# Patient Record
Sex: FEMALE | Race: BLACK OR AFRICAN AMERICAN | NOT HISPANIC OR LATINO | Employment: FULL TIME | URBAN - METROPOLITAN AREA
[De-identification: names, ages, dates, MRNs, and addresses within clinical notes are randomized per-mention and may not be internally consistent; named-entity substitution may affect disease eponyms.]

---

## 2017-01-09 ENCOUNTER — OFFICE VISIT (OUTPATIENT)
Dept: OBSTETRICS AND GYNECOLOGY | Facility: CLINIC | Age: 37
End: 2017-01-09
Payer: COMMERCIAL

## 2017-01-09 VITALS
HEIGHT: 66 IN | BODY MASS INDEX: 37.35 KG/M2 | DIASTOLIC BLOOD PRESSURE: 84 MMHG | WEIGHT: 232.38 LBS | SYSTOLIC BLOOD PRESSURE: 122 MMHG

## 2017-01-09 DIAGNOSIS — Z34.90 ENCOUNTER FOR SUPERVISION OF NORMAL PREGNANCY, ANTEPARTUM, UNSPECIFIED GRAVIDITY: Primary | ICD-10-CM

## 2017-01-09 PROCEDURE — 99999 PR PBB SHADOW E&M-EST. PATIENT-LVL II: CPT | Mod: PBBFAC,,, | Performed by: ADVANCED PRACTICE MIDWIFE

## 2017-01-09 PROCEDURE — 99212 OFFICE O/P EST SF 10 MIN: CPT | Mod: S$GLB,,, | Performed by: ADVANCED PRACTICE MIDWIFE

## 2017-01-09 PROCEDURE — 1159F MED LIST DOCD IN RCRD: CPT | Mod: S$GLB,,, | Performed by: ADVANCED PRACTICE MIDWIFE

## 2017-01-09 NOTE — LETTER
January 9, 2017    Merari Orosco  [address] [facility] [address]             [facility]  [address] [facility] [address]  Phone: [phone]  Fax: [phone] To whom it may concern,      Please excuse Merari Orosco from work  on 1/9/17.       If you have any questions or concerns, please don't hesitate to call.    Sincerely,        Miranda Tovar CNM

## 2017-01-10 ENCOUNTER — TELEPHONE (OUTPATIENT)
Dept: OBSTETRICS AND GYNECOLOGY | Facility: CLINIC | Age: 37
End: 2017-01-10

## 2017-01-10 NOTE — TELEPHONE ENCOUNTER
----- Message from Rama Santos sent at 1/10/2017  8:18 AM CST -----  Contact: pt  Call pt at [phone]// pt calling for work excuse for yesterday will be here to  today//moustapha johnson

## 2017-01-12 NOTE — PROGRESS NOTES
Pt here for IUD string check. She states she is having some spotting, nothing too heavy. IUD strings easily visualized.

## 2017-01-23 ENCOUNTER — TELEPHONE (OUTPATIENT)
Dept: OBSTETRICS AND GYNECOLOGY | Facility: CLINIC | Age: 37
End: 2017-01-23

## 2017-01-23 NOTE — TELEPHONE ENCOUNTER
----- Message from Julieth Swan sent at 1/23/2017  2:55 PM CST -----  Contact: Rose[employer] .  Calling regarding pt work excuse to return back to work, states she need to clarify restrictions, please call Rose @ [phone],

## 2017-02-02 ENCOUNTER — OFFICE VISIT (OUTPATIENT)
Dept: INTERNAL MEDICINE | Facility: CLINIC | Age: 37
End: 2017-02-02
Payer: COMMERCIAL

## 2017-02-02 VITALS
OXYGEN SATURATION: 96 % | HEART RATE: 72 BPM | WEIGHT: 231.5 LBS | BODY MASS INDEX: 37.21 KG/M2 | TEMPERATURE: 98 F | HEIGHT: 66 IN | DIASTOLIC BLOOD PRESSURE: 76 MMHG | SYSTOLIC BLOOD PRESSURE: 128 MMHG

## 2017-02-02 DIAGNOSIS — J01.90 ACUTE SINUSITIS, RECURRENCE NOT SPECIFIED, UNSPECIFIED LOCATION: Primary | ICD-10-CM

## 2017-02-02 PROCEDURE — 3078F DIAST BP <80 MM HG: CPT | Mod: S$GLB,,, | Performed by: PHYSICIAN ASSISTANT

## 2017-02-02 PROCEDURE — 3074F SYST BP LT 130 MM HG: CPT | Mod: S$GLB,,, | Performed by: PHYSICIAN ASSISTANT

## 2017-02-02 PROCEDURE — 99999 PR PBB SHADOW E&M-EST. PATIENT-LVL III: CPT | Mod: PBBFAC,,, | Performed by: PHYSICIAN ASSISTANT

## 2017-02-02 PROCEDURE — 99213 OFFICE O/P EST LOW 20 MIN: CPT | Mod: S$GLB,,, | Performed by: PHYSICIAN ASSISTANT

## 2017-02-02 RX ORDER — DOXYCYCLINE 100 MG/1
100 CAPSULE ORAL 2 TIMES DAILY
Qty: 14 CAPSULE | Refills: 0 | Status: SHIPPED | OUTPATIENT
Start: 2017-02-02 | End: 2017-02-09

## 2017-02-02 RX ORDER — METHYLPREDNISOLONE 4 MG/1
TABLET ORAL
Qty: 1 PACKAGE | Refills: 0 | Status: SHIPPED | OUTPATIENT
Start: 2017-02-02 | End: 2017-02-28

## 2017-02-02 NOTE — LETTER
February 2, 2017      [facility]  [address]  [place] [address]  Phone: [phone]  Fax: [phone]       Patient: Merari Orosco   YOB: 1980  Date of Visit: 02/02/2017    To Whom It May Concern:    Merari was at [facility] on 02/02/2017. She may return to work/school on 2/3/2017 with no restrictions. Also excuse her on 2/1/2017 as well. If you have any questions or concerns, or if I can be of further assistance, please do not hesitate to contact me.    Sincerely,          Chelsie Saucedo PA-C

## 2017-02-02 NOTE — PROGRESS NOTES
Subjective:      Patient ID: Merari Orosco is a 36 y.o. female.    Chief Complaint: Sinus Problem and Headache (with pressure behind eyes)    Sinus Problem   This is a recurrent problem. The current episode started in the past 7 days. The problem has been gradually worsening since onset. There has been no fever. The pain is moderate. Associated symptoms include congestion, ear pain, headaches and sinus pressure. Pertinent negatives include no chills, coughing, diaphoresis, hoarse voice, neck pain, shortness of breath, sneezing, sore throat or swollen glands. (Teeth pain) Treatments tried: claritin D. The treatment provided no relief.       Review of Systems   Constitutional: Positive for activity change, appetite change and fatigue. Negative for chills, diaphoresis, fever and unexpected weight change.   HENT: Positive for congestion, ear pain, postnasal drip and sinus pressure. Negative for dental problem, drooling, ear discharge, facial swelling, hearing loss, hoarse voice, mouth sores, nosebleeds, rhinorrhea, sneezing, sore throat and trouble swallowing.    Eyes: Negative for pain, discharge, redness, itching and visual disturbance.   Respiratory: Negative for cough, chest tightness, shortness of breath and wheezing.    Cardiovascular: Negative for chest pain, palpitations and leg swelling.   Gastrointestinal: Negative for abdominal pain, constipation, diarrhea, nausea and vomiting.   Endocrine: Negative.    Genitourinary: Negative.  Negative for difficulty urinating.   Musculoskeletal: Negative for myalgias, neck pain and neck stiffness.   Skin: Negative for rash.   Allergic/Immunologic: Negative for environmental allergies, food allergies and immunocompromised state.   Neurological: Positive for headaches. Negative for dizziness and weakness.     Objective:     Visit Vitals    /76 (BP Location: Right arm, Patient Position: Sitting, BP Method: Manual)    Pulse 72    Temp 97.5 °F (36.4 °C) (Tympanic)  "   Ht 5' 6" (1.676 m)    Wt 105 kg (231 lb 7.7 oz)    LMP 01/02/2017 (Approximate)    SpO2 96%    BMI 37.36 kg/m2       Physical Exam   Constitutional: She is oriented to person, place, and time. She appears well-developed and well-nourished.   HENT:   Head: Normocephalic and atraumatic.   Right Ear: Hearing, external ear and ear canal normal. No tenderness. A middle ear effusion (serous) is present.   Left Ear: Hearing, external ear and ear canal normal. No tenderness. A middle ear effusion (serous) is present.   Nose: Mucosal edema and rhinorrhea present. No sinus tenderness. Right sinus exhibits maxillary sinus tenderness and frontal sinus tenderness. Left sinus exhibits maxillary sinus tenderness and frontal sinus tenderness.   Mouth/Throat: Uvula is midline and mucous membranes are normal. No oral lesions. Normal dentition. No dental abscesses, uvula swelling or dental caries. Posterior oropharyngeal edema and posterior oropharyngeal erythema present. No oropharyngeal exudate or tonsillar abscesses. No tonsillar exudate.   Eyes: Conjunctivae and EOM are normal. Pupils are equal, round, and reactive to light. Right eye exhibits no discharge. Left eye exhibits no discharge.   Neck: Normal range of motion. Neck supple.   Cardiovascular: Normal rate, regular rhythm and normal heart sounds.  Exam reveals no gallop and no friction rub.    No murmur heard.  Pulmonary/Chest: Effort normal and breath sounds normal. No respiratory distress. She has no wheezes. She has no rales.   Lymphadenopathy:     She has no cervical adenopathy.   Neurological: She is alert and oriented to person, place, and time.   Skin: Skin is warm. No rash noted. No erythema. No pallor.   Psychiatric: She has a normal mood and affect. Her behavior is normal. Judgment and thought content normal.   Vitals reviewed.      Assessment:     1. Acute sinusitis, recurrence not specified, unspecified location      Plan:   Acute sinusitis, recurrence " not specified, unspecified location  -     methylPREDNISolone (MEDROL DOSEPACK) 4 mg tablet; use as directed  Dispense: 1 Package; Refill: 0  -     doxycycline (MONODOX) 100 MG capsule; Take 1 capsule (100 mg total) by mouth 2 (two) times daily. Do not take with milk or yogurt  Dispense: 14 capsule; Refill: 0    -flonase  -tylenol or Excedrin for headache  -Educational handout on over-the-counter medications and at-home conservative care, pertinent to the patients diagnosis today, was handed to the patient and discussed in detail.  -Steroids can increase blood sugar and blood pressure. Therefore, diabetics need to check their blood sugar regularly while taking a steroid. Patients with hypertension need to check their blood pressure regularly as well.      Return if symptoms worsen or fail to improve.

## 2017-02-02 NOTE — PATIENT INSTRUCTIONS
Acute Bacterial Rhinosinusitis (ABRS)  Acute bacterial rhinosinusitis (ABRS) is an infection of your nasal cavity and sinuses. Its caused by bacteria. Acute means that youve had symptoms for less than 12 weeks.  Understanding your sinuses  The nasal cavity is the large air-filled space behind your nose. The sinuses are a group of spaces formed by the bones of your face. They connect with your nasal cavity. ABRS causes the tissue lining these spaces to become inflamed. Mucus may not drain normally. This leads to facial pain and other symptoms.  What causes ABRS?  ABRS most often follows an upper respiratory infection caused by a virus. Bacteria then infect the lining of your nasal cavity and sinuses. But you can also get ABRS if you have:  · Nasal allergies  · Long-term nasal swelling and congestion not caused by allergies  · Blockage in the nose  Symptoms of ABRS  The symptoms of ABRS may be different for each person, and can include:  · Nasal congestion  · Runny nose  · Fluid draining from the nose down the throat (postnasal drip)  · Headache  · Cough  · Pain in the sinuses  · Thick, colored fluid from the nose (mucus)  · Fever  Diagnosing ABRS  ABRS may be diagnosed if youve had an upper respiratory infection like a cold and cough for longer than 10 to 14 days. Your health care provider will ask about your symptoms and your medical history. The provider will check your vital signs, including your temperature. Youll have a physical exam. The health care provider will check your ears, nose, and throat. You likely wont need any tests. If ABRS comes back, you may have a culture or other tests.  Treatment for ABRS  Treatment may include:  · Antibiotic medicine. This is for symptoms that last for at least 10 to 14 days.  · Nasal corticosteroid medicine. Drops or spray used in the nose can lessen swelling and congestion.  · Over-the-counter pain medicine. This is to lessen sinus pain and pressure.  · Nasal  decongestant medicine. Spray or drops may help to lessen congestion. Do not use them for more than a few days.  · Salt wash (saline irrigation). This can help to loosen mucus.  Possible complications of ABRS  ABRS may come back or become long-term (chronic).  In rare cases, ABRS may cause complications such as:   · Inflamed tissue around the brain and spinal cord (meningitis)  · Inflamed tissue around the eyes (orbital cellulitis)  · Inflamed bones around the sinuses (osteitis)  These problems may need to be treated in a hospital with intravenous (IV) antibiotic medicine or surgery.  When to call the health care provider  Call your health care provider if you have any of the following:  · Symptoms that dont get better, or get worse  · Symptoms that dont get better after 3 to 5 days on antibiotics  · Trouble seeing  · Swelling around your eyes  · Confusion or trouble staying awake      © 9508-5464 [employer]. [address] [place] [address]. All rights reserved. This information is not intended as a substitute for professional medical care. Always follow your healthcare professional's instructions.        Sinusitis (Antibiotic Treatment)    The sinuses are air-filled spaces within the bones of the face. They connect to the inside of the nose. Sinusitis is an inflammation of the tissue lining the sinus cavity. Sinus inflammation can occur during a cold. It can also be due to allergies to pollens and other particles in the air. Sinusitis can cause symptoms of sinus congestion and fullness. A sinus infection causes fever, headache and facial pain. There is often green or yellow drainage from the nose or into the back of the throat (post-nasal drip). You have been given antibiotics to treat this condition.  Home care:  · Take the full course of antibiotics as instructed. Do not stop taking them, even if you feel better.  · Drink plenty of water, hot tea, and other liquids. This may help thin  mucus. It also may promote sinus drainage.  · Heat may help soothe painful areas of the face. Use a towel soaked in hot water. Or,  the shower and direct the hot spray onto your face. Using a vaporizer along with a menthol rub at night may also help.   · An expectorant containing guaifenesin may help thin the mucus and promote drainage from the sinuses.  · Over-the-counter decongestants may be used unless a similar medicine was prescribed. Nasal sprays work the fastest. Use one that contains phenylephrine or oxymetazoline. First blow the nose gently. Then use the spray. Do not use these medicines more often than directed on the label or symptoms may get worse. You may also use tablets containing pseudoephedrine. Avoid products that combine ingredients, because side effects may be increased. Read labels. You can also ask the pharmacist for help. (NOTE: Persons with high blood pressure should not use decongestants. They can raise blood pressure.)  · Over-the-counter antihistamines may help if allergies contributed to your sinusitis.    · Do not use nasal rinses or irrigation during an acute sinus infection, unless told to by your health care provider. Rinsing may spread the infection to other sinuses.  · Use acetaminophen or ibuprofen to control pain, unless another pain medicine was prescribed. (If you have chronic liver or kidney disease or ever had a stomach ulcer, talk with your doctor before using these medicines. Aspirin should never be used in anyone under 18 years of age who is ill with a fever. It may cause severe liver damage.)  · Don't smoke. This can worsen symptoms.  Follow-up care  Follow up with your healthcare provider or our staff if you are not improving within the next week.  When to seek medical advice  Call your healthcare provider if any of these occur:  · Facial pain or headache becoming more severe  · Stiff neck  · Unusual drowsiness or confusion  · Swelling of the forehead or  eyelids  · Vision problems, including blurred or double vision  · Fever of 100.4ºF (38ºC) or higher, or as directed by your healthcare provider  · Seizure  · Breathing problems  · Symptoms not resolving within 10 days  © 3342-2108 [employer]. [address] [place] [address]. All rights reserved. This information is not intended as a substitute for professional medical care. Always follow your healthcare professional's instructions.        Preventing Sinusitis    Colds, flu, and allergies can lead more easily to sinusitis. Do your best to prevent sinusitis by preventing these underlying problems. Do what you can to avoid getting colds and other infections. Avoid any allergens (substances that cause allergies), and keep your sinuses as moist as possible.  Tips for air travel  When traveling on an airplane, use saline nasal spray to keep your sinuses moist. Drink plenty of fluids. You may also want to take a decongestant before boarding.   Prevent colds  Do what you can to avoid exposure to colds and flu. Whenever possible, take more time to rest when you feel something coming on.  · Wash your hands often, especially during cold and flu season.  · As much as possible, stay away from infected people.  · Follow these standbys for beating the bugs: eat balanced meals, exercise regularly, and get plenty of sleep.  Avoid allergens  First find out what substances youre allergic to. Then take steps to minimize exposure to allergens or irritants in the air such as dust, pollution, and pollen.  · Wear a mask when you clean, or consider hiring a  to help minimize your exposure to dust.  · Sit in the nonsmoking sections of restaurants.  · Avoid the outdoors during peak pollution hours such as rush hour.  · Keep an air conditioner on during allergy season and clean its filter regularly.  Maximize moisture  Keeping your sinuses moist makes your mucus thinner, allowing your sinuses to drain  better. This, in turn, helps prevent infection. Ask your doctor about these suggestions:  · Use a humidifier, regularly cleaning out any mold or mildew in the reservoir.  · Drink several glasses of water a day.  · Avoid drying substances such as alcohol and coffee.  · Avoid smoke, which dries out sinus linings.  · Use saltwater rinses.  © 4061-8704 [employer]. [address] [place] [address], [place] [address]. All rights reserved. This information is not intended as a substitute for professional medical care. Always follow your healthcare professional's instructions.        Self-Care for Sinusitis     Drinking plenty of water can help sinuses drain.     Sinusitis can often be managed with self-care. Self-care can keep sinuses moist and make you feel more comfortable. Remember to follow your doctor's instructions closely, which can make a big difference in getting your sinus problem under control.  Drink fluids  Drinking extra fluids -- a glass every hour or two -- helps thin your mucus, allowing it to drain from your sinuses more easily. A humidifier helps in much the same way. Fluids can also offset the drying effects of certain drugs.  Use saltwater rinses  Rinses help keep your sinuses and nose moist. Mix a teaspoon of salt in 8 ounces of fresh, warm water. Use a bulb syringe to gently squirt the water into your nose a few times a day. You can also buy ready-made saline nasal sprays.  Apply hot or cold packs  Applying heat to the area surrounding your sinuses may make you feel more comfortable. Use a hot water bottle or a hand towel dipped in hot water. Some people also find ice packs effective for relieving pain.  Medications  Your doctor may prescribe medications to help treat your sinusitis. If you have an infection, antibiotics can help clear it up. If you are prescribed antibiotics, take all pills on schedule until they are gone, even if you feel better. Decongestants help relieve swelling. Use  decongestant sprays for short periods only under the direction of your doctor. If you have allergies, your doctor may prescribe medications to help relieve them.   © 8512-2549 [employer]. [address] [place] [address]. All rights reserved. This information is not intended as a substitute for professional medical care. Always follow your healthcare professional's instructions.        Sinus Headaches     When using nasal spray, keep your chin down and angle the spray away from center.     Sinus headaches can cause a gnawing pain behind the nose and eyes. The pain most often gets worse in the afternoon and evening. You may also run a fever. Sinus headaches are caused by colds or allergies that make the nasal passages inflamed or infected.  To help prevent sinus headaches:  · Treat colds promptly to keep mucus from backing up.  · Avoid things that trigger sinus problems, such as pollens, dust, smoke, fumes, and strong odors.  · Take allergy medications as directed by your doctor.  To relieve the pain:  · Keep your sinuses open and free of mucus. Try over-the-counter sinus rinse products.  · Use a nasal decongestant as directed to reduce the inflammation.  · Drink fluids to keep the mucus thinner. This helps it drain more easily. You can also use a humidifier.  · Apply hot packs to the area around your sinuses. Use a hot water bottle.  · See your doctor if your sinus headache lasts more than two weeks. You may need medication for a sinus infection or an exam to check for other headache conditions, like migraines.  © 0957-6132 [employer]. [address] [place] [address]. All rights reserved. This information is not intended as a substitute for professional medical care. Always follow your healthcare professional's instructions.

## 2017-02-02 NOTE — MR AVS SNAPSHOT
[facility]  [address] [facility] [place] [address]  Phone: [phone]  Fax: [phone]                  Merari Orosco   2017 10:20 AM   Office Visit    Description:  Female : 1980   Provider:  Chelsie Saucedo PA-C   Department:  [facility] Internal Medicine           Reason for Visit     Sinus Problem     Headache           Diagnoses this Visit        Comments    Acute sinusitis, recurrence not specified, unspecified location    -  Primary            To Do List           Future Appointments        Provider Department Dept Phone    2017 4:20 PM Gena Ordonez MD [facility] [phone]      Goals (5 Years of Data)     None      Follow-Up and Disposition     Return if symptoms worsen or fail to improve.    Follow-up and Disposition History       These Medications        Disp Refills Start End    methylPREDNISolone (MEDROL DOSEPACK) 4 mg tablet 1 Package 0 2017     use as directed    Pharmacy: [facility] [address] [place] [address] [place] [address] AT [facility] Ph #: [phone]       doxycycline (MONODOX) 100 MG capsule 14 capsule 0 2017    Take 1 capsule (100 mg total) by mouth 2 (two) times daily. Do not take with milk or yogurt - Oral    Pharmacy: [facility] [address] [place] [address] [place] - [address] AT [facility] Ph #: [phone]         [facility] On Call     [facility] On Call Nurse [facility] -  Assistance  Registered nurses in the [facility] On Call Center provide clinical advisement, health education, appointment booking, and other advisory services.  Call for this free service at [phone].             Medications           Message regarding Medications     Verify the changes and/or additions to your medication regime listed below are the same as discussed with your clinician today.  If any of these changes or additions are incorrect, please notify your healthcare provider.       "  START taking these NEW medications        Refills    methylPREDNISolone (MEDROL DOSEPACK) 4 mg tablet 0    Sig: use as directed    Class: Normal    doxycycline (MONODOX) 100 MG capsule 0    Sig: Take 1 capsule (100 mg total) by mouth 2 (two) times daily. Do not take with milk or yogurt    Class: Normal    Route: Oral      STOP taking these medications     ibuprofen (ADVIL,MOTRIN) 800 MG tablet Take 1 tablet (800 mg total) by mouth every 8 (eight) hours as needed for Pain.    clonazePAM (KLONOPIN) 0.5 MG tablet Take 1 tablet (0.5 mg total) by mouth 2 (two) times daily as needed for Anxiety.           Verify that the below list of medications is an accurate representation of the medications you are currently taking.  If none reported, the list may be blank. If incorrect, please contact your healthcare provider. Carry this list with you in case of emergency.           Current Medications     fluoxetine (PROZAC) 40 MG capsule Take 1 capsule (40 mg total) by mouth once daily.    lisinopril-hydrochlorothiazide (PRINZIDE,ZESTORETIC) 20-12.5 mg per tablet Take 2 tablets by mouth once daily.    metoprolol tartrate (LOPRESSOR) 50 MG tablet Take 1 tablet (50 mg total) by mouth 2 (two) times daily.    promethazine-dextromethorphan (PROMETHAZINE-DM) 6.25-15 mg/5 mL Syrp Take 5 mLs by mouth every 6 (six) hours as needed. May cause drowsiness    doxycycline (MONODOX) 100 MG capsule Take 1 capsule (100 mg total) by mouth 2 (two) times daily. Do not take with milk or yogurt    methylPREDNISolone (MEDROL DOSEPACK) 4 mg tablet use as directed           Clinical Reference Information           Your Vitals Were     BP Pulse Temp Height    128/76 (BP Location: Right arm, Patient Position: Sitting, BP Method: Manual) 72 97.5 °F (36.4 °C) (Tympanic) 5' 6" (1.676 m)    Weight Last Period SpO2 BMI    105 kg (231 lb 7.7 oz) 01/02/2017 (Approximate) 96% 37.36 kg/m2      Blood Pressure          Most Recent Value    BP  128/76      Allergies " as of 2/2/2017     No Known Allergies      Immunizations Administered on Date of Encounter - 2/2/2017     None      Instructions      Acute Bacterial Rhinosinusitis (ABRS)  Acute bacterial rhinosinusitis (ABRS) is an infection of your nasal cavity and sinuses. Its caused by bacteria. Acute means that youve had symptoms for less than 12 weeks.  Understanding your sinuses  The nasal cavity is the large air-filled space behind your nose. The sinuses are a group of spaces formed by the bones of your face. They connect with your nasal cavity. ABRS causes the tissue lining these spaces to become inflamed. Mucus may not drain normally. This leads to facial pain and other symptoms.  What causes ABRS?  ABRS most often follows an upper respiratory infection caused by a virus. Bacteria then infect the lining of your nasal cavity and sinuses. But you can also get ABRS if you have:  · Nasal allergies  · Long-term nasal swelling and congestion not caused by allergies  · Blockage in the nose  Symptoms of ABRS  The symptoms of ABRS may be different for each person, and can include:  · Nasal congestion  · Runny nose  · Fluid draining from the nose down the throat (postnasal drip)  · Headache  · Cough  · Pain in the sinuses  · Thick, colored fluid from the nose (mucus)  · Fever  Diagnosing ABRS  ABRS may be diagnosed if youve had an upper respiratory infection like a cold and cough for longer than 10 to 14 days. Your health care provider will ask about your symptoms and your medical history. The provider will check your vital signs, including your temperature. Youll have a physical exam. The health care provider will check your ears, nose, and throat. You likely wont need any tests. If ABRS comes back, you may have a culture or other tests.  Treatment for ABRS  Treatment may include:  · Antibiotic medicine. This is for symptoms that last for at least 10 to 14 days.  · Nasal corticosteroid medicine. Drops or spray used in the  nose can lessen swelling and congestion.  · Over-the-counter pain medicine. This is to lessen sinus pain and pressure.  · Nasal decongestant medicine. Spray or drops may help to lessen congestion. Do not use them for more than a few days.  · Salt wash (saline irrigation). This can help to loosen mucus.  Possible complications of ABRS  ABRS may come back or become long-term (chronic).  In rare cases, ABRS may cause complications such as:   · Inflamed tissue around the brain and spinal cord (meningitis)  · Inflamed tissue around the eyes (orbital cellulitis)  · Inflamed bones around the sinuses (osteitis)  These problems may need to be treated in a hospital with intravenous (IV) antibiotic medicine or surgery.  When to call the health care provider  Call your health care provider if you have any of the following:  · Symptoms that dont get better, or get worse  · Symptoms that dont get better after 3 to 5 days on antibiotics  · Trouble seeing  · Swelling around your eyes  · Confusion or trouble staying awake      © 5658-6242 [employer]. [address] [place] [address]. All rights reserved. This information is not intended as a substitute for professional medical care. Always follow your healthcare professional's instructions.        Sinusitis (Antibiotic Treatment)    The sinuses are air-filled spaces within the bones of the face. They connect to the inside of the nose. Sinusitis is an inflammation of the tissue lining the sinus cavity. Sinus inflammation can occur during a cold. It can also be due to allergies to pollens and other particles in the air. Sinusitis can cause symptoms of sinus congestion and fullness. A sinus infection causes fever, headache and facial pain. There is often green or yellow drainage from the nose or into the back of the throat (post-nasal drip). You have been given antibiotics to treat this condition.  Home care:  · Take the full course of antibiotics as  instructed. Do not stop taking them, even if you feel better.  · Drink plenty of water, hot tea, and other liquids. This may help thin mucus. It also may promote sinus drainage.  · Heat may help soothe painful areas of the face. Use a towel soaked in hot water. Or,  the shower and direct the hot spray onto your face. Using a vaporizer along with a menthol rub at night may also help.   · An expectorant containing guaifenesin may help thin the mucus and promote drainage from the sinuses.  · Over-the-counter decongestants may be used unless a similar medicine was prescribed. Nasal sprays work the fastest. Use one that contains phenylephrine or oxymetazoline. First blow the nose gently. Then use the spray. Do not use these medicines more often than directed on the label or symptoms may get worse. You may also use tablets containing pseudoephedrine. Avoid products that combine ingredients, because side effects may be increased. Read labels. You can also ask the pharmacist for help. (NOTE: Persons with high blood pressure should not use decongestants. They can raise blood pressure.)  · Over-the-counter antihistamines may help if allergies contributed to your sinusitis.    · Do not use nasal rinses or irrigation during an acute sinus infection, unless told to by your health care provider. Rinsing may spread the infection to other sinuses.  · Use acetaminophen or ibuprofen to control pain, unless another pain medicine was prescribed. (If you have chronic liver or kidney disease or ever had a stomach ulcer, talk with your doctor before using these medicines. Aspirin should never be used in anyone under 18 years of age who is ill with a fever. It may cause severe liver damage.)  · Don't smoke. This can worsen symptoms.  Follow-up care  Follow up with your healthcare provider or our staff if you are not improving within the next week.  When to seek medical advice  Call your healthcare provider if any of these  occur:  · Facial pain or headache becoming more severe  · Stiff neck  · Unusual drowsiness or confusion  · Swelling of the forehead or eyelids  · Vision problems, including blurred or double vision  · Fever of 100.4ºF (38ºC) or higher, or as directed by your healthcare provider  · Seizure  · Breathing problems  · Symptoms not resolving within 10 days  © 2000-2016 [employer]. [address] [place] [address], [place] [address]. All rights reserved. This information is not intended as a substitute for professional medical care. Always follow your healthcare professional's instructions.        Preventing Sinusitis    Colds, flu, and allergies can lead more easily to sinusitis. Do your best to prevent sinusitis by preventing these underlying problems. Do what you can to avoid getting colds and other infections. Avoid any allergens (substances that cause allergies), and keep your sinuses as moist as possible.  Tips for air travel  When traveling on an airplane, use saline nasal spray to keep your sinuses moist. Drink plenty of fluids. You may also want to take a decongestant before boarding.   Prevent colds  Do what you can to avoid exposure to colds and flu. Whenever possible, take more time to rest when you feel something coming on.  · Wash your hands often, especially during cold and flu season.  · As much as possible, stay away from infected people.  · Follow these standbys for beating the bugs: eat balanced meals, exercise regularly, and get plenty of sleep.  Avoid allergens  First find out what substances youre allergic to. Then take steps to minimize exposure to allergens or irritants in the air such as dust, pollution, and pollen.  · Wear a mask when you clean, or consider hiring a  to help minimize your exposure to dust.  · Sit in the nonsmoking sections of restaurants.  · Avoid the outdoors during peak pollution hours such as rush hour.  · Keep an air conditioner on during allergy season  and clean its filter regularly.  Maximize moisture  Keeping your sinuses moist makes your mucus thinner, allowing your sinuses to drain better. This, in turn, helps prevent infection. Ask your doctor about these suggestions:  · Use a humidifier, regularly cleaning out any mold or mildew in the reservoir.  · Drink several glasses of water a day.  · Avoid drying substances such as alcohol and coffee.  · Avoid smoke, which dries out sinus linings.  · Use saltwater rinses.  © 8779-4428 [employer]. [address] [place] [address], [place] [address]. All rights reserved. This information is not intended as a substitute for professional medical care. Always follow your healthcare professional's instructions.        Self-Care for Sinusitis     Drinking plenty of water can help sinuses drain.     Sinusitis can often be managed with self-care. Self-care can keep sinuses moist and make you feel more comfortable. Remember to follow your doctor's instructions closely, which can make a big difference in getting your sinus problem under control.  Drink fluids  Drinking extra fluids -- a glass every hour or two -- helps thin your mucus, allowing it to drain from your sinuses more easily. A humidifier helps in much the same way. Fluids can also offset the drying effects of certain drugs.  Use saltwater rinses  Rinses help keep your sinuses and nose moist. Mix a teaspoon of salt in 8 ounces of fresh, warm water. Use a bulb syringe to gently squirt the water into your nose a few times a day. You can also buy ready-made saline nasal sprays.  Apply hot or cold packs  Applying heat to the area surrounding your sinuses may make you feel more comfortable. Use a hot water bottle or a hand towel dipped in hot water. Some people also find ice packs effective for relieving pain.  Medications  Your doctor may prescribe medications to help treat your sinusitis. If you have an infection, antibiotics can help clear it up. If you are  prescribed antibiotics, take all pills on schedule until they are gone, even if you feel better. Decongestants help relieve swelling. Use decongestant sprays for short periods only under the direction of your doctor. If you have allergies, your doctor may prescribe medications to help relieve them.   © 5376-1425 [employer]. [address] [place] [address]. All rights reserved. This information is not intended as a substitute for professional medical care. Always follow your healthcare professional's instructions.        Sinus Headaches     When using nasal spray, keep your chin down and angle the spray away from center.     Sinus headaches can cause a gnawing pain behind the nose and eyes. The pain most often gets worse in the afternoon and evening. You may also run a fever. Sinus headaches are caused by colds or allergies that make the nasal passages inflamed or infected.  To help prevent sinus headaches:  · Treat colds promptly to keep mucus from backing up.  · Avoid things that trigger sinus problems, such as pollens, dust, smoke, fumes, and strong odors.  · Take allergy medications as directed by your doctor.  To relieve the pain:  · Keep your sinuses open and free of mucus. Try over-the-counter sinus rinse products.  · Use a nasal decongestant as directed to reduce the inflammation.  · Drink fluids to keep the mucus thinner. This helps it drain more easily. You can also use a humidifier.  · Apply hot packs to the area around your sinuses. Use a hot water bottle.  · See your doctor if your sinus headache lasts more than two weeks. You may need medication for a sinus infection or an exam to check for other headache conditions, like migraines.  © 6096-7492 [employer]. [address] [place] [address]. All rights reserved. This information is not intended as a substitute for professional medical care. Always follow your healthcare professional's  instructions.             Language Assistance Services     ATTENTION: Language assistance services are available, free of charge. Please call [phone].      ATENCIÓN: Si habla marisel, tiene a peralta disposición servicios gratuitos de asistencia lingüística. Llame al [phone].     CHÚ Ý: N?u b?n nói Ti?ng Vi?t, có các d?ch v? h? tr? ngôn ng? mi?n phí dành cho b?n. G?i s? [phone].         [facility] complies with applicable Federal civil rights laws and does not discriminate on the basis of race, color, national origin, age, disability, or sex.

## 2017-02-13 DIAGNOSIS — F32.A ANXIETY AND DEPRESSION: ICD-10-CM

## 2017-02-13 DIAGNOSIS — F41.9 ANXIETY AND DEPRESSION: ICD-10-CM

## 2017-02-14 RX ORDER — CLONAZEPAM 0.5 MG/1
0.5 TABLET ORAL 2 TIMES DAILY PRN
Qty: 30 TABLET | Refills: 0 | OUTPATIENT
Start: 2017-02-14

## 2017-02-21 ENCOUNTER — TELEPHONE (OUTPATIENT)
Dept: PHARMACY | Facility: CLINIC | Age: 37
End: 2017-02-21

## 2017-02-21 NOTE — TELEPHONE ENCOUNTER
Patient called to ask for a refill on her Clonazepam.  I explained we faxed her doctor and were waiting on a response.      I told her she could contact her doctor through the [facility] portal or leave her a message through the phone lines.      Patient is asking us to rush the prescription, I indicated that we can not do that until we get a hard copy.

## 2017-02-28 ENCOUNTER — TELEPHONE (OUTPATIENT)
Dept: INTERNAL MEDICINE | Facility: CLINIC | Age: 37
End: 2017-02-28

## 2017-02-28 ENCOUNTER — OFFICE VISIT (OUTPATIENT)
Dept: INTERNAL MEDICINE | Facility: CLINIC | Age: 37
End: 2017-02-28
Payer: COMMERCIAL

## 2017-02-28 VITALS
TEMPERATURE: 98 F | SYSTOLIC BLOOD PRESSURE: 120 MMHG | HEIGHT: 66 IN | WEIGHT: 232.38 LBS | BODY MASS INDEX: 37.35 KG/M2 | OXYGEN SATURATION: 98 % | DIASTOLIC BLOOD PRESSURE: 84 MMHG | HEART RATE: 81 BPM

## 2017-02-28 DIAGNOSIS — M54.50 ACUTE BILATERAL LOW BACK PAIN WITHOUT SCIATICA: Primary | ICD-10-CM

## 2017-02-28 LAB
BACTERIA #/AREA URNS HPF: ABNORMAL /HPF
BILIRUB UR QL STRIP: NEGATIVE
CLARITY UR: CLEAR
COLOR UR: YELLOW
GLUCOSE UR QL STRIP: NEGATIVE
HGB UR QL STRIP: ABNORMAL
KETONES UR QL STRIP: NEGATIVE
LEUKOCYTE ESTERASE UR QL STRIP: NEGATIVE
MICROSCOPIC COMMENT: ABNORMAL
NITRITE UR QL STRIP: NEGATIVE
PH UR STRIP: 7 [PH] (ref 5–8)
PROT UR QL STRIP: NEGATIVE
RBC #/AREA URNS HPF: 4 /HPF (ref 0–4)
SP GR UR STRIP: 1 (ref 1–1.03)
SQUAMOUS #/AREA URNS HPF: 3 /HPF
URN SPEC COLLECT METH UR: ABNORMAL
WBC #/AREA URNS HPF: 2 /HPF (ref 0–5)

## 2017-02-28 PROCEDURE — 3079F DIAST BP 80-89 MM HG: CPT | Mod: S$GLB,,, | Performed by: FAMILY MEDICINE

## 2017-02-28 PROCEDURE — 3074F SYST BP LT 130 MM HG: CPT | Mod: S$GLB,,, | Performed by: FAMILY MEDICINE

## 2017-02-28 PROCEDURE — 1160F RVW MEDS BY RX/DR IN RCRD: CPT | Mod: S$GLB,,, | Performed by: FAMILY MEDICINE

## 2017-02-28 PROCEDURE — 81000 URINALYSIS NONAUTO W/SCOPE: CPT

## 2017-02-28 PROCEDURE — 99999 PR PBB SHADOW E&M-EST. PATIENT-LVL III: CPT | Mod: PBBFAC,,, | Performed by: FAMILY MEDICINE

## 2017-02-28 PROCEDURE — 87086 URINE CULTURE/COLONY COUNT: CPT

## 2017-02-28 PROCEDURE — 99213 OFFICE O/P EST LOW 20 MIN: CPT | Mod: S$GLB,,, | Performed by: FAMILY MEDICINE

## 2017-02-28 RX ORDER — CYCLOBENZAPRINE HCL 5 MG
5 TABLET ORAL 3 TIMES DAILY PRN
Qty: 10 TABLET | Refills: 0 | Status: SHIPPED | OUTPATIENT
Start: 2017-02-28 | End: 2017-03-07 | Stop reason: SDUPTHER

## 2017-02-28 NOTE — PROGRESS NOTES
Subjective:       Patient ID: Merari Orosco is a 36 y.o. female.    Chief Complaint: Medication Refill and Back Pain    HPI  Back Pain: Patient presents for presents evaluation of low back problems.  Symptoms have been present for a few months and include pain in low back both sides (throbbing in character; 6/10 in severity). With moving around it is worse than a 6. Initial inciting event: IUD placement. Symptoms are worst: NA. Pain is worse with cycle. Alleviating factors identifiable by patient are Using a blanket for pressure (in chair against back). Exacerbating factors identifiable by patient are a lot of movement and laying flat. Treatments so far initiated by patient: took ibuprofen 800 mg which did not help.  Previous lower back problems: has had pain in the past but did not last as long. Previous workup: none. Previous treatments: none. She has had IUD checked.       Review of Systems   Constitutional: Negative for fatigue and fever.   Genitourinary: Positive for flank pain. Negative for decreased urine volume, difficulty urinating, dysuria, pelvic pain and urgency.   Musculoskeletal: Positive for back pain. Negative for gait problem, joint swelling, neck pain and neck stiffness.   Neurological: Negative for dizziness, weakness, light-headedness and headaches.   Psychiatric/Behavioral: Negative for sleep disturbance.         Objective:        Physical Exam   Constitutional: She is oriented to person, place, and time. She appears well-developed and well-nourished.   HENT:   Head: Normocephalic and atraumatic.   Cardiovascular: Normal heart sounds.    Pulmonary/Chest: Breath sounds normal.   Abdominal: Soft. Bowel sounds are normal. She exhibits no distension. There is no tenderness.   Musculoskeletal: Normal range of motion.   Neurological: She is alert and oriented to person, place, and time.   Skin: No rash noted.   Psychiatric: She has a normal mood and affect.   Nursing note and vitals reviewed.         Assessment/Plan:   Acute bilateral low back pain without sciatica  -     Urinalysis-trace blood  -     Urine culture-pending        -     Urinalysis Microscopic-few bacteria  -     cyclobenzaprine (FLEXERIL) 5 MG tablet; Take 1 tablet (5 mg total) by mouth 3 (three) times daily as needed for Muscle spasms.  Dispense: 10 tablet; Refill: 0    See PCP for medication refills (Xanax)  Return if symptoms worsen or fail to improve.    Shefali Kirk MD  [facility]   Family Medicine

## 2017-02-28 NOTE — MR AVS SNAPSHOT
[facility]  [address] [place] [address]  [place] [address]  Phone: [phone]  Fax: [phone]                  Merari Orosco   2017 1:40 PM   Office Visit    Description:  Female : 1980   Provider:  Shefali Kirk MD   Department:  [facility]           Reason for Visit     Medication Refill     Back Pain           Diagnoses this Visit        Comments    Acute bilateral low back pain without sciatica    -  Primary            To Do List           Goals (5 Years of Data)     None      [facility]     [facility] On Call Nurse Care Line -  Assistance  Registered nurses in the [facility] On Call Center provide clinical advisement, health education, appointment booking, and other advisory services.  Call for this free service at [phone].             Medications           Message regarding Medications     Verify the changes and/or additions to your medication regime listed below are the same as discussed with your clinician today.  If any of these changes or additions are incorrect, please notify your healthcare provider.        STOP taking these medications     methylPREDNISolone (MEDROL DOSEPACK) 4 mg tablet use as directed           Verify that the below list of medications is an accurate representation of the medications you are currently taking.  If none reported, the list may be blank. If incorrect, please contact your healthcare provider. Carry this list with you in case of emergency.           Current Medications     fluoxetine (PROZAC) 40 MG capsule Take 1 capsule (40 mg total) by mouth once daily.    lisinopril-hydrochlorothiazide (PRINZIDE,ZESTORETIC) 20-12.5 mg per tablet Take 2 tablets by mouth once daily.    metoprolol tartrate (LOPRESSOR) 50 MG tablet Take 1 tablet (50 mg total) by mouth 2 (two) times daily.    promethazine-dextromethorphan (PROMETHAZINE-DM) 6.25-15 mg/5 mL Syrp Take 5 mLs by mouth every 6 (six) hours as needed. May  cause drowsiness           Clinical Reference Information           Your Vitals Were     BP                   120/84 (BP Location: Left arm, Patient Position: Sitting, BP Method: Manual)           Blood Pressure          Most Recent Value    BP  120/84      Allergies as of 2/28/2017     No Known Allergies      Immunizations Administered on Date of Encounter - 2/28/2017     None      Orders Placed During Today's Visit      Normal Orders This Visit    Urinalysis     Urine culture       Language Assistance Services     ATTENTION: Language assistance services are available, free of charge. Please call [phone].      ATENCIÓN: Si chrisla marisel, tiene a peralta disposición servicios gratuitos de asistencia lingüística. Llame al [phone].     CHÚ Ý: N?u b?n nói Ti?ng Vi?t, có các d?ch v? h? tr? ngôn ng? mi?n phí dành cho b?n. G?i s? [phone].         [facility] complies with applicable Federal civil rights laws and does not discriminate on the basis of race, color, national origin, age, disability, or sex.

## 2017-03-02 LAB
BACTERIA UR CULT: NORMAL
BACTERIA UR CULT: NORMAL

## 2017-03-07 ENCOUNTER — PATIENT MESSAGE (OUTPATIENT)
Dept: INTERNAL MEDICINE | Facility: CLINIC | Age: 37
End: 2017-03-07

## 2017-03-07 DIAGNOSIS — M54.50 ACUTE BILATERAL LOW BACK PAIN WITHOUT SCIATICA: ICD-10-CM

## 2017-03-07 RX ORDER — CYCLOBENZAPRINE HCL 5 MG
5 TABLET ORAL 3 TIMES DAILY PRN
Qty: 10 TABLET | Refills: 0 | Status: SHIPPED | OUTPATIENT
Start: 2017-03-07 | End: 2017-03-17

## 2017-07-27 ENCOUNTER — OFFICE VISIT (OUTPATIENT)
Dept: INTERNAL MEDICINE | Facility: CLINIC | Age: 37
End: 2017-07-27
Payer: COMMERCIAL

## 2017-07-27 VITALS
WEIGHT: 241.63 LBS | HEART RATE: 83 BPM | TEMPERATURE: 96 F | BODY MASS INDEX: 38.83 KG/M2 | HEIGHT: 66 IN | DIASTOLIC BLOOD PRESSURE: 74 MMHG | SYSTOLIC BLOOD PRESSURE: 116 MMHG

## 2017-07-27 DIAGNOSIS — M54.50 ACUTE BILATERAL LOW BACK PAIN WITHOUT SCIATICA: Primary | ICD-10-CM

## 2017-07-27 PROCEDURE — 99214 OFFICE O/P EST MOD 30 MIN: CPT | Mod: 25,S$GLB,, | Performed by: NURSE PRACTITIONER

## 2017-07-27 PROCEDURE — 99999 PR PBB SHADOW E&M-EST. PATIENT-LVL III: CPT | Mod: PBBFAC,,, | Performed by: NURSE PRACTITIONER

## 2017-07-27 PROCEDURE — 96372 THER/PROPH/DIAG INJ SC/IM: CPT | Mod: S$GLB,,, | Performed by: NURSE PRACTITIONER

## 2017-07-27 RX ORDER — DICLOFENAC SODIUM 10 MG/G
2 GEL TOPICAL DAILY
Qty: 100 G | Refills: 0 | Status: SHIPPED | OUTPATIENT
Start: 2017-07-27 | End: 2017-08-24

## 2017-07-27 RX ORDER — METHYLPREDNISOLONE ACETATE 80 MG/ML
80 INJECTION, SUSPENSION INTRA-ARTICULAR; INTRALESIONAL; INTRAMUSCULAR; SOFT TISSUE
Status: COMPLETED | OUTPATIENT
Start: 2017-07-27 | End: 2017-07-27

## 2017-07-27 RX ORDER — CYCLOBENZAPRINE HCL 10 MG
10 TABLET ORAL NIGHTLY PRN
Qty: 10 TABLET | Refills: 0 | Status: SHIPPED | OUTPATIENT
Start: 2017-07-27 | End: 2017-08-03

## 2017-07-27 RX ADMIN — METHYLPREDNISOLONE ACETATE 80 MG: 80 INJECTION, SUSPENSION INTRA-ARTICULAR; INTRALESIONAL; INTRAMUSCULAR; SOFT TISSUE at 11:07

## 2017-07-27 NOTE — PROGRESS NOTES
Subjective:       Patient ID: Merari Orosco is a 37 y.o. female.    Chief Complaint: Back Pain    Pt presents with midline lumbar back pain x 6 weeks    She has been having this pain on and off since last year. She correlates the pain with her Mirena IUD. She states that she was not having the pain until she got the Mirena. She describes it as cramping and tightness. Pain is 7/10.  Pain is worse when sitting to standing and walking. She has used ibuprofen and it is not helping. She was seen in local urgent a week ago where she had lumbar xrays that were negative. She was also given norco for pain which she states helps but makes her too drowsy and the pain just comes right back. She reports having urinary urgency since IUD was placed as well. Pt has not been back to see GYN since IUD was placed has only gone to urgent cares to be seen. No radicular pain, no leg weakness, numbness/tingling, incont to bladder or bowel, fever, cp, sob, headaches, or any other acute symptoms.      Past Medical History:  No date: Hypertension            Review of Systems   Constitutional: Positive for activity change. Negative for appetite change, chills, diaphoresis, fatigue, fever and unexpected weight change.   HENT: Negative.    Eyes: Negative for visual disturbance.   Respiratory: Negative for apnea, cough, choking, chest tightness, shortness of breath, wheezing and stridor.    Cardiovascular: Negative for chest pain, palpitations and leg swelling.   Genitourinary: Positive for urgency. Negative for decreased urine volume, difficulty urinating, dyspareunia, dysuria, enuresis, flank pain, frequency, genital sores, hematuria, menstrual problem, pelvic pain, vaginal bleeding, vaginal discharge and vaginal pain.   Musculoskeletal: Positive for back pain (lower/ midline ) and gait problem. Negative for arthralgias, joint swelling, myalgias, neck pain and neck stiffness.   Neurological: Negative for dizziness, tremors, seizures, facial  asymmetry, speech difficulty, weakness, light-headedness, numbness and headaches.   Psychiatric/Behavioral: Negative.        Objective:      Physical Exam   Constitutional: She is oriented to person, place, and time.   obese   Cardiovascular: Normal rate, regular rhythm and normal heart sounds.    Pulmonary/Chest: Effort normal and breath sounds normal.   Musculoskeletal:        Lumbar back: She exhibits tenderness, bony tenderness, pain and spasm. She exhibits no swelling, no edema, no deformity, no laceration and normal pulse.   Neurological: She is alert and oriented to person, place, and time.   Skin: Skin is warm and dry.   Psychiatric: She has a normal mood and affect.       Assessment:       1. Acute bilateral low back pain without sciatica        Plan:   Acute bilateral low back pain without sciatica  -     methylPREDNISolone acetate injection 80 mg; Inject 1 mL (80 mg total) into the muscle one time.  -     cyclobenzaprine (FLEXERIL) 10 MG tablet; Take 1 tablet (10 mg total) by mouth nightly as needed for Muscle spasms.  Dispense: 10 tablet; Refill: 0  -     diclofenac sodium (VOLTAREN) 1 % Gel; Apply 2 g topically once daily.  Dispense: 100 g; Refill: 0      Heating pad to lower back  Steroid shot today. Take OTC ( aleve, ibuprofen) 3 pills up to 2 times daily for pain starting tomorrow   Rub in diclofenac to lower back  Take muscle relaxant only at night they cause drowsiness  Follow up with gyn to discuss IUD and other options /F/U with PCP PRN

## 2017-07-27 NOTE — PATIENT INSTRUCTIONS
Heating pad to lower back  Steroid shot today. Take OTC ( aleve, ibuprofen) 3 pills up to 2 times daily for pain starting tomorrow   Rub in diclofenac to lower back  Take muscle relaxant only at night they cause drowsiness  Follow up with gyn to discuss IUD and other options

## 2017-08-04 DIAGNOSIS — F32.A ANXIETY AND DEPRESSION: ICD-10-CM

## 2017-08-04 DIAGNOSIS — F41.9 ANXIETY AND DEPRESSION: ICD-10-CM

## 2017-08-04 RX ORDER — FLUOXETINE HYDROCHLORIDE 40 MG/1
CAPSULE ORAL
Qty: 30 CAPSULE | Refills: 2 | OUTPATIENT
Start: 2017-08-04

## 2017-08-24 ENCOUNTER — OFFICE VISIT (OUTPATIENT)
Dept: OTOLARYNGOLOGY | Facility: CLINIC | Age: 37
End: 2017-08-24
Payer: COMMERCIAL

## 2017-08-24 VITALS
HEART RATE: 88 BPM | BODY MASS INDEX: 39.5 KG/M2 | WEIGHT: 244.69 LBS | SYSTOLIC BLOOD PRESSURE: 122 MMHG | DIASTOLIC BLOOD PRESSURE: 80 MMHG | TEMPERATURE: 97 F

## 2017-08-24 DIAGNOSIS — Q18.1 CONGENITAL PREAURICULAR PIT: Primary | ICD-10-CM

## 2017-08-24 DIAGNOSIS — M26.622 ARTHRALGIA OF LEFT TEMPOROMANDIBULAR JOINT: ICD-10-CM

## 2017-08-24 DIAGNOSIS — J30.9 ALLERGIC RHINITIS, UNSPECIFIED CHRONICITY, UNSPECIFIED SEASONALITY, UNSPECIFIED TRIGGER: ICD-10-CM

## 2017-08-24 DIAGNOSIS — H91.90 PERCEIVED HEARING CHANGES: ICD-10-CM

## 2017-08-24 PROCEDURE — 3074F SYST BP LT 130 MM HG: CPT | Mod: S$GLB,,, | Performed by: PHYSICIAN ASSISTANT

## 2017-08-24 PROCEDURE — 3079F DIAST BP 80-89 MM HG: CPT | Mod: S$GLB,,, | Performed by: PHYSICIAN ASSISTANT

## 2017-08-24 PROCEDURE — 3008F BODY MASS INDEX DOCD: CPT | Mod: S$GLB,,, | Performed by: PHYSICIAN ASSISTANT

## 2017-08-24 PROCEDURE — 99204 OFFICE O/P NEW MOD 45 MIN: CPT | Mod: S$GLB,,, | Performed by: PHYSICIAN ASSISTANT

## 2017-08-24 PROCEDURE — 99999 PR PBB SHADOW E&M-EST. PATIENT-LVL III: CPT | Mod: PBBFAC,,, | Performed by: PHYSICIAN ASSISTANT

## 2017-08-24 RX ORDER — CETIRIZINE HYDROCHLORIDE 10 MG/1
10 TABLET ORAL DAILY
Qty: 30 TABLET | Refills: 12 | Status: SHIPPED | OUTPATIENT
Start: 2017-08-24 | End: 2020-01-30

## 2017-08-24 RX ORDER — FLUTICASONE PROPIONATE 50 MCG
2 SPRAY, SUSPENSION (ML) NASAL DAILY
Qty: 1 BOTTLE | Refills: 12 | Status: SHIPPED | OUTPATIENT
Start: 2017-08-24 | End: 2017-08-29

## 2017-08-24 NOTE — PATIENT INSTRUCTIONS
We had a long discussion regarding the underlying pathology of temporomandibular joint dysfunction (TMD) as the cause of ear pain.  We further discussed conservative measures to treat TMD including avoiding gum and other foods that require lots of chewing, warm compresses, and scheduled antinflammatories.  If the pain persists, the patient will then schedule an appointment with a dentist for further evaluation.  Dr. Mikhail Valles - TMJ specialist

## 2017-08-24 NOTE — LETTER
August 24, 2017      Gena Ordonez MD  [address] [place] [address]           [facility]  [address] [place] [address]  Phone: [phone]  Fax: [phone]          Patient: Merari Orosco   MR Number: [id number]   YOB: 1980   Date of Visit: 8/24/2017       Dear Dr. Gena Ordonez:    Thank you for referring Merari Orosco to me for evaluation. Attached you will find relevant portions of my assessment and plan of care.    If you have questions, please do not hesitate to call me. I look forward to following Merari Orosco along with you.    Sincerely,    Ree Gallegos PA-C    Enclosure  CC:  No Recipients    If you would like to receive this communication electronically, please contact [email or web address] or [phone] to request more information on [facility] access.    For providers and/or their staff who would like to refer a patient to [facility], please contact us through our one-stop-shop provider referral line, [facility], at [phone].    If you feel you have received this communication in error or would no longer like to receive these types of communications, please e-mail [email or web address]

## 2017-08-24 NOTE — PROGRESS NOTES
Subjective:       Patient ID: Merari Orosco is a 37 y.o. female.    Chief Complaint: Otalgia (Both ears)    Patient is a very pleasant 37 year old female here to see me today for the first time for evaluation of otalgia.  She has a right preauricular pit that she says becomes inflamed and drains periodically (maybe three times/year).  It was drained once in the ED when she was a teenager, otherwise no surgery on it.  She's also been told by her dentist that she has left TMJ arthralgia and a splint was recommended.  She takes Ibuprofen as needed for that.  She does frequently chew gum but denies grinding or clenching.  She denies ear drainage; denies tinnitus; denies dizziness.  She says her hearing has gradually declined over the years with her left ear being the better hearing ear.  She also suffers with allergies.  She's had previous allergy testing about 2-3 years ago and was on SCIT for about 1 year (2 years ago).  She saw no real improvement in her symptoms and it was inconvenient with work so she stopped.  She takes Claritin-D as needed currently; no nasal sprays.  She does not smoke; she has a dog; no carpet in her home.  Her allergy symptoms include rhinorrhea, nasal congestion, burning in the nose and eyes; little sneezing and throat irritation.      Review of Systems   Constitutional: Negative for activity change, appetite change and fever.   HENT: Positive for congestion, ear pain, hearing loss and rhinorrhea. Negative for ear discharge, nosebleeds, postnasal drip, sinus pressure, sneezing, sore throat, tinnitus and trouble swallowing.    Eyes: Positive for pain (burning). Negative for discharge.   Respiratory: Negative for cough, shortness of breath and wheezing.    Cardiovascular: Negative for chest pain.   Gastrointestinal: Negative for diarrhea, nausea and vomiting.   Musculoskeletal: Negative for arthralgias and neck pain.   Allergic/Immunologic: Positive for environmental allergies. Negative for  food allergies.   Neurological: Positive for headaches. Negative for dizziness and light-headedness.   Hematological: Negative for adenopathy.   Psychiatric/Behavioral: The patient is nervous/anxious (anxiety).        Objective:      Physical Exam   Constitutional: She is oriented to person, place, and time. She appears well-developed and well-nourished. She is cooperative. No distress.   HENT:   Head: Normocephalic and atraumatic.   Right Ear: Tympanic membrane, external ear and ear canal normal. Tympanic membrane is not erythematous. No middle ear effusion.   Left Ear: Tympanic membrane, external ear and ear canal normal. Tympanic membrane is not erythematous.  No middle ear effusion.   Ears:    Nose: Mucosal edema present. No rhinorrhea, nasal deformity or septal deviation. No epistaxis. Right sinus exhibits no maxillary sinus tenderness and no frontal sinus tenderness. Left sinus exhibits no maxillary sinus tenderness and no frontal sinus tenderness.   Mouth/Throat: Uvula is midline, oropharynx is clear and moist and mucous membranes are normal. Mucous membranes are not pale and not dry. No trismus in the jaw. Normal dentition. No uvula swelling. No oropharyngeal exudate or posterior oropharyngeal erythema. Tonsils are 2+ on the right. Tonsils are 2+ on the left. No tonsillar exudate.   Crepitus over left TMJ with opening her mouth   Eyes: Conjunctivae, EOM and lids are normal. Pupils are equal, round, and reactive to light. Right eye exhibits no chemosis. Left eye exhibits no chemosis. Right conjunctiva is not injected. Left conjunctiva is not injected. No scleral icterus. Right eye exhibits normal extraocular motion and no nystagmus. Left eye exhibits normal extraocular motion and no nystagmus.   Neck: Trachea normal and phonation normal. No tracheal tenderness present. No tracheal deviation present. No thyroid mass and no thyromegaly present.   Cardiovascular: Intact distal pulses.    Pulmonary/Chest: Effort  normal. No stridor. No respiratory distress.   Abdominal: She exhibits no distension.   Lymphadenopathy:        Head (right side): No submental, no submandibular, no preauricular and no posterior auricular adenopathy present.        Head (left side): No submental, no submandibular, no preauricular and no posterior auricular adenopathy present.     She has no cervical adenopathy.   Neurological: She is alert and oriented to person, place, and time. No cranial nerve deficit.   Skin: Skin is warm and dry. No rash noted. No erythema.   Psychiatric: She has a normal mood and affect. Her behavior is normal.       Assessment:       1. Congenital preauricular pit    2. Allergic rhinitis, unspecified chronicity, unspecified seasonality, unspecified trigger    3. Arthralgia of left temporomandibular joint    4. Perceived hearing changes        Plan:         1.  Preauricular pit, right:  She desires surgical removal.  We discussed that surgical removal is not necessary unless pits become repeatedly infected or have continuous drainage which she reports happens often.  I'll review with Dr. Beasley or Dr. Roque and call her with further recommendations.  No antibiotics needed today as it's not inflamed.  2.  AR:  She had little success with SCIT and is not on a consistent allergy regimen currently.   The patient was given a prescription for a steroid nasal spray, and we discussed in detail the proper mechanism of use directing the spray away from the nasal septum.  In addition, we also discussed that it will take two to three weeks of daily use to achieve maximal effectiveness.  The patient will please call in 2-3 weeks with their progress.  If allergy symptoms persist at that time, we could consider additional allergy testing.  Also recommend Zyrtec daily.  3.  TMJ arthralgia:  We had a long discussion regarding the underlying pathology of temporomandibular joint dysfunction (TMD) as the cause of ear pain.  We further discussed  conservative measures to treat TMD including avoiding gum and other foods that require lots of chewing, warm compresses, and scheduled antinflammatories.  If the pain persists, the patient will then schedule an appointment with a dentist for further evaluation.  4.  Perceived hearing changes:  Recommend scheduling an audiogram at her convenience and will call review results once available.

## 2017-08-28 ENCOUNTER — TELEPHONE (OUTPATIENT)
Dept: OTOLARYNGOLOGY | Facility: CLINIC | Age: 37
End: 2017-08-28

## 2017-08-28 NOTE — TELEPHONE ENCOUNTER
Phoned patient and let her know that PIA Chakraborty spoke with Dr. Beasley about her case and that there is a risk of recurrence after excision of preauricular pit and Ree is recommending for patient to see Dr. Beasley or Dr. Roque to discuss further and discuss possible surgery. Patient verbalized understanding and wants to see Dr. Guillermo. I scheduled patient an appointment for her and the call ended well.

## 2017-08-29 ENCOUNTER — OFFICE VISIT (OUTPATIENT)
Dept: INTERNAL MEDICINE | Facility: CLINIC | Age: 37
End: 2017-08-29
Payer: COMMERCIAL

## 2017-08-29 ENCOUNTER — LAB VISIT (OUTPATIENT)
Dept: LAB | Facility: HOSPITAL | Age: 37
End: 2017-08-29
Attending: FAMILY MEDICINE
Payer: COMMERCIAL

## 2017-08-29 VITALS
BODY MASS INDEX: 40.46 KG/M2 | WEIGHT: 251.75 LBS | SYSTOLIC BLOOD PRESSURE: 134 MMHG | HEIGHT: 66 IN | DIASTOLIC BLOOD PRESSURE: 88 MMHG | TEMPERATURE: 97 F

## 2017-08-29 DIAGNOSIS — I10 ESSENTIAL HYPERTENSION: ICD-10-CM

## 2017-08-29 DIAGNOSIS — Z00.00 ANNUAL PHYSICAL EXAM: ICD-10-CM

## 2017-08-29 DIAGNOSIS — Z00.00 ANNUAL PHYSICAL EXAM: Primary | ICD-10-CM

## 2017-08-29 DIAGNOSIS — F32.A ANXIETY AND DEPRESSION: ICD-10-CM

## 2017-08-29 DIAGNOSIS — F41.9 ANXIETY AND DEPRESSION: ICD-10-CM

## 2017-08-29 DIAGNOSIS — E66.01 MORBID OBESITY WITH BMI OF 40.0-44.9, ADULT: ICD-10-CM

## 2017-08-29 PROCEDURE — 83036 HEMOGLOBIN GLYCOSYLATED A1C: CPT

## 2017-08-29 PROCEDURE — 99395 PREV VISIT EST AGE 18-39: CPT | Mod: S$GLB,,, | Performed by: FAMILY MEDICINE

## 2017-08-29 PROCEDURE — 85027 COMPLETE CBC AUTOMATED: CPT

## 2017-08-29 PROCEDURE — 84443 ASSAY THYROID STIM HORMONE: CPT

## 2017-08-29 PROCEDURE — 99999 PR PBB SHADOW E&M-EST. PATIENT-LVL III: CPT | Mod: PBBFAC,,, | Performed by: FAMILY MEDICINE

## 2017-08-29 PROCEDURE — 36415 COLL VENOUS BLD VENIPUNCTURE: CPT | Mod: PO

## 2017-08-29 PROCEDURE — 80053 COMPREHEN METABOLIC PANEL: CPT

## 2017-08-29 PROCEDURE — 80061 LIPID PANEL: CPT

## 2017-08-29 RX ORDER — BUPROPION HYDROCHLORIDE 150 MG/1
150 TABLET ORAL DAILY
Qty: 30 TABLET | Refills: 1 | Status: SHIPPED | OUTPATIENT
Start: 2017-08-29 | End: 2017-10-23 | Stop reason: SDUPTHER

## 2017-08-29 NOTE — LETTER
August 29, 2017                 [facility] - Internal Medicine  Internal Medicine  [address] [facility] [address] [place] [address]  Phone: [phone]  Fax: [phone]   August 29, 2017     Patient: Merari Orosco   YOB: 1980   Date of Visit: 8/29/2017       To Whom it May Concern:    Merari Orosco was seen in my clinic on 8/29/2017. She may return to work on 8/30/2017.    If you have any questions or concerns, please don't hesitate to call.    Sincerely,         Ayush Zuluaga MD/Clementina Mayer LPN

## 2017-08-29 NOTE — PROGRESS NOTES
Subjective:   Patient ID: Merari Orosco is a 37 y.o. female.  Chief Complaint:  Anxiety    PCP Dr. Ordonez.  Presents for a physical exam and to discuss retreatment for anxiety/depression.  Hypertension well controlled.  Previously on Prozac.  Reports not really effective and caused abnormal dreams.  Stopped medication.  Klonopin as needed did help, but did not want to take on a regular basis.  Symptoms recurring is starting to affect ADLs.  Interested in starting another medication.  August 2016 CBC, CMP, lipids, thyroid all acceptable.  Due repeat labs.  No additional complaints concerns today.        Current Outpatient Prescriptions:     cetirizine (ZYRTEC) 10 MG tablet, Take 1 tablet (10 mg total) by mouth once daily., Disp: 30 tablet, Rfl: 12    lisinopril-hydrochlorothiazide (PRINZIDE,ZESTORETIC) 20-12.5 mg per tablet, Take 2 tablets by mouth once daily., Disp: 60 tablet, Rfl: 11    metoprolol tartrate (LOPRESSOR) 50 MG tablet, Take 1 tablet (50 mg total) by mouth 2 (two) times daily., Disp: 60 tablet, Rfl: 11    buPROPion (WELLBUTRIN XL) 150 MG TB24 tablet, Take 1 tablet (150 mg total) by mouth once daily., Disp: 30 tablet, Rfl: 1     Review of Systems   Constitutional: Positive for fatigue. Negative for activity change, appetite change, chills and fever.   HENT: Negative for congestion, dental problem, ear pain, postnasal drip, sinus pressure and sore throat.    Eyes: Negative for visual disturbance.   Respiratory: Negative for cough, chest tightness, shortness of breath and wheezing.    Cardiovascular: Negative for chest pain, palpitations and leg swelling.   Gastrointestinal: Negative for abdominal pain, blood in stool, constipation, diarrhea, nausea and vomiting.   Endocrine: Negative for polydipsia, polyphagia and polyuria.   Genitourinary: Negative for difficulty urinating, dysuria, flank pain, hematuria and pelvic pain.   Musculoskeletal: Negative for back pain, myalgias and neck pain.   Skin:  "Negative for rash.   Neurological: Negative for dizziness, tremors, syncope, weakness, light-headedness, numbness and headaches.   Hematological: Negative for adenopathy.   Psychiatric/Behavioral: Positive for decreased concentration, dysphoric mood and sleep disturbance. Negative for agitation, behavioral problems, confusion, hallucinations, self-injury and suicidal ideas. The patient is nervous/anxious. The patient is not hyperactive.      Objective:   /88 (BP Location: Right arm, Patient Position: Sitting, BP Method: Large (Manual))   Temp 97.1 °F (36.2 °C) (Tympanic)   Ht 5' 6" (1.676 m)   Wt 114.2 kg (251 lb 12.3 oz)   LMP  (LMP Unknown)   BMI 40.64 kg/m²     Physical Exam   Constitutional: She is oriented to person, place, and time. Vital signs are normal. She appears well-developed and well-nourished. No distress.   Neck: No JVD present. No thyroid mass and no thyromegaly present.   Cardiovascular: Normal rate, regular rhythm and normal heart sounds.  Exam reveals no gallop and no friction rub.    No murmur heard.  Pulses:       Radial pulses are 2+ on the right side, and 2+ on the left side.   Pulmonary/Chest: Effort normal and breath sounds normal. She has no wheezes. She has no rhonchi. She has no rales.   Abdominal: Soft. She exhibits no distension. There is no tenderness. There is no rebound, no guarding and no CVA tenderness.   Musculoskeletal: Normal range of motion. She exhibits no edema.   Neurological: She is oriented to person, place, and time. She displays a negative Romberg sign. Coordination and gait normal.   Skin: Skin is warm and dry. No rash noted.   Psychiatric: Thought content normal. Her mood appears not anxious. Her affect is not angry, not blunt, not labile and not inappropriate. Her speech is delayed. Her speech is not rapid and/or pressured, not tangential and not slurred. She is slowed and withdrawn. She is not agitated, not aggressive, not hyperactive, not actively " hallucinating and not combative. Thought content is not paranoid and not delusional. Cognition and memory are normal. She does not express impulsivity or inappropriate judgment. She exhibits a depressed mood. She expresses no homicidal and no suicidal ideation. She is communicative.   Flat affect, teary-eyed, monotone, decreased eye contact. She is inattentive.   Nursing note and vitals reviewed.    Assessment:     1. Annual physical exam    2. Anxiety and depression    3. Essential hypertension    4. Morbid obesity with BMI of 40.0-44.9, adult      Plan:   Annual physical exam  -     CBC Without Differential; Future; Expected date: 08/29/2017  -     Comprehensive metabolic panel; Future; Expected date: 08/29/2017  -     TSH; Future; Expected date: 08/29/2017  -     Lipid panel; Future; Expected date: 08/29/2017  -     Hemoglobin A1c; Future; Expected date: 08/29/2017  Check labs.  Treat as indicated.    Anxiety and depression  -     buPROPion (WELLBUTRIN XL) 150 MG TB24 tablet; Take 1 tablet (150 mg total) by mouth once daily.  Dispense: 30 tablet; Refill: 1  -     Ambulatory referral to Psychiatry  Agrees to trial of Wellbutrin with referral to psychiatry  for counseling/therapy    Essential hypertension  Controlled.  Continue present medications.    Morbid obesity with BMI of 40.0-44.9, adult  Discussed increased BMI, Increased health risks, Need for weight loss, Lifestyle modifications.    RTC 1 month

## 2017-08-30 LAB
ALBUMIN SERPL BCP-MCNC: 3.3 G/DL
ALP SERPL-CCNC: 55 U/L
ALT SERPL W/O P-5'-P-CCNC: 26 U/L
ANION GAP SERPL CALC-SCNC: 9 MMOL/L
AST SERPL-CCNC: 30 U/L
BILIRUB SERPL-MCNC: 0.3 MG/DL
BUN SERPL-MCNC: 13 MG/DL
CALCIUM SERPL-MCNC: 8.5 MG/DL
CHLORIDE SERPL-SCNC: 102 MMOL/L
CHOLEST SERPL-MCNC: 167 MG/DL
CHOLEST/HDLC SERPL: 3.6 {RATIO}
CO2 SERPL-SCNC: 27 MMOL/L
CREAT SERPL-MCNC: 0.8 MG/DL
ERYTHROCYTE [DISTWIDTH] IN BLOOD BY AUTOMATED COUNT: 13.1 %
EST. GFR  (AFRICAN AMERICAN): >60 ML/MIN/1.73 M^2
EST. GFR  (NON AFRICAN AMERICAN): >60 ML/MIN/1.73 M^2
GLUCOSE SERPL-MCNC: 76 MG/DL
HCT VFR BLD AUTO: 35.1 %
HDLC SERPL-MCNC: 47 MG/DL
HDLC SERPL: 28.1 %
HGB BLD-MCNC: 11.9 G/DL
LDLC SERPL CALC-MCNC: 85.4 MG/DL
MCH RBC QN AUTO: 31.4 PG
MCHC RBC AUTO-ENTMCNC: 33.9 G/DL
MCV RBC AUTO: 93 FL
NONHDLC SERPL-MCNC: 120 MG/DL
PLATELET # BLD AUTO: 237 K/UL
PMV BLD AUTO: 10.4 FL
POTASSIUM SERPL-SCNC: 3.4 MMOL/L
PROT SERPL-MCNC: 6.6 G/DL
RBC # BLD AUTO: 3.79 M/UL
SODIUM SERPL-SCNC: 138 MMOL/L
TRIGL SERPL-MCNC: 173 MG/DL
TSH SERPL DL<=0.005 MIU/L-ACNC: 1.13 UIU/ML
WBC # BLD AUTO: 6.01 K/UL

## 2017-08-31 ENCOUNTER — TELEPHONE (OUTPATIENT)
Dept: INTERNAL MEDICINE | Facility: CLINIC | Age: 37
End: 2017-08-31

## 2017-08-31 LAB
ESTIMATED AVG GLUCOSE: 103 MG/DL
HBA1C MFR BLD HPLC: 5.2 %

## 2017-08-31 NOTE — TELEPHONE ENCOUNTER
Excuse sent through [employer]. Pt notified only excuse for today and to return tomorrow. Pt verbalized understanding.

## 2017-08-31 NOTE — TELEPHONE ENCOUNTER
----- Message from Jing Armendariz sent at 8/31/2017  7:48 AM CDT -----  Patient needs a doctors excuse to return to work tomorrow.  Call her at [phone].                                        carias

## 2017-08-31 NOTE — TELEPHONE ENCOUNTER
S/w pt. Pt stated that she was seen on 8/29/17 and was started on Wellbutrin. She stated that she has woken up with a headache yesterday and today and did not go to work. Pt is wanting an excuse to return to work tomorrow. Told pt that I would discuss with on call doctor and return her call. Pt verbalized understanding.

## 2017-08-31 NOTE — LETTER
August 31, 2017                 [facility] Internal Medicine  Internal Medicine  [address] [place] [place] [address]  Phone: [phone]  Fax: [phone]   August 31, 2017     Patient: Merari Orosco   YOB: 1980   Date of Visit: 8/31/2017       To Whom it May Concern:    Please excuse Merari Orosco from work today, 8/31/2017. She may return to work tomorrow, 9/1/2017.    If you have any questions or concerns, please don't hesitate to call.    Sincerely,         Ayush Zuluaga MD/Clementina Mayer LPN

## 2017-09-25 DIAGNOSIS — I10 ESSENTIAL HYPERTENSION: ICD-10-CM

## 2017-09-25 RX ORDER — LISINOPRIL AND HYDROCHLOROTHIAZIDE 12.5; 2 MG/1; MG/1
TABLET ORAL
Qty: 60 TABLET | Refills: 11 | Status: SHIPPED | OUTPATIENT
Start: 2017-09-25 | End: 2020-01-30

## 2017-09-25 RX ORDER — CHOLECALCIFEROL (VITAMIN D3) 25 MCG
TABLET ORAL
Qty: 100 TABLET | Refills: 3 | Status: SHIPPED | OUTPATIENT
Start: 2017-09-25 | End: 2020-01-30

## 2017-09-25 RX ORDER — METOPROLOL TARTRATE 50 MG/1
TABLET ORAL
Qty: 60 TABLET | Refills: 11 | Status: SHIPPED | OUTPATIENT
Start: 2017-09-25 | End: 2020-01-30

## 2017-10-16 ENCOUNTER — LAB VISIT (OUTPATIENT)
Dept: LAB | Facility: HOSPITAL | Age: 37
End: 2017-10-16
Attending: PHYSICIAN ASSISTANT
Payer: COMMERCIAL

## 2017-10-16 ENCOUNTER — OFFICE VISIT (OUTPATIENT)
Dept: INTERNAL MEDICINE | Facility: CLINIC | Age: 37
End: 2017-10-16
Payer: COMMERCIAL

## 2017-10-16 VITALS
WEIGHT: 254.19 LBS | BODY MASS INDEX: 40.85 KG/M2 | HEIGHT: 66 IN | DIASTOLIC BLOOD PRESSURE: 88 MMHG | SYSTOLIC BLOOD PRESSURE: 112 MMHG | OXYGEN SATURATION: 97 % | HEART RATE: 94 BPM | TEMPERATURE: 98 F

## 2017-10-16 DIAGNOSIS — R60.1 GENERALIZED EDEMA: Primary | ICD-10-CM

## 2017-10-16 DIAGNOSIS — E87.6 HYPOKALEMIA: Primary | ICD-10-CM

## 2017-10-16 DIAGNOSIS — M54.50 ACUTE BILATERAL LOW BACK PAIN WITHOUT SCIATICA: ICD-10-CM

## 2017-10-16 DIAGNOSIS — R60.1 GENERALIZED EDEMA: ICD-10-CM

## 2017-10-16 LAB
ALBUMIN SERPL BCP-MCNC: 3.7 G/DL
ALP SERPL-CCNC: 39 U/L
ALT SERPL W/O P-5'-P-CCNC: 22 U/L
ANION GAP SERPL CALC-SCNC: 10 MMOL/L
AST SERPL-CCNC: 27 U/L
BILIRUB SERPL-MCNC: 0.2 MG/DL
BUN SERPL-MCNC: 8 MG/DL
CALCIUM SERPL-MCNC: 9.6 MG/DL
CHLORIDE SERPL-SCNC: 101 MMOL/L
CO2 SERPL-SCNC: 32 MMOL/L
CREAT SERPL-MCNC: 0.9 MG/DL
EST. GFR  (AFRICAN AMERICAN): >60 ML/MIN/1.73 M^2
EST. GFR  (NON AFRICAN AMERICAN): >60 ML/MIN/1.73 M^2
GLUCOSE SERPL-MCNC: 95 MG/DL
POTASSIUM SERPL-SCNC: 3.1 MMOL/L
PROT SERPL-MCNC: 7.7 G/DL
SODIUM SERPL-SCNC: 143 MMOL/L

## 2017-10-16 PROCEDURE — 99999 PR PBB SHADOW E&M-EST. PATIENT-LVL IV: CPT | Mod: PBBFAC,,, | Performed by: PHYSICIAN ASSISTANT

## 2017-10-16 PROCEDURE — 96372 THER/PROPH/DIAG INJ SC/IM: CPT | Mod: S$GLB,,, | Performed by: PHYSICIAN ASSISTANT

## 2017-10-16 PROCEDURE — 83880 ASSAY OF NATRIURETIC PEPTIDE: CPT

## 2017-10-16 PROCEDURE — 80053 COMPREHEN METABOLIC PANEL: CPT | Mod: PO

## 2017-10-16 PROCEDURE — 99213 OFFICE O/P EST LOW 20 MIN: CPT | Mod: 25,S$GLB,, | Performed by: PHYSICIAN ASSISTANT

## 2017-10-16 PROCEDURE — 36415 COLL VENOUS BLD VENIPUNCTURE: CPT | Mod: PO

## 2017-10-16 PROCEDURE — 84443 ASSAY THYROID STIM HORMONE: CPT

## 2017-10-16 RX ORDER — POTASSIUM CHLORIDE 750 MG/1
10 TABLET, EXTENDED RELEASE ORAL 2 TIMES DAILY
Qty: 10 TABLET | Refills: 0 | Status: SHIPPED | OUTPATIENT
Start: 2017-10-16 | End: 2017-10-21

## 2017-10-16 RX ORDER — METHYLPREDNISOLONE ACETATE 80 MG/ML
80 INJECTION, SUSPENSION INTRA-ARTICULAR; INTRALESIONAL; INTRAMUSCULAR; SOFT TISSUE
Status: COMPLETED | OUTPATIENT
Start: 2017-10-16 | End: 2017-10-16

## 2017-10-16 RX ADMIN — METHYLPREDNISOLONE ACETATE 80 MG: 80 INJECTION, SUSPENSION INTRA-ARTICULAR; INTRALESIONAL; INTRAMUSCULAR; SOFT TISSUE at 02:10

## 2017-10-16 NOTE — LETTER
October 17, 2017      [facility] - Internal Medicine  [address] [address] [place] [address]  Phone: [phone]  Fax: [phone]       Patient: Merari Orosco   YOB: 1980  Date of Visit: 10/16/2017    To Whom It May Concern:    Krishna Orosco  was at [facility] on 10/16/2017. She may return to work/school on 10/18/2017 with no restrictions. If you have any questions or concerns, or if I can be of further assistance, please do not hesitate to contact me.    Sincerely,          Chelsie Saucedo PA-C

## 2017-10-16 NOTE — PROGRESS NOTES
Subjective:      Patient ID: Merari Orosco is a 37 y.o. female.    Chief Complaint: Back Pain and Belepharitis    Back Pain   This is a new problem. The current episode started in the past 7 days. The problem occurs constantly. The problem has been rapidly worsening since onset. The pain is present in the lumbar spine. The quality of the pain is described as cramping. The pain is worse during the night. The symptoms are aggravated by sitting. Pertinent negatives include no abdominal pain, bladder incontinence, bowel incontinence, chest pain, dysuria, fever, headaches, leg pain, numbness, paresis, paresthesias, pelvic pain, perianal numbness, tingling or weakness. She has tried NSAIDs (pressure on back helps) for the symptoms. The treatment provided mild relief.   Edema   This is a new problem. The current episode started in the past 7 days. The problem occurs constantly. The problem has been unchanged. Pertinent negatives include no abdominal pain, anorexia, arthralgias, change in bowel habit, chest pain, chills, congestion, coughing, diaphoresis, fatigue, fever, headaches, joint swelling, myalgias, nausea, neck pain, numbness, rash, sore throat, swollen glands, urinary symptoms, vertigo, visual change, vomiting or weakness. Associated symptoms comments: Stiffness in her left hand 4th and 5th digit. Exacerbated by: worse in the morning. She has tried NSAIDs for the symptoms. The treatment provided moderate relief.   Pt reports swelling in her hands, feet, and around her eyes.     Review of Systems   Constitutional: Negative for chills, diaphoresis, fatigue and fever.   HENT: Negative for congestion and sore throat.    Respiratory: Negative for cough.    Cardiovascular: Negative for chest pain.   Gastrointestinal: Negative for abdominal pain, anorexia, bowel incontinence, change in bowel habit, nausea and vomiting.   Genitourinary: Negative for bladder incontinence, dysuria and pelvic pain.   Musculoskeletal:  "Positive for back pain. Negative for arthralgias, joint swelling, myalgias and neck pain.   Skin: Negative for rash.   Neurological: Negative for vertigo, tingling, weakness, numbness, headaches and paresthesias.     Objective:   /88   Pulse 94   Temp 97.6 °F (36.4 °C) (Tympanic)   Ht 5' 6" (1.676 m)   Wt 115.3 kg (254 lb 3.1 oz)   SpO2 97%   BMI 41.03 kg/m²     Physical Exam   Constitutional: She is oriented to person, place, and time. She appears well-developed and well-nourished.   HENT:   Head: Normocephalic and atraumatic.   Right Ear: External ear normal.   Left Ear: External ear normal.   Nose: Nose normal.   Mouth/Throat: Oropharynx is clear and moist.   Eyes: Conjunctivae and EOM are normal. Pupils are equal, round, and reactive to light.   Neck: Normal range of motion. Neck supple.   Cardiovascular: Normal rate, regular rhythm and normal heart sounds.  Exam reveals no gallop and no friction rub.    No murmur heard.  Pulmonary/Chest: Effort normal and breath sounds normal. No respiratory distress. She has no wheezes. She has no rales. She exhibits no tenderness.   Abdominal: Soft. She exhibits no distension. There is no tenderness.   Musculoskeletal:        Right hip: Normal. She exhibits normal range of motion and normal strength.        Left hip: Normal. She exhibits normal range of motion and normal strength.        Right ankle: She exhibits swelling. She exhibits normal range of motion, no ecchymosis, no deformity and no laceration. No tenderness.        Left ankle: She exhibits swelling. She exhibits normal range of motion, no ecchymosis, no deformity, no laceration and normal pulse. No tenderness.        Lumbar back: She exhibits decreased range of motion, tenderness, pain and spasm. She exhibits no bony tenderness, no swelling, no edema, no deformity, no laceration and normal pulse.        Right hand: She exhibits swelling. She exhibits normal range of motion, no tenderness, no bony " tenderness, normal capillary refill, no deformity and no laceration.        Left hand: She exhibits swelling. She exhibits normal range of motion, no tenderness, no bony tenderness, normal capillary refill, no deformity and no laceration.        Hands:       Right lower leg: She exhibits no tenderness, no bony tenderness, no swelling and no edema.        Left lower leg: She exhibits no tenderness, no bony tenderness and no swelling.        Legs:  Lymphadenopathy:     She has no cervical adenopathy.   Neurological: She is alert and oriented to person, place, and time.   Skin: Skin is warm and dry.   Psychiatric: She has a normal mood and affect. Her behavior is normal. Judgment and thought content normal.   Vitals reviewed.      Assessment:     1. Generalized edema    2. Acute bilateral low back pain without sciatica      Plan:   Generalized edema  -     Comprehensive metabolic panel; Future; Expected date: 10/16/2017  -     Urinalysis; Future  -     TSH; Future; Expected date: 10/16/2017  -     Brain natriuretic peptide; Future; Expected date: 10/16/2017  -likely induced by NSAID use. Discontinue NSAIDS for now.   -compression stockings    Acute bilateral low back pain without sciatica  -     methylPREDNISolone acetate injection 80 mg; Inject 1 mL (80 mg total) into the muscle one time.  -Steroids can increase blood sugar and blood pressure. Therefore, diabetics need to check their blood sugar regularly while taking a steroid. Patients with hypertension need to check their blood pressure regularly as well.    -Educational handout on over-the-counter medications and at-home conservative care, pertinent to the patients diagnosis today, was handed to the patient and discussed in detail.      Return if symptoms worsen or fail to improve.

## 2017-10-16 NOTE — PATIENT INSTRUCTIONS
Back Care Tips    Caring for your back  These are things you can do to prevent a recurrence of acute back pain and to reduce symptoms from chronic back pain:  · Maintain a healthy weight. If you are overweight, losing weight will help most types of back pain.  · Exercise is an important part of recovery from most types of back pain. The muscles behind and in front of the spine support the back. This means strengthening both the back muscles and the abdominal muscles will provide better support for your spine.   · Swimming and brisk walking are good overall exercises to improve your fitness level.  · Practice safe lifting methods (below).  · Practice good posture when sitting, standing and walking. Avoid prolonged sitting. This puts more stress on the lower back than standing or walking.  · Wear quality shoes with sufficient arch support. Foot and ankle alignment can affect back symptoms. Women should avoid wearing high heels.  · Therapeutic massage can help relax the back muscles without stretching them.  · During the first 24 to 72 hours after an acute injury or flare-up of chronic back pain, apply an ice pack to the painful area for 20 minutes and then remove it for 20 minutes, over a period of 60 to 90 minutes, or several times a day. As a safety precaution, do not use a heating pad at bedtime. Sleeping on a heating pad can lead to skin burns or tissue damage.  · You can alternate ice and heat therapies.  Medications  Talk to your healthcare provider before using medicines, especially if you have other medical problems or are taking other medicines.  · You may use acetaminophen or ibuprofen to control pain, unless your healthcare provider prescribed other pain medicine. If you have chronic conditions like diabetes, liver or kidney disease, stomach ulcers, or gastrointestinal bleeding, or are taking blood thinners, talk with your healthcare provider before taking any medicines.  · Be careful if you are given  prescription pain medicines, narcotics, or medicine for muscle spasm. They can cause drowsiness, affect your coordination, reflexes, and judgment. Do not drive or operate heavy machinery while taking these types of medicines. Take prescription pain medicine only as prescribed by your healthcare provider.  Lumbar stretch  Here is a simple stretching exercise that will help relax muscle spasm and keep your back more limber. If exercise makes your back pain worse, dont do it.  · Lie on your back with your knees bent and both feet on the ground.  · Slowly raise your left knee to your chest as you flatten your lower back against the floor. Hold for 5 seconds.  · Relax and repeat the exercise with your right knee.  · Do 10 of these exercises for each leg.  Safe lifting method  · Dont bend over at the waist to lift an object off the floor.  Instead, bend your knees and hips in a squat.   · Keep your back and head upright  · Hold the object close to your body, directly in front of you.  · Straighten your legs to lift the object.   · Lower the object to the floor in the reverse fashion.  · If you must slide something across the floor, push it.  Posture tips  Sitting  Sit in chairs with straight backs or low-back support. Keep your knees lower than your hips, with your feet flat on the floor.  When driving, sit up straight. Adjust the seat forward so you are not leaning toward the steering wheel.  A small pillow or rolled towel behind your lower back may help if you are driving long distances.   Standing  When standing for long periods, shift most of your weight to one leg at a time. Alternate legs every few minutes.   Sleeping  The best way to sleep is on your side with your knees bent. Put a low pillow under your head to support your neck in a neutral spine position. Avoid thick pillows that bend your neck to one side. Put a pillow between your legs to further relax your lower back. If you sleep on your back, put pillows  under your knees to support your legs in a slightly flexed position. Use a firm mattress. If your mattress sags, replace it, or use a 1/2-inch plywood board under the mattress to add support.  Follow-up care  Follow up with your healthcare provider, or as advised.  If X-rays, a CT scan or an MRI scan were taken, they will be reviewed by a radiologist. You will be notified of any new findings that may affect your care.  Call 911  Seek emergency medical care if any of the following occur:  · Trouble breathing  · Confusion  · Very drowsy  · Fainting or loss of consciousness  · Rapid or very slow heart rate  · Loss of  bowel or bladder control  When to seek medical care  Call your healthcare provider if any of the following occur:  · Pain becomes worse or spreads to your arms or legs  · Weakness or numbness in one or both arms or legs  · Numbness in the groin area  Date Last Reviewed: 6/1/2016  © 8714-0111 [employer]. [address] [place] [address]. All rights reserved. This information is not intended as a substitute for professional medical care. Always follow your healthcare professional's instructions.        Causes of Lumbar (Low Back) Pain  Low back pain can be caused by problems with any part of the lumbar spine. A disk can herniate (push out) and press on a nerve. Vertebrae can rub against each other or slip out of place. This can irritate facet joints and nerves. It can also lead to stenosis, a narrowing of the spinal canal or foramen.  Pressure from a disk  Constant wear and tear on a disk can cause it to weaken and push outward. Part of the disk may then press on nearby nerves. There are two common types of herniated disks:  Contained means the soft nucleus is protruding outward.   Extruded means the firm annulus has torn, letting the soft center squeeze through.     Pressure from bone  An unstable spine   With age, a disk may thin and wear out. Vertebrae above and below the disk  may begin to touch. This can put pressure on nerves. It can also cause bone spurs (growths) to form where the bones rub together.    Stenosis results when bone spurs narrow the foramen or spinal canal. This also puts pressure on nerves. Slipping vertebrae can irritate nerves and joints. They can also worsen stenosis.    In some cases, vertebrae become unstable and slip forward. This is called spondylolisthesis.     Date Last Reviewed: 10/12/2015  © 3693-3595 [employer]. [address] [place] [address], [place] [address]. All rights reserved. This information is not intended as a substitute for professional medical care. Always follow your healthcare professional's instructions.        Self-Care for Low Back Pain    Most people have low back pain now and then. In many cases, it isnt serious and self-care can help. Sometimes low back pain can be a sign of a bigger problem. Call your healthcare provider if your pain returns often or gets worse over time. For the long-term care of your back, get regular exercise, lose any excess weight and learn good posture.  Take a short rest  Lying down during the day may be beneficial for short periods of time if severe pain increases with sitting or standing. Long-term bed rest could be detrimental.  Reduce pain and swelling  Cold reduces swelling. Both cold and heat can reduce pain. Protect your skin by placing a towel between your body and the ice or heat source.  · For the first few days, apply an ice pack for 15 to 20 minutes .  · After the first few days, try heat for 15 minutes at a time to ease pain. Never sleep on a heating pad.  · Over-the-counter medicine can help control pain and swelling. Try aspirin or ibuprofen.  Exercise  Exercise can help your back heal. It also helps your back get stronger and more flexible, preventing any reinjury. Ask your healthcare provider about specific exercises for your back.  Use good posture to avoid reinjury  · When moving, bend  at the hips and knees. Dont bend at the waist or twist around.  · When lifting, keep the object close to your body. Dont try to lift more than you can handle.  · When sitting, keep your lower back supported. Use a rolled-up towel as needed.  Seek immediate medical care if:  · Youre unable to stand or walk.  · You have a temperature over 100.4°F (38.0°C)  · You have frequent, painful, or bloody urination.  · You have severe abdominal pain.  · You have a sharp, stabbing pain.  · Your pain is constant.  · You have pain or numbness in your leg.  · You feel pain in a new area of your back.  · You notice that the pain isnt decreasing after more than a week.   Date Last Reviewed: 9/29/2015 © 2000-2017 [employer]. [address] [place] [address]. All rights reserved. This information is not intended as a substitute for professional medical care. Always follow your healthcare professional's instructions.        Back Spasm (No Trauma)    Spasm of the back muscles can occur after a sudden forceful twisting or bending force (such as in a car accident), after a simple awkward movement, or after lifting something heavy with poor body positioning. In any case, muscle spasm adds to the pain. Sleeping in an awkward position or on a poor quality mattress can also cause this. Some people respond to emotional stress by tensing the muscles of their back.  Pain that continues may need further evaluation or other types of treatment such as physical therapy.  You don't always need X-rays for the initial evaluation of back pain, unless you had a physical injury such as from a car accident or fall. If your pain continues and doesn't respond to medical treatment, X-rays and other tests may then be done.   Home care  · As soon as possible, start sitting or walking again to avoid problems from prolonged bed rest (muscle weakness, worsening back stiffness and pain, blood clots in the legs).  · When in bed, try to  find a position of comfort. A firm mattress is best. Try lying flat on your back with pillows under your knees. You can also try lying on your side with your knees bent up toward your chest and a pillow between your knees.  · Avoid prolonged sitting, long car rides, or travel. This puts more stress on the lower back than standing or walking.   · During the first 24 to 72 hours after an injury or flare-up, apply an ice pack to the painful area for 20 minutes, then remove it for 20 minutes. Do this over a period of 60 to 90 minutes or several times a day. This will reduce swelling and pain. Always wrap ice packs in a thin towel.  · You can start with ice, then switch to heat. Heat (hot shower, hot bath, or heating pad) reduces pain, and works well for muscle spasms. Apply heat to the painful area for 20 minutes, then remove it for 20 minutes. Do this over a period of 60 to 90 minutes or several times a day. Do not sleep on a heating pad as it can burn or damage skin.  · Alternate ice and heat therapies.  · Be aware of safe lifting methods and do not lift anything over 15 pounds until all the pain is gone.  Gentle stretching will help your back heal faster. Do this simple routine 2 to 3 times a day until your back is feeling better.  · Lie on your back with your knees bent and both feet on the ground  · Slowly raise your left knee to your chest as you flatten your lower back against the floor. Hold for 20 to 30 seconds.  · Relax and repeat the exercise with your right knee.  · Do 2 to 3 of these exercises for each leg.  · Repeat, hugging both knees to your chest at the same time.  · Do not bounce, but use a gentle pull.  Medicines  Talk to your doctor before using medicine, especially if you have other medical problems or are taking other medicines.  You may use acetaminophen or ibuprofen to control pain, unless your healthcare provider prescribed another pain medicine. If you have a chronic condition such as diabetes,  liver or kidney disease, stomach ulcer, or gastrointestinal bleeding, or are taking blood thinners, talk with your healthcare provider before taking any medicines.  Be careful if you are given prescription pain medicine, narcotics, or medicine for muscle spasm. They can cause drowsiness, affect your coordination, reflexes, or judgment. Do not drive or operate heavy machinery when taking these medicines. Take pain medicine only as prescribed by your healthcare provider.  Follow-up care  Follow up with your doctor, or as advised. Physical therapy or further tests may be needed.  If X-rays were taken, they may be reviewed by a radiologist. You will be notified of any new findings that may affect your care.  Call 911  Seek emergency medical care if any of these occur:  · Trouble breathing  · Confusion  · Drowsiness or trouble awakening  · Fainting or loss of consciousness  · Rapid or very slow heart rate  · Loss of bowel or bladder control  When to seek medical advice  Call your healthcare provider right away if any of these occur:  · Pain becomes worse or spreads to your legs  · Weakness or numbness in one or both legs  · Numbness in the groin or genital area  · Unexplained fever over 100.4ºF (38.0ºC)  · Burning or pain when passing urine  Date Last Reviewed: 6/1/2016  © 3480-5758 [employer]. [address] [place] [address], [place] [id number]. All rights reserved. This information is not intended as a substitute for professional medical care. Always follow your healthcare professional's instructions.        Leg Swelling in Both Legs    Swelling of the feet, ankles, and legs is called edema. It is caused by excess fluid that has collected in the tissues. Extra fluid in the body settles in the lowest part because of gravity. This is why the legs and feet are most affected.  Some of the causes for edema include:  · Disease of the heart like congestive heart failure  · Standing or sitting for long periods of  time  · Infection of the feet or legs  · Blood pooling in the veins of your legs (venous insufficiency)  · Dilated veins in your lower leg (varicose veins)  · Garters or other clothing that is tight on your legs. This will cause blood to pool in your legs because the clothing limits blood flow.  · Some medicines such as hormones like birth control pills, some blood pressure medicines like calcium channel blockers (amlodipine) and steroids, some antidepressants like MAO inhibitors and tricyclics  · Menstrual periods that cause you to retain fluids  · Many types of renal disease  · Liver failure or cirrhosis  · Pregnancy, some swelling is normal, but a sudden increase in leg swelling or weight gain can be a sign of a dangerous complication of pregnancy  · Poor nutrition  · Thyroid disease  Medical treatment will depend on what is causing the swelling in your legs. Your healthcare provider may prescribe water pills (diuretics) to get rid of the extra fluid.  Home care  Follow these guidelines when caring for yourself at home:  · Don't wear clothing like garters that is tight on your legs.  · Keep your legs up while lying or sitting.  · If infection, injury, or recent surgery is causing the swelling, stay off your legs as much as possible until symptoms get better.  · If your healthcare provider says that your leg swelling is caused by venous insufficiency or varicose veins, don't sit or  one place for long periods of time. Take breaks and walk about every few hours. Brisk walking is a good exercise. It helps circulate the blood that has collected in your leg. Talk with your provider about using support stockings to stop daytime leg swelling.  · If your provider says that heart disease is causing your leg swelling, follow a low-salt diet to stop extra fluid from staying in your body. You may also need medicine.  Follow-up care  Follow up with your healthcare provider, or as advised.  When to seek medical  advice  Call your healthcare provider right away if any of these occur:  · New shortness of breath or chest pain  · Shortness of breath or chest pain that gets worse  · Swelling in both legs or ankles that gets worse  · Swelling of the abdomen  · Redness, warmth, or swelling in one leg  · Fever of 100.4ºF (38ºC) or higher, or as directed by your healthcare provider  · Yellow color to your skin or eyes  · Rapid, unexplained weight gain  · Having to sleep upright or use an increased number of pillows  Date Last Reviewed: 3/31/2016  © 5200-4221 [employer]. [address] [place] [address], [place] [id number]. All rights reserved. This information is not intended as a substitute for professional medical care. Always follow your healthcare professional's instructions.

## 2017-10-17 ENCOUNTER — TELEPHONE (OUTPATIENT)
Dept: INTERNAL MEDICINE | Facility: CLINIC | Age: 37
End: 2017-10-17

## 2017-10-17 LAB
BNP SERPL-MCNC: <10 PG/ML
TSH SERPL DL<=0.005 MIU/L-ACNC: 0.92 UIU/ML

## 2017-10-17 NOTE — TELEPHONE ENCOUNTER
----- Message from Radha Bundy sent at 10/17/2017  4:32 PM CDT -----  Contact: Patient  Patient called to speak with the nurse to see if she can get a work excuse for today. She can be contacted at [phone].    Thanks,  Radha

## 2017-10-17 NOTE — TELEPHONE ENCOUNTER
----- Message from Raymundo Najera sent at 10/17/2017  7:48 AM CDT -----  Contact: pt  She's calling in regard to a missed call, [phone] (home)

## 2017-10-17 NOTE — TELEPHONE ENCOUNTER
Work excuse given per provider. Patient states she is still experiencing back pain. 9/10 on pain scale. Request medication to be sent to preferred pharmacy for pain. Please advise.//ddw

## 2017-10-17 NOTE — TELEPHONE ENCOUNTER
----- Message from Lucila Jiménez sent at 10/17/2017  7:15 AM CDT -----  Contact: Patient  Patient is returning a call, please call them back at [phone]. Thank you

## 2017-10-18 DIAGNOSIS — M54.50 ACUTE BILATERAL LOW BACK PAIN WITHOUT SCIATICA: Primary | ICD-10-CM

## 2017-10-18 RX ORDER — METHYLPREDNISOLONE 4 MG/1
TABLET ORAL
Qty: 1 PACKAGE | Refills: 0 | Status: SHIPPED | OUTPATIENT
Start: 2017-10-18 | End: 2018-03-01

## 2017-10-18 RX ORDER — METHOCARBAMOL 750 MG/1
750 TABLET, FILM COATED ORAL 4 TIMES DAILY
Qty: 20 TABLET | Refills: 0 | Status: SHIPPED | OUTPATIENT
Start: 2017-10-18 | End: 2017-10-25

## 2017-10-23 DIAGNOSIS — F41.9 ANXIETY AND DEPRESSION: ICD-10-CM

## 2017-10-23 DIAGNOSIS — F32.A ANXIETY AND DEPRESSION: ICD-10-CM

## 2017-10-23 RX ORDER — BUPROPION HYDROCHLORIDE 150 MG/1
150 TABLET ORAL DAILY
Qty: 30 TABLET | Refills: 0 | Status: SHIPPED | OUTPATIENT
Start: 2017-10-23 | End: 2018-03-01

## 2017-11-08 ENCOUNTER — TELEPHONE (OUTPATIENT)
Dept: INTERNAL MEDICINE | Facility: CLINIC | Age: 37
End: 2017-11-08

## 2017-11-08 NOTE — TELEPHONE ENCOUNTER
----- Message from Dorothy Lin sent at 11/8/2017 10:26 AM CST -----  Contact:  Elizabeth with [facility]  Calling in reference to patient having several medical excuses for the court to reschedule her court date. Calling to find out if they are valid. Please call Elizabeth Tyson @ [phone] ext # [phone]. Thanks, luisito

## 2017-11-09 NOTE — TELEPHONE ENCOUNTER
----- Message from Chad Packer sent at 11/9/2017  8:42 AM CST -----  Contact: [facility]/   Pt is returning the nurse staff call.  Pt call back [phone] ext. [phone]. thanks

## 2017-11-09 NOTE — TELEPHONE ENCOUNTER
Spoke Elizabeth with [facility]. Verified dates of excuses that patient was given by Ayush Zuluaga MD and Chelsie Saucedo PA-C.//jeff

## 2017-11-09 NOTE — TELEPHONE ENCOUNTER
Received excuses from [address]. Excuses were to be clarified as being altered. Copies of excuses were presented to Chelsie Saucedo PA-C and Ayush Zuluaga MD. Excuses sent to scanning.//ddw

## 2017-11-22 ENCOUNTER — TELEPHONE (OUTPATIENT)
Dept: INTERNAL MEDICINE | Facility: CLINIC | Age: 37
End: 2017-11-22

## 2017-11-22 NOTE — TELEPHONE ENCOUNTER
----- Message from Chelsie Saucedo PA-C sent at 11/22/2017  8:33 AM CST -----  Yes. She should have her labs rechecked.     ----- Message -----  From: Mary Gan LPN  Sent: 11/22/2017   8:24 AM  To: Chelsie Saucedo PA-C    Message from the lab.  Do you want this patient to come in and have these labs drawn?  Orders placed in October.  Please advise.  ----- Message -----  From: Chelsie Saucedo PA-C  Sent: 11/22/2017   8:18 AM  To: Lewis KUMAR Staff (Int Med)    Please see if patient can come in to have labs rechecked.     ----- Message -----  From: SYSTEM  Sent: 11/22/2017  12:10 AM  To: Chelsie Saucedo PA-C

## 2018-03-01 ENCOUNTER — OFFICE VISIT (OUTPATIENT)
Dept: URGENT CARE | Facility: CLINIC | Age: 38
End: 2018-03-01
Payer: COMMERCIAL

## 2018-03-01 VITALS
DIASTOLIC BLOOD PRESSURE: 80 MMHG | BODY MASS INDEX: 36 KG/M2 | HEIGHT: 66 IN | WEIGHT: 224 LBS | RESPIRATION RATE: 18 BRPM | SYSTOLIC BLOOD PRESSURE: 126 MMHG | TEMPERATURE: 99 F | OXYGEN SATURATION: 96 % | HEART RATE: 120 BPM

## 2018-03-01 DIAGNOSIS — F32.A ANXIETY AND DEPRESSION: Primary | ICD-10-CM

## 2018-03-01 DIAGNOSIS — F41.9 ANXIETY AND DEPRESSION: Primary | ICD-10-CM

## 2018-03-01 PROCEDURE — 99213 OFFICE O/P EST LOW 20 MIN: CPT | Mod: S$GLB,,, | Performed by: FAMILY MEDICINE

## 2018-03-01 PROCEDURE — 3079F DIAST BP 80-89 MM HG: CPT | Mod: S$GLB,,, | Performed by: FAMILY MEDICINE

## 2018-03-01 PROCEDURE — 99999 PR PBB SHADOW E&M-EST. PATIENT-LVL IV: CPT | Mod: PBBFAC,,, | Performed by: FAMILY MEDICINE

## 2018-03-01 PROCEDURE — 3074F SYST BP LT 130 MM HG: CPT | Mod: S$GLB,,, | Performed by: FAMILY MEDICINE

## 2018-03-01 RX ORDER — ALPRAZOLAM 0.25 MG/1
0.25 TABLET ORAL
COMMUNITY
Start: 2018-02-27 | End: 2020-01-30

## 2018-03-01 RX ORDER — SERTRALINE HYDROCHLORIDE 50 MG/1
50 TABLET, FILM COATED ORAL
COMMUNITY
Start: 2018-02-13 | End: 2020-01-30

## 2018-03-01 NOTE — PROGRESS NOTES
"Subjective:       Patient ID: Merari Orosco is a 37 y.o. female.    Chief Complaint: No chief complaint on file.    /80   Pulse (!) 120   Temp 98.6 °F (37 °C) (Tympanic)   Resp 18   Ht 5' 6" (1.676 m)   Wt 101.6 kg (224 lb)   SpO2 96%   BMI 36.15 kg/m²     HPI  36 yo came with mother for visit, states she may be experiencing panic attacks - upset, crying, not sleeping, difficult functioning at work, and with son. Further questioning revealed long history of current state, at least a year. She was referred to psychiatry by internal medicine a few month ago, but no visit was found in records.   Denies SI/OI     Review of Systems   Constitutional: Positive for fatigue.   Psychiatric/Behavioral: Positive for decreased concentration and sleep disturbance. Negative for confusion, hallucinations, self-injury and suicidal ideas. The patient is nervous/anxious.        Objective:      Physical Exam   Constitutional: She is oriented to person, place, and time. She appears well-developed and well-nourished. No distress.   HENT:   Head: Normocephalic and atraumatic.   Eyes: EOM are normal. Pupils are equal, round, and reactive to light. Right eye exhibits no discharge. Left eye exhibits no discharge.   Neck: Normal range of motion. Neck supple.   Cardiovascular: Normal rate.    Pulmonary/Chest: Effort normal. No respiratory distress.   Musculoskeletal: Normal range of motion.   Neurological: She is alert and oriented to person, place, and time.   Skin: Skin is warm and dry. She is not diaphoretic.   Psychiatric:   Appearance: groomed  Affect: labile  Language: appears normal  Thought process: appears normal   Nursing note and vitals reviewed.      Assessment:       1. Anxiety and depression        Plan:     Diagnoses and all orders for this visit:    Anxiety and depression  -     Ambulatory referral to Psychiatry              Discussed with pt/family all information and results pertaining to this visit. Discussed " diagnosis and plan of treatment.  All questions and concerns were addressed at this time. Pt/family expresses understanding of information and instructions.  Care and follow up instruction provided.

## 2018-11-05 DIAGNOSIS — I10 ESSENTIAL HYPERTENSION: ICD-10-CM

## 2019-01-15 NOTE — TELEPHONE ENCOUNTER
----- Message from Shefali Kirk MD sent at 2/28/2017  3:34 PM CST -----  Few bacteria will prefer to wait on culture to see if there is a need for antibiotics   UGI Results/Gastric Lap Band Placement     Per Dr Avina   Gastric Lap Band is in place and looks fine-no slippage.       Patient notified       Oked per patient

## 2019-01-22 RX ORDER — LISINOPRIL AND HYDROCHLOROTHIAZIDE 12.5; 2 MG/1; MG/1
TABLET ORAL
Qty: 180 TABLET | Refills: 3 | Status: CANCELLED | OUTPATIENT
Start: 2019-01-22

## 2019-01-22 RX ORDER — METOPROLOL TARTRATE 50 MG/1
TABLET ORAL
Qty: 180 TABLET | Refills: 3 | Status: CANCELLED | OUTPATIENT
Start: 2019-01-22

## 2019-03-21 ENCOUNTER — PATIENT OUTREACH (OUTPATIENT)
Dept: ADMINISTRATIVE | Facility: HOSPITAL | Age: 39
End: 2019-03-21

## 2019-03-21 NOTE — PROGRESS NOTES
I have attempted without success to contact this patient to schedule an appointment. Contact not reachable.

## 2019-07-18 ENCOUNTER — PATIENT OUTREACH (OUTPATIENT)
Dept: ADMINISTRATIVE | Facility: HOSPITAL | Age: 39
End: 2019-07-18

## 2019-12-16 ENCOUNTER — TELEPHONE (OUTPATIENT)
Dept: OBSTETRICS AND GYNECOLOGY | Facility: CLINIC | Age: 39
End: 2019-12-16

## 2019-12-16 NOTE — TELEPHONE ENCOUNTER
----- Message from Allyssa Baires sent at 12/16/2019  3:39 PM CST -----  Contact: pt  States she got her IUD in 2016. She wants to know when she needs to have it taken out and a new one put in. Please call pt [phone]. Thank you

## 2019-12-16 NOTE — TELEPHONE ENCOUNTER
Left message for patient that IUD is scheduled to come out 12/2021 and to call with any further needs.

## 2020-01-30 ENCOUNTER — OFFICE VISIT (OUTPATIENT)
Dept: INTERNAL MEDICINE | Facility: CLINIC | Age: 40
End: 2020-01-30
Payer: COMMERCIAL

## 2020-01-30 ENCOUNTER — TELEPHONE (OUTPATIENT)
Dept: ADMINISTRATIVE | Facility: HOSPITAL | Age: 40
End: 2020-01-30

## 2020-01-30 ENCOUNTER — LAB VISIT (OUTPATIENT)
Dept: LAB | Facility: HOSPITAL | Age: 40
End: 2020-01-30
Attending: FAMILY MEDICINE
Payer: MEDICAID

## 2020-01-30 ENCOUNTER — LAB VISIT (OUTPATIENT)
Dept: LAB | Facility: HOSPITAL | Age: 40
End: 2020-01-30
Payer: MEDICAID

## 2020-01-30 VITALS
HEART RATE: 81 BPM | TEMPERATURE: 98 F | WEIGHT: 206.56 LBS | HEIGHT: 66 IN | OXYGEN SATURATION: 97 % | BODY MASS INDEX: 33.2 KG/M2 | SYSTOLIC BLOOD PRESSURE: 150 MMHG | DIASTOLIC BLOOD PRESSURE: 108 MMHG

## 2020-01-30 DIAGNOSIS — Z13.220 SCREENING FOR LIPID DISORDERS: ICD-10-CM

## 2020-01-30 DIAGNOSIS — R53.83 OTHER FATIGUE: Chronic | ICD-10-CM

## 2020-01-30 DIAGNOSIS — I10 ESSENTIAL HYPERTENSION: ICD-10-CM

## 2020-01-30 DIAGNOSIS — Z11.3 ROUTINE SCREENING FOR STI (SEXUALLY TRANSMITTED INFECTION): ICD-10-CM

## 2020-01-30 DIAGNOSIS — Z11.4 SCREENING FOR HIV WITHOUT PRESENCE OF RISK FACTORS: ICD-10-CM

## 2020-01-30 DIAGNOSIS — Z13.1 SCREENING FOR DIABETES MELLITUS: ICD-10-CM

## 2020-01-30 DIAGNOSIS — F33.1 MAJOR DEPRESSIVE DISORDER, RECURRENT, MODERATE: Chronic | ICD-10-CM

## 2020-01-30 DIAGNOSIS — E66.09 CLASS 1 OBESITY DUE TO EXCESS CALORIES WITH SERIOUS COMORBIDITY AND BODY MASS INDEX (BMI) OF 33.0 TO 33.9 IN ADULT: Chronic | ICD-10-CM

## 2020-01-30 DIAGNOSIS — Z97.5 PRESENCE OF IUD: Chronic | ICD-10-CM

## 2020-01-30 DIAGNOSIS — F41.1 GENERALIZED ANXIETY DISORDER: Chronic | ICD-10-CM

## 2020-01-30 DIAGNOSIS — Z00.00 PREVENTATIVE HEALTH CARE: Primary | ICD-10-CM

## 2020-01-30 PROBLEM — E66.811 CLASS 1 OBESITY DUE TO EXCESS CALORIES WITH SERIOUS COMORBIDITY AND BODY MASS INDEX (BMI) OF 33.0 TO 33.9 IN ADULT: Chronic | Status: ACTIVE | Noted: 2020-01-30

## 2020-01-30 LAB
ALBUMIN SERPL BCP-MCNC: 3.6 G/DL (ref 3.5–5.2)
ALP SERPL-CCNC: 43 U/L (ref 55–135)
ALT SERPL W/O P-5'-P-CCNC: 75 U/L (ref 10–44)
ANION GAP SERPL CALC-SCNC: 8 MMOL/L (ref 8–16)
AST SERPL-CCNC: 38 U/L (ref 10–40)
BASOPHILS # BLD AUTO: 0.07 K/UL (ref 0–0.2)
BASOPHILS NFR BLD: 2 % (ref 0–1.9)
BILIRUB SERPL-MCNC: 0.2 MG/DL (ref 0.1–1)
BUN SERPL-MCNC: 5 MG/DL (ref 6–20)
CALCIUM SERPL-MCNC: 9 MG/DL (ref 8.7–10.5)
CHLORIDE SERPL-SCNC: 103 MMOL/L (ref 95–110)
CHOLEST SERPL-MCNC: 220 MG/DL (ref 120–199)
CHOLEST/HDLC SERPL: 3.2 {RATIO} (ref 2–5)
CO2 SERPL-SCNC: 30 MMOL/L (ref 23–29)
CREAT SERPL-MCNC: 0.8 MG/DL (ref 0.5–1.4)
DIFFERENTIAL METHOD: ABNORMAL
EOSINOPHIL # BLD AUTO: 0.1 K/UL (ref 0–0.5)
EOSINOPHIL NFR BLD: 1.4 % (ref 0–8)
ERYTHROCYTE [DISTWIDTH] IN BLOOD BY AUTOMATED COUNT: 13.8 % (ref 11.5–14.5)
EST. GFR  (AFRICAN AMERICAN): >60 ML/MIN/1.73 M^2
EST. GFR  (NON AFRICAN AMERICAN): >60 ML/MIN/1.73 M^2
GLUCOSE SERPL-MCNC: 83 MG/DL (ref 70–110)
HCT VFR BLD AUTO: 37.8 % (ref 37–48.5)
HDLC SERPL-MCNC: 69 MG/DL (ref 40–75)
HDLC SERPL: 31.4 % (ref 20–50)
HGB BLD-MCNC: 12 G/DL (ref 12–16)
IMM GRANULOCYTES # BLD AUTO: 0 K/UL (ref 0–0.04)
IMM GRANULOCYTES NFR BLD AUTO: 0 % (ref 0–0.5)
LDLC SERPL CALC-MCNC: 135.4 MG/DL (ref 63–159)
LYMPHOCYTES # BLD AUTO: 1.9 K/UL (ref 1–4.8)
LYMPHOCYTES NFR BLD: 53.8 % (ref 18–48)
MCH RBC QN AUTO: 32.9 PG (ref 27–31)
MCHC RBC AUTO-ENTMCNC: 31.7 G/DL (ref 32–36)
MCV RBC AUTO: 104 FL (ref 82–98)
MONOCYTES # BLD AUTO: 0.4 K/UL (ref 0.3–1)
MONOCYTES NFR BLD: 12.5 % (ref 4–15)
NEUTROPHILS # BLD AUTO: 1.1 K/UL (ref 1.8–7.7)
NEUTROPHILS NFR BLD: 30.3 % (ref 38–73)
NONHDLC SERPL-MCNC: 151 MG/DL
NRBC BLD-RTO: 0 /100 WBC
PLATELET # BLD AUTO: 425 K/UL (ref 150–350)
PMV BLD AUTO: 11.1 FL (ref 9.2–12.9)
POTASSIUM SERPL-SCNC: 3.5 MMOL/L (ref 3.5–5.1)
PROT SERPL-MCNC: 7.3 G/DL (ref 6–8.4)
RBC # BLD AUTO: 3.65 M/UL (ref 4–5.4)
SODIUM SERPL-SCNC: 141 MMOL/L (ref 136–145)
TRIGL SERPL-MCNC: 78 MG/DL (ref 30–150)
TSH SERPL DL<=0.005 MIU/L-ACNC: 0.72 UIU/ML (ref 0.4–4)
WBC # BLD AUTO: 3.51 K/UL (ref 3.9–12.7)

## 2020-01-30 PROCEDURE — 99213 OFFICE O/P EST LOW 20 MIN: CPT | Mod: PBBFAC | Performed by: FAMILY MEDICINE

## 2020-01-30 PROCEDURE — 84443 ASSAY THYROID STIM HORMONE: CPT

## 2020-01-30 PROCEDURE — 85025 COMPLETE CBC W/AUTO DIFF WBC: CPT

## 2020-01-30 PROCEDURE — 99395 PR PREVENTIVE VISIT,EST,18-39: ICD-10-PCS | Mod: S$PBB,,, | Performed by: FAMILY MEDICINE

## 2020-01-30 PROCEDURE — 80053 COMPREHEN METABOLIC PANEL: CPT

## 2020-01-30 PROCEDURE — 87491 CHLMYD TRACH DNA AMP PROBE: CPT

## 2020-01-30 PROCEDURE — 86592 SYPHILIS TEST NON-TREP QUAL: CPT

## 2020-01-30 PROCEDURE — 99999 PR PBB SHADOW E&M-EST. PATIENT-LVL III: ICD-10-PCS | Mod: PBBFAC,,, | Performed by: FAMILY MEDICINE

## 2020-01-30 PROCEDURE — 80061 LIPID PANEL: CPT

## 2020-01-30 PROCEDURE — 36415 COLL VENOUS BLD VENIPUNCTURE: CPT

## 2020-01-30 PROCEDURE — 99999 PR PBB SHADOW E&M-EST. PATIENT-LVL III: CPT | Mod: PBBFAC,,, | Performed by: FAMILY MEDICINE

## 2020-01-30 PROCEDURE — 99395 PREV VISIT EST AGE 18-39: CPT | Mod: S$PBB,,, | Performed by: FAMILY MEDICINE

## 2020-01-30 PROCEDURE — 86703 HIV-1/HIV-2 1 RESULT ANTBDY: CPT

## 2020-01-30 RX ORDER — CHLORTHALIDONE 25 MG/1
25 TABLET ORAL DAILY
Qty: 30 TABLET | Refills: 1 | Status: SHIPPED | OUTPATIENT
Start: 2020-01-30 | End: 2020-04-13 | Stop reason: SDUPTHER

## 2020-01-30 RX ORDER — ALPRAZOLAM 0.25 MG/1
0.25 TABLET ORAL 3 TIMES DAILY PRN
Qty: 90 TABLET | Refills: 1 | Status: SHIPPED | OUTPATIENT
Start: 2020-01-30 | End: 2020-04-13 | Stop reason: SDUPTHER

## 2020-01-30 RX ORDER — CLONIDINE 0.1 MG/24H
PATCH, EXTENDED RELEASE TRANSDERMAL
COMMUNITY
Start: 2019-02-22 | End: 2020-01-30

## 2020-01-30 RX ORDER — SERTRALINE HYDROCHLORIDE 50 MG/1
50 TABLET, FILM COATED ORAL DAILY
Qty: 30 TABLET | Refills: 11 | Status: SHIPPED | OUTPATIENT
Start: 2020-01-30 | End: 2020-10-08

## 2020-01-30 RX ORDER — METOPROLOL SUCCINATE 50 MG/1
50 TABLET, EXTENDED RELEASE ORAL DAILY
Qty: 30 TABLET | Refills: 11 | Status: SHIPPED | OUTPATIENT
Start: 2020-01-30 | End: 2020-10-23 | Stop reason: SDUPTHER

## 2020-01-30 NOTE — PATIENT INSTRUCTIONS
I've listed below some mental health providers whom I believe are accepting new patients with your insurance. You should be able to get the mental health care you need at one of the centers listed below. Please contact them ASAP to schedule your first appointment. Tell them you were referred by your primary care provider.    [facility]  [address], [place] [address]  Phone: [phone]  Web:  [email or web address]    [facility]  [address], [address]  [place] [address]  Phone: [phone]  Web: [email or web address]    [facility] (Satellite Location)  [address]  [address], [place] [address]  Phone: [phone]  Web: [email or web address]    [facility]  [address], [address]  [place] [address]   Phone: [phone]    [facility] [address] [place] [address]  Phone: [phone]  Web: [email or web address]    [facility]  [address]  [place] [address]  Phone: [phone]  Web: [email or web address]    [facility]  [address] [place] [address]  Phone: [phone]  Web: [email or web address]    [facility]  [address] [place] [place] [address]  Phone: [phone]  Web: [email or web address]    IMPORANT: If you experience an emotional crisis, go to the nearest emergency room or call the crisis intervention center at [phone].

## 2020-01-30 NOTE — ASSESSMENT & PLAN NOTE
Future Appointments   Date Time Provider Department Center   4/7/2020 10:15 AM Bere Villa NP [facility] [place]

## 2020-01-30 NOTE — TELEPHONE ENCOUNTER
Contacting patient to noty patient of 2 wk Appointment with Dr Stone, EKG and OBGYN. Left message to for patient to call back to receive appointment dates. PDaugherty 01/30/2020

## 2020-01-30 NOTE — ASSESSMENT & PLAN NOTE
Chronic problem.  Previously did well on sertraline 50 mg daily.  Worse recently due to recent life stressors (lost job, had to move in to live with her mother who apparently has early dementia.

## 2020-01-31 ENCOUNTER — PATIENT MESSAGE (OUTPATIENT)
Dept: INTERNAL MEDICINE | Facility: CLINIC | Age: 40
End: 2020-01-31

## 2020-01-31 LAB
C TRACH DNA SPEC QL NAA+PROBE: NOT DETECTED
HIV 1+2 AB+HIV1 P24 AG SERPL QL IA: NEGATIVE
N GONORRHOEA DNA SPEC QL NAA+PROBE: NOT DETECTED
RPR SER QL: NORMAL

## 2020-01-31 NOTE — PROGRESS NOTES
WELLNESS VISIT (PREVENTIVE SERVICES)    CHIEF COMPLAINT  Establish Care; Anxiety; and Hypertension    This is my first time treating her here. All problems addressed today are NEW TO ME.  She is requesting that I take over as her primary care provider.     HEALTH MAINTENANCE INTERVENTIONS - UP TO DATE  Health Maintenance Topics with due status: Not Due       Topic Last Completion Date    TETANUS VACCINE 08/15/2016       HEALTH MAINTENANCE INTERVENTIONS - DUE OR DUE SOON  Health Maintenance Due   Topic Date Due    HIV Screening  03/31/1997    Pap Smear with HPV Cotest  03/20/2018       HISTORY, ASSESSMENT, and PLAN     1. Preventative health care       ASSESSMENT: Health Risks Addressed    2. Essential hypertension       ASSESSMENT:  She says that she has been off all of her medications except for metoprolol tartrate, and she says she has been taking only one tablet (50 mg) daily.  Hypertension is uncontrolled, asymptomatic.  She has no apparent compelling indication for beta-blocker.    3. Major depressive disorder, recurrent, moderate   4. Generalized anxiety disorder       ASSESSMENT:  Depression and anxiety are worse, exacerbated by certain recent life stressors.  She reports no suicidal thoughts.  She reports no significant mood instability and no history of the hypomania.  She reports average consumption of fewer than six standard alcoholic drinks per month.  She says she previously did well with sertraline. [place] Board of Pharmacy Controlled Prescription Drug Monitoring database was queried and showed no activity to suggest abuse, diversion, or other inappropriate use of prescription medications. n    5. Class 1 obesity due to excess calories with serious comorbidity and body mass index (BMI) of 33.0 to 33.9 in adult        ASSESSMENT: Therapeutic lifestyle changes encouraged.     6. Other fatigue        ASSESSMENT:  Likely multifactorial.    7. Presence of IUD        ASSESSMENT:  She has gynecology  appointment scheduled soon for re-evaluation and for Pap smear.    8. Screening for HIV without presence of risk factors        ASSESSMENT: She reports no specific exposure or risk factor or specific relevant symptoms, but she accepted offer for screening.      9. Routine screening for STI (sexually transmitted infection)        ASSESSMENT: She reports no specific exposure or risk factor or specific relevant symptoms, but she accepted offer for screening.      10. Screening for diabetes mellitus        ASSESSMENT: Recommended/Ordered.     11. Screening for lipid disorders        ASSESSMENT: Recommended/Ordered.     Orders Placed This Encounter    C. trachomatis/N. gonorrhoeae by AMP DNA [facility]; Urine    HIV 1/2 Ag/Ab (4th Gen)    Lipid panel    Comprehensive metabolic panel    CBC auto differential    TSH    RPR    SCHEDULED EKG 12-LEAD (to Muse)    sertraline (ZOLOFT) 50 MG tablet    ALPRAZolam (XANAX) 0.25 MG tablet    metoprolol succinate (TOPROL-XL) 50 MG 24 hr tablet    chlorthalidone (HYGROTEN) 25 MG Tab        Outpatient Medications Prior to Visit   Medication Sig Dispense Refill    ALPRAZolam (XANAX) 0.25 MG tablet Take 0.25 mg by mouth.      cetirizine (ZYRTEC) 10 MG tablet Take 1 tablet (10 mg total) by mouth once daily. 30 tablet 12    cloNIDine 0.1 mg/24 hr td ptwk (CATAPRES) 0.1 mg/24 hr       lisinopril-hydrochlorothiazide (PRINZIDE,ZESTORETIC) 20-12.5 mg per tablet TAKE TWO TABLETS BY MOUTH EVERY DAY (Patient not taking: Reported on 1/30/2020) 60 tablet 11    metoprolol tartrate (LOPRESSOR) 50 MG tablet TAKE ONE TABLET BY MOUTH TWICE A DAY (Patient not taking: Reported on 1/30/2020) 60 tablet 11    sertraline (ZOLOFT) 50 MG tablet Take 50 mg by mouth.      vitamin D 1000 units Tab TAKE 1 TABLET BY MOUTH ONCE DAILY (Patient not taking: Reported on 1/30/2020) 100 tablet 3     No facility-administered medications prior to visit.         Medications Discontinued During This Encounter    Medication Reason    cetirizine (ZYRTEC) 10 MG tablet Patient no longer taking    ALPRAZolam (XANAX) 0.25 MG tablet Patient no longer taking    cetirizine (ZYRTEC) 10 MG tablet Patient no longer taking    cloNIDine 0.1 mg/24 hr td ptwk (CATAPRES) 0.1 mg/24 hr Patient no longer taking    lisinopril-hydrochlorothiazide (PRINZIDE,ZESTORETIC) 20-12.5 mg per tablet Patient no longer taking    metoprolol tartrate (LOPRESSOR) 50 MG tablet Patient no longer taking    sertraline (ZOLOFT) 50 MG tablet Patient no longer taking    vitamin D 1000 units Tab Patient no longer taking        Medications Ordered This Encounter   Medications    ALPRAZolam (XANAX) 0.25 MG tablet     Sig: Take 1 tablet (0.25 mg total) by mouth 3 (three) times daily as needed for Insomnia or Anxiety.     Dispense:  90 tablet     Refill:  1    chlorthalidone (HYGROTEN) 25 MG Tab     Sig: Take 1 tablet (25 mg total) by mouth once daily.     Dispense:  30 tablet     Refill:  1    metoprolol succinate (TOPROL-XL) 50 MG 24 hr tablet     Sig: Take 1 tablet (50 mg total) by mouth once daily.     Dispense:  30 tablet     Refill:  11    sertraline (ZOLOFT) 50 MG tablet     Sig: Take 1 tablet (50 mg total) by mouth once daily.     Dispense:  30 tablet     Refill:  11        Follow up in about 2 weeks (around 2/13/2020) for re-evaluate problem(s) discussed today.    Problem List Items Addressed This Visit        Psychiatric    Major depressive disorder, recurrent, moderate (Chronic)    Current Assessment & Plan     Chronic problem.  Previously did well on sertraline 50 mg daily.  Worse recently due to recent life stressors (lost job, had to move in to live with her mother who apparently has early dementia.         Relevant Medications    sertraline (ZOLOFT) 50 MG tablet    Other Relevant Orders    Comprehensive metabolic panel    CBC auto differential    Generalized anxiety disorder (Chronic)    Relevant Medications    sertraline (ZOLOFT) 50 MG tablet  "   ALPRAZolam (XANAX) 0.25 MG tablet       Cardiac/Vascular    Essential hypertension    Relevant Medications    metoprolol succinate (TOPROL-XL) 50 MG 24 hr tablet    chlorthalidone (HYGROTEN) 25 MG Tab    Other Relevant Orders    Comprehensive metabolic panel    TSH    SCHEDULED EKG 12-LEAD (to Muse)       Endocrine    Class 1 obesity due to excess calories with serious comorbidity and body mass index (BMI) of 33.0 to 33.9 in adult (Chronic)    Relevant Orders    TSH       Other    Presence of IUD (Chronic)    Overview     Placed December, 2016         Current Assessment & Plan     Future Appointments   Date Time Provider Department Center   4/7/2020 10:15 AM Bere Villa NP [facility] OBG[facility] [place]             Other fatigue (Chronic)    Relevant Orders    CBC auto differential    TSH      Other Visit Diagnoses     Preventative health care    -  Primary    Screening for HIV without presence of risk factors        Relevant Orders    HIV 1/2 Ag/Ab (4th Gen)    Routine screening for STI (sexually transmitted infection)        Relevant Orders    C. trachomatis/N. gonorrhoeae by AMP DNA [facility]; Urine    RPR    Screening for diabetes mellitus        Relevant Orders    Comprehensive metabolic panel    Screening for lipid disorders        Relevant Orders    Lipid panel           REVIEW OF SYSTEMS  Answers for HPI/ROS submitted by the patient on 1/28/2020    blurred vision: No  chest pain: No  malaise/fatigue: No  neck pain: No  orthopnea: No  palpitations: No  peripheral edema: No  PND: No  shortness of breath: No  sweats: No    PHYSICAL EXAM  Vitals:    01/30/20 1152   BP: (!) 150/108   BP Location: Right arm   Patient Position: Sitting   BP Method: Large (Manual)   Pulse: 81   Temp: 98.4 °F (36.9 °C)   TempSrc: Oral   SpO2: 97%   Weight: 93.7 kg (206 lb 9.1 oz)   Height: 5' 6" (1.676 m)     CONSTITUTIONAL: Vital signs noted. No apparent distress. Does not appear acutely ill or septic. Appears adequately hydrated.  EYE: " "Sclerae anicteric. Lids and conjunctiva unremarkable.  ENT: External ENT unremarkable. Oropharynx moist.  NECK: Trachea midline. Thyroid nontender.  PULM: Lungs clear. Breathing unlabored.  CV: Auscultation reveals regular rate and rhythm without murmur, gallop or rub. No carotid bruit.  GI: Soft and nontender. Bowel sounds normal.  DERM: Skin warm and moist with normal turgor.  PSYCH: Alert and oriented x 3. Mood is grossly neutral. Affect appropriate. Judgment and insight not grossly compromised.      PAST MEDICAL HISTORY  She has a past medical history of Anxiety, Depression, and Hypertension.    SURGICAL HISTORY  She has a past surgical history that includes [place] tooth extraction and  section.    FAMILY HISTORY  Her family history includes Colon cancer in her father; Diabetes in her father and sister; Hypertension in her father, mother, and sister.     ALLERGIES  Review of patient's allergies indicates:  No Known Allergies    SOCIAL HISTORY   TOBACCO USE HISTORY: She reports that she has never smoked. She has never used smokeless tobacco.   ALCOHOL USE HISTORY:  She reports that she drinks alcohol.   RECREATIONAL DRUG USE HISTORY:  She reports that she does not use drugs.    Documentation entered by me for this encounter may have been done in part using speech-recognition technology. Although I have made an effort to ensure accuracy, "sound like" errors may exist and should be interpreted in context. -MARTINE Stone MD.    "

## 2020-02-13 ENCOUNTER — TELEPHONE (OUTPATIENT)
Dept: INTERNAL MEDICINE | Facility: CLINIC | Age: 40
End: 2020-02-13

## 2020-02-13 ENCOUNTER — TELEPHONE (OUTPATIENT)
Dept: OBSTETRICS AND GYNECOLOGY | Facility: CLINIC | Age: 40
End: 2020-02-13

## 2020-02-13 NOTE — TELEPHONE ENCOUNTER
----- Message from Jailyn Stevens sent at 2/13/2020  9:28 AM CST -----  Contact: pt   Pt calling to get appt r/s offer to get everything r/s patient want to get the nurse to r/s patient declined. [phone]         Thanks   Jailyn Stevens

## 2020-02-13 NOTE — TELEPHONE ENCOUNTER
Adrien with patient and rescheduled her follow up with Dr. Stone due to medical transportation not showing up for her today.

## 2020-02-13 NOTE — TELEPHONE ENCOUNTER
Returned call to patient, she requested to cancelled scheduled appt.  Made her aware that her last visit has been over 3 yrs, and that an internal referral would be needed, she verbalized understanding and said that she will get Dr. Bernard to send a referral, per patient.

## 2020-02-13 NOTE — TELEPHONE ENCOUNTER
----- Message from Jailyn Stevens sent at 2/13/2020  9:25 AM CST -----  Contact: pt   Patient calling to r/s appt offer to r/s the appt and pt stated she want the nurse to r/s please call patient to get appt r/s. [phone]         Thanks   Jailyn Stevens

## 2020-02-24 ENCOUNTER — TELEPHONE (OUTPATIENT)
Dept: INTERNAL MEDICINE | Facility: CLINIC | Age: 40
End: 2020-02-24

## 2020-02-24 NOTE — TELEPHONE ENCOUNTER
----- Message from Gabriela Guo sent at 2/24/2020  2:06 PM CST -----  Type:  RX Refill Request    Who Called:  Pt  Orneotta                                                                                                              Refill or New Rx:   Refill   RX Name and Strength:   Xanax  (not sure of mg)  How is the patient currently taking it? (ex. 1XDay):    Is this a 30 day or 90 day RX:  30 day  Preferred Pharmacy with phone number [facility] or Mail Order:  Local  Ordering Provider:  Dr Stone  Would the patient rather a call back or a response via [facility]?   Call back  Best Call Back Number:   [phone]  Additional Information:  Please call//[email or web address]

## 2020-02-24 NOTE — TELEPHONE ENCOUNTER
Called and left a voicemail for patient that she should still have a refill of her medication requested at the pharmacy and that if she calls them and has any problems to let us know.

## 2020-02-27 ENCOUNTER — TELEPHONE (OUTPATIENT)
Dept: INTERNAL MEDICINE | Facility: CLINIC | Age: 40
End: 2020-02-27

## 2020-02-27 NOTE — TELEPHONE ENCOUNTER
----- Message from Kelley Stevens sent at 2/27/2020  3:19 PM CST -----  Patient needs call back to schedule appointment.[phone]

## 2020-02-27 NOTE — TELEPHONE ENCOUNTER
Spoke with patient and scheduled her follow up with Dr. Stone on 3/17/2020. She verbalized understanding.

## 2020-02-27 NOTE — TELEPHONE ENCOUNTER
Spoke with patient and she stated that she apologizes for missing her last two scheduled appointments with Dr. Stone. She said that for the first one, it was because of a transportation issue, however, the second one, was not. She would like for Dr. Stone to approve another schedule override to fit her in for a follow up and her ekg appointment.

## 2020-03-06 ENCOUNTER — TELEPHONE (OUTPATIENT)
Dept: INTERNAL MEDICINE | Facility: CLINIC | Age: 40
End: 2020-03-06

## 2020-03-06 NOTE — TELEPHONE ENCOUNTER
Spoke with patient and she stated that she wants to change her prescriptions from [facility] to [facility] [place]. I informed her that [facility]. She verbalized understanding.

## 2020-03-06 NOTE — TELEPHONE ENCOUNTER
----- Message from Kelley Stevens sent at 3/6/2020  1:58 PM CST -----  Patient needs call back to consult with nurse james change in pharmacy..[phone] (home)

## 2020-03-26 NOTE — PROGRESS NOTES
"Merari Harris.    I'm happy to report that these test results are fine and do not require a change in treatment.    Thanks for letting me care for you, thanks for trusting us with your healthcare needs, and thanks for using [facility].    Sincerely,    MARTINE Stone MD  --------------------------------------------------------------------------------   Want to learn more about your test results and what they mean? (1) Log in to your [facility] account at [email or web address]. (2) From the "View test results" tab, click on the test you want to know more about. (3) Click on the "About This Test" link."

## 2020-04-12 DIAGNOSIS — I10 ESSENTIAL HYPERTENSION: ICD-10-CM

## 2020-04-13 ENCOUNTER — PATIENT MESSAGE (OUTPATIENT)
Dept: INTERNAL MEDICINE | Facility: CLINIC | Age: 40
End: 2020-04-13

## 2020-04-13 DIAGNOSIS — I10 ESSENTIAL HYPERTENSION: ICD-10-CM

## 2020-04-13 DIAGNOSIS — F41.1 GENERALIZED ANXIETY DISORDER: Chronic | ICD-10-CM

## 2020-04-13 RX ORDER — ALPRAZOLAM 0.25 MG/1
0.25 TABLET ORAL 3 TIMES DAILY PRN
Qty: 90 TABLET | Refills: 0 | Status: SHIPPED | OUTPATIENT
Start: 2020-04-13 | End: 2020-06-15

## 2020-04-13 RX ORDER — ALPRAZOLAM 0.25 MG/1
0.25 TABLET ORAL 3 TIMES DAILY PRN
Qty: 90 TABLET | Refills: 0 | Status: SHIPPED | OUTPATIENT
Start: 2020-04-13 | End: 2020-04-13 | Stop reason: SDUPTHER

## 2020-04-13 RX ORDER — CHLORTHALIDONE 25 MG/1
25 TABLET ORAL DAILY
Qty: 90 TABLET | Refills: 0 | Status: SHIPPED | OUTPATIENT
Start: 2020-04-13 | End: 2020-04-13 | Stop reason: SDUPTHER

## 2020-04-13 RX ORDER — CHLORTHALIDONE 25 MG/1
25 TABLET ORAL DAILY
Qty: 90 TABLET | Refills: 0 | Status: SHIPPED | OUTPATIENT
Start: 2020-04-13 | End: 2020-08-14

## 2020-04-14 ENCOUNTER — PATIENT MESSAGE (OUTPATIENT)
Dept: INTERNAL MEDICINE | Facility: CLINIC | Age: 40
End: 2020-04-14

## 2020-04-14 RX ORDER — CHLORTHALIDONE 25 MG/1
TABLET ORAL
Qty: 30 TABLET | Refills: 1 | OUTPATIENT
Start: 2020-04-14

## 2020-04-28 DIAGNOSIS — I10 ESSENTIAL HYPERTENSION: ICD-10-CM

## 2020-05-01 DIAGNOSIS — I10 ESSENTIAL HYPERTENSION: ICD-10-CM

## 2020-05-01 RX ORDER — CHLORTHALIDONE 25 MG/1
25 TABLET ORAL DAILY
Qty: 90 TABLET | Refills: 0 | OUTPATIENT
Start: 2020-05-01 | End: 2020-07-30

## 2020-05-04 ENCOUNTER — PATIENT MESSAGE (OUTPATIENT)
Dept: INTERNAL MEDICINE | Facility: CLINIC | Age: 40
End: 2020-05-04

## 2020-05-04 RX ORDER — CHLORTHALIDONE 25 MG/1
TABLET ORAL
Qty: 30 TABLET | OUTPATIENT
Start: 2020-05-04

## 2020-06-15 ENCOUNTER — PATIENT MESSAGE (OUTPATIENT)
Dept: INTERNAL MEDICINE | Facility: CLINIC | Age: 40
End: 2020-06-15

## 2020-06-15 DIAGNOSIS — F41.1 GENERALIZED ANXIETY DISORDER: Chronic | ICD-10-CM

## 2020-06-15 RX ORDER — ALPRAZOLAM 0.25 MG/1
0.25 TABLET ORAL 3 TIMES DAILY PRN
Qty: 90 TABLET | Refills: 0 | Status: SHIPPED | OUTPATIENT
Start: 2020-06-15 | End: 2020-08-08

## 2020-07-16 ENCOUNTER — TELEPHONE (OUTPATIENT)
Dept: INTERNAL MEDICINE | Facility: CLINIC | Age: 40
End: 2020-07-16

## 2020-07-16 DIAGNOSIS — F41.1 GENERALIZED ANXIETY DISORDER: Chronic | ICD-10-CM

## 2020-07-16 RX ORDER — ALPRAZOLAM 0.25 MG/1
TABLET ORAL
Qty: 90 TABLET | OUTPATIENT
Start: 2020-07-16

## 2020-08-07 DIAGNOSIS — F41.1 GENERALIZED ANXIETY DISORDER: Chronic | ICD-10-CM

## 2020-08-08 RX ORDER — ALPRAZOLAM 0.25 MG/1
0.25 TABLET ORAL 2 TIMES DAILY PRN
Qty: 30 TABLET | Refills: 0 | Status: SHIPPED | OUTPATIENT
Start: 2020-08-08 | End: 2020-10-08

## 2020-08-08 NOTE — TELEPHONE ENCOUNTER
Merari Harris.    I approved a refill of your medicine listed below.    Medications Ordered This Encounter   Medications    ALPRAZolam (XANAX) 0.25 MG tablet      IMPORTANT: Before I can give you any additional refills, I'm going to need to re-evaluate you with appointment with me (either telemedicine virtual visit or in-office appointment).    Please take the time right now to schedule your appointment soon and before you will need any additional refills You can schedule your appointments from your [facility] account or from the [employer] [facility]y on your smartphone or by calling our appointment desk at [phone]. If you have any difficulty, send my staff a message, and they will be glad to help.     I look forward to seeing you then.    Thanks for letting me care for you, thanks for trusting us with your healthcare needs, and thanks for using [facility].    Sincerely,    MARTINE Stone MD     Medications Ordered This Encounter   Medications    ALPRAZolam (XANAX) 0.25 MG tablet     Sig: Take 1 tablet (0.25 mg total) by mouth 2 (two) times daily as needed for Insomnia or Anxiety.     Dispense:  30 tablet     Refill:  0     - APPOINTMENT REQUIRED FOR MORE REFILLS    #LMRF

## 2020-08-10 NOTE — TELEPHONE ENCOUNTER
Called and left a voicemail for patient to call back and schedule appointment for more medication refills per Dr. Stone.

## 2020-08-27 ENCOUNTER — DOCUMENTATION ONLY (OUTPATIENT)
Dept: INTERNAL MEDICINE | Facility: CLINIC | Age: 40
End: 2020-08-27

## 2020-09-14 ENCOUNTER — PATIENT MESSAGE (OUTPATIENT)
Dept: INTERNAL MEDICINE | Facility: CLINIC | Age: 40
End: 2020-09-14

## 2020-09-14 ENCOUNTER — TELEPHONE (OUTPATIENT)
Dept: OBSTETRICS AND GYNECOLOGY | Facility: CLINIC | Age: 40
End: 2020-09-14

## 2020-09-14 DIAGNOSIS — Z01.419 PAP SMEAR, AS PART OF ROUTINE GYNECOLOGICAL EXAMINATION: Primary | ICD-10-CM

## 2020-09-14 NOTE — TELEPHONE ENCOUNTER
Returned call to patient.  She requested an appt of an annual visit.  Made her aware that she would be considered a new patient, and currently no appt is populating, she verbalized understanding.  Offered her the number to [facility] at [phone] or to have an internal referral submitted.  She says that she will have a referral submitted.

## 2020-09-14 NOTE — TELEPHONE ENCOUNTER
----- Message from Haritha Del Rio sent at 9/12/2020 12:47 PM CDT -----  Patient called to schedule an appointment specifically with Dr Cullen  And wishes to speak with a nurse regarding this matter.          can be reached at [phone]    Thanks  KB

## 2020-09-15 ENCOUNTER — TELEPHONE (OUTPATIENT)
Dept: OBSTETRICS AND GYNECOLOGY | Facility: CLINIC | Age: 40
End: 2020-09-15

## 2020-09-15 NOTE — TELEPHONE ENCOUNTER
Merari Harris.    I received your request for a referral to OB/GYN. I'm glad to help.    I have entered a referral order for you to be treated by one of our excellent OB/GYNs.    Please take the time right now to schedule that appointment. You can schedule the appointment by calling our appointment desk at [phone]. If you have any difficulty, send my staff a message, and they will be glad to help.     Thanks for letting me care for you, and thanks for trusting [facility] with your healthcare needs.    Take care and be well.    Sincerely,    MARTINE Stone MD   Orders Placed This Encounter   Procedures    Ambulatory referral/consult to Gynecology

## 2020-09-15 NOTE — TELEPHONE ENCOUNTER
----- Message from Teri Clark sent at 9/15/2020 10:27 AM CDT -----  Contact: pt  Pt requesting a call back to schedule well woman and discuss issues with IUD. Please call pt back at [phone]

## 2020-09-15 NOTE — TELEPHONE ENCOUNTER
Returned call to patient.  She requested an annual at [facility] with Dr. Cullen only.  Appt scheduled for 11/18/20 at 9:00 am.  She confirmed appt, provider and location.  She verbalized understanding of the current visitor and mask policies.

## 2020-09-16 ENCOUNTER — PATIENT MESSAGE (OUTPATIENT)
Dept: INTERNAL MEDICINE | Facility: CLINIC | Age: 40
End: 2020-09-16

## 2020-09-16 ENCOUNTER — TELEPHONE (OUTPATIENT)
Dept: INTERNAL MEDICINE | Facility: CLINIC | Age: 40
End: 2020-09-16

## 2020-10-08 ENCOUNTER — OFFICE VISIT (OUTPATIENT)
Dept: INTERNAL MEDICINE | Facility: CLINIC | Age: 40
End: 2020-10-08
Payer: MEDICAID

## 2020-10-08 ENCOUNTER — PATIENT MESSAGE (OUTPATIENT)
Dept: FAMILY MEDICINE | Facility: CLINIC | Age: 40
End: 2020-10-08

## 2020-10-08 DIAGNOSIS — M25.551 RIGHT HIP PAIN: Primary | ICD-10-CM

## 2020-10-08 PROCEDURE — 99214 PR OFFICE/OUTPT VISIT, EST, LEVL IV, 30-39 MIN: ICD-10-PCS | Mod: 95,,, | Performed by: FAMILY MEDICINE

## 2020-10-08 PROCEDURE — 99214 OFFICE O/P EST MOD 30 MIN: CPT | Mod: 95,,, | Performed by: FAMILY MEDICINE

## 2020-10-08 RX ORDER — TRAMADOL HYDROCHLORIDE 50 MG/1
50 TABLET ORAL EVERY 8 HOURS PRN
Qty: 21 TABLET | Refills: 0 | Status: SHIPPED | OUTPATIENT
Start: 2020-10-08 | End: 2020-11-18
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
## 2020-10-08 NOTE — ASSESSMENT & PLAN NOTE
Recommended getting hip and pelvic x-ray because it definitely seems like a musculoskeletal problem.  Giving a short trial of tramadol for temporary relief.  Shoulder likely she will need to come in for a face-to-face visit for physical exam.

## 2020-10-14 DIAGNOSIS — I10 ESSENTIAL HYPERTENSION: ICD-10-CM

## 2020-10-14 RX ORDER — CHLORTHALIDONE 25 MG/1
25 TABLET ORAL DAILY
Qty: 30 TABLET | Refills: 1 | OUTPATIENT
Start: 2020-10-14

## 2020-10-14 NOTE — TELEPHONE ENCOUNTER
ACTION NEEDED: NONE. This is FYI only. No further action required unless you need to contact the patient and/or pharmacy    REFILL REFUSED  REASON: She is overdue for a follow-up appointment. I have given her refills previously with instructions that an APPOINTMENT was required prior to additional refills. (Refer to telephone encounters 8/8/2020 and 8/14/2020.)    Requested Prescriptions     Refused Prescriptions Disp Refills    chlorthalidone (HYGROTEN) 25 MG Tab 30 tablet 1     Sig: Take 1 tablet (25 mg total) by mouth once daily. - APPOINTMENT REQUIRED FOR MORE REFILLS     Refused By: YESY BURROWS     Reason for Refusal: Patient needs an appointment    #LMRX

## 2020-10-15 ENCOUNTER — PATIENT MESSAGE (OUTPATIENT)
Dept: INTERNAL MEDICINE | Facility: CLINIC | Age: 40
End: 2020-10-15

## 2020-10-15 DIAGNOSIS — I10 ESSENTIAL HYPERTENSION: ICD-10-CM

## 2020-10-15 RX ORDER — CHLORTHALIDONE 25 MG/1
25 TABLET ORAL DAILY
Qty: 30 TABLET | Refills: 0 | Status: SHIPPED | OUTPATIENT
Start: 2020-10-15 | End: 2020-10-23 | Stop reason: SDUPTHER

## 2020-10-15 NOTE — TELEPHONE ENCOUNTER
ACTION NEEDED: Please let her know I gave her one month refill of her chlorthalidone.  Schedule her for a nurse visit to check her blood pressure.  (required for any additional refills)     Medications Ordered This Encounter   Medications    chlorthalidone (HYGROTEN) 25 MG Tab     Sig: Take 1 tablet (25 mg total) by mouth once daily. - APPOINTMENT REQUIRED FOR MORE REFILLS     Dispense:  30 tablet     Refill:  0     NURSE VISIT OR OFFICE VISIT REQUIRED FOR REFILLS       #[id number] #LMRX

## 2020-10-22 ENCOUNTER — CLINICAL SUPPORT (OUTPATIENT)
Dept: INTERNAL MEDICINE | Facility: CLINIC | Age: 40
End: 2020-10-22
Payer: MEDICAID

## 2020-10-22 ENCOUNTER — PATIENT MESSAGE (OUTPATIENT)
Dept: INTERNAL MEDICINE | Facility: CLINIC | Age: 40
End: 2020-10-22

## 2020-10-22 ENCOUNTER — HOSPITAL ENCOUNTER (OUTPATIENT)
Dept: RADIOLOGY | Facility: HOSPITAL | Age: 40
Discharge: HOME | End: 2020-10-22
Attending: FAMILY MEDICINE
Payer: MEDICAID

## 2020-10-22 DIAGNOSIS — M25.551 RIGHT HIP PAIN: ICD-10-CM

## 2020-10-22 DIAGNOSIS — F41.1 GENERALIZED ANXIETY DISORDER: Primary | Chronic | ICD-10-CM

## 2020-10-22 DIAGNOSIS — I10 ESSENTIAL HYPERTENSION: ICD-10-CM

## 2020-10-22 PROCEDURE — 99999 PR PBB SHADOW E&M-EST. PATIENT-LVL I: ICD-10-PCS | Mod: PBBFAC,,,

## 2020-10-22 PROCEDURE — 90471 IMMUNIZATION ADMIN: CPT | Mod: PBBFAC

## 2020-10-22 PROCEDURE — 73502 XR HIP 2 VIEW RIGHT: ICD-10-PCS | Mod: 26,RT,, | Performed by: RADIOLOGY

## 2020-10-22 PROCEDURE — 99211 OFF/OP EST MAY X REQ PHY/QHP: CPT | Mod: PBBFAC,25

## 2020-10-22 PROCEDURE — 73502 X-RAY EXAM HIP UNI 2-3 VIEWS: CPT | Mod: 26,RT,, | Performed by: RADIOLOGY

## 2020-10-22 PROCEDURE — 73502 X-RAY EXAM HIP UNI 2-3 VIEWS: CPT | Mod: TC,RT

## 2020-10-22 PROCEDURE — 99999 PR PBB SHADOW E&M-EST. PATIENT-LVL I: CPT | Mod: PBBFAC,,,

## 2020-10-22 NOTE — TELEPHONE ENCOUNTER
----- Message from Lazaro Smith, Chikis sent at 10/22/2020  2:38 PM CDT -----  Hello,    Pt is requesting her blood pressure medication and xanax to be sent to [facility] in [place].    Thanks,    Lazaro

## 2020-10-23 ENCOUNTER — PATIENT MESSAGE (OUTPATIENT)
Dept: INTERNAL MEDICINE | Facility: CLINIC | Age: 40
End: 2020-10-23

## 2020-10-23 ENCOUNTER — TELEPHONE (OUTPATIENT)
Dept: INTERNAL MEDICINE | Facility: CLINIC | Age: 40
End: 2020-10-23

## 2020-10-23 DIAGNOSIS — M25.551 RIGHT HIP PAIN: Primary | ICD-10-CM

## 2020-10-23 RX ORDER — ALPRAZOLAM 0.25 MG/1
TABLET ORAL 3 TIMES DAILY
OUTPATIENT
Start: 2020-10-23 | End: 2020-11-22

## 2020-10-23 RX ORDER — METOPROLOL SUCCINATE 50 MG/1
50 TABLET, EXTENDED RELEASE ORAL DAILY
Qty: 30 TABLET | Refills: 7 | Status: SHIPPED | OUTPATIENT
Start: 2020-10-23 | End: 2021-10-21 | Stop reason: SDUPTHER

## 2020-10-23 RX ORDER — METHYLPREDNISOLONE 4 MG/1
TABLET ORAL
Qty: 1 PACKAGE | Refills: 0 | Status: SHIPPED | OUTPATIENT
Start: 2020-10-23 | End: 2020-11-18

## 2020-10-23 RX ORDER — ALPRAZOLAM 0.25 MG/1
0.25 TABLET ORAL 2 TIMES DAILY PRN
Qty: 60 TABLET | Refills: 5 | Status: SHIPPED | OUTPATIENT
Start: 2020-10-23 | End: 2021-08-24 | Stop reason: SDUPTHER

## 2020-10-23 RX ORDER — CHLORTHALIDONE 25 MG/1
25 TABLET ORAL DAILY
Qty: 30 TABLET | Refills: 0 | OUTPATIENT
Start: 2020-10-23

## 2020-10-23 RX ORDER — CHLORTHALIDONE 25 MG/1
25 TABLET ORAL DAILY
Qty: 30 TABLET | Refills: 7 | Status: SHIPPED | OUTPATIENT
Start: 2020-10-23 | End: 2021-10-21 | Stop reason: SDUPTHER

## 2020-10-23 NOTE — TELEPHONE ENCOUNTER
ACTION NEEDED:  Please notify Or[facility] of refill below. For her convenience, offer to schedule her appointment on the same day her GYN appointment on 11/18/2020. Schedule override approved. OK to convert a virtual visit appointment slot to in-office appointment. Thanks.    Future Appointments   Date Time Provider Department Center   11/18/2020  9:00 AM Anan Cullen MD [facility] [place]   4/1/2021  2:40 PM MARTINE Stone MD [facility]     Medications Ordered This Encounter   Medications    ALPRAZolam (XANAX) 0.25 MG tablet     Sig: Take 1 tablet (0.25 mg total) by mouth 2 (two) times daily as needed for Anxiety.     Dispense:  60 tablet     Refill:  5    chlorthalidone (HYGROTEN) 25 MG Tab     Sig: Take 1 tablet (25 mg total) by mouth once daily.     Dispense:  30 tablet     Refill:  7     -    metoprolol succinate (TOPROL-XL) 50 MG 24 hr tablet     Sig: Take 1 tablet (50 mg total) by mouth once daily.     Dispense:  30 tablet     Refill:  7     -    #LMRX

## 2020-10-23 NOTE — TELEPHONE ENCOUNTER
Due to findings on xray I suggest we get her in with ortho as there are signs of arthritis. Let me know if there are any problems with getting appointment.   Will send course of steroid to see if this helps temporarily.

## 2020-10-23 NOTE — TELEPHONE ENCOUNTER
Spoke with pt about results. Pt voiced understanding. Pt would like to have steroids sent to [facility]

## 2020-10-26 ENCOUNTER — TELEPHONE (OUTPATIENT)
Dept: ORTHOPEDICS | Facility: CLINIC | Age: 40
End: 2020-10-26

## 2020-11-10 ENCOUNTER — PATIENT MESSAGE (OUTPATIENT)
Dept: INTERNAL MEDICINE | Facility: CLINIC | Age: 40
End: 2020-11-10

## 2020-11-18 ENCOUNTER — OFFICE VISIT (OUTPATIENT)
Dept: OBSTETRICS AND GYNECOLOGY | Facility: CLINIC | Age: 40
End: 2020-11-18
Payer: MEDICAID

## 2020-11-18 VITALS
DIASTOLIC BLOOD PRESSURE: 84 MMHG | WEIGHT: 210.13 LBS | SYSTOLIC BLOOD PRESSURE: 128 MMHG | HEIGHT: 63 IN | BODY MASS INDEX: 37.23 KG/M2

## 2020-11-18 DIAGNOSIS — Z01.419 WELL WOMAN EXAM WITH ROUTINE GYNECOLOGICAL EXAM: Primary | ICD-10-CM

## 2020-11-18 DIAGNOSIS — Z01.419 PAP SMEAR, AS PART OF ROUTINE GYNECOLOGICAL EXAMINATION: ICD-10-CM

## 2020-11-18 PROCEDURE — 87624 HPV HI-RISK TYP POOLED RSLT: CPT

## 2020-11-18 PROCEDURE — 99385 PR PREVENTIVE VISIT,NEW,18-39: ICD-10-PCS | Mod: S$PBB,,, | Performed by: OBSTETRICS & GYNECOLOGY

## 2020-11-18 PROCEDURE — 88142 CYTOPATH C/V THIN LAYER: CPT

## 2020-11-18 PROCEDURE — 99213 OFFICE O/P EST LOW 20 MIN: CPT | Mod: PBBFAC | Performed by: OBSTETRICS & GYNECOLOGY

## 2020-11-18 PROCEDURE — 99999 PR PBB SHADOW E&M-EST. PATIENT-LVL III: CPT | Mod: PBBFAC,,, | Performed by: OBSTETRICS & GYNECOLOGY

## 2020-11-18 PROCEDURE — 99385 PREV VISIT NEW AGE 18-39: CPT | Mod: S$PBB,,, | Performed by: OBSTETRICS & GYNECOLOGY

## 2020-11-18 PROCEDURE — 99999 PR PBB SHADOW E&M-EST. PATIENT-LVL III: ICD-10-PCS | Mod: PBBFAC,,, | Performed by: OBSTETRICS & GYNECOLOGY

## 2020-11-18 NOTE — LETTER
November 20, 2020      MARTINE Stone MD  [address] [facility]  [place] [address]           [place]  [address] [facility] [address]  Phone: [phone]  Fax: [phone]          Patient: Merari Orosco   MR Number: [id number]   YOB: 1980   Date of Visit: 11/18/2020       Dear Dr. MARTINE Stone:    Thank you for referring Merari Orosco to me for evaluation. Attached you will find relevant portions of my assessment and plan of care.    If you have questions, please do not hesitate to call me. I look forward to following Merari Orosco along with you.    Sincerely,    Anna Cullen MD    Enclosure  CC:  No Recipients    If you would like to receive this communication electronically, please contact [email or web address] or [phone] to request more information on [facility] Link access.    For providers and/or their staff who would like to refer a patient to [facility], please contact us through our one-stop-shop provider referral line, [facility], at [phone].    If you feel you have received this communication in error or would no longer like to receive these types of communications, please e-mail [email or web address]

## 2020-11-18 NOTE — PROGRESS NOTES
CHIEF COMPLAINT:   Gynecologic Exam  Chief Complaint   Patient presents with    Well Woman       HISTORY OF PRESENT ILLNESS  Merari Orosco  presents for annual exam. The patient has no complaints today.   She is sexually active.  Contraception:  Mirena 2016 Dec.     No LMP recorded. Patient has had an implant.    GYN screening history: last Pap: was normal. Never had any abnormal Pap smears in past.      Health Maintenance   Topic Date Due    Hepatitis C Screening  1980    TETANUS VACCINE  08/15/2026    Lipid Panel  Completed       HISTORY  Patient Active Problem List   Diagnosis    Essential hypertension    Presence of IUD    Other fatigue    Major depressive disorder, recurrent, moderate    Generalized anxiety disorder    Class 1 obesity due to excess calories with serious comorbidity and body mass index (BMI) of 33.0 to 33.9 in adult    Right hip pain       Past Medical History:   Diagnosis Date    Anxiety     Depression     Hypertension        Past Surgical History:   Procedure Laterality Date     SECTION      x1    WISDOM TOOTH EXTRACTION         Family History   Problem Relation Age of Onset    Colon cancer Father     Diabetes Father     Hypertension Father     Hypertension Mother     Diabetes Sister     Hypertension Sister     Breast cancer Neg Hx     Cancer Neg Hx     Miscarriages / Stillbirths Neg Hx     Ovarian cancer Neg Hx      labor Neg Hx     Stroke Neg Hx        Social History     Socioeconomic History    Marital status: Single     Spouse name: Not on file    Number of children: 1    Years of education: Not on file    Highest education level: Not on file   Occupational History    Not on file   Social Needs    Financial resource strain: Hard    Food insecurity     Worry: Sometimes true     Inability: Sometimes true    Transportation needs     Medical: No     Non-medical: Yes   Tobacco Use    Smoking status: Never Smoker    Smokeless  "tobacco: Never Used   Substance and Sexual Activity    Alcohol use: Yes     Frequency: Monthly or less     Drinks per session: 3 or 4     Binge frequency: Never     Comment: socially    Drug use: No    Sexual activity: Yes     Partners: Male     Birth control/protection: Condom   Lifestyle    Physical activity     Days per week: 0 days     Minutes per session: Not on file    Stress: Rather much   Relationships    Social connections     Talks on phone: Twice a week     Gets together: Never     Attends Bahai service: Not on file     Active member of club or organization: No     Attends meetings of clubs or organizations: Not on file     Relationship status:    Other Topics Concern    Not on file   Social History Narrative    Not on file       Current Outpatient Medications   Medication Sig Dispense Refill    ALPRAZolam (XANAX) 0.25 MG tablet Take 1 tablet (0.25 mg total) by mouth 2 (two) times daily as needed for Anxiety. 60 tablet 5    chlorthalidone (HYGROTEN) 25 MG Tab Take 1 tablet (25 mg total) by mouth once daily. 30 tablet 7    metoprolol succinate (TOPROL-XL) 50 MG 24 hr tablet Take 1 tablet (50 mg total) by mouth once daily. 30 tablet 7     No current facility-administered medications for this visit.        Review of patient's allergies indicates:  No Known Allergies        PHYSICAL EXAM     Vitals:    11/18/20 0841   BP: 128/84   Weight: 95.3 kg (210 lb 1.6 oz)   Height: 5' 3" (1.6 m)        PAIN SCALE: 0/10 None    ROS:  GENERAL: No fever, chills, fatigability or weight loss.  ABDOMEN: Appetite fine. No weight loss. Denies diarrhea, abdominal pain, hematemesis or blood in stool.  No change in bowel movement pattern.  URINARY: No flank pain, dysuria or hematuria.  REPRODUCTIVE: No abnormal vaginal bleeding.  BREASTS: Breasts symmetric, nontender and no lumps detected.    PE:   APPEARANCE: Well nourished, well developed, in no acute distress.  NECK: Neck symmetric without masses or " thyromegaly.   NODES: No inguinal lymph node enlargement.  ABDOMEN: Soft. No tenderness or masses.  No hernias.  BREASTS: Symmetrical, no skin changes or visible lesions. No palpable masses, nipple discharge or adenopathy bilaterally.    PELVIC:   VULVA: No lesions. Normal female genitalia.  URETHRAL MEATUS: Normal size and location, no lesions, no prolapse.  URETHRA: No masses, tenderness, prolapse or scarring.  VAGINA: Moist and well rugated, no discharge, no significant cystocele or rectocele.  CERVIX: No lesions, normal diameter, no stenosis, no cervical motion tenderness.    Strings seen    UTERUS: 8 week size, regular shape, mobile, non-tender, normal position, good support.  ADNEXA: No masses, tenderness or CDS nodularity.  ANUS PERINEUM: No lesions, no relaxation, no external hemorrhoids.      DIAGNOSIS:      1. Well woman exam with routine gynecological exam    2. Pap smear, as part of routine gynecological examination        PLAN:   Orders Placed This Encounter   Procedures    HPV High Risk Genotypes, PCR     Order Specific Question:   Source     Answer:   Cervix        MEDICATIONS PRESCRIBED:   PNV         COUNSELING:  Patient was counseled today on A.C.S. Pap guidelines and recommendations for yearly pelvic exams, mammograms and monthly self breast exams; to see her PCP for other health maintenance.     FOLLOW-UP: With me in 1 year. Pap smear every 3 years.      Pt will call to order next Mirena 2mo prior to expiration

## 2020-11-20 ENCOUNTER — PATIENT MESSAGE (OUTPATIENT)
Dept: OBSTETRICS AND GYNECOLOGY | Facility: CLINIC | Age: 40
End: 2020-11-20

## 2020-11-23 ENCOUNTER — OFFICE VISIT (OUTPATIENT)
Dept: ORTHOPEDICS | Facility: CLINIC | Age: 40
End: 2020-11-23
Payer: MEDICAID

## 2020-11-23 VITALS — WEIGHT: 220.44 LBS | HEIGHT: 67 IN | BODY MASS INDEX: 34.6 KG/M2

## 2020-11-23 DIAGNOSIS — M25.551 RIGHT HIP PAIN: ICD-10-CM

## 2020-11-23 PROCEDURE — 99203 OFFICE O/P NEW LOW 30 MIN: CPT | Mod: S$PBB,,, | Performed by: ORTHOPAEDIC SURGERY

## 2020-11-23 PROCEDURE — 99203 PR OFFICE/OUTPT VISIT, NEW, LEVL III, 30-44 MIN: ICD-10-PCS | Mod: S$PBB,,, | Performed by: ORTHOPAEDIC SURGERY

## 2020-11-23 PROCEDURE — 99999 PR PBB SHADOW E&M-EST. PATIENT-LVL III: CPT | Mod: PBBFAC,,, | Performed by: ORTHOPAEDIC SURGERY

## 2020-11-23 PROCEDURE — 99999 PR PBB SHADOW E&M-EST. PATIENT-LVL III: ICD-10-PCS | Mod: PBBFAC,,, | Performed by: ORTHOPAEDIC SURGERY

## 2020-11-23 PROCEDURE — 99213 OFFICE O/P EST LOW 20 MIN: CPT | Mod: PBBFAC,PO | Performed by: ORTHOPAEDIC SURGERY

## 2020-11-23 NOTE — PROGRESS NOTES
Patient ID: Merari Orosco  YOB: 1980  MRN: [id number]    Chief Complaint: Pain of the Right Hip    Referred By: Stevenson Metzger MD    History of Present Illness: Merari Orosco is a 40 y.o. female with right hip pain that began in  without any known cause.  It gradually worsened until 2020 when it was at its worst.  She was unable to walk due to the pain.  She used crutches for about week and her pain began to slowly improve.  She has modified her activities to avoid painful motions.  It has significantly affected her activities of daily living. She is able to walk.  The pain is sometimes severe and other times manageable.  She denies and fever, chills, etc related to this pain.    Hip Pain   The pain is present in the right hip. This is a chronic problem. The current episode started more than 1 month ago. There has been no history of extremity trauma. The problem occurs daily. The problem has been gradually improving. The pain is at a severity of 10/10. Pertinent negatives include no fever. She has tried NSAIDs for the symptoms. The treatment provided no relief. Physical therapy was not tried.      Past Medical History:   Past Medical History:   Diagnosis Date    Anxiety     Depression     Hypertension      Past Surgical History:   Procedure Laterality Date     SECTION      x1    WISDOM TOOTH EXTRACTION       Family History   Problem Relation Age of Onset    Colon cancer Father     Diabetes Father     Hypertension Father     Hypertension Mother     Diabetes Sister     Hypertension Sister     Breast cancer Neg Hx     Cancer Neg Hx     Miscarriages / Stillbirths Neg Hx     Ovarian cancer Neg Hx      labor Neg Hx     Stroke Neg Hx      Social History     Socioeconomic History    Marital status: Single     Spouse name: Not on file    Number of children: 1    Years of education: Not on file    Highest education level: Not on file   Occupational History     Not on file   Social Needs    Financial resource strain: Hard    Food insecurity     Worry: Sometimes true     Inability: Sometimes true    Transportation needs     Medical: No     Non-medical: Yes   Tobacco Use    Smoking status: Never Smoker    Smokeless tobacco: Never Used   Substance and Sexual Activity    Alcohol use: Yes     Frequency: Monthly or less     Drinks per session: 3 or 4     Binge frequency: Never     Comment: socially    Drug use: No    Sexual activity: Yes     Partners: Male     Birth control/protection: Condom   Lifestyle    Physical activity     Days per week: 0 days     Minutes per session: Not on file    Stress: Rather much   Relationships    Social connections     Talks on phone: Twice a week     Gets together: Never     Attends Moravian service: Not on file     Active member of club or organization: No     Attends meetings of clubs or organizations: Not on file     Relationship status:    Other Topics Concern    Not on file   Social History Narrative    Not on file     Medication List with Changes/Refills   Current Medications    ALPRAZOLAM (XANAX) 0.25 MG TABLET    Take 1 tablet (0.25 mg total) by mouth 2 (two) times daily as needed for Anxiety.    CHLORTHALIDONE (HYGROTEN) 25 MG TAB    Take 1 tablet (25 mg total) by mouth once daily.    METOPROLOL SUCCINATE (TOPROL-XL) 50 MG 24 HR TABLET    Take 1 tablet (50 mg total) by mouth once daily.     Review of patient's allergies indicates:  No Known Allergies  Review of Systems   Constitution: Negative for fever.   HENT: Negative for hearing loss.    Eyes: Negative for blurred vision.   Cardiovascular: Negative for chest pain.   Respiratory: Negative for shortness of breath.    Endocrine: Negative for polyuria.   Hematologic/Lymphatic: Negative for bleeding problem.   Skin: Negative for rash.   Gastrointestinal: Negative for abdominal pain.   Genitourinary: Negative for hematuria.   Neurological: Negative for weakness.    Psychiatric/Behavioral: Negative for altered mental status.   Allergic/Immunologic: Negative for hives.       Physical Exam:   Body mass index is 34.53 kg/m².  There were no vitals filed for this visit.   GENERAL: Well appearing, appropriate for stated age, no acute distress.  CARDIOVASCULAR: Pulses regular by peripheral palpation.  PULMONARY: Respirations are even and non-labored.  NEURO: Awake, alert, and oriented x 3.  PSYCH: Mood & affect are appropriate.  HEENT: Head is normocephalic and atraumatic.  Ortho/SPM Exam  Bilateral hips: 15/30. 5/5 knee flex/ext. Localizes pain anteriorly and laterally. NVID    Imaging:    X-Ray Hip 2 or 3 views Right  Narrative: EXAMINATION:  XR HIP 2 VIEW RIGHT    CLINICAL HISTORY:  Pain in right hip    TECHNIQUE:  AP view of the pelvis and frog leg lateral view of the right hip were performed.    COMPARISON:  None    FINDINGS:  Pelvic ring is intact.  Minimal degenerative change noted in the included lumbosacral spine and along the inferior margins SI joints bilaterally.  IUD visualized in the midline upper pelvis.    Bilateral degenerative changes noted involving the hips with marginal spurring superolateral aspect of the joint space, greater on the right.  There is a heterogeneous density within the superolateral aspect of the femoral necks bilaterally also greater on the right.  Cannot exclude cortical disruption the subcapital location.  Clinical correlation for point tenderness in this area suggested.  Overlying skin folds accentuate these findings.  Impression: Abnormal exam demonstrating bilateral degenerative changes involving the hips as detailed above with increased prominence on the right.  Cannot totally exclude fracture and or AVN.  Correlate with clinical exam and history.  If there is concern for occult fracture or AVN consideration should be given for CT and or MRI.    Degenerative changes noted in the included lumbosacral spine and SI joints.    IUD in  place.    This report was flagged in Epic as abnormal.    Electronically signed by: Khurram Schultz MD  Date:    10/22/2020  Time:    16:31    Relevant imaging results reviewed and interpreted by me, discussed with the patient and / or family today.     Other Tests:     No other tests performed today.    Assessment:  Merari Orosco is a 40 y.o. female with right hip pain   Right hip OA   ? Right hip AVN/fracture?    Encounter Diagnosis   Name Primary?    Right hip pain         Plan:   MRI right hip   CT scan right hip   Crutches - limit weight bearing    Follow-up: after MRI & CT scan or sooner if there are any problems between now and then.    Disclaimer: This note was prepared using a voice recognition system and is likely to have sound alike errors within the text.

## 2020-11-23 NOTE — LETTER
November 23, 2020      Stevenson Metzger DO  [address]  [place] [address]           [facility]  [address]  [place] [address]  Phone: [phone]          Patient: Merari Orosco   MR Number: [id number]   YOB: 1980   Date of Visit: 11/23/2020       Dear Dr. Stevenson Metzger:    Thank you for referring Merari Orosco to me for evaluation. Attached you will find relevant portions of my assessment and plan of care.    If you have questions, please do not hesitate to call me. I look forward to following Merari Orosco along with you.    Sincerely,    Keon Contreras MD    Enclosure  CC:  No Recipients    If you would like to receive this communication electronically, please contact [email or web address] or [phone] to request more information on [facility] Link access.    For providers and/or their staff who would like to refer a patient to [facility], please contact us through our one-stop-shop provider referral line, [facility], at [phone].    If you feel you have received this communication in error or would no longer like to receive these types of communications, please e-mail [email or web address]

## 2020-11-23 NOTE — PATIENT INSTRUCTIONS
Assessment:  Merari Orosco is a 40 y.o. female with right hip pain   Right hip OA   ? Right hip AVN/fracture?    Encounter Diagnosis   Name Primary?    Right hip pain         Plan:   MRI right hip   CT scan right hip   Crutches - limit weight bearing    Follow-up: after MRI & CT scan or sooner if there are any problems between now and then.    Thank you for choosing [facility] and Dr. Keon Contreras for your orthopedic & sports medicine care. It is our goal to provide you with exceptional care that will help keep you healthy, active, and get you back in the game.    If you felt that you received exemplary care today, please consider leaving us feedback on [email or web address] at [email or web address].     Please do not hesitate to reach out to us via email, phone, or MyChart with any questions, concerns, or feedback.    If you are experiencing pain/discomfort ,or have questions after 5pm and would like to be connected to the [facility] on-call team, please call this number and specify which Sports Medicine provider is treating you: [phone]

## 2020-11-24 LAB
HPV HR 12 DNA SPEC QL NAA+PROBE: NEGATIVE
HPV16 AG SPEC QL: NEGATIVE
HPV18 DNA SPEC QL NAA+PROBE: NEGATIVE

## 2020-11-27 ENCOUNTER — HOSPITAL ENCOUNTER (OUTPATIENT)
Dept: RADIOLOGY | Facility: HOSPITAL | Age: 40
Discharge: HOME | End: 2020-11-27
Attending: ORTHOPAEDIC SURGERY
Payer: MEDICAID

## 2020-11-27 DIAGNOSIS — M25.551 RIGHT HIP PAIN: ICD-10-CM

## 2020-11-27 PROCEDURE — 73721 MRI HIP WITHOUT CONTRAST RIGHT: ICD-10-PCS | Mod: 26,RT,, | Performed by: RADIOLOGY

## 2020-11-27 PROCEDURE — 73721 MRI JNT OF LWR EXTRE W/O DYE: CPT | Mod: TC,PO,RT

## 2020-11-27 PROCEDURE — 73721 MRI JNT OF LWR EXTRE W/O DYE: CPT | Mod: 26,RT,, | Performed by: RADIOLOGY

## 2020-11-30 ENCOUNTER — TELEPHONE (OUTPATIENT)
Dept: ORTHOPEDICS | Facility: CLINIC | Age: 40
End: 2020-11-30

## 2020-12-02 ENCOUNTER — PATIENT MESSAGE (OUTPATIENT)
Dept: ORTHOPEDICS | Facility: CLINIC | Age: 40
End: 2020-12-02

## 2020-12-02 DIAGNOSIS — M25.551 RIGHT HIP PAIN: Primary | ICD-10-CM

## 2020-12-07 ENCOUNTER — HOSPITAL ENCOUNTER (OUTPATIENT)
Dept: RADIOLOGY | Facility: HOSPITAL | Age: 40
Discharge: HOME | End: 2020-12-07
Attending: ORTHOPAEDIC SURGERY
Payer: MEDICAID

## 2020-12-07 DIAGNOSIS — M25.551 RIGHT HIP PAIN: ICD-10-CM

## 2020-12-07 PROCEDURE — 73700 CT LOWER EXTREMITY W/O DYE: CPT | Mod: TC,PO,RT

## 2020-12-07 PROCEDURE — 73700 CT LOWER EXTREMITY W/O DYE: CPT | Mod: 26,RT,, | Performed by: RADIOLOGY

## 2020-12-07 PROCEDURE — 73700 CT HIP WITHOUT CONTRAST RIGHT: ICD-10-PCS | Mod: 26,RT,, | Performed by: RADIOLOGY

## 2020-12-10 ENCOUNTER — OFFICE VISIT (OUTPATIENT)
Dept: ORTHOPEDICS | Facility: CLINIC | Age: 40
End: 2020-12-10
Payer: MEDICAID

## 2020-12-10 ENCOUNTER — TELEPHONE (OUTPATIENT)
Dept: ORTHOPEDICS | Facility: CLINIC | Age: 40
End: 2020-12-10

## 2020-12-10 VITALS
HEART RATE: 82 BPM | DIASTOLIC BLOOD PRESSURE: 82 MMHG | BODY MASS INDEX: 33.8 KG/M2 | SYSTOLIC BLOOD PRESSURE: 116 MMHG | HEIGHT: 67 IN | WEIGHT: 215.38 LBS

## 2020-12-10 DIAGNOSIS — M25.551 RIGHT HIP PAIN: ICD-10-CM

## 2020-12-10 DIAGNOSIS — M87.051 AVASCULAR NECROSIS OF BONE OF HIP, RIGHT: Primary | ICD-10-CM

## 2020-12-10 PROCEDURE — 99214 OFFICE O/P EST MOD 30 MIN: CPT | Mod: S$PBB,,, | Performed by: ORTHOPAEDIC SURGERY

## 2020-12-10 PROCEDURE — 99214 PR OFFICE/OUTPT VISIT, EST, LEVL IV, 30-39 MIN: ICD-10-PCS | Mod: S$PBB,,, | Performed by: ORTHOPAEDIC SURGERY

## 2020-12-10 PROCEDURE — 99214 OFFICE O/P EST MOD 30 MIN: CPT | Mod: PBBFAC | Performed by: ORTHOPAEDIC SURGERY

## 2020-12-10 PROCEDURE — 99999 PR PBB SHADOW E&M-EST. PATIENT-LVL IV: CPT | Mod: PBBFAC,,, | Performed by: ORTHOPAEDIC SURGERY

## 2020-12-10 PROCEDURE — 99999 PR PBB SHADOW E&M-EST. PATIENT-LVL IV: ICD-10-PCS | Mod: PBBFAC,,, | Performed by: ORTHOPAEDIC SURGERY

## 2020-12-11 ENCOUNTER — PATIENT MESSAGE (OUTPATIENT)
Dept: OTHER | Facility: OTHER | Age: 40
End: 2020-12-11

## 2020-12-11 ENCOUNTER — TELEPHONE (OUTPATIENT)
Dept: RHEUMATOLOGY | Facility: CLINIC | Age: 40
End: 2020-12-11

## 2020-12-12 ENCOUNTER — PATIENT MESSAGE (OUTPATIENT)
Dept: ADMINISTRATIVE | Facility: OTHER | Age: 40
End: 2020-12-12

## 2020-12-14 ENCOUNTER — PATIENT MESSAGE (OUTPATIENT)
Dept: ORTHOPEDICS | Facility: CLINIC | Age: 40
End: 2020-12-14

## 2020-12-21 ENCOUNTER — OFFICE VISIT (OUTPATIENT)
Dept: ORTHOPEDICS | Facility: CLINIC | Age: 40
End: 2020-12-21
Payer: MEDICAID

## 2020-12-21 VITALS — BODY MASS INDEX: 33.74 KG/M2 | HEIGHT: 67 IN | WEIGHT: 215 LBS

## 2020-12-21 DIAGNOSIS — M87.051 AVASCULAR NECROSIS OF BONE OF HIP, RIGHT: Primary | ICD-10-CM

## 2020-12-21 PROCEDURE — 99999 PR PBB SHADOW E&M-EST. PATIENT-LVL III: CPT | Mod: PBBFAC,,, | Performed by: STUDENT IN AN ORGANIZED HEALTH CARE EDUCATION/TRAINING PROGRAM

## 2020-12-21 PROCEDURE — 20611 LARGE JOINT ASPIRATION/INJECTION: R HIP JOINT: ICD-10-PCS | Mod: S$PBB,RT,, | Performed by: STUDENT IN AN ORGANIZED HEALTH CARE EDUCATION/TRAINING PROGRAM

## 2020-12-21 PROCEDURE — 99213 OFFICE O/P EST LOW 20 MIN: CPT | Mod: S$PBB,25,, | Performed by: STUDENT IN AN ORGANIZED HEALTH CARE EDUCATION/TRAINING PROGRAM

## 2020-12-21 PROCEDURE — 99999 PR PBB SHADOW E&M-EST. PATIENT-LVL III: ICD-10-PCS | Mod: PBBFAC,,, | Performed by: STUDENT IN AN ORGANIZED HEALTH CARE EDUCATION/TRAINING PROGRAM

## 2020-12-21 PROCEDURE — 99213 PR OFFICE/OUTPT VISIT, EST, LEVL III, 20-29 MIN: ICD-10-PCS | Mod: S$PBB,25,, | Performed by: STUDENT IN AN ORGANIZED HEALTH CARE EDUCATION/TRAINING PROGRAM

## 2020-12-21 PROCEDURE — 20611 DRAIN/INJ JOINT/BURSA W/US: CPT | Mod: PBBFAC,RT | Performed by: STUDENT IN AN ORGANIZED HEALTH CARE EDUCATION/TRAINING PROGRAM

## 2020-12-21 PROCEDURE — 99213 OFFICE O/P EST LOW 20 MIN: CPT | Mod: PBBFAC,25 | Performed by: STUDENT IN AN ORGANIZED HEALTH CARE EDUCATION/TRAINING PROGRAM

## 2020-12-21 RX ORDER — BUPIVACAINE HYDROCHLORIDE 5 MG/ML
2 INJECTION, SOLUTION PERINEURAL
Status: DISCONTINUED | OUTPATIENT
Start: 2020-12-21 | End: 2020-12-21 | Stop reason: HOSPADM

## 2020-12-21 RX ORDER — LIDOCAINE HYDROCHLORIDE 10 MG/ML
1 INJECTION INFILTRATION; PERINEURAL
Status: DISCONTINUED | OUTPATIENT
Start: 2020-12-21 | End: 2020-12-21 | Stop reason: HOSPADM

## 2020-12-21 RX ORDER — TRIAMCINOLONE ACETONIDE 40 MG/ML
40 INJECTION, SUSPENSION INTRA-ARTICULAR; INTRAMUSCULAR
Status: DISCONTINUED | OUTPATIENT
Start: 2020-12-21 | End: 2020-12-21 | Stop reason: HOSPADM

## 2020-12-21 RX ADMIN — TRIAMCINOLONE ACETONIDE 40 MG: 40 INJECTION, SUSPENSION INTRA-ARTICULAR; INTRAMUSCULAR at 08:12

## 2020-12-21 RX ADMIN — LIDOCAINE HYDROCHLORIDE 1 ML: 10 INJECTION, SOLUTION INFILTRATION; PERINEURAL at 08:12

## 2020-12-21 RX ADMIN — BUPIVACAINE HYDROCHLORIDE 2 ML: 5 INJECTION, SOLUTION PERINEURAL at 08:12

## 2020-12-21 NOTE — PROGRESS NOTES
Patient ID: Merari Orosco  YOB: 1980  MRN: [id number]    Chief Complaint: Pain of the Right Hip    Referred By: Dr. Contreras for Right hip pain     History of Present Illness: Merari Orosco is a right-hand dominant 40 y.o. female who presents today with right hip pain and possible US guided injection.     It has been present for 1 year.   Pain is located in the right hip(s).   The pain is described as constant.  The symptoms are worse with movement and standing  The patient has tried OTC pain medications and oral steroids for pain, with no relief.   Related to injury: no.    The patient is active in none.  Occupation: N/A     This is a 41 yo F presenting for right hip pain.  She has been previously evaluated by 1 of my partners this Dr. Keon Contreras.  She has a history of a vascular necrosis of the bilateral hips but only the weight asymptomatic today.  It is worse with all ranges of motion, and nothing seems to make it better.  She is here for a intra-articular corticosteroid hip injection.    Past Medical History:   Past Medical History:   Diagnosis Date    Anxiety     Depression     Hypertension      Past Surgical History:   Procedure Laterality Date     SECTION      x1    WISDOM TOOTH EXTRACTION       Family History   Problem Relation Age of Onset    Colon cancer Father     Diabetes Father     Hypertension Father     Hypertension Mother     Diabetes Sister     Hypertension Sister     Breast cancer Neg Hx     Cancer Neg Hx     Miscarriages / Stillbirths Neg Hx     Ovarian cancer Neg Hx      labor Neg Hx     Stroke Neg Hx      Social History     Socioeconomic History    Marital status: Single     Spouse name: Not on file    Number of children: 1    Years of education: Not on file    Highest education level: Not on file   Occupational History    Not on file   Social Needs    Financial resource strain: Hard    Food insecurity     Worry: Sometimes true      Inability: Sometimes true    Transportation needs     Medical: No     Non-medical: Yes   Tobacco Use    Smoking status: Never Smoker    Smokeless tobacco: Never Used   Substance and Sexual Activity    Alcohol use: Yes     Frequency: Monthly or less     Drinks per session: 3 or 4     Binge frequency: Never     Comment: socially    Drug use: No    Sexual activity: Yes     Partners: Male     Birth control/protection: Condom   Lifestyle    Physical activity     Days per week: 0 days     Minutes per session: Not on file    Stress: Rather much   Relationships    Social connections     Talks on phone: Twice a week     Gets together: Never     Attends Jew service: More than 4 times per year     Active member of club or organization: No     Attends meetings of clubs or organizations: Never     Relationship status:    Other Topics Concern    Not on file   Social History Narrative    Not on file     Medication List with Changes/Refills   Current Medications    ALPRAZOLAM (XANAX) 0.25 MG TABLET    Take 1 tablet (0.25 mg total) by mouth 2 (two) times daily as needed for Anxiety.    BLOOD PRESSURE MONITOR ([employer] EASE) LG ARM SIZE (OP)    by Other route as needed for High Blood Pressure.    CHLORTHALIDONE (HYGROTEN) 25 MG TAB    Take 1 tablet (25 mg total) by mouth once daily.    METOPROLOL SUCCINATE (TOPROL-XL) 50 MG 24 HR TABLET    Take 1 tablet (50 mg total) by mouth once daily.     Review of patient's allergies indicates:  No Known Allergies  Review of Systems   Constitution: Negative. Negative for chills and fever.   HENT: Negative.  Negative for congestion.    Eyes: Negative.  Negative for photophobia.   Cardiovascular: Negative.  Negative for chest pain and leg swelling.   Respiratory: Negative.  Negative for shortness of breath.    Endocrine: Negative.    Hematologic/Lymphatic: Negative.    Skin: Negative.    Musculoskeletal: Positive for back pain, joint pain, muscle cramps and myalgias.  Negative for falls.   Gastrointestinal: Negative.  Negative for abdominal pain, constipation and diarrhea.   Genitourinary: Negative.    Neurological: Negative.  Negative for headaches, numbness and weakness.   Psychiatric/Behavioral: Negative.    Allergic/Immunologic: Negative.        Physical Exam:   Body mass index is 33.67 kg/m².    GENERAL: Well appearing, in no acute distress.  HEAD: Normocephalic and atraumatic.  ENT: External ears and nose grossly normal.  EYES: EOMI bilaterally  PULMONARY: Respirations are grossly even and non-labored.  NEURO: Awake, alert, and oriented x 3.  SKIN: No obvious rashes appreciated.  PSYCH: Mood & affect are appropriate.    Detailed MSK exam:     Tenderness over the anterior joint with decreased range of motion    Imaging:    Narrative & Impression     EXAMINATION:  XR HIP 2 VIEW RIGHT     CLINICAL HISTORY:  Pain in right hip     TECHNIQUE:  AP view of the pelvis and frog leg lateral view of the right hip were performed.     COMPARISON:  None     FINDINGS:  Pelvic ring is intact.  Minimal degenerative change noted in the included lumbosacral spine and along the inferior margins SI joints bilaterally.  IUD visualized in the midline upper pelvis.     Bilateral degenerative changes noted involving the hips with marginal spurring superolateral aspect of the joint space, greater on the right.  There is a heterogeneous density within the superolateral aspect of the femoral necks bilaterally also greater on the right.  Cannot exclude cortical disruption the subcapital location.  Clinical correlation for point tenderness in this area suggested.  Overlying skin folds accentuate these findings.     Impression:     Abnormal exam demonstrating bilateral degenerative changes involving the hips as detailed above with increased prominence on the right.  Cannot totally exclude fracture and or AVN.  Correlate with clinical exam and history.  If there is concern for occult fracture or AVN  consideration should be given for CT and or MRI.     Degenerative changes noted in the included lumbosacral spine and SI joints.     IUD in place.     This report was flagged in Epic as abnormal.        Electronically signed by: Khurram Schultz MD  Date:                                            10/22/2020  Time:                                           16:31     Narrative & Impression     EXAMINATION:  MRI HIP WITHOUT CONTRAST RIGHT     CLINICAL HISTORY:  right hip pain;  Pain in right hip     TECHNIQUE:  Multiplanar multisequence MR imaging of the right hip was performed without contrast.     COMPARISON:  Plain films from 10/22/2020     FINDINGS:  There is a large area of a vascular necrosis seen involving either femoral head and involving at least 75% of the articular surface.  There is subchondral collapse.  Cystic changes seen along the anterior and lateral margins of the a area of AVN.  A joint effusion is present.  Similar appearing changes of avascular necrosis also involving at least 70 80% of the articular surface of the left femoral head is also noted with some associated subchondral collapse as seen on plain film and associated left-sided joint effusion.  Cystic changes are less prominent on the left than on the right.     The intrapelvic contents are grossly unremarkable except for a few borderline enlarged bilateral iliac chain lymph nodes measuring up to approximately 10-11 mm in the short axis.  These are nonspecific.     Impression:     1. Findings consistent with advanced avascular necrosis of either femoral head as described in detail above.        Electronically signed by: Osmany Angel DO  Date:                                            11/27/2020  Time:                                           12:54        Narrative & Impression     EXAMINATION:  CT HIP WITHOUT CONTRAST RIGHT     CLINICAL HISTORY:  right hip pain;  Pain in right hip     TECHNIQUE:  Noncontrast axial CT of the right hip was  performed with multiplanar sagittal and coronal reformations.     COMPARISON:  11/27/2020 MRI right hip.     FINDINGS:  There is redemonstration of advanced changes of avascular necrosis of the right hip with a broad zone of mixed sclerosis and subcortical cystic change within the femoral head.  There is associated subchondral collapse anteriorly and superiorly with maximum cortical depression of approximately 2-3 mm.     There is concomitant degenerative change of the right hip with narrowing of the joint space and acetabular spurring.  Moderate hip joint effusion noted.     Impression:     Findings consistent with right hip AVN as detailed.        Electronically signed by: ZINA Pacheco MD  Date:                                            12/07/2020  Time:                                           12:17            Relevant imaging results were reviewed and interpreted by me and per my read appears to be advanced changes of a vascular necrosis with right femoral head collapse and multiple subcortical cystic changes.  This was discussed with the patient and / or family today.     Assessment:  Merari Orosco is a 40 y.o. female presenting today for intra-articular hip injection with corticosteroid for her diagnosis of a vascular necrosis of the right hip.  Please for the procedure note for further details.  We discussed the risks and benefits, as well as the importance of following up with our joint specialist to discuss probable total hip replacement in the near future due to her pain.  We also discussed getting into physical therapy in the meantime to work on some strengthening as much as possible while her hip is feeling a little bit better, as well as avoiding a dramatic increase in activity if her hip is feeling much better to prevent any further collapse or damage.    Follow-up as needed    Avascular necrosis of bone of hip, right  -     Large Joint Aspiration/Injection: R hip joint  -     Ambulatory  referral/consult to Physical/Occupational Therapy; Future; Expected date: 12/28/2020         A copy of today's visit note has been sent to the referring provider.     Parker Walden MD    Disclaimer: This note was prepared using a voice recognition system and is likely to have sound alike errors within the text.

## 2020-12-21 NOTE — PROCEDURES
Large Joint Aspiration/Injection: R hip joint    Date/Time: 12/21/2020 8:00 AM  Performed by: Parker Walden MD  Authorized by: Parker Walden MD     Consent Done?:  Yes (Verbal)  Indications:  Pain  Site marked: the procedure site was marked    Timeout: prior to procedure the correct patient, procedure, and site was verified    Prep: patient was prepped and draped in usual sterile fashion      Local anesthesia used?: Yes    Local anesthetic:  Topical anesthetic    Details:  Needle Size:  22 G  Ultrasonic Guidance for needle placement?: Yes    Images are saved and documented.  Approach:  Anterior  Location:  Hip  Site:  R hip joint  Medications:  1 mL lidocaine HCL 10 mg/ml (1%) 10 mg/mL (1 %); 2 mL bupivacaine 0.5 % (5 mg/mL); 40 mg triamcinolone acetonide 40 mg/mL  Patient tolerance:  Patient tolerated the procedure well with no immediate complications     Ultrasound guidance was used for needle localization. Images were saved and stored for documentation. The appropriate structures were visualized. Dynamic visualization of the needle was continuous throughout the procedures and maintained good position.     We discussed the proper protocols after the injection such as no submerging pools, baths tubs, or hot tubs for 24 hr.  Showering is okay today.  We also discussed that blood sugars can be elevated after an injection and asked patient to properly checked her sugars over the next few days and contact their PCP if there are any concerns.  We discussed red flags such as fevers, chills, red, warm, tender joint at the area of injection to please seek medical care immediately.

## 2021-01-04 LAB
FINAL PATHOLOGIC DIAGNOSIS: NORMAL
Lab: NORMAL

## 2021-01-05 ENCOUNTER — CLINICAL SUPPORT (OUTPATIENT)
Dept: REHABILITATION | Facility: HOSPITAL | Age: 41
End: 2021-01-05
Attending: STUDENT IN AN ORGANIZED HEALTH CARE EDUCATION/TRAINING PROGRAM
Payer: MEDICAID

## 2021-01-05 ENCOUNTER — TELEPHONE (OUTPATIENT)
Dept: OBSTETRICS AND GYNECOLOGY | Facility: CLINIC | Age: 41
End: 2021-01-05

## 2021-01-05 ENCOUNTER — PATIENT MESSAGE (OUTPATIENT)
Dept: OBSTETRICS AND GYNECOLOGY | Facility: CLINIC | Age: 41
End: 2021-01-05

## 2021-01-05 DIAGNOSIS — R29.898 WEAKNESS OF RIGHT HIP: ICD-10-CM

## 2021-01-05 DIAGNOSIS — M87.051 AVASCULAR NECROSIS OF BONE OF HIP, RIGHT: ICD-10-CM

## 2021-01-05 DIAGNOSIS — M25.651 DECREASED RANGE OF RIGHT HIP MOVEMENT: Primary | ICD-10-CM

## 2021-01-05 DIAGNOSIS — Z78.9 DIFFICULTY NAVIGATING STAIRS: ICD-10-CM

## 2021-01-05 DIAGNOSIS — M25.651 STIFFNESS OF RIGHT HIP JOINT: ICD-10-CM

## 2021-01-05 DIAGNOSIS — R26.9 ALTERED GAIT: ICD-10-CM

## 2021-01-05 PROCEDURE — 97161 PT EVAL LOW COMPLEX 20 MIN: CPT

## 2021-01-05 RX ORDER — METRONIDAZOLE 500 MG/1
500 TABLET ORAL 2 TIMES DAILY
Qty: 14 TABLET | Refills: 1 | Status: SHIPPED | OUTPATIENT
Start: 2021-01-05 | End: 2021-01-12

## 2021-01-06 ENCOUNTER — TELEPHONE (OUTPATIENT)
Dept: RHEUMATOLOGY | Facility: CLINIC | Age: 41
End: 2021-01-06

## 2021-01-07 ENCOUNTER — LAB VISIT (OUTPATIENT)
Dept: LAB | Facility: HOSPITAL | Age: 41
End: 2021-01-07
Attending: PHYSICIAN ASSISTANT
Payer: MEDICAID

## 2021-01-07 ENCOUNTER — OFFICE VISIT (OUTPATIENT)
Dept: RHEUMATOLOGY | Facility: CLINIC | Age: 41
End: 2021-01-07
Payer: MEDICAID

## 2021-01-07 VITALS
HEART RATE: 89 BPM | HEIGHT: 66 IN | BODY MASS INDEX: 34.22 KG/M2 | DIASTOLIC BLOOD PRESSURE: 69 MMHG | SYSTOLIC BLOOD PRESSURE: 102 MMHG | WEIGHT: 212.94 LBS

## 2021-01-07 DIAGNOSIS — M87.051 AVASCULAR NECROSIS OF BONE OF RIGHT HIP: ICD-10-CM

## 2021-01-07 DIAGNOSIS — M87.051 AVASCULAR NECROSIS OF BONE OF HIP, RIGHT: ICD-10-CM

## 2021-01-07 DIAGNOSIS — E55.9 VITAMIN D DEFICIENCY: ICD-10-CM

## 2021-01-07 DIAGNOSIS — M25.551 RIGHT HIP PAIN: Primary | ICD-10-CM

## 2021-01-07 LAB — 25(OH)D3+25(OH)D2 SERPL-MCNC: 12 NG/ML (ref 30–96)

## 2021-01-07 PROCEDURE — 99204 OFFICE O/P NEW MOD 45 MIN: CPT | Mod: S$PBB,,, | Performed by: PHYSICIAN ASSISTANT

## 2021-01-07 PROCEDURE — 99204 PR OFFICE/OUTPT VISIT, NEW, LEVL IV, 45-59 MIN: ICD-10-PCS | Mod: S$PBB,,, | Performed by: PHYSICIAN ASSISTANT

## 2021-01-07 PROCEDURE — 36415 COLL VENOUS BLD VENIPUNCTURE: CPT

## 2021-01-07 PROCEDURE — 99213 OFFICE O/P EST LOW 20 MIN: CPT | Mod: PBBFAC | Performed by: PHYSICIAN ASSISTANT

## 2021-01-07 PROCEDURE — 99999 PR PBB SHADOW E&M-EST. PATIENT-LVL III: CPT | Mod: PBBFAC,,, | Performed by: PHYSICIAN ASSISTANT

## 2021-01-07 PROCEDURE — 82306 VITAMIN D 25 HYDROXY: CPT

## 2021-01-07 PROCEDURE — 99999 PR PBB SHADOW E&M-EST. PATIENT-LVL III: ICD-10-PCS | Mod: PBBFAC,,, | Performed by: PHYSICIAN ASSISTANT

## 2021-01-07 RX ORDER — ALENDRONATE SODIUM 70 MG/1
70 TABLET ORAL
Qty: 4 TABLET | Refills: 11 | Status: SHIPPED | OUTPATIENT
Start: 2021-01-07 | End: 2022-04-04

## 2021-01-07 RX ORDER — ERGOCALCIFEROL 1.25 MG/1
50000 CAPSULE ORAL
Qty: 12 CAPSULE | Refills: 3 | Status: SHIPPED | OUTPATIENT
Start: 2021-01-07 | End: 2021-10-21 | Stop reason: SDUPTHER

## 2021-01-13 ENCOUNTER — PATIENT MESSAGE (OUTPATIENT)
Dept: ADMINISTRATIVE | Facility: OTHER | Age: 41
End: 2021-01-13

## 2021-01-15 ENCOUNTER — CLINICAL SUPPORT (OUTPATIENT)
Dept: REHABILITATION | Facility: HOSPITAL | Age: 41
End: 2021-01-15
Attending: STUDENT IN AN ORGANIZED HEALTH CARE EDUCATION/TRAINING PROGRAM
Payer: MEDICAID

## 2021-01-15 DIAGNOSIS — R29.898 WEAKNESS OF RIGHT HIP: ICD-10-CM

## 2021-01-15 DIAGNOSIS — R26.9 ALTERED GAIT: ICD-10-CM

## 2021-01-15 DIAGNOSIS — M25.651 DECREASED RANGE OF RIGHT HIP MOVEMENT: ICD-10-CM

## 2021-01-15 DIAGNOSIS — M25.651 STIFFNESS OF RIGHT HIP JOINT: ICD-10-CM

## 2021-01-15 DIAGNOSIS — Z78.9 DIFFICULTY NAVIGATING STAIRS: ICD-10-CM

## 2021-01-15 PROCEDURE — 97110 THERAPEUTIC EXERCISES: CPT

## 2021-01-19 ENCOUNTER — CLINICAL SUPPORT (OUTPATIENT)
Dept: REHABILITATION | Facility: HOSPITAL | Age: 41
End: 2021-01-19
Attending: STUDENT IN AN ORGANIZED HEALTH CARE EDUCATION/TRAINING PROGRAM
Payer: MEDICAID

## 2021-01-19 DIAGNOSIS — Z78.9 DIFFICULTY NAVIGATING STAIRS: ICD-10-CM

## 2021-01-19 DIAGNOSIS — M25.651 STIFFNESS OF RIGHT HIP JOINT: ICD-10-CM

## 2021-01-19 DIAGNOSIS — R26.9 ALTERED GAIT: ICD-10-CM

## 2021-01-19 DIAGNOSIS — M25.651 DECREASED RANGE OF RIGHT HIP MOVEMENT: ICD-10-CM

## 2021-01-19 DIAGNOSIS — R29.898 WEAKNESS OF RIGHT HIP: ICD-10-CM

## 2021-01-19 PROCEDURE — 97110 THERAPEUTIC EXERCISES: CPT

## 2021-01-21 ENCOUNTER — CLINICAL SUPPORT (OUTPATIENT)
Dept: REHABILITATION | Facility: HOSPITAL | Age: 41
End: 2021-01-21
Attending: STUDENT IN AN ORGANIZED HEALTH CARE EDUCATION/TRAINING PROGRAM
Payer: MEDICAID

## 2021-01-21 DIAGNOSIS — Z78.9 DIFFICULTY NAVIGATING STAIRS: ICD-10-CM

## 2021-01-21 DIAGNOSIS — M25.651 STIFFNESS OF RIGHT HIP JOINT: ICD-10-CM

## 2021-01-21 DIAGNOSIS — R29.898 WEAKNESS OF RIGHT HIP: ICD-10-CM

## 2021-01-21 DIAGNOSIS — R26.9 ALTERED GAIT: ICD-10-CM

## 2021-01-21 DIAGNOSIS — M25.651 DECREASED RANGE OF RIGHT HIP MOVEMENT: ICD-10-CM

## 2021-01-21 PROCEDURE — 97110 THERAPEUTIC EXERCISES: CPT

## 2021-01-28 ENCOUNTER — CLINICAL SUPPORT (OUTPATIENT)
Dept: REHABILITATION | Facility: HOSPITAL | Age: 41
End: 2021-01-28
Payer: MEDICAID

## 2021-01-28 DIAGNOSIS — R29.898 WEAKNESS OF RIGHT HIP: ICD-10-CM

## 2021-01-28 DIAGNOSIS — Z78.9 DIFFICULTY NAVIGATING STAIRS: ICD-10-CM

## 2021-01-28 DIAGNOSIS — M25.651 DECREASED RANGE OF RIGHT HIP MOVEMENT: ICD-10-CM

## 2021-01-28 DIAGNOSIS — R26.9 ALTERED GAIT: ICD-10-CM

## 2021-01-28 DIAGNOSIS — M25.651 STIFFNESS OF RIGHT HIP JOINT: ICD-10-CM

## 2021-01-28 PROCEDURE — 97110 THERAPEUTIC EXERCISES: CPT

## 2021-02-01 ENCOUNTER — OFFICE VISIT (OUTPATIENT)
Dept: ORTHOPEDICS | Facility: CLINIC | Age: 41
End: 2021-02-01
Payer: MEDICAID

## 2021-02-01 VITALS
WEIGHT: 212 LBS | BODY MASS INDEX: 34.07 KG/M2 | DIASTOLIC BLOOD PRESSURE: 83 MMHG | HEIGHT: 66 IN | SYSTOLIC BLOOD PRESSURE: 125 MMHG | HEART RATE: 92 BPM

## 2021-02-01 DIAGNOSIS — M25.551 RIGHT HIP PAIN: ICD-10-CM

## 2021-02-01 DIAGNOSIS — M87.051 AVASCULAR NECROSIS OF BONE OF HIP, RIGHT: Primary | ICD-10-CM

## 2021-02-01 DIAGNOSIS — M87.052 AVASCULAR NECROSIS OF BONE OF LEFT HIP: ICD-10-CM

## 2021-02-01 PROCEDURE — 99214 OFFICE O/P EST MOD 30 MIN: CPT | Mod: PBBFAC | Performed by: ORTHOPAEDIC SURGERY

## 2021-02-01 PROCEDURE — 99214 PR OFFICE/OUTPT VISIT, EST, LEVL IV, 30-39 MIN: ICD-10-PCS | Mod: S$PBB,,, | Performed by: ORTHOPAEDIC SURGERY

## 2021-02-01 PROCEDURE — 99999 PR PBB SHADOW E&M-EST. PATIENT-LVL IV: ICD-10-PCS | Mod: PBBFAC,,, | Performed by: ORTHOPAEDIC SURGERY

## 2021-02-01 PROCEDURE — 99999 PR PBB SHADOW E&M-EST. PATIENT-LVL IV: CPT | Mod: PBBFAC,,, | Performed by: ORTHOPAEDIC SURGERY

## 2021-02-01 PROCEDURE — 99214 OFFICE O/P EST MOD 30 MIN: CPT | Mod: S$PBB,,, | Performed by: ORTHOPAEDIC SURGERY

## 2021-02-01 RX ORDER — MELOXICAM 15 MG/1
15 TABLET ORAL DAILY
Qty: 90 TABLET | Refills: 0 | Status: SHIPPED | OUTPATIENT
Start: 2021-02-01 | End: 2021-12-09

## 2021-02-03 ENCOUNTER — CLINICAL SUPPORT (OUTPATIENT)
Dept: REHABILITATION | Facility: HOSPITAL | Age: 41
End: 2021-02-03
Payer: MEDICAID

## 2021-02-03 DIAGNOSIS — R29.898 WEAKNESS OF RIGHT HIP: ICD-10-CM

## 2021-02-03 DIAGNOSIS — M25.651 DECREASED RANGE OF RIGHT HIP MOVEMENT: ICD-10-CM

## 2021-02-03 DIAGNOSIS — R26.9 ALTERED GAIT: ICD-10-CM

## 2021-02-03 DIAGNOSIS — Z78.9 DIFFICULTY NAVIGATING STAIRS: ICD-10-CM

## 2021-02-03 DIAGNOSIS — M25.651 STIFFNESS OF RIGHT HIP JOINT: ICD-10-CM

## 2021-02-03 PROCEDURE — 97110 THERAPEUTIC EXERCISES: CPT

## 2021-02-04 ENCOUNTER — TELEPHONE (OUTPATIENT)
Dept: RHEUMATOLOGY | Facility: CLINIC | Age: 41
End: 2021-02-04

## 2021-02-05 ENCOUNTER — OFFICE VISIT (OUTPATIENT)
Dept: RHEUMATOLOGY | Facility: CLINIC | Age: 41
End: 2021-02-05
Payer: MEDICAID

## 2021-02-05 ENCOUNTER — TELEPHONE (OUTPATIENT)
Dept: RHEUMATOLOGY | Facility: CLINIC | Age: 41
End: 2021-02-05

## 2021-02-05 ENCOUNTER — CLINICAL SUPPORT (OUTPATIENT)
Dept: REHABILITATION | Facility: HOSPITAL | Age: 41
End: 2021-02-05
Payer: MEDICAID

## 2021-02-05 VITALS
HEART RATE: 90 BPM | HEIGHT: 66 IN | SYSTOLIC BLOOD PRESSURE: 128 MMHG | BODY MASS INDEX: 35.22 KG/M2 | DIASTOLIC BLOOD PRESSURE: 85 MMHG | WEIGHT: 219.13 LBS

## 2021-02-05 DIAGNOSIS — Z51.81 MEDICATION MONITORING ENCOUNTER: ICD-10-CM

## 2021-02-05 DIAGNOSIS — R26.9 ALTERED GAIT: ICD-10-CM

## 2021-02-05 DIAGNOSIS — Z78.9 DIFFICULTY NAVIGATING STAIRS: ICD-10-CM

## 2021-02-05 DIAGNOSIS — M87.051 AVASCULAR NECROSIS OF BONE OF RIGHT HIP: ICD-10-CM

## 2021-02-05 DIAGNOSIS — M87.051 AVASCULAR NECROSIS OF BONE OF HIP, RIGHT: ICD-10-CM

## 2021-02-05 DIAGNOSIS — M25.651 DECREASED RANGE OF RIGHT HIP MOVEMENT: ICD-10-CM

## 2021-02-05 DIAGNOSIS — E55.9 VITAMIN D DEFICIENCY: ICD-10-CM

## 2021-02-05 DIAGNOSIS — M25.651 STIFFNESS OF RIGHT HIP JOINT: ICD-10-CM

## 2021-02-05 DIAGNOSIS — M25.551 RIGHT HIP PAIN: Primary | ICD-10-CM

## 2021-02-05 DIAGNOSIS — R29.898 WEAKNESS OF RIGHT HIP: ICD-10-CM

## 2021-02-05 DIAGNOSIS — E55.9 VITAMIN D DEFICIENCY: Primary | ICD-10-CM

## 2021-02-05 PROCEDURE — 99213 OFFICE O/P EST LOW 20 MIN: CPT | Mod: PBBFAC | Performed by: PHYSICIAN ASSISTANT

## 2021-02-05 PROCEDURE — 99999 PR PBB SHADOW E&M-EST. PATIENT-LVL III: CPT | Mod: PBBFAC,,, | Performed by: PHYSICIAN ASSISTANT

## 2021-02-05 PROCEDURE — 99999 PR PBB SHADOW E&M-EST. PATIENT-LVL III: ICD-10-PCS | Mod: PBBFAC,,, | Performed by: PHYSICIAN ASSISTANT

## 2021-02-05 PROCEDURE — 99214 PR OFFICE/OUTPT VISIT, EST, LEVL IV, 30-39 MIN: ICD-10-PCS | Mod: S$PBB,,, | Performed by: PHYSICIAN ASSISTANT

## 2021-02-05 PROCEDURE — 99214 OFFICE O/P EST MOD 30 MIN: CPT | Mod: S$PBB,,, | Performed by: PHYSICIAN ASSISTANT

## 2021-02-05 PROCEDURE — 97110 THERAPEUTIC EXERCISES: CPT

## 2021-02-08 ENCOUNTER — LAB VISIT (OUTPATIENT)
Dept: LAB | Facility: HOSPITAL | Age: 41
End: 2021-02-08
Attending: INTERNAL MEDICINE
Payer: MEDICAID

## 2021-02-08 ENCOUNTER — CLINICAL SUPPORT (OUTPATIENT)
Dept: REHABILITATION | Facility: HOSPITAL | Age: 41
End: 2021-02-08
Payer: MEDICAID

## 2021-02-08 DIAGNOSIS — Z78.9 DIFFICULTY NAVIGATING STAIRS: ICD-10-CM

## 2021-02-08 DIAGNOSIS — I10 ESSENTIAL HYPERTENSION: ICD-10-CM

## 2021-02-08 DIAGNOSIS — M25.651 DECREASED RANGE OF RIGHT HIP MOVEMENT: ICD-10-CM

## 2021-02-08 DIAGNOSIS — R29.898 WEAKNESS OF RIGHT HIP: ICD-10-CM

## 2021-02-08 DIAGNOSIS — M25.651 STIFFNESS OF RIGHT HIP JOINT: ICD-10-CM

## 2021-02-08 DIAGNOSIS — R26.9 ALTERED GAIT: ICD-10-CM

## 2021-02-08 PROCEDURE — 36415 COLL VENOUS BLD VENIPUNCTURE: CPT

## 2021-02-08 PROCEDURE — 80048 BASIC METABOLIC PNL TOTAL CA: CPT

## 2021-02-08 PROCEDURE — 97110 THERAPEUTIC EXERCISES: CPT

## 2021-02-09 LAB
ANION GAP SERPL CALC-SCNC: 9 MMOL/L (ref 8–16)
BUN SERPL-MCNC: 12 MG/DL (ref 6–20)
CALCIUM SERPL-MCNC: 8.9 MG/DL (ref 8.7–10.5)
CHLORIDE SERPL-SCNC: 108 MMOL/L (ref 95–110)
CO2 SERPL-SCNC: 23 MMOL/L (ref 23–29)
CREAT SERPL-MCNC: 0.7 MG/DL (ref 0.5–1.4)
EST. GFR  (AFRICAN AMERICAN): >60 ML/MIN/1.73 M^2
EST. GFR  (NON AFRICAN AMERICAN): >60 ML/MIN/1.73 M^2
GLUCOSE SERPL-MCNC: 88 MG/DL (ref 70–110)
POTASSIUM SERPL-SCNC: 3.9 MMOL/L (ref 3.5–5.1)
SODIUM SERPL-SCNC: 140 MMOL/L (ref 136–145)

## 2021-02-10 ENCOUNTER — CLINICAL SUPPORT (OUTPATIENT)
Dept: REHABILITATION | Facility: HOSPITAL | Age: 41
End: 2021-02-10
Payer: MEDICAID

## 2021-02-10 DIAGNOSIS — R26.9 ALTERED GAIT: ICD-10-CM

## 2021-02-10 DIAGNOSIS — M25.651 DECREASED RANGE OF RIGHT HIP MOVEMENT: ICD-10-CM

## 2021-02-10 DIAGNOSIS — M25.651 STIFFNESS OF RIGHT HIP JOINT: ICD-10-CM

## 2021-02-10 DIAGNOSIS — Z78.9 DIFFICULTY NAVIGATING STAIRS: ICD-10-CM

## 2021-02-10 DIAGNOSIS — R29.898 WEAKNESS OF RIGHT HIP: ICD-10-CM

## 2021-02-10 PROCEDURE — 97110 THERAPEUTIC EXERCISES: CPT

## 2021-02-12 ENCOUNTER — OFFICE VISIT (OUTPATIENT)
Dept: ORTHOPEDICS | Facility: CLINIC | Age: 41
End: 2021-02-12
Payer: MEDICAID

## 2021-02-12 VITALS — HEIGHT: 66 IN | WEIGHT: 219 LBS | BODY MASS INDEX: 35.2 KG/M2

## 2021-02-12 DIAGNOSIS — M87.052 AVASCULAR NECROSIS OF BONE OF LEFT HIP: Primary | ICD-10-CM

## 2021-02-12 PROCEDURE — 99499 UNLISTED E&M SERVICE: CPT | Mod: S$PBB,,, | Performed by: STUDENT IN AN ORGANIZED HEALTH CARE EDUCATION/TRAINING PROGRAM

## 2021-02-12 PROCEDURE — 99999 PR PBB SHADOW E&M-EST. PATIENT-LVL III: ICD-10-PCS | Mod: PBBFAC,,, | Performed by: STUDENT IN AN ORGANIZED HEALTH CARE EDUCATION/TRAINING PROGRAM

## 2021-02-12 PROCEDURE — 99999 PR PBB SHADOW E&M-EST. PATIENT-LVL III: CPT | Mod: PBBFAC,,, | Performed by: STUDENT IN AN ORGANIZED HEALTH CARE EDUCATION/TRAINING PROGRAM

## 2021-02-12 PROCEDURE — 99213 OFFICE O/P EST LOW 20 MIN: CPT | Mod: PBBFAC,25 | Performed by: STUDENT IN AN ORGANIZED HEALTH CARE EDUCATION/TRAINING PROGRAM

## 2021-02-12 PROCEDURE — 99499 NO LOS: ICD-10-PCS | Mod: S$PBB,,, | Performed by: STUDENT IN AN ORGANIZED HEALTH CARE EDUCATION/TRAINING PROGRAM

## 2021-02-12 PROCEDURE — 20611 LARGE JOINT ASPIRATION/INJECTION: L HIP JOINT: ICD-10-PCS | Mod: S$PBB,LT,, | Performed by: STUDENT IN AN ORGANIZED HEALTH CARE EDUCATION/TRAINING PROGRAM

## 2021-02-12 PROCEDURE — 20611 DRAIN/INJ JOINT/BURSA W/US: CPT | Mod: PBBFAC | Performed by: STUDENT IN AN ORGANIZED HEALTH CARE EDUCATION/TRAINING PROGRAM

## 2021-02-12 RX ORDER — TRIAMCINOLONE ACETONIDE 40 MG/ML
40 INJECTION, SUSPENSION INTRA-ARTICULAR; INTRAMUSCULAR
Status: DISCONTINUED | OUTPATIENT
Start: 2021-02-12 | End: 2021-02-12 | Stop reason: HOSPADM

## 2021-02-12 RX ORDER — BUPIVACAINE HYDROCHLORIDE 5 MG/ML
2 INJECTION, SOLUTION PERINEURAL
Status: DISCONTINUED | OUTPATIENT
Start: 2021-02-12 | End: 2021-02-12 | Stop reason: HOSPADM

## 2021-02-12 RX ORDER — LIDOCAINE HYDROCHLORIDE 10 MG/ML
1 INJECTION INFILTRATION; PERINEURAL
Status: DISCONTINUED | OUTPATIENT
Start: 2021-02-12 | End: 2021-02-12 | Stop reason: HOSPADM

## 2021-02-12 RX ADMIN — TRIAMCINOLONE ACETONIDE 40 MG: 40 INJECTION, SUSPENSION INTRA-ARTICULAR; INTRAMUSCULAR at 09:02

## 2021-02-12 RX ADMIN — LIDOCAINE HYDROCHLORIDE 1 ML: 10 INJECTION, SOLUTION INFILTRATION; PERINEURAL at 09:02

## 2021-02-12 RX ADMIN — BUPIVACAINE HYDROCHLORIDE 2 ML: 5 INJECTION, SOLUTION PERINEURAL at 09:02

## 2021-02-17 ENCOUNTER — CLINICAL SUPPORT (OUTPATIENT)
Dept: REHABILITATION | Facility: HOSPITAL | Age: 41
End: 2021-02-17
Payer: MEDICAID

## 2021-02-17 DIAGNOSIS — R26.9 ALTERED GAIT: ICD-10-CM

## 2021-02-17 DIAGNOSIS — R29.898 WEAKNESS OF RIGHT HIP: ICD-10-CM

## 2021-02-17 DIAGNOSIS — M25.651 DECREASED RANGE OF RIGHT HIP MOVEMENT: ICD-10-CM

## 2021-02-17 DIAGNOSIS — M25.651 STIFFNESS OF RIGHT HIP JOINT: ICD-10-CM

## 2021-02-17 DIAGNOSIS — Z78.9 DIFFICULTY NAVIGATING STAIRS: ICD-10-CM

## 2021-02-17 PROCEDURE — 97110 THERAPEUTIC EXERCISES: CPT

## 2021-02-19 ENCOUNTER — CLINICAL SUPPORT (OUTPATIENT)
Dept: REHABILITATION | Facility: HOSPITAL | Age: 41
End: 2021-02-19
Payer: MEDICAID

## 2021-02-19 DIAGNOSIS — M25.651 DECREASED RANGE OF RIGHT HIP MOVEMENT: ICD-10-CM

## 2021-02-19 DIAGNOSIS — R29.898 WEAKNESS OF RIGHT HIP: ICD-10-CM

## 2021-02-19 DIAGNOSIS — R26.9 ALTERED GAIT: ICD-10-CM

## 2021-02-19 DIAGNOSIS — Z78.9 DIFFICULTY NAVIGATING STAIRS: ICD-10-CM

## 2021-02-19 DIAGNOSIS — M25.651 STIFFNESS OF RIGHT HIP JOINT: ICD-10-CM

## 2021-02-19 PROCEDURE — 97110 THERAPEUTIC EXERCISES: CPT

## 2021-02-22 ENCOUNTER — CLINICAL SUPPORT (OUTPATIENT)
Dept: REHABILITATION | Facility: HOSPITAL | Age: 41
End: 2021-02-22
Payer: MEDICAID

## 2021-02-22 DIAGNOSIS — R29.898 WEAKNESS OF RIGHT HIP: ICD-10-CM

## 2021-02-22 DIAGNOSIS — M25.651 DECREASED RANGE OF RIGHT HIP MOVEMENT: ICD-10-CM

## 2021-02-22 DIAGNOSIS — R26.9 ALTERED GAIT: ICD-10-CM

## 2021-02-22 DIAGNOSIS — M25.651 STIFFNESS OF RIGHT HIP JOINT: ICD-10-CM

## 2021-02-22 DIAGNOSIS — Z78.9 DIFFICULTY NAVIGATING STAIRS: ICD-10-CM

## 2021-02-22 PROCEDURE — 97110 THERAPEUTIC EXERCISES: CPT

## 2021-02-24 ENCOUNTER — PATIENT MESSAGE (OUTPATIENT)
Dept: OBSTETRICS AND GYNECOLOGY | Facility: CLINIC | Age: 41
End: 2021-02-24

## 2021-02-25 ENCOUNTER — CLINICAL SUPPORT (OUTPATIENT)
Dept: REHABILITATION | Facility: HOSPITAL | Age: 41
End: 2021-02-25
Payer: MEDICAID

## 2021-02-25 DIAGNOSIS — M25.651 DECREASED RANGE OF RIGHT HIP MOVEMENT: ICD-10-CM

## 2021-02-25 DIAGNOSIS — Z78.9 DIFFICULTY NAVIGATING STAIRS: ICD-10-CM

## 2021-02-25 DIAGNOSIS — R29.898 WEAKNESS OF RIGHT HIP: ICD-10-CM

## 2021-02-25 DIAGNOSIS — R26.9 ALTERED GAIT: ICD-10-CM

## 2021-02-25 DIAGNOSIS — M25.651 STIFFNESS OF RIGHT HIP JOINT: ICD-10-CM

## 2021-02-25 PROCEDURE — 97110 THERAPEUTIC EXERCISES: CPT

## 2021-03-02 ENCOUNTER — CLINICAL SUPPORT (OUTPATIENT)
Dept: REHABILITATION | Facility: HOSPITAL | Age: 41
End: 2021-03-02
Payer: MEDICAID

## 2021-03-02 DIAGNOSIS — Z78.9 DIFFICULTY NAVIGATING STAIRS: ICD-10-CM

## 2021-03-02 DIAGNOSIS — M25.651 STIFFNESS OF RIGHT HIP JOINT: ICD-10-CM

## 2021-03-02 DIAGNOSIS — R26.9 ALTERED GAIT: ICD-10-CM

## 2021-03-02 DIAGNOSIS — R29.898 WEAKNESS OF RIGHT HIP: ICD-10-CM

## 2021-03-02 DIAGNOSIS — M25.651 DECREASED RANGE OF RIGHT HIP MOVEMENT: ICD-10-CM

## 2021-03-02 PROCEDURE — 97110 THERAPEUTIC EXERCISES: CPT

## 2021-03-05 ENCOUNTER — CLINICAL SUPPORT (OUTPATIENT)
Dept: REHABILITATION | Facility: HOSPITAL | Age: 41
End: 2021-03-05
Payer: MEDICAID

## 2021-03-05 DIAGNOSIS — M25.651 STIFFNESS OF RIGHT HIP JOINT: ICD-10-CM

## 2021-03-05 DIAGNOSIS — Z78.9 DIFFICULTY NAVIGATING STAIRS: ICD-10-CM

## 2021-03-05 DIAGNOSIS — R26.9 ALTERED GAIT: ICD-10-CM

## 2021-03-05 DIAGNOSIS — R29.898 WEAKNESS OF RIGHT HIP: ICD-10-CM

## 2021-03-05 DIAGNOSIS — M25.651 DECREASED RANGE OF RIGHT HIP MOVEMENT: ICD-10-CM

## 2021-03-05 PROCEDURE — 97110 THERAPEUTIC EXERCISES: CPT

## 2021-03-11 ENCOUNTER — OFFICE VISIT (OUTPATIENT)
Dept: OBSTETRICS AND GYNECOLOGY | Facility: CLINIC | Age: 41
End: 2021-03-11
Payer: MEDICAID

## 2021-03-11 VITALS
HEIGHT: 66 IN | WEIGHT: 228.38 LBS | SYSTOLIC BLOOD PRESSURE: 132 MMHG | BODY MASS INDEX: 36.7 KG/M2 | DIASTOLIC BLOOD PRESSURE: 80 MMHG

## 2021-03-11 DIAGNOSIS — Z11.3 SCREEN FOR STD (SEXUALLY TRANSMITTED DISEASE): ICD-10-CM

## 2021-03-11 DIAGNOSIS — A59.9 TRICHOMONIASIS: Primary | ICD-10-CM

## 2021-03-11 PROCEDURE — 99212 OFFICE O/P EST SF 10 MIN: CPT | Mod: S$PBB,,, | Performed by: OBSTETRICS & GYNECOLOGY

## 2021-03-11 PROCEDURE — 99999 PR PBB SHADOW E&M-EST. PATIENT-LVL III: ICD-10-PCS | Mod: PBBFAC,,, | Performed by: OBSTETRICS & GYNECOLOGY

## 2021-03-11 PROCEDURE — 99212 PR OFFICE/OUTPT VISIT, EST, LEVL II, 10-19 MIN: ICD-10-PCS | Mod: S$PBB,,, | Performed by: OBSTETRICS & GYNECOLOGY

## 2021-03-11 PROCEDURE — 87491 CHLMYD TRACH DNA AMP PROBE: CPT | Performed by: OBSTETRICS & GYNECOLOGY

## 2021-03-11 PROCEDURE — 87591 N.GONORRHOEAE DNA AMP PROB: CPT | Performed by: OBSTETRICS & GYNECOLOGY

## 2021-03-11 PROCEDURE — 99999 PR PBB SHADOW E&M-EST. PATIENT-LVL III: CPT | Mod: PBBFAC,,, | Performed by: OBSTETRICS & GYNECOLOGY

## 2021-03-11 PROCEDURE — 99213 OFFICE O/P EST LOW 20 MIN: CPT | Mod: PBBFAC | Performed by: OBSTETRICS & GYNECOLOGY

## 2021-03-12 LAB
C TRACH DNA SPEC QL NAA+PROBE: NOT DETECTED
N GONORRHOEA DNA SPEC QL NAA+PROBE: NOT DETECTED

## 2021-03-16 ENCOUNTER — TELEPHONE (OUTPATIENT)
Dept: REHABILITATION | Facility: HOSPITAL | Age: 41
End: 2021-03-16

## 2021-04-28 ENCOUNTER — PATIENT MESSAGE (OUTPATIENT)
Dept: RESEARCH | Facility: HOSPITAL | Age: 41
End: 2021-04-28

## 2021-05-04 ENCOUNTER — TELEPHONE (OUTPATIENT)
Dept: OBSTETRICS AND GYNECOLOGY | Facility: CLINIC | Age: 41
End: 2021-05-04

## 2021-05-06 ENCOUNTER — TELEPHONE (OUTPATIENT)
Dept: ORTHOPEDICS | Facility: CLINIC | Age: 41
End: 2021-05-06

## 2021-06-07 ENCOUNTER — TELEPHONE (OUTPATIENT)
Dept: RHEUMATOLOGY | Facility: CLINIC | Age: 41
End: 2021-06-07

## 2021-07-08 ENCOUNTER — TELEPHONE (OUTPATIENT)
Dept: RHEUMATOLOGY | Facility: CLINIC | Age: 41
End: 2021-07-08

## 2021-07-15 DIAGNOSIS — F41.1 GENERALIZED ANXIETY DISORDER: Chronic | ICD-10-CM

## 2021-07-21 ENCOUNTER — PATIENT MESSAGE (OUTPATIENT)
Dept: INTERNAL MEDICINE | Facility: CLINIC | Age: 41
End: 2021-07-21

## 2021-07-21 RX ORDER — ALPRAZOLAM 0.25 MG/1
0.25 TABLET ORAL 2 TIMES DAILY PRN
Qty: 60 TABLET | Refills: 5 | OUTPATIENT
Start: 2021-07-21

## 2021-07-23 ENCOUNTER — PATIENT OUTREACH (OUTPATIENT)
Dept: ADMINISTRATIVE | Facility: HOSPITAL | Age: 41
End: 2021-07-23

## 2021-08-12 ENCOUNTER — TELEPHONE (OUTPATIENT)
Dept: RHEUMATOLOGY | Facility: CLINIC | Age: 41
End: 2021-08-12

## 2021-08-18 ENCOUNTER — TELEPHONE (OUTPATIENT)
Dept: ORTHOPEDICS | Facility: CLINIC | Age: 41
End: 2021-08-18

## 2021-08-18 DIAGNOSIS — M25.551 RIGHT HIP PAIN: Primary | ICD-10-CM

## 2021-08-24 ENCOUNTER — OFFICE VISIT (OUTPATIENT)
Dept: INTERNAL MEDICINE | Facility: CLINIC | Age: 41
End: 2021-08-24
Payer: MEDICAID

## 2021-08-24 ENCOUNTER — PATIENT MESSAGE (OUTPATIENT)
Dept: INTERNAL MEDICINE | Facility: CLINIC | Age: 41
End: 2021-08-24

## 2021-08-24 ENCOUNTER — TELEPHONE (OUTPATIENT)
Dept: ORTHOPEDICS | Facility: CLINIC | Age: 41
End: 2021-08-24

## 2021-08-24 ENCOUNTER — TELEPHONE (OUTPATIENT)
Dept: INTERNAL MEDICINE | Facility: CLINIC | Age: 41
End: 2021-08-24

## 2021-08-24 ENCOUNTER — LAB VISIT (OUTPATIENT)
Dept: LAB | Facility: HOSPITAL | Age: 41
End: 2021-08-24
Attending: FAMILY MEDICINE
Payer: MEDICAID

## 2021-08-24 VITALS
HEART RATE: 92 BPM | DIASTOLIC BLOOD PRESSURE: 86 MMHG | WEIGHT: 211.63 LBS | OXYGEN SATURATION: 97 % | SYSTOLIC BLOOD PRESSURE: 136 MMHG | TEMPERATURE: 99 F | HEIGHT: 66 IN | BODY MASS INDEX: 34.01 KG/M2

## 2021-08-24 DIAGNOSIS — F41.1 GENERALIZED ANXIETY DISORDER: Chronic | ICD-10-CM

## 2021-08-24 DIAGNOSIS — I10 ESSENTIAL HYPERTENSION: ICD-10-CM

## 2021-08-24 DIAGNOSIS — Z11.59 ENCOUNTER FOR HEPATITIS C SCREENING TEST FOR LOW RISK PATIENT: ICD-10-CM
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
## 2021-08-24 DIAGNOSIS — M25.552 CHRONIC PAIN OF BOTH HIPS: ICD-10-CM

## 2021-08-24 DIAGNOSIS — Z00.00 ANNUAL PHYSICAL EXAM: ICD-10-CM

## 2021-08-24 DIAGNOSIS — G89.29 CHRONIC PAIN OF BOTH HIPS: ICD-10-CM

## 2021-08-24 DIAGNOSIS — B35.1 ONYCHOMYCOSIS: ICD-10-CM

## 2021-08-24 DIAGNOSIS — M25.551 CHRONIC PAIN OF BOTH HIPS: ICD-10-CM

## 2021-08-24 DIAGNOSIS — M87.00 AVN (AVASCULAR NECROSIS OF BONE): ICD-10-CM

## 2021-08-24 DIAGNOSIS — B35.3 TINEA PEDIS OF BOTH FEET: ICD-10-CM

## 2021-08-24 LAB
ALBUMIN SERPL BCP-MCNC: 3.8 G/DL (ref 3.5–5.2)
ALP SERPL-CCNC: 35 U/L (ref 55–135)
ALT SERPL W/O P-5'-P-CCNC: 14 U/L (ref 10–44)
ANION GAP SERPL CALC-SCNC: 11 MMOL/L (ref 8–16)
AST SERPL-CCNC: 24 U/L (ref 10–40)
BASOPHILS # BLD AUTO: 0.04 K/UL (ref 0–0.2)
BASOPHILS NFR BLD: 0.9 % (ref 0–1.9)
BILIRUB SERPL-MCNC: 0.3 MG/DL (ref 0.1–1)
BUN SERPL-MCNC: 10 MG/DL (ref 6–20)
CALCIUM SERPL-MCNC: 9.1 MG/DL (ref 8.7–10.5)
CHLORIDE SERPL-SCNC: 106 MMOL/L (ref 95–110)
CHOLEST SERPL-MCNC: 228 MG/DL (ref 120–199)
CHOLEST/HDLC SERPL: 3.9 {RATIO} (ref 2–5)
CO2 SERPL-SCNC: 28 MMOL/L (ref 23–29)
CREAT SERPL-MCNC: 0.7 MG/DL (ref 0.5–1.4)
DIFFERENTIAL METHOD: ABNORMAL
EOSINOPHIL # BLD AUTO: 0.1 K/UL (ref 0–0.5)
EOSINOPHIL NFR BLD: 2.1 % (ref 0–8)
ERYTHROCYTE [DISTWIDTH] IN BLOOD BY AUTOMATED COUNT: 12.8 % (ref 11.5–14.5)
EST. GFR  (AFRICAN AMERICAN): >60 ML/MIN/1.73 M^2
EST. GFR  (NON AFRICAN AMERICAN): >60 ML/MIN/1.73 M^2
GLUCOSE SERPL-MCNC: 95 MG/DL (ref 70–110)
HCT VFR BLD AUTO: 40.6 % (ref 37–48.5)
HDLC SERPL-MCNC: 58 MG/DL (ref 40–75)
HDLC SERPL: 25.4 % (ref 20–50)
HGB BLD-MCNC: 13.9 G/DL (ref 12–16)
IMM GRANULOCYTES # BLD AUTO: 0 K/UL (ref 0–0.04)
IMM GRANULOCYTES NFR BLD AUTO: 0 % (ref 0–0.5)
LDLC SERPL CALC-MCNC: 147.2 MG/DL (ref 63–159)
LYMPHOCYTES # BLD AUTO: 3 K/UL (ref 1–4.8)
LYMPHOCYTES NFR BLD: 70.3 % (ref 18–48)
MCH RBC QN AUTO: 33.3 PG (ref 27–31)
MCHC RBC AUTO-ENTMCNC: 34.2 G/DL (ref 32–36)
MCV RBC AUTO: 97 FL (ref 82–98)
MONOCYTES # BLD AUTO: 0.3 K/UL (ref 0.3–1)
MONOCYTES NFR BLD: 5.8 % (ref 4–15)
NEUTROPHILS # BLD AUTO: 0.9 K/UL (ref 1.8–7.7)
NEUTROPHILS NFR BLD: 20.9 % (ref 38–73)
NONHDLC SERPL-MCNC: 170 MG/DL
NRBC BLD-RTO: 0 /100 WBC
PLATELET # BLD AUTO: 273 K/UL (ref 150–450)
PMV BLD AUTO: 9.7 FL (ref 9.2–12.9)
POTASSIUM SERPL-SCNC: 3.2 MMOL/L (ref 3.5–5.1)
PROT SERPL-MCNC: 7.9 G/DL (ref 6–8.4)
RBC # BLD AUTO: 4.18 M/UL (ref 4–5.4)
SODIUM SERPL-SCNC: 145 MMOL/L (ref 136–145)
TRIGL SERPL-MCNC: 114 MG/DL (ref 30–150)
WBC # BLD AUTO: 4.31 K/UL (ref 3.9–12.7)

## 2021-08-24 PROCEDURE — 80053 COMPREHEN METABOLIC PANEL: CPT | Performed by: PHYSICIAN ASSISTANT

## 2021-08-24 PROCEDURE — 86803 HEPATITIS C AB TEST: CPT | Performed by: PHYSICIAN ASSISTANT

## 2021-08-24 PROCEDURE — 99215 OFFICE O/P EST HI 40 MIN: CPT | Mod: PBBFAC | Performed by: PHYSICIAN ASSISTANT

## 2021-08-24 PROCEDURE — 85025 COMPLETE CBC W/AUTO DIFF WBC: CPT | Performed by: PHYSICIAN ASSISTANT

## 2021-08-24 PROCEDURE — 99214 PR OFFICE/OUTPT VISIT, EST, LEVL IV, 30-39 MIN: ICD-10-PCS | Mod: S$PBB,,, | Performed by: PHYSICIAN ASSISTANT

## 2021-08-24 PROCEDURE — 99999 PR PBB SHADOW E&M-EST. PATIENT-LVL V: CPT | Mod: PBBFAC,,, | Performed by: PHYSICIAN ASSISTANT

## 2021-08-24 PROCEDURE — 80061 LIPID PANEL: CPT | Performed by: PHYSICIAN ASSISTANT

## 2021-08-24 PROCEDURE — 36415 COLL VENOUS BLD VENIPUNCTURE: CPT | Performed by: PHYSICIAN ASSISTANT

## 2021-08-24 PROCEDURE — 99999 PR PBB SHADOW E&M-EST. PATIENT-LVL V: ICD-10-PCS | Mod: PBBFAC,,, | Performed by: PHYSICIAN ASSISTANT

## 2021-08-24 PROCEDURE — 99214 OFFICE O/P EST MOD 30 MIN: CPT | Mod: S$PBB,,, | Performed by: PHYSICIAN ASSISTANT

## 2021-08-24 RX ORDER — MUPIROCIN 20 MG/G
OINTMENT TOPICAL 2 TIMES DAILY
Qty: 1 TUBE | Refills: 0 | Status: SHIPPED | OUTPATIENT
Start: 2021-08-24 | End: 2021-08-31

## 2021-08-24 RX ORDER — CICLOPIROX 80 MG/ML
SOLUTION TOPICAL
Qty: 6.6 ML | Refills: 3 | Status: SHIPPED | OUTPATIENT
Start: 2021-08-24 | End: 2021-12-09

## 2021-08-24 RX ORDER — CLOTRIMAZOLE 1 %
CREAM (GRAM) TOPICAL 2 TIMES DAILY
Qty: 30 G | Refills: 1 | Status: SHIPPED | OUTPATIENT
Start: 2021-08-24 | End: 2021-12-09

## 2021-08-24 RX ORDER — ALPRAZOLAM 0.25 MG/1
0.25 TABLET ORAL 2 TIMES DAILY PRN
Qty: 30 TABLET | Refills: 0 | Status: SHIPPED | OUTPATIENT
Start: 2021-08-24 | End: 2021-10-21 | Stop reason: SDUPTHER

## 2021-08-25 ENCOUNTER — TELEPHONE (OUTPATIENT)
Dept: PHARMACY | Facility: CLINIC | Age: 41
End: 2021-08-25

## 2021-08-25 LAB — HCV AB SERPL QL IA: NEGATIVE

## 2021-09-06 ENCOUNTER — PATIENT MESSAGE (OUTPATIENT)
Dept: INTERNAL MEDICINE | Facility: CLINIC | Age: 41
End: 2021-09-06

## 2021-09-06 DIAGNOSIS — B35.1 ONYCHOMYCOSIS: Primary | ICD-10-CM

## 2021-09-06 DIAGNOSIS — B35.3 TINEA PEDIS OF BOTH FEET: ICD-10-CM

## 2021-09-14 ENCOUNTER — PATIENT MESSAGE (OUTPATIENT)
Dept: ADMINISTRATIVE | Facility: OTHER | Age: 41
End: 2021-09-14

## 2021-09-14 RX ORDER — TERBINAFINE HYDROCHLORIDE 250 MG/1
250 TABLET ORAL DAILY
Qty: 90 TABLET | Refills: 0 | Status: SHIPPED | OUTPATIENT
Start: 2021-09-14 | End: 2021-12-09

## 2021-09-15 ENCOUNTER — TELEPHONE (OUTPATIENT)
Dept: INTERNAL MEDICINE | Facility: CLINIC | Age: 41
End: 2021-09-15

## 2021-10-12 ENCOUNTER — TELEPHONE (OUTPATIENT)
Dept: RHEUMATOLOGY | Facility: CLINIC | Age: 41
End: 2021-10-12

## 2021-10-12 ENCOUNTER — PATIENT OUTREACH (OUTPATIENT)
Dept: ADMINISTRATIVE | Facility: OTHER | Age: 41
End: 2021-10-12

## 2021-10-13 ENCOUNTER — TELEPHONE (OUTPATIENT)
Dept: RHEUMATOLOGY | Facility: CLINIC | Age: 41
End: 2021-10-13

## 2021-10-13 DIAGNOSIS — M87.052 AVASCULAR NECROSIS OF BONE OF LEFT HIP: Primary | ICD-10-CM

## 2021-10-13 DIAGNOSIS — M87.051 AVASCULAR NECROSIS OF BONE OF HIP, RIGHT: ICD-10-CM

## 2021-10-21 ENCOUNTER — TELEPHONE (OUTPATIENT)
Dept: INTERNAL MEDICINE | Facility: CLINIC | Age: 41
End: 2021-10-21

## 2021-10-21 ENCOUNTER — LAB VISIT (OUTPATIENT)
Dept: LAB | Facility: HOSPITAL | Age: 41
End: 2021-10-21
Attending: FAMILY MEDICINE
Payer: MEDICAID

## 2021-10-21 DIAGNOSIS — E55.9 VITAMIN D DEFICIENCY: ICD-10-CM

## 2021-10-21 DIAGNOSIS — Z79.899 ENCOUNTER FOR LONG-TERM CURRENT USE OF MEDICATION: ICD-10-CM

## 2021-10-21 DIAGNOSIS — I10 ESSENTIAL HYPERTENSION: Primary | ICD-10-CM

## 2021-10-21 DIAGNOSIS — D70.9 GRANULOCYTOPENIA: ICD-10-CM

## 2021-10-21 DIAGNOSIS — I10 ESSENTIAL HYPERTENSION: ICD-10-CM

## 2021-10-21 DIAGNOSIS — F41.1 GENERALIZED ANXIETY DISORDER: Chronic | ICD-10-CM

## 2021-10-21 PROCEDURE — 36415 COLL VENOUS BLD VENIPUNCTURE: CPT | Performed by: FAMILY MEDICINE

## 2021-10-21 PROCEDURE — 85025 COMPLETE CBC W/AUTO DIFF WBC: CPT | Performed by: FAMILY MEDICINE

## 2021-10-21 PROCEDURE — 82306 VITAMIN D 25 HYDROXY: CPT | Performed by: FAMILY MEDICINE

## 2021-10-21 PROCEDURE — 80053 COMPREHEN METABOLIC PANEL: CPT | Performed by: FAMILY MEDICINE

## 2021-10-21 RX ORDER — METOPROLOL SUCCINATE 50 MG/1
50 TABLET, EXTENDED RELEASE ORAL DAILY
Qty: 30 TABLET | Refills: 0 | Status: SHIPPED | OUTPATIENT
Start: 2021-10-21 | End: 2021-12-28 | Stop reason: SDUPTHER

## 2021-10-21 RX ORDER — ALPRAZOLAM 0.25 MG/1
0.25 TABLET ORAL 2 TIMES DAILY PRN
Qty: 30 TABLET | Refills: 0 | Status: CANCELLED | OUTPATIENT
Start: 2021-10-21 | End: 2021-11-20

## 2021-10-21 RX ORDER — CHLORTHALIDONE 25 MG/1
25 TABLET ORAL DAILY
Qty: 30 TABLET | Refills: 7 | Status: CANCELLED | OUTPATIENT
Start: 2021-10-21

## 2021-10-21 RX ORDER — METOPROLOL SUCCINATE 50 MG/1
50 TABLET, EXTENDED RELEASE ORAL DAILY
Qty: 30 TABLET | Refills: 7 | Status: CANCELLED | OUTPATIENT
Start: 2021-10-21 | End: 2022-10-21

## 2021-10-21 RX ORDER — ALPRAZOLAM 0.25 MG/1
0.25 TABLET ORAL DAILY PRN
Qty: 30 TABLET | Refills: 0 | Status: SHIPPED | OUTPATIENT
Start: 2021-10-21 | End: 2022-06-09 | Stop reason: SDUPTHER

## 2021-10-21 RX ORDER — ERGOCALCIFEROL 1.25 MG/1
50000 CAPSULE ORAL
Qty: 4 CAPSULE | Refills: 0 | Status: SHIPPED | OUTPATIENT
Start: 2021-10-21 | End: 2021-11-05 | Stop reason: SDUPTHER

## 2021-10-21 RX ORDER — CHLORTHALIDONE 25 MG/1
25 TABLET ORAL DAILY
Qty: 30 TABLET | Refills: 0 | Status: SHIPPED | OUTPATIENT
Start: 2021-10-21 | End: 2021-12-28 | Stop reason: SDUPTHER

## 2021-10-22 ENCOUNTER — PATIENT MESSAGE (OUTPATIENT)
Dept: INTERNAL MEDICINE | Facility: CLINIC | Age: 41
End: 2021-10-22
Payer: MEDICAID

## 2021-10-22 LAB
25(OH)D3+25(OH)D2 SERPL-MCNC: 34 NG/ML (ref 30–96)
ALBUMIN SERPL BCP-MCNC: 3.7 G/DL (ref 3.5–5.2)
ALP SERPL-CCNC: 40 U/L (ref 55–135)
ALT SERPL W/O P-5'-P-CCNC: 46 U/L (ref 10–44)
ANION GAP SERPL CALC-SCNC: 11 MMOL/L (ref 8–16)
AST SERPL-CCNC: 51 U/L (ref 10–40)
BASOPHILS # BLD AUTO: 0.02 K/UL (ref 0–0.2)
BASOPHILS NFR BLD: 0.5 % (ref 0–1.9)
BILIRUB SERPL-MCNC: 0.3 MG/DL (ref 0.1–1)
BUN SERPL-MCNC: 11 MG/DL (ref 6–20)
CALCIUM SERPL-MCNC: 9.9 MG/DL (ref 8.7–10.5)
CHLORIDE SERPL-SCNC: 98 MMOL/L (ref 95–110)
CO2 SERPL-SCNC: 29 MMOL/L (ref 23–29)
CREAT SERPL-MCNC: 0.6 MG/DL (ref 0.5–1.4)
DIFFERENTIAL METHOD: ABNORMAL
EOSINOPHIL # BLD AUTO: 0.1 K/UL (ref 0–0.5)
EOSINOPHIL NFR BLD: 1.1 % (ref 0–8)
ERYTHROCYTE [DISTWIDTH] IN BLOOD BY AUTOMATED COUNT: 12.5 % (ref 11.5–14.5)
EST. GFR  (AFRICAN AMERICAN): >60 ML/MIN/1.73 M^2
EST. GFR  (NON AFRICAN AMERICAN): >60 ML/MIN/1.73 M^2
GLUCOSE SERPL-MCNC: 87 MG/DL (ref 70–110)
HCT VFR BLD AUTO: 32.7 % (ref 37–48.5)
HGB BLD-MCNC: 11 G/DL (ref 12–16)
IMM GRANULOCYTES # BLD AUTO: 0.01 K/UL (ref 0–0.04)
IMM GRANULOCYTES NFR BLD AUTO: 0.2 % (ref 0–0.5)
LYMPHOCYTES # BLD AUTO: 2.4 K/UL (ref 1–4.8)
LYMPHOCYTES NFR BLD: 54.7 % (ref 18–48)
MCH RBC QN AUTO: 32.3 PG (ref 27–31)
MCHC RBC AUTO-ENTMCNC: 33.6 G/DL (ref 32–36)
MCV RBC AUTO: 96 FL (ref 82–98)
MONOCYTES # BLD AUTO: 0.5 K/UL (ref 0.3–1)
MONOCYTES NFR BLD: 10.3 % (ref 4–15)
NEUTROPHILS # BLD AUTO: 1.5 K/UL (ref 1.8–7.7)
NEUTROPHILS NFR BLD: 33.2 % (ref 38–73)
NRBC BLD-RTO: 0 /100 WBC
PLATELET # BLD AUTO: 150 K/UL (ref 150–450)
PMV BLD AUTO: 12 FL (ref 9.2–12.9)
POTASSIUM SERPL-SCNC: 3 MMOL/L (ref 3.5–5.1)
PROT SERPL-MCNC: 6.8 G/DL (ref 6–8.4)
RBC # BLD AUTO: 3.41 M/UL (ref 4–5.4)
SODIUM SERPL-SCNC: 138 MMOL/L (ref 136–145)
WBC # BLD AUTO: 4.37 K/UL (ref 3.9–12.7)

## 2021-10-23 ENCOUNTER — PATIENT MESSAGE (OUTPATIENT)
Dept: INTERNAL MEDICINE | Facility: CLINIC | Age: 41
End: 2021-10-23
Payer: MEDICAID

## 2021-10-25 ENCOUNTER — HOSPITAL ENCOUNTER (OUTPATIENT)
Dept: RADIOLOGY | Facility: HOSPITAL | Age: 41
Discharge: HOME | End: 2021-10-25
Attending: ORTHOPAEDIC SURGERY
Payer: MEDICAID

## 2021-10-25 ENCOUNTER — OFFICE VISIT (OUTPATIENT)
Dept: ORTHOPEDICS | Facility: CLINIC | Age: 41
End: 2021-10-25
Payer: MEDICAID

## 2021-10-25 VITALS
BODY MASS INDEX: 33.91 KG/M2 | HEIGHT: 66 IN | WEIGHT: 211 LBS | SYSTOLIC BLOOD PRESSURE: 117 MMHG | HEART RATE: 64 BPM | DIASTOLIC BLOOD PRESSURE: 78 MMHG

## 2021-10-25 DIAGNOSIS — M25.552 CHRONIC PAIN OF BOTH HIPS: ICD-10-CM

## 2021-10-25 DIAGNOSIS — M87.052 AVASCULAR NECROSIS OF BONE OF LEFT HIP: ICD-10-CM

## 2021-10-25 DIAGNOSIS — M25.551 RIGHT HIP PAIN: ICD-10-CM

## 2021-10-25 DIAGNOSIS — M87.00 AVN (AVASCULAR NECROSIS OF BONE): ICD-10-CM

## 2021-10-25 DIAGNOSIS — G89.29 CHRONIC PAIN OF BOTH HIPS: ICD-10-CM

## 2021-10-25 DIAGNOSIS — M87.052 AVASCULAR NECROSIS OF BONE OF LEFT HIP: Primary | ICD-10-CM

## 2021-10-25 DIAGNOSIS — M25.551 CHRONIC PAIN OF BOTH HIPS: ICD-10-CM

## 2021-10-25 DIAGNOSIS — M87.051 AVASCULAR NECROSIS OF BONE OF HIP, RIGHT: ICD-10-CM

## 2021-10-25 PROCEDURE — 73521 X-RAY EXAM HIPS BI 2 VIEWS: CPT | Mod: 26,,, | Performed by: RADIOLOGY

## 2021-10-25 PROCEDURE — 99999 PR PBB SHADOW E&M-EST. PATIENT-LVL IV: CPT | Mod: PBBFAC,,, | Performed by: ORTHOPAEDIC SURGERY

## 2021-10-25 PROCEDURE — 73521 XR HIPS BILATERAL 2 VIEW INCL AP PELVIS: ICD-10-PCS | Mod: 26,,, | Performed by: RADIOLOGY

## 2021-10-25 PROCEDURE — 99214 OFFICE O/P EST MOD 30 MIN: CPT | Mod: PBBFAC | Performed by: ORTHOPAEDIC SURGERY

## 2021-10-25 PROCEDURE — 73521 X-RAY EXAM HIPS BI 2 VIEWS: CPT | Mod: TC

## 2021-10-25 PROCEDURE — 99999 PR PBB SHADOW E&M-EST. PATIENT-LVL IV: ICD-10-PCS | Mod: PBBFAC,,, | Performed by: ORTHOPAEDIC SURGERY

## 2021-10-25 PROCEDURE — 99214 OFFICE O/P EST MOD 30 MIN: CPT | Mod: S$PBB,,, | Performed by: ORTHOPAEDIC SURGERY

## 2021-10-25 PROCEDURE — 99214 PR OFFICE/OUTPT VISIT, EST, LEVL IV, 30-39 MIN: ICD-10-PCS | Mod: S$PBB,,, | Performed by: ORTHOPAEDIC SURGERY

## 2021-10-25 RX ORDER — NABUMETONE 750 MG/1
750 TABLET, FILM COATED ORAL 2 TIMES DAILY PRN
Qty: 60 TABLET | Refills: 2 | Status: SHIPPED | OUTPATIENT
Start: 2021-10-25 | End: 2021-12-28 | Stop reason: ALTCHOICE

## 2021-10-26 ENCOUNTER — PATIENT MESSAGE (OUTPATIENT)
Dept: SURGERY | Facility: HOSPITAL | Age: 41
End: 2021-10-26
Payer: MEDICAID

## 2021-10-26 DIAGNOSIS — Z01.818 PREOP TESTING: ICD-10-CM

## 2021-10-26 DIAGNOSIS — M87.051 AVASCULAR NECROSIS OF BONE OF HIP, RIGHT: ICD-10-CM

## 2021-10-26 DIAGNOSIS — M87.052 AVASCULAR NECROSIS OF BONE OF LEFT HIP: Primary | ICD-10-CM

## 2021-10-26 RX ORDER — TRAMADOL HYDROCHLORIDE 50 MG/1
50 TABLET ORAL NIGHTLY
Qty: 30 TABLET | Refills: 0 | Status: SHIPPED | OUTPATIENT
Start: 2021-10-26 | End: 2021-12-09

## 2021-11-04 ENCOUNTER — TELEPHONE (OUTPATIENT)
Dept: RHEUMATOLOGY | Facility: CLINIC | Age: 41
End: 2021-11-04
Payer: MEDICAID

## 2021-11-05 ENCOUNTER — LAB VISIT (OUTPATIENT)
Dept: LAB | Facility: HOSPITAL | Age: 41
End: 2021-11-05
Attending: PHYSICIAN ASSISTANT
Payer: MEDICAID

## 2021-11-05 ENCOUNTER — PATIENT MESSAGE (OUTPATIENT)
Dept: SURGERY | Facility: HOSPITAL | Age: 41
End: 2021-11-05
Payer: MEDICAID

## 2021-11-05 ENCOUNTER — OFFICE VISIT (OUTPATIENT)
Dept: RHEUMATOLOGY | Facility: CLINIC | Age: 41
End: 2021-11-05
Payer: MEDICAID

## 2021-11-05 ENCOUNTER — NURSE TRIAGE (OUTPATIENT)
Dept: ADMINISTRATIVE | Facility: CLINIC | Age: 41
End: 2021-11-05
Payer: MEDICAID

## 2021-11-05 VITALS
SYSTOLIC BLOOD PRESSURE: 115 MMHG | DIASTOLIC BLOOD PRESSURE: 76 MMHG | BODY MASS INDEX: 34.08 KG/M2 | HEART RATE: 77 BPM | HEIGHT: 66 IN | WEIGHT: 212.06 LBS

## 2021-11-05 DIAGNOSIS — E55.9 VITAMIN D DEFICIENCY: ICD-10-CM

## 2021-11-05 DIAGNOSIS — M87.051 AVASCULAR NECROSIS OF BONE OF RIGHT HIP: Primary | ICD-10-CM

## 2021-11-05 DIAGNOSIS — Z51.81 MEDICATION MONITORING ENCOUNTER: ICD-10-CM

## 2021-11-05 DIAGNOSIS — B35.1 ONYCHOMYCOSIS: ICD-10-CM

## 2021-11-05 LAB
ALBUMIN SERPL BCP-MCNC: 3.9 G/DL (ref 3.5–5.2)
ALP SERPL-CCNC: 31 U/L (ref 55–135)
ALT SERPL W/O P-5'-P-CCNC: 10 U/L (ref 10–44)
AST SERPL-CCNC: 12 U/L (ref 10–40)
BASOPHILS # BLD AUTO: 0.06 K/UL (ref 0–0.2)
BASOPHILS NFR BLD: 1.1 % (ref 0–1.9)
BILIRUB DIRECT SERPL-MCNC: 0.1 MG/DL (ref 0.1–0.3)
BILIRUB SERPL-MCNC: 0.4 MG/DL (ref 0.1–1)
DIFFERENTIAL METHOD: ABNORMAL
EOSINOPHIL # BLD AUTO: 0.1 K/UL (ref 0–0.5)
EOSINOPHIL NFR BLD: 1.5 % (ref 0–8)
ERYTHROCYTE [DISTWIDTH] IN BLOOD BY AUTOMATED COUNT: 13.1 % (ref 11.5–14.5)
HCT VFR BLD AUTO: 38.9 % (ref 37–48.5)
HGB BLD-MCNC: 12.4 G/DL (ref 12–16)
IMM GRANULOCYTES # BLD AUTO: 0.01 K/UL (ref 0–0.04)
IMM GRANULOCYTES NFR BLD AUTO: 0.2 % (ref 0–0.5)
LYMPHOCYTES # BLD AUTO: 2.8 K/UL (ref 1–4.8)
LYMPHOCYTES NFR BLD: 53 % (ref 18–48)
MCH RBC QN AUTO: 31.6 PG (ref 27–31)
MCHC RBC AUTO-ENTMCNC: 31.9 G/DL (ref 32–36)
MCV RBC AUTO: 99 FL (ref 82–98)
MONOCYTES # BLD AUTO: 0.4 K/UL (ref 0.3–1)
MONOCYTES NFR BLD: 7.5 % (ref 4–15)
NEUTROPHILS # BLD AUTO: 1.9 K/UL (ref 1.8–7.7)
NEUTROPHILS NFR BLD: 36.7 % (ref 38–73)
NRBC BLD-RTO: 0 /100 WBC
PLATELET # BLD AUTO: 392 K/UL (ref 150–450)
PMV BLD AUTO: 10.8 FL (ref 9.2–12.9)
PROT SERPL-MCNC: 7.5 G/DL (ref 6–8.4)
RBC # BLD AUTO: 3.92 M/UL (ref 4–5.4)
WBC # BLD AUTO: 5.3 K/UL (ref 3.9–12.7)

## 2021-11-05 PROCEDURE — 85025 COMPLETE CBC W/AUTO DIFF WBC: CPT | Performed by: PHYSICIAN ASSISTANT

## 2021-11-05 PROCEDURE — 99215 PR OFFICE/OUTPT VISIT, EST, LEVL V, 40-54 MIN: ICD-10-PCS | Mod: S$PBB,,,

## 2021-11-05 PROCEDURE — 99215 OFFICE O/P EST HI 40 MIN: CPT | Mod: S$PBB,,,

## 2021-11-05 PROCEDURE — 80076 HEPATIC FUNCTION PANEL: CPT | Performed by: PHYSICIAN ASSISTANT

## 2021-11-05 PROCEDURE — 36415 COLL VENOUS BLD VENIPUNCTURE: CPT | Performed by: PHYSICIAN ASSISTANT

## 2021-11-05 PROCEDURE — 99214 OFFICE O/P EST MOD 30 MIN: CPT | Mod: PBBFAC

## 2021-11-05 PROCEDURE — 99999 PR PBB SHADOW E&M-EST. PATIENT-LVL IV: ICD-10-PCS | Mod: PBBFAC,,,

## 2021-11-05 PROCEDURE — 99999 PR PBB SHADOW E&M-EST. PATIENT-LVL IV: CPT | Mod: PBBFAC,,,

## 2021-11-05 RX ORDER — ERGOCALCIFEROL 1.25 MG/1
50000 CAPSULE ORAL
Qty: 4 CAPSULE | Refills: 0 | Status: SHIPPED | OUTPATIENT
Start: 2021-11-05 | End: 2022-01-11 | Stop reason: SDUPTHER

## 2021-11-05 RX ORDER — DULOXETIN HYDROCHLORIDE 30 MG/1
30 CAPSULE, DELAYED RELEASE ORAL DAILY
Qty: 14 CAPSULE | Refills: 0 | Status: SHIPPED | OUTPATIENT
Start: 2021-11-05 | End: 2021-11-19

## 2021-11-05 RX ORDER — DULOXETIN HYDROCHLORIDE 60 MG/1
60 CAPSULE, DELAYED RELEASE ORAL DAILY
Qty: 30 CAPSULE | Refills: 6 | Status: SHIPPED | OUTPATIENT
Start: 2021-11-05 | End: 2022-04-04

## 2021-11-07 ENCOUNTER — LAB VISIT (OUTPATIENT)
Dept: PRIMARY CARE CLINIC | Facility: CLINIC | Age: 41
End: 2021-11-07
Payer: MEDICAID

## 2021-11-07 DIAGNOSIS — Z01.818 PREOP TESTING: ICD-10-CM

## 2021-11-07 PROCEDURE — U0005 INFEC AGEN DETEC AMPLI PROBE: HCPCS | Performed by: ORTHOPAEDIC SURGERY

## 2021-11-07 PROCEDURE — U0003 INFECTIOUS AGENT DETECTION BY NUCLEIC ACID (DNA OR RNA); SEVERE ACUTE RESPIRATORY SYNDROME CORONAVIRUS 2 (SARS-COV-2) (CORONAVIRUS DISEASE [COVID-19]), AMPLIFIED PROBE TECHNIQUE, MAKING USE OF HIGH THROUGHPUT TECHNOLOGIES AS DESCRIBED BY CMS-2020-01-R: HCPCS | Performed by: ORTHOPAEDIC SURGERY

## 2021-11-08 LAB
SARS-COV-2 RNA RESP QL NAA+PROBE: NOT DETECTED
SARS-COV-2- CYCLE NUMBER: NORMAL

## 2021-11-09 ENCOUNTER — OFFICE VISIT (OUTPATIENT)
Dept: PODIATRY | Facility: CLINIC | Age: 41
End: 2021-11-09
Payer: MEDICAID

## 2021-11-09 VITALS — BODY MASS INDEX: 32.95 KG/M2 | HEIGHT: 66 IN | WEIGHT: 205 LBS

## 2021-11-09 DIAGNOSIS — L85.3 XEROSIS OF SKIN: ICD-10-CM

## 2021-11-09 DIAGNOSIS — B35.1 ONYCHOMYCOSIS: ICD-10-CM

## 2021-11-09 PROCEDURE — 99999 PR PBB SHADOW E&M-EST. PATIENT-LVL IV: ICD-10-PCS | Mod: PBBFAC,,, | Performed by: PODIATRIST

## 2021-11-09 PROCEDURE — 99204 OFFICE O/P NEW MOD 45 MIN: CPT | Mod: S$PBB,,, | Performed by: PODIATRIST

## 2021-11-09 PROCEDURE — 99999 PR PBB SHADOW E&M-EST. PATIENT-LVL IV: CPT | Mod: PBBFAC,,, | Performed by: PODIATRIST

## 2021-11-09 PROCEDURE — 99214 OFFICE O/P EST MOD 30 MIN: CPT | Mod: PBBFAC | Performed by: PODIATRIST

## 2021-11-09 PROCEDURE — 99204 PR OFFICE/OUTPT VISIT, NEW, LEVL IV, 45-59 MIN: ICD-10-PCS | Mod: S$PBB,,, | Performed by: PODIATRIST

## 2021-11-09 RX ORDER — CICLOPIROX 80 MG/ML
SOLUTION TOPICAL
Qty: 6.6 ML | Refills: 10 | Status: ON HOLD | OUTPATIENT
Start: 2021-11-09 | End: 2021-11-10 | Stop reason: HOSPADM

## 2021-11-10 ENCOUNTER — HOSPITAL ENCOUNTER (OUTPATIENT)
Facility: HOSPITAL | Age: 41
Discharge: HOME | End: 2021-11-10
Attending: ORTHOPAEDIC SURGERY | Admitting: ORTHOPAEDIC SURGERY
Payer: MEDICAID

## 2021-11-10 ENCOUNTER — ANESTHESIA (OUTPATIENT)
Dept: SURGERY | Facility: HOSPITAL | Age: 41
End: 2021-11-10
Payer: MEDICAID

## 2021-11-10 ENCOUNTER — ANESTHESIA EVENT (OUTPATIENT)
Dept: SURGERY | Facility: HOSPITAL | Age: 41
End: 2021-11-10
Payer: MEDICAID

## 2021-11-10 VITALS
TEMPERATURE: 98 F | HEIGHT: 66 IN | SYSTOLIC BLOOD PRESSURE: 138 MMHG | WEIGHT: 208.56 LBS | BODY MASS INDEX: 33.52 KG/M2 | RESPIRATION RATE: 18 BRPM | DIASTOLIC BLOOD PRESSURE: 78 MMHG | OXYGEN SATURATION: 97 % | HEART RATE: 71 BPM

## 2021-11-10 DIAGNOSIS — M87.059 AVASCULAR NECROSIS OF HIP: ICD-10-CM

## 2021-11-10 DIAGNOSIS — M87.059 AVASCULAR NECROSIS OF BONE OF HIP, UNSPECIFIED LATERALITY: Primary | ICD-10-CM

## 2021-11-10 PROCEDURE — 01200 ANES ALL CLOSED PX HIP JOINT: CPT | Performed by: ORTHOPAEDIC SURGERY

## 2021-11-10 PROCEDURE — 63600175 PHARM REV CODE 636 W HCPCS: Performed by: STUDENT IN AN ORGANIZED HEALTH CARE EDUCATION/TRAINING PROGRAM

## 2021-11-10 PROCEDURE — 25000003 PHARM REV CODE 250: Performed by: STUDENT IN AN ORGANIZED HEALTH CARE EDUCATION/TRAINING PROGRAM

## 2021-11-10 PROCEDURE — 71000033 HC RECOVERY, INTIAL HOUR: Performed by: ORTHOPAEDIC SURGERY

## 2021-11-10 PROCEDURE — 25000003 PHARM REV CODE 250: Performed by: ORTHOPAEDIC SURGERY

## 2021-11-10 PROCEDURE — 37000008 HC ANESTHESIA 1ST 15 MINUTES: Performed by: ORTHOPAEDIC SURGERY

## 2021-11-10 PROCEDURE — 37000009 HC ANESTHESIA EA ADD 15 MINS: Performed by: ORTHOPAEDIC SURGERY

## 2021-11-10 PROCEDURE — 71000015 HC POSTOP RECOV 1ST HR: Performed by: ORTHOPAEDIC SURGERY

## 2021-11-10 PROCEDURE — 63600175 PHARM REV CODE 636 W HCPCS: Performed by: ORTHOPAEDIC SURGERY

## 2021-11-10 PROCEDURE — 36000704 HC OR TIME LEV I 1ST 15 MIN: Performed by: ORTHOPAEDIC SURGERY

## 2021-11-10 PROCEDURE — 36000705 HC OR TIME LEV I EA ADD 15 MIN: Performed by: ORTHOPAEDIC SURGERY

## 2021-11-10 RX ORDER — PROPOFOL 10 MG/ML
VIAL (ML) INTRAVENOUS
Status: DISCONTINUED | OUTPATIENT
Start: 2021-11-10 | End: 2021-11-10

## 2021-11-10 RX ORDER — KETOROLAC TROMETHAMINE 30 MG/ML
15 INJECTION, SOLUTION INTRAMUSCULAR; INTRAVENOUS EVERY 8 HOURS PRN
Status: DISCONTINUED | OUTPATIENT
Start: 2021-11-10 | End: 2021-11-10 | Stop reason: HOSPADM

## 2021-11-10 RX ORDER — HYDROMORPHONE HYDROCHLORIDE 2 MG/ML
0.2 INJECTION, SOLUTION INTRAMUSCULAR; INTRAVENOUS; SUBCUTANEOUS EVERY 5 MIN PRN
Status: DISCONTINUED | OUTPATIENT
Start: 2021-11-10 | End: 2021-11-10 | Stop reason: HOSPADM

## 2021-11-10 RX ORDER — ONDANSETRON 2 MG/ML
4 INJECTION INTRAMUSCULAR; INTRAVENOUS DAILY PRN
Status: DISCONTINUED | OUTPATIENT
Start: 2021-11-10 | End: 2021-11-10 | Stop reason: HOSPADM

## 2021-11-10 RX ORDER — CEFAZOLIN SODIUM 2 G/50ML
2 SOLUTION INTRAVENOUS
Status: COMPLETED | OUTPATIENT
Start: 2021-11-10 | End: 2021-11-10

## 2021-11-10 RX ORDER — METHYLPREDNISOLONE ACETATE 40 MG/ML
INJECTION, SUSPENSION INTRA-ARTICULAR; INTRALESIONAL; INTRAMUSCULAR; SOFT TISSUE
Status: DISCONTINUED | OUTPATIENT
Start: 2021-11-10 | End: 2021-11-10 | Stop reason: HOSPADM

## 2021-11-10 RX ORDER — SODIUM CHLORIDE, SODIUM LACTATE, POTASSIUM CHLORIDE, CALCIUM CHLORIDE 600; 310; 30; 20 MG/100ML; MG/100ML; MG/100ML; MG/100ML
INJECTION, SOLUTION INTRAVENOUS CONTINUOUS PRN
Status: DISCONTINUED | OUTPATIENT
Start: 2021-11-10 | End: 2021-11-10

## 2021-11-10 RX ORDER — CHLORHEXIDINE GLUCONATE ORAL RINSE 1.2 MG/ML
10 SOLUTION DENTAL
Status: DISCONTINUED | OUTPATIENT
Start: 2021-11-10 | End: 2021-12-28

## 2021-11-10 RX ORDER — TRAMADOL HYDROCHLORIDE 50 MG/1
50 TABLET ORAL EVERY 6 HOURS PRN
Qty: 28 TABLET | Refills: 0 | Status: SHIPPED | OUTPATIENT
Start: 2021-11-10 | End: 2021-12-01 | Stop reason: SDUPTHER

## 2021-11-10 RX ORDER — SODIUM CHLORIDE 9 MG/ML
INJECTION, SOLUTION INTRAVENOUS CONTINUOUS
Status: DISCONTINUED | OUTPATIENT
Start: 2021-11-10 | End: 2021-12-28

## 2021-11-10 RX ORDER — OXYCODONE AND ACETAMINOPHEN 5; 325 MG/1; MG/1
1 TABLET ORAL
Status: DISCONTINUED | OUTPATIENT
Start: 2021-11-10 | End: 2021-11-10 | Stop reason: HOSPADM

## 2021-11-10 RX ORDER — LIDOCAINE HYDROCHLORIDE 10 MG/ML
INJECTION, SOLUTION EPIDURAL; INFILTRATION; INTRACAUDAL; PERINEURAL
Status: DISCONTINUED | OUTPATIENT
Start: 2021-11-10 | End: 2021-11-10

## 2021-11-10 RX ORDER — LIDOCAINE HYDROCHLORIDE 10 MG/ML
INJECTION, SOLUTION EPIDURAL; INFILTRATION; INTRACAUDAL; PERINEURAL
Status: DISCONTINUED | OUTPATIENT
Start: 2021-11-10 | End: 2021-11-10 | Stop reason: HOSPADM

## 2021-11-10 RX ORDER — CHLORHEXIDINE GLUCONATE ORAL RINSE 1.2 MG/ML
10 SOLUTION DENTAL 2 TIMES DAILY
Status: DISCONTINUED | OUTPATIENT
Start: 2021-11-10 | End: 2021-11-10 | Stop reason: HOSPADM

## 2021-11-10 RX ADMIN — SODIUM CHLORIDE, SODIUM LACTATE, POTASSIUM CHLORIDE, AND CALCIUM CHLORIDE: 600; 310; 30; 20 INJECTION, SOLUTION INTRAVENOUS at 08:11

## 2021-11-10 RX ADMIN — PROPOFOL 100 MG: 10 INJECTION, EMULSION INTRAVENOUS at 08:11

## 2021-11-10 RX ADMIN — CEFAZOLIN SODIUM 2 G: 2 SOLUTION INTRAVENOUS at 08:11

## 2021-11-10 RX ADMIN — LIDOCAINE HYDROCHLORIDE 100 MG: 10 INJECTION, SOLUTION EPIDURAL; INFILTRATION; INTRACAUDAL; PERINEURAL at 08:11

## 2021-12-01 DIAGNOSIS — F41.1 GENERALIZED ANXIETY DISORDER: Chronic | ICD-10-CM

## 2021-12-01 DIAGNOSIS — M87.052 AVASCULAR NECROSIS OF BONE OF LEFT HIP: ICD-10-CM

## 2021-12-01 DIAGNOSIS — I10 ESSENTIAL HYPERTENSION: ICD-10-CM

## 2021-12-01 DIAGNOSIS — M87.051 AVASCULAR NECROSIS OF BONE OF HIP, RIGHT: ICD-10-CM

## 2021-12-01 RX ORDER — MELOXICAM 15 MG/1
15 TABLET ORAL DAILY
Qty: 90 TABLET | Refills: 0 | Status: CANCELLED | OUTPATIENT
Start: 2021-12-01

## 2021-12-03 RX ORDER — CHLORTHALIDONE 25 MG/1
25 TABLET ORAL DAILY
Qty: 30 TABLET | Refills: 7 | OUTPATIENT
Start: 2021-12-03

## 2021-12-03 RX ORDER — METOPROLOL SUCCINATE 50 MG/1
50 TABLET, EXTENDED RELEASE ORAL DAILY
Qty: 30 TABLET | Refills: 0 | OUTPATIENT
Start: 2021-12-03 | End: 2022-12-03

## 2021-12-03 RX ORDER — ALPRAZOLAM 0.25 MG/1
0.25 TABLET ORAL DAILY PRN
Qty: 30 TABLET | Refills: 0 | OUTPATIENT
Start: 2021-12-03 | End: 2022-01-02

## 2021-12-06 RX ORDER — TRAMADOL HYDROCHLORIDE 50 MG/1
50 TABLET ORAL EVERY 6 HOURS PRN
Qty: 28 TABLET | Refills: 0 | Status: SHIPPED | OUTPATIENT
Start: 2021-12-06 | End: 2022-02-22

## 2021-12-09 ENCOUNTER — OFFICE VISIT (OUTPATIENT)
Dept: ORTHOPEDICS | Facility: CLINIC | Age: 41
End: 2021-12-09
Payer: MEDICAID

## 2021-12-09 VITALS
SYSTOLIC BLOOD PRESSURE: 141 MMHG | BODY MASS INDEX: 33.43 KG/M2 | HEART RATE: 101 BPM | DIASTOLIC BLOOD PRESSURE: 101 MMHG | HEIGHT: 66 IN | WEIGHT: 208 LBS | TEMPERATURE: 99 F

## 2021-12-09 DIAGNOSIS — M16.12 ARTHRITIS OF LEFT HIP: ICD-10-CM

## 2021-12-09 DIAGNOSIS — G89.29 CHRONIC PAIN OF BOTH HIPS: ICD-10-CM

## 2021-12-09 DIAGNOSIS — M25.551 CHRONIC PAIN OF BOTH HIPS: ICD-10-CM

## 2021-12-09 DIAGNOSIS — M25.552 CHRONIC PAIN OF BOTH HIPS: ICD-10-CM

## 2021-12-09 DIAGNOSIS — M87.052 AVASCULAR NECROSIS OF BONE OF LEFT HIP: Primary | ICD-10-CM

## 2021-12-09 DIAGNOSIS — M87.051 AVASCULAR NECROSIS OF BONE OF HIP, RIGHT: ICD-10-CM

## 2021-12-09 DIAGNOSIS — M16.11 ARTHRITIS OF RIGHT HIP: ICD-10-CM

## 2021-12-09 PROCEDURE — 99999 PR PBB SHADOW E&M-EST. PATIENT-LVL IV: CPT | Mod: PBBFAC,,, | Performed by: ORTHOPAEDIC SURGERY

## 2021-12-09 PROCEDURE — 99214 OFFICE O/P EST MOD 30 MIN: CPT | Mod: PBBFAC | Performed by: ORTHOPAEDIC SURGERY

## 2021-12-09 PROCEDURE — 99214 PR OFFICE/OUTPT VISIT, EST, LEVL IV, 30-39 MIN: ICD-10-PCS | Mod: S$PBB,,, | Performed by: ORTHOPAEDIC SURGERY

## 2021-12-09 PROCEDURE — 99214 OFFICE O/P EST MOD 30 MIN: CPT | Mod: S$PBB,,, | Performed by: ORTHOPAEDIC SURGERY

## 2021-12-09 PROCEDURE — 99999 PR PBB SHADOW E&M-EST. PATIENT-LVL IV: ICD-10-PCS | Mod: PBBFAC,,, | Performed by: ORTHOPAEDIC SURGERY

## 2021-12-09 RX ORDER — NABUMETONE 750 MG/1
750 TABLET, FILM COATED ORAL 2 TIMES DAILY PRN
Qty: 60 TABLET | Refills: 3 | Status: SHIPPED | OUTPATIENT
Start: 2021-12-09 | End: 2022-04-04

## 2021-12-09 RX ORDER — GABAPENTIN 300 MG/1
300 CAPSULE ORAL NIGHTLY
Qty: 30 CAPSULE | Refills: 11 | Status: SHIPPED | OUTPATIENT
Start: 2021-12-09 | End: 2022-04-04

## 2021-12-10 DIAGNOSIS — M16.11 ARTHRITIS OF RIGHT HIP: ICD-10-CM

## 2021-12-10 DIAGNOSIS — Z01.818 PREOP TESTING: Primary | ICD-10-CM

## 2021-12-10 DIAGNOSIS — M87.051 AVASCULAR NECROSIS OF BONE OF HIP, RIGHT: ICD-10-CM

## 2021-12-28 ENCOUNTER — OFFICE VISIT (OUTPATIENT)
Dept: INTERNAL MEDICINE | Facility: CLINIC | Age: 41
End: 2021-12-28
Payer: MEDICAID

## 2021-12-28 ENCOUNTER — TELEPHONE (OUTPATIENT)
Dept: INTERNAL MEDICINE | Facility: CLINIC | Age: 41
End: 2021-12-28

## 2021-12-28 DIAGNOSIS — S09.93XA FACIAL INJURY, INITIAL ENCOUNTER: Primary | ICD-10-CM

## 2021-12-28 DIAGNOSIS — F41.1 GENERALIZED ANXIETY DISORDER: ICD-10-CM

## 2021-12-28 DIAGNOSIS — I10 ESSENTIAL HYPERTENSION: ICD-10-CM

## 2021-12-28 PROCEDURE — 1159F MED LIST DOCD IN RCRD: CPT | Mod: CPTII,95,, | Performed by: NURSE PRACTITIONER

## 2021-12-28 PROCEDURE — 99214 PR OFFICE/OUTPT VISIT, EST, LEVL IV, 30-39 MIN: ICD-10-PCS | Mod: 95,,, | Performed by: NURSE PRACTITIONER

## 2021-12-28 PROCEDURE — 1159F PR MEDICATION LIST DOCUMENTED IN MEDICAL RECORD: ICD-10-PCS | Mod: CPTII,95,, | Performed by: NURSE PRACTITIONER

## 2021-12-28 PROCEDURE — 1160F RVW MEDS BY RX/DR IN RCRD: CPT | Mod: CPTII,95,, | Performed by: NURSE PRACTITIONER

## 2021-12-28 PROCEDURE — 99214 OFFICE O/P EST MOD 30 MIN: CPT | Mod: 95,,, | Performed by: NURSE PRACTITIONER

## 2021-12-28 PROCEDURE — 1160F PR REVIEW ALL MEDS BY PRESCRIBER/CLIN PHARMACIST DOCUMENTED: ICD-10-PCS | Mod: CPTII,95,, | Performed by: NURSE PRACTITIONER

## 2021-12-28 RX ORDER — CHLORTHALIDONE 25 MG/1
25 TABLET ORAL DAILY
Qty: 30 TABLET | Refills: 0 | Status: SHIPPED | OUTPATIENT
Start: 2021-12-28 | End: 2022-01-18 | Stop reason: SDUPTHER

## 2021-12-28 RX ORDER — ERYTHROMYCIN 5 MG/G
OINTMENT OPHTHALMIC 4 TIMES DAILY
Qty: 3.5 G | Refills: 0 | Status: SHIPPED | OUTPATIENT
Start: 2021-12-28 | End: 2022-01-04

## 2021-12-28 RX ORDER — METOPROLOL SUCCINATE 50 MG/1
50 TABLET, EXTENDED RELEASE ORAL DAILY
Qty: 30 TABLET | Refills: 0 | Status: SHIPPED | OUTPATIENT
Start: 2021-12-28 | End: 2022-01-18 | Stop reason: SDUPTHER

## 2021-12-28 NOTE — TELEPHONE ENCOUNTER
Pt seen by Nany on 12/28/21. Nany put in a referral to see the eye doctor. I'm not able to schedule an appt due to pt's insurance. Please assist pt in scheduling an appt.  Thanks

## 2021-12-29 ENCOUNTER — TELEPHONE (OUTPATIENT)
Dept: OPHTHALMOLOGY | Facility: CLINIC | Age: 41
End: 2021-12-29
Payer: MEDICAID

## 2022-01-06 ENCOUNTER — HOSPITAL ENCOUNTER (OUTPATIENT)
Dept: CARDIOLOGY | Facility: HOSPITAL | Age: 42
Discharge: HOME | End: 2022-01-06
Attending: ORTHOPAEDIC SURGERY
Payer: MEDICAID

## 2022-01-06 ENCOUNTER — HOSPITAL ENCOUNTER (OUTPATIENT)
Dept: RADIOLOGY | Facility: HOSPITAL | Age: 42
Discharge: HOME | End: 2022-01-06
Attending: ORTHOPAEDIC SURGERY
Payer: MEDICAID

## 2022-01-06 ENCOUNTER — OFFICE VISIT (OUTPATIENT)
Dept: CARDIOLOGY | Facility: CLINIC | Age: 42
End: 2022-01-06
Payer: MEDICAID

## 2022-01-06 VITALS — DIASTOLIC BLOOD PRESSURE: 87 MMHG | HEART RATE: 90 BPM | SYSTOLIC BLOOD PRESSURE: 120 MMHG | OXYGEN SATURATION: 100 %

## 2022-01-06 DIAGNOSIS — E78.5 HYPERLIPIDEMIA, UNSPECIFIED HYPERLIPIDEMIA TYPE: ICD-10-CM

## 2022-01-06 DIAGNOSIS — M25.551 RIGHT HIP PAIN: ICD-10-CM

## 2022-01-06 DIAGNOSIS — Z01.818 PREOP TESTING: ICD-10-CM

## 2022-01-06 DIAGNOSIS — I10 ESSENTIAL HYPERTENSION: Primary | ICD-10-CM

## 2022-01-06 DIAGNOSIS — S09.93XA FACIAL INJURY, INITIAL ENCOUNTER: ICD-10-CM

## 2022-01-06 DIAGNOSIS — E66.09 CLASS 1 OBESITY DUE TO EXCESS CALORIES WITH SERIOUS COMORBIDITY AND BODY MASS INDEX (BMI) OF 33.0 TO 33.9 IN ADULT: ICD-10-CM

## 2022-01-06 PROCEDURE — 93005 ELECTROCARDIOGRAM TRACING: CPT

## 2022-01-06 PROCEDURE — 99999 PR PBB SHADOW E&M-EST. PATIENT-LVL III: ICD-10-PCS | Mod: PBBFAC,,, | Performed by: INTERNAL MEDICINE

## 2022-01-06 PROCEDURE — 71046 X-RAY EXAM CHEST 2 VIEWS: CPT | Mod: 26,,, | Performed by: RADIOLOGY

## 2022-01-06 PROCEDURE — 99999 PR PBB SHADOW E&M-EST. PATIENT-LVL III: CPT | Mod: PBBFAC,,, | Performed by: INTERNAL MEDICINE

## 2022-01-06 PROCEDURE — 3074F PR MOST RECENT SYSTOLIC BLOOD PRESSURE < 130 MM HG: ICD-10-PCS | Mod: CPTII,,, | Performed by: INTERNAL MEDICINE

## 2022-01-06 PROCEDURE — 1159F MED LIST DOCD IN RCRD: CPT | Mod: CPTII,,, | Performed by: INTERNAL MEDICINE

## 2022-01-06 PROCEDURE — 99204 PR OFFICE/OUTPT VISIT, NEW, LEVL IV, 45-59 MIN: ICD-10-PCS | Mod: S$PBB,,, | Performed by: INTERNAL MEDICINE

## 2022-01-06 PROCEDURE — 93010 EKG 12-LEAD: ICD-10-PCS | Mod: ,,, | Performed by: INTERNAL MEDICINE

## 2022-01-06 PROCEDURE — 99213 OFFICE O/P EST LOW 20 MIN: CPT | Mod: PBBFAC | Performed by: INTERNAL MEDICINE

## 2022-01-06 PROCEDURE — 3074F SYST BP LT 130 MM HG: CPT | Mod: CPTII,,, | Performed by: INTERNAL MEDICINE

## 2022-01-06 PROCEDURE — 3079F DIAST BP 80-89 MM HG: CPT | Mod: CPTII,,, | Performed by: INTERNAL MEDICINE

## 2022-01-06 PROCEDURE — 99204 OFFICE O/P NEW MOD 45 MIN: CPT | Mod: S$PBB,,, | Performed by: INTERNAL MEDICINE

## 2022-01-06 PROCEDURE — 93010 ELECTROCARDIOGRAM REPORT: CPT | Mod: ,,, | Performed by: INTERNAL MEDICINE

## 2022-01-06 PROCEDURE — 71046 X-RAY EXAM CHEST 2 VIEWS: CPT | Mod: TC

## 2022-01-06 PROCEDURE — 1159F PR MEDICATION LIST DOCUMENTED IN MEDICAL RECORD: ICD-10-PCS | Mod: CPTII,,, | Performed by: INTERNAL MEDICINE

## 2022-01-06 PROCEDURE — 71046 XR CHEST PA AND LATERAL PRE-OP: ICD-10-PCS | Mod: 26,,, | Performed by: RADIOLOGY

## 2022-01-06 PROCEDURE — 3079F PR MOST RECENT DIASTOLIC BLOOD PRESSURE 80-89 MM HG: ICD-10-PCS | Mod: CPTII,,, | Performed by: INTERNAL MEDICINE

## 2022-01-06 NOTE — PROGRESS NOTES
Subjective:   Patient ID:  Merari Orosco is a 41 y.o. female who presents for evaluation of No chief complaint on file.      HPI  42 yo female, started doing cigars occ recently no prior smoking, HTN controlled on diuretic and toprol, bilateral AVN  Denies hx of cocaine use, no family hx of cad   No hx of cad, cva, chf, dm or ckd \  Comes in for preop evaluation for right hip replacement     Denies any chest pain, she is limited with her activity however she denies any hx of chest pain, dyspnea, palpitations, syncope or presyncope   She is able to ambulate with difficulty  Unclear reason of her AVN to her hips as per review of notes , no hx of autoimmune disease, she denies hx of cocaine use  Compliant with meds       Past Medical History:   Diagnosis Date    Anxiety     Depression     Hypertension        Past Surgical History:   Procedure Laterality Date     SECTION      x1    INJECTION OF JOINT Bilateral 11/10/2021    Procedure: Injection, Joint;  Surgeon: Satnam Deleon MD;  Location: [facility] OR;  Service: General;  Laterality: Bilateral;  under fluoroscopy    WISDOM TOOTH EXTRACTION         Social History     Tobacco Use    Smoking status: Never Smoker    Smokeless tobacco: Never Used   Substance Use Topics    Alcohol use: Yes     Comment: socially    Drug use: No       Family History   Problem Relation Age of Onset    Colon cancer Father     Diabetes Father     Hypertension Father     Hypertension Mother     Diabetes Sister     Hypertension Sister     Breast cancer Neg Hx     Cancer Neg Hx     Miscarriages / Stillbirths Neg Hx     Ovarian cancer Neg Hx      labor Neg Hx     Stroke Neg Hx        Review of Systems   Constitutional: Negative for fever and malaise/fatigue.   HENT: Negative for sore throat.    Eyes: Negative for blurred vision.   Cardiovascular: Negative for chest pain, claudication, cyanosis, dyspnea on exertion, irregular heartbeat, leg swelling,  near-syncope, orthopnea, palpitations, paroxysmal nocturnal dyspnea and syncope.   Respiratory: Negative for cough and hemoptysis.    Hematologic/Lymphatic: Negative for bleeding problem.   Skin: Negative for rash.   Musculoskeletal: Positive for arthritis. Negative for falls.   Gastrointestinal: Negative for abdominal pain.   Genitourinary: Negative.    Neurological: Negative.    Psychiatric/Behavioral: Negative for altered mental status and substance abuse.       Current Outpatient Medications on File Prior to Visit   Medication Sig    alendronate (FOSAMAX) 70 MG tablet Take 1 tablet (70 mg total) by mouth every 7 days. Take 1st thing in the morning with 8oz water, sit upright and only water for 30min    blood pressure monitor ([employer] EASE) LG arm size (OP) by Other route as needed for High Blood Pressure.    chlorthalidone (HYGROTEN) 25 MG Tab Take 1 tablet (25 mg total) by mouth once daily.    DULoxetine (CYMBALTA) 60 MG capsule Take 1 capsule (60 mg total) by mouth once daily.    ergocalciferol (ERGOCALCIFEROL) 50,000 unit Cap Take 1 capsule (50,000 Units total) by mouth every 7 days.    fluoride, sodium, (PREVIDENT 5000) 1.1 % Pste Use once daily, do not rinse mouth for 30 minutes after use.    gabapentin (NEURONTIN) 300 MG capsule Take 1 capsule (300 mg total) by mouth every evening.    metoprolol succinate (TOPROL-XL) 50 MG 24 hr tablet Take 1 tablet (50 mg total) by mouth once daily.    multivitamin capsule Take 1 capsule by mouth once daily.    nabumetone (RELAFEN) 750 MG tablet Take 1 tablet (750 mg total) by mouth 2 (two) times daily as needed for Pain. Take with food    traMADoL (ULTRAM) 50 mg tablet Take 1 tablet (50 mg total) by mouth every 6 (six) hours as needed for Pain.    ALPRAZolam (XANAX) 0.25 MG tablet Take 1 tablet (0.25 mg total) by mouth daily as needed for Anxiety.     No current facility-administered medications on file prior to visit.       Objective:   Objective:  Wt  Readings from Last 3 Encounters:   12/09/21 94.3 kg (208 lb)   11/10/21 94.6 kg (208 lb 8.9 oz)   11/09/21 93 kg (205 lb 0.4 oz)     Temp Readings from Last 3 Encounters:   12/09/21 98.8 °F (37.1 °C) (Oral)   11/10/21 97.8 °F (36.6 °C) (Temporal)   08/24/21 99.3 °F (37.4 °C) (Tympanic)     BP Readings from Last 3 Encounters:   01/06/22 120/87   12/09/21 (!) 141/101   11/10/21 138/78     Pulse Readings from Last 3 Encounters:   01/06/22 90   12/09/21 101   11/10/21 71       Physical Exam  Vitals reviewed.   Constitutional:       Appearance: She is well-developed and well-nourished.   HENT:      Head: Normocephalic and atraumatic.   Eyes:      General: No scleral icterus.     Conjunctiva/sclera: Conjunctivae normal.   Cardiovascular:      Rate and Rhythm: Normal rate and regular rhythm.      Pulses: Intact distal pulses.      Heart sounds: Normal heart sounds. No murmur heard.      Pulmonary:      Effort: No respiratory distress.      Breath sounds: No wheezing or rales.   Chest:      Chest wall: No tenderness.   Abdominal:      General: Bowel sounds are normal. There is no distension.      Palpations: Abdomen is soft.      Tenderness: There is no guarding.   Musculoskeletal:         General: Normal range of motion.      Cervical back: Normal range of motion and neck supple.   Skin:     General: Skin is warm.   Neurological:      Mental Status: She is alert and oriented to person, place, and time.   Psychiatric:         Mood and Affect: Mood and affect normal.         Lab Results   Component Value Date    CHOL 228 (H) 08/24/2021    CHOL 220 (H) 01/30/2020    CHOL 167 08/29/2017     Lab Results   Component Value Date    HDL 58 08/24/2021    HDL 69 01/30/2020    HDL 47 08/29/2017     Lab Results   Component Value Date    LDLCALC 147.2 08/24/2021    LDLCALC 135.4 01/30/2020    LDLCALC 85.4 08/29/2017     Lab Results   Component Value Date    TRIG 114 08/24/2021    TRIG 78 01/30/2020    TRIG 173 (H) 08/29/2017     Lab  Results   Component Value Date    CHOLHDL 25.4 08/24/2021    CHOLHDL 31.4 01/30/2020    CHOLHDL 28.1 08/29/2017       Chemistry        Component Value Date/Time     10/21/2021 1605    K 3.0 (L) 10/21/2021 1605    CL 98 10/21/2021 1605    CO2 29 10/21/2021 1605    BUN 11 10/21/2021 1605    CREATININE 0.6 10/21/2021 1605    GLU 87 10/21/2021 1605        Component Value Date/Time    CALCIUM 9.9 10/21/2021 1605    ALKPHOS 31 (L) 11/05/2021 0904    AST 12 11/05/2021 0904    ALT 10 11/05/2021 0904    BILITOT 0.4 11/05/2021 0904    ESTGFRAFRICA >60.0 10/21/2021 1605    EGFRNONAA >60.0 10/21/2021 1605          Lab Results   Component Value Date    TSH 0.725 01/30/2020     No results found for: INR, PROTIME  Lab Results   Component Value Date    WBC 5.30 11/05/2021    HGB 12.4 11/05/2021    HCT 38.9 11/05/2021    MCV 99 (H) 11/05/2021     11/05/2021     BNP  @LABRCNTIP(BNP,BNPTRIAGEBLO)@  CrCl cannot be calculated (Patient's most recent lab result is older than the maximum 7 days allowed.).     Imaging:  ======  No results found for this or any previous visit.    No results found for this or any previous visit.    No results found for this or any previous visit.    Results for orders placed during the hospital encounter of 08/15/16    X-Ray Chest PA And Lateral    Narrative  Comparison: None    2 views    Findings:    Heart and pulmonary vasculature are within normal limits.  Midline trachea.  Minimal smooth pleural thickening extensive apices.  No consolidation or effusion.  Osseous structures intact.  IMPRESSION:      No acute infiltrate.  See above.  ______________________________________    Electronically signed by: JASE MAZA III, MD  Date:     08/15/16  Time:    11:56    No results found for this or any previous visit.    No valid procedures specified.    Diagnostic Results:  ECG: Reviewed  Nsr, normal ecg     The ASCVD Risk score (Cranechristian PADILLA Jr., et al., 2013) failed to calculate for the following  reasons:    The 2013 ASCVD risk score is only valid for ages 40 to 79    Assessment and Plan:   Essential hypertension    Class 1 obesity due to excess calories with serious comorbidity and body mass index (BMI) of 33.0 to 33.9 in adult    Right hip pain    Hyperlipidemia, unspecified hyperlipidemia type      Reviewed all tests and above medical conditions with patient in detail and formulated treatment plan.  Risk factor modification discussed.   Cardiac low salt diet discussed.  Maintaining healthy weight and weight loss goals were discussed in clinic.    No active cardiac issues, no chest pain   Normal cardiac exam   No dynamic changes on ekg   Ok and stable to proceed with non cardiac intermediate risk surgery         Follow up in 6 months

## 2022-01-07 ENCOUNTER — PATIENT MESSAGE (OUTPATIENT)
Dept: ADMINISTRATIVE | Facility: OTHER | Age: 42
End: 2022-01-07
Payer: MEDICAID

## 2022-01-11 DIAGNOSIS — E55.9 VITAMIN D DEFICIENCY: ICD-10-CM

## 2022-01-12 RX ORDER — ERGOCALCIFEROL 1.25 MG/1
50000 CAPSULE ORAL
Qty: 4 CAPSULE | Refills: 4 | Status: SHIPPED | OUTPATIENT
Start: 2022-01-12 | End: 2022-06-09 | Stop reason: SDUPTHER

## 2022-01-24 NOTE — PRE ADMISSION SCREENING
Please report to the [facility] at 05:15am on 2/2/22 (1st Floor Lobby) [facility] on the left, in front of the flag pole).  We will call you the afternoon prior to surgery to confirm arrival time, as it is subject to change due to cancellations & emergencies.    Your Covid Test appointment is on 1/30/22 @ 10am  Your Type and Screen Lab is on 2/1/22  (Please do not remove red arm band applied)      INSTRUCTIONS IMPORTANT!!!  Do Not Eat, Drink, or Smoke after midnight! NO gum, mints, candy or water after midnight. Ok to brush teeth.      Medication Instructions:    Take tylenol 650mg the Night before surgery per your Surgeon's instructions.    Take only these medicines with a small swallow of water-Morning of surgery:  -Cymbalta,   -metoprolol    ____  Do Not wear makeup, mascara or nail polish.   ____  Recommended to not wear acrylic/fake nails to surgery.  ____  NO powder, lotions, deodorants, oil or creams on skin.  ____  Please remove all jewelry, including piercings and leave at home.  ____  Dentures, Hearing Aids and Contact Lens will need to be removed prior to the start of surgery.  ____  Please bring photo ID and insurance information to hospital (Leave Valuables at Home)  ____  If going home the same day, arrange for a ride home. You will not be able to             drive if Anesthesia was used.   ____ You will need someone to help assist you at home for a week post-surgery.  ____  Wear clean, loose fitting clothing. Allow for dressings, bandages.  ____  Stop Aspirin, Ibuprofen, Motrin and Aleve at least 5-7 days before surgery, unless otherwise instructed by your doctor, or the nurse. You MAY use Tylenol/acetaminophen until day of                surgery.  ____  If you take diabetic medication, do NOT take morning of surgery unless instructed by             Doctor. Metformin to be stopped 24 hrs prior to surgery time. DO NOT take long-acting insulin the evening before  surgery. Blood sugars will be checked in pre-op morning of.  ____ Stop taking any Fish Oil supplements or Vitamins at least 5 days prior to surgery, unless instructed otherwise by your Doctor.      Physical/Occupational Therapy: You will be seen by them in recovery DAY OF YOUR SURGERY! They will assist you in walking before discharge. You will need a walker to use at home after surgery. Please call your Surgeon's office to inquire about medical equipment if you have not received one yet ([phone]).            Bathing Instructions:    -Do not shave your face or body 3 days prior to surgery.   -Shower & Rinse your body as usual with anti-bacterial Soap 3 days prior to surgery(Dial or Brandon 2000).              -Use HIBICLENS to the surgery site for 5 minutes the night before and morning of surgery.   -Do not use hibiclens on your head, face, or genitals.   -Do not wash with anti-bacterial soap after you use the hibiclens.   -Rinse your body thoroughly.      [facility] Visitor/Ride Policy:  Only 1 adult allowed (over the age of 18) to accompany you into Pre-op/Recovery Surgery Dept. All other visitors will need to wait in waiting room. Must have a ride home from a responsible adult that you know and trust. Medical Transport, Uber or Lyft can only be used if patient has a responsible adult to accompany them during ride home.      Surgical Site Infection:    Prevention of surgical site infections:   -Keep incisions clean and dry.   -Do not soak/submerge incisions in water until completely healed.   -Do not apply lotions, powders, creams, or deodorants to site.   -Always make sure hands are cleaned with antibacterial soap/ alcohol-based   prior to touching the surgical site.     -You will receive detailed instructions by your Hospital Discharge Team prior to going home.    Signs and symptoms:   -Redness and pain around the area where you had surgery   -Drainage of cloudy fluid from your surgical wound   -Fever  over 100.4 or chills  >>>Call Surgeon office/on-call Surgeon if you experience any of these signs & symptoms post-surgery ([phone]).      *Please Call [facility] Pre-Admissions Department with pre-surgery instruction questions at [phone] or [phone].    *Insurance Questions, please call [phone].

## 2022-01-25 ENCOUNTER — TELEPHONE (OUTPATIENT)
Dept: PREADMISSION TESTING | Facility: HOSPITAL | Age: 42
End: 2022-01-25
Payer: MEDICAID

## 2022-01-30 ENCOUNTER — LAB VISIT (OUTPATIENT)
Dept: PRIMARY CARE CLINIC | Facility: CLINIC | Age: 42
End: 2022-01-30
Payer: MEDICAID

## 2022-01-30 DIAGNOSIS — Z01.818 PREOP TESTING: ICD-10-CM

## 2022-01-30 PROCEDURE — U0003 INFECTIOUS AGENT DETECTION BY NUCLEIC ACID (DNA OR RNA); SEVERE ACUTE RESPIRATORY SYNDROME CORONAVIRUS 2 (SARS-COV-2) (CORONAVIRUS DISEASE [COVID-19]), AMPLIFIED PROBE TECHNIQUE, MAKING USE OF HIGH THROUGHPUT TECHNOLOGIES AS DESCRIBED BY CMS-2020-01-R: HCPCS | Performed by: ORTHOPAEDIC SURGERY

## 2022-01-30 PROCEDURE — U0005 INFEC AGEN DETEC AMPLI PROBE: HCPCS | Performed by: ORTHOPAEDIC SURGERY

## 2022-01-31 DIAGNOSIS — U07.1 COVID-19 VIRUS DETECTED: ICD-10-CM

## 2022-01-31 LAB
SARS-COV-2 RNA RESP QL NAA+PROBE: DETECTED
SARS-COV-2- CYCLE NUMBER: 29

## 2022-02-01 ENCOUNTER — IMMUNIZATION (OUTPATIENT)
Dept: PHARMACY | Facility: CLINIC | Age: 42
End: 2022-02-01
Payer: MEDICAID

## 2022-02-01 DIAGNOSIS — Z23 NEED FOR VACCINATION: Primary | ICD-10-CM

## 2022-02-08 ENCOUNTER — TELEPHONE (OUTPATIENT)
Dept: PREADMISSION TESTING | Facility: HOSPITAL | Age: 42
End: 2022-02-08
Payer: MEDICAID

## 2022-02-08 ENCOUNTER — ANESTHESIA EVENT (OUTPATIENT)
Dept: SURGERY | Facility: HOSPITAL | Age: 42
End: 2022-02-08
Payer: MEDICAID

## 2022-02-08 NOTE — PRE ADMISSION SCREENING
Pt will need to get type and screen done morning of surgery per Marilou in Dr Deleon office. Pt did not go to type and screen appt today, office messaged and aware.

## 2022-02-09 ENCOUNTER — HOSPITAL ENCOUNTER (OUTPATIENT)
Facility: HOSPITAL | Age: 42
Discharge: HOME | End: 2022-02-09
Attending: ORTHOPAEDIC SURGERY | Admitting: ORTHOPAEDIC SURGERY
Payer: MEDICAID

## 2022-02-09 ENCOUNTER — ANESTHESIA (OUTPATIENT)
Dept: SURGERY | Facility: HOSPITAL | Age: 42
End: 2022-02-09
Payer: MEDICAID

## 2022-02-09 VITALS
BODY MASS INDEX: 35.01 KG/M2 | SYSTOLIC BLOOD PRESSURE: 158 MMHG | TEMPERATURE: 98 F | RESPIRATION RATE: 20 BRPM | OXYGEN SATURATION: 99 % | HEIGHT: 66 IN | WEIGHT: 217.81 LBS | HEART RATE: 82 BPM | DIASTOLIC BLOOD PRESSURE: 99 MMHG

## 2022-02-09 DIAGNOSIS — M16.11 ARTHRITIS OF RIGHT HIP: Primary | ICD-10-CM

## 2022-02-09 DIAGNOSIS — R26.9 ALTERED GAIT: ICD-10-CM

## 2022-02-09 LAB
ABO + RH BLD: NORMAL
B-HCG UR QL: NEGATIVE
BLD GP AB SCN CELLS X3 SERPL QL: NORMAL
CTP QC/QA: YES

## 2022-02-09 PROCEDURE — 71000016 HC POSTOP RECOV ADDL HR: Performed by: ORTHOPAEDIC SURGERY

## 2022-02-09 PROCEDURE — 97161 PT EVAL LOW COMPLEX 20 MIN: CPT

## 2022-02-09 PROCEDURE — 63600175 PHARM REV CODE 636 W HCPCS: Performed by: ANESTHESIOLOGY

## 2022-02-09 PROCEDURE — 63600175 PHARM REV CODE 636 W HCPCS: Performed by: ORTHOPAEDIC SURGERY

## 2022-02-09 PROCEDURE — 81025 URINE PREGNANCY TEST: CPT | Performed by: ORTHOPAEDIC SURGERY

## 2022-02-09 PROCEDURE — 37000008 HC ANESTHESIA 1ST 15 MINUTES: Performed by: ORTHOPAEDIC SURGERY

## 2022-02-09 PROCEDURE — 97165 OT EVAL LOW COMPLEX 30 MIN: CPT

## 2022-02-09 PROCEDURE — 25000003 PHARM REV CODE 250: Performed by: STUDENT IN AN ORGANIZED HEALTH CARE EDUCATION/TRAINING PROGRAM

## 2022-02-09 PROCEDURE — 27130 TOTAL HIP ARTHROPLASTY: CPT | Mod: RT,,, | Performed by: ORTHOPAEDIC SURGERY

## 2022-02-09 PROCEDURE — 71000039 HC RECOVERY, EACH ADD'L HOUR: Performed by: ORTHOPAEDIC SURGERY

## 2022-02-09 PROCEDURE — 37000009 HC ANESTHESIA EA ADD 15 MINS: Performed by: ORTHOPAEDIC SURGERY

## 2022-02-09 PROCEDURE — 27130 PR TOTAL HIP ARTHROPLASTY: ICD-10-PCS | Mod: AS,RT,, | Performed by: PHYSICIAN ASSISTANT

## 2022-02-09 PROCEDURE — 27201423 OPTIME MED/SURG SUP & DEVICES STERILE SUPPLY: Performed by: ORTHOPAEDIC SURGERY

## 2022-02-09 PROCEDURE — 25000003 PHARM REV CODE 250: Performed by: ANESTHESIOLOGY

## 2022-02-09 PROCEDURE — 27130 PR TOTAL HIP ARTHROPLASTY: ICD-10-PCS | Mod: RT,,, | Performed by: ORTHOPAEDIC SURGERY

## 2022-02-09 PROCEDURE — 86850 RBC ANTIBODY SCREEN: CPT | Performed by: ORTHOPAEDIC SURGERY

## 2022-02-09 PROCEDURE — 25000003 PHARM REV CODE 250: Performed by: ORTHOPAEDIC SURGERY

## 2022-02-09 PROCEDURE — 36000711: Performed by: ORTHOPAEDIC SURGERY

## 2022-02-09 PROCEDURE — 27130 TOTAL HIP ARTHROPLASTY: CPT | Mod: AS,RT,, | Performed by: PHYSICIAN ASSISTANT

## 2022-02-09 PROCEDURE — C1713 ANCHOR/SCREW BN/BN,TIS/BN: HCPCS | Performed by: ORTHOPAEDIC SURGERY

## 2022-02-09 PROCEDURE — 97530 THERAPEUTIC ACTIVITIES: CPT

## 2022-02-09 PROCEDURE — 36000710: Performed by: ORTHOPAEDIC SURGERY

## 2022-02-09 PROCEDURE — 97535 SELF CARE MNGMENT TRAINING: CPT

## 2022-02-09 PROCEDURE — 01214 ANES OPEN PX TOT HIP ARTHRP: CPT | Performed by: ORTHOPAEDIC SURGERY

## 2022-02-09 PROCEDURE — 63600175 PHARM REV CODE 636 W HCPCS: Performed by: STUDENT IN AN ORGANIZED HEALTH CARE EDUCATION/TRAINING PROGRAM

## 2022-02-09 PROCEDURE — C1776 JOINT DEVICE (IMPLANTABLE): HCPCS | Performed by: ORTHOPAEDIC SURGERY

## 2022-02-09 PROCEDURE — 71000033 HC RECOVERY, INTIAL HOUR: Performed by: ORTHOPAEDIC SURGERY

## 2022-02-09 PROCEDURE — 71000015 HC POSTOP RECOV 1ST HR: Performed by: ORTHOPAEDIC SURGERY

## 2022-02-09 DEVICE — UTF STEM, REDUCED, TI PLASMA SPRAY, #8
Type: IMPLANTABLE DEVICE | Site: HIP | Status: FUNCTIONAL
Brand: UNITED HIP SYSTEM, UTF STEM, REDUCED, TI PLASMA SPRAY

## 2022-02-09 DEVICE — U-MOTION II XPE CUP LINER, 0 DEG., I.D. 36MM, O.D. 54MM
Type: IMPLANTABLE DEVICE | Site: HIP | Status: FUNCTIONAL
Brand: UNITED HIP SYSTEM, U-MOTION II XPE CUP LINER

## 2022-02-09 DEVICE — CERAMIC FEMORAL HEAD, DELTA, O.D.Ï†36MM, 12/14- M
Type: IMPLANTABLE DEVICE | Site: HIP | Status: FUNCTIONAL
Brand: CERAMIC FEMORAL HEAD BIOLOXÂ® DELTA 12/14M

## 2022-02-09 DEVICE — U-MOTION II PS+ CUP, CLUSTER-HOLE, Ø54MM
Type: IMPLANTABLE DEVICE | Site: HIP | Status: FUNCTIONAL
Brand: U-MOTION II PS+ CUP, CLUSTER-HOLE

## 2022-02-09 DEVICE — TI CANCELLOUS SCREW, Ø6.5 X 30MM
Type: IMPLANTABLE DEVICE | Site: HIP | Status: FUNCTIONAL
Brand: TI CANCELLOUS SCREW

## 2022-02-09 DEVICE — TI CANCELLOUS SCREW, Ø6.5 X 25MM
Type: IMPLANTABLE DEVICE | Site: HIP | Status: FUNCTIONAL
Brand: TI CANCELLOUS SCREW

## 2022-02-09 RX ORDER — ONDANSETRON 2 MG/ML
INJECTION INTRAMUSCULAR; INTRAVENOUS
Status: DISCONTINUED | OUTPATIENT
Start: 2022-02-09 | End: 2022-02-09

## 2022-02-09 RX ORDER — CHLORHEXIDINE GLUCONATE ORAL RINSE 1.2 MG/ML
10 SOLUTION DENTAL
Status: DISCONTINUED | OUTPATIENT
Start: 2022-02-09 | End: 2022-12-15

## 2022-02-09 RX ORDER — TRANEXAMIC ACID 100 MG/ML
1000 INJECTION, SOLUTION INTRAVENOUS
Status: COMPLETED | OUTPATIENT
Start: 2022-02-09 | End: 2022-02-09

## 2022-02-09 RX ORDER — ONDANSETRON 8 MG/1
8 TABLET, ORALLY DISINTEGRATING ORAL EVERY 8 HOURS PRN
Status: CANCELLED | OUTPATIENT
Start: 2022-02-09

## 2022-02-09 RX ORDER — KETOROLAC TROMETHAMINE 30 MG/ML
15 INJECTION, SOLUTION INTRAMUSCULAR; INTRAVENOUS EVERY 8 HOURS PRN
Status: DISCONTINUED | OUTPATIENT
Start: 2022-02-09 | End: 2022-02-09 | Stop reason: HOSPADM

## 2022-02-09 RX ORDER — ONDANSETRON 4 MG/1
8 TABLET, ORALLY DISINTEGRATING ORAL EVERY 8 HOURS PRN
Qty: 20 TABLET | Refills: 0 | Status: SHIPPED | OUTPATIENT
Start: 2022-02-09 | End: 2022-04-04

## 2022-02-09 RX ORDER — FENTANYL CITRATE 50 UG/ML
INJECTION, SOLUTION INTRAMUSCULAR; INTRAVENOUS
Status: DISCONTINUED | OUTPATIENT
Start: 2022-02-09 | End: 2022-02-09

## 2022-02-09 RX ORDER — OXYCODONE AND ACETAMINOPHEN 10; 325 MG/1; MG/1
1 TABLET ORAL EVERY 6 HOURS PRN
Qty: 28 TABLET | Refills: 0 | Status: SHIPPED | OUTPATIENT
Start: 2022-02-09 | End: 2022-02-22 | Stop reason: SDUPTHER

## 2022-02-09 RX ORDER — MIDAZOLAM HYDROCHLORIDE 1 MG/ML
INJECTION, SOLUTION INTRAMUSCULAR; INTRAVENOUS
Status: DISCONTINUED | OUTPATIENT
Start: 2022-02-09 | End: 2022-02-09

## 2022-02-09 RX ORDER — DEXAMETHASONE SODIUM PHOSPHATE 4 MG/ML
INJECTION, SOLUTION INTRA-ARTICULAR; INTRALESIONAL; INTRAMUSCULAR; INTRAVENOUS; SOFT TISSUE
Status: DISCONTINUED | OUTPATIENT
Start: 2022-02-09 | End: 2022-02-09

## 2022-02-09 RX ORDER — DEXAMETHASONE SODIUM PHOSPHATE 4 MG/ML
8 INJECTION, SOLUTION INTRA-ARTICULAR; INTRALESIONAL; INTRAMUSCULAR; INTRAVENOUS; SOFT TISSUE
Status: DISCONTINUED | OUTPATIENT
Start: 2022-02-09 | End: 2022-05-31 | Stop reason: SDUPTHER

## 2022-02-09 RX ORDER — OXYCODONE AND ACETAMINOPHEN 5; 325 MG/1; MG/1
1 TABLET ORAL
Status: DISCONTINUED | OUTPATIENT
Start: 2022-02-09 | End: 2022-02-09 | Stop reason: HOSPADM

## 2022-02-09 RX ORDER — ROCURONIUM BROMIDE 10 MG/ML
INJECTION, SOLUTION INTRAVENOUS
Status: DISCONTINUED | OUTPATIENT
Start: 2022-02-09 | End: 2022-02-09

## 2022-02-09 RX ORDER — GABAPENTIN 300 MG/1
300 CAPSULE ORAL 2 TIMES DAILY
Status: DISCONTINUED | OUTPATIENT
Start: 2022-02-09 | End: 2022-05-31 | Stop reason: SDUPTHER

## 2022-02-09 RX ORDER — DIPHENHYDRAMINE HYDROCHLORIDE 50 MG/ML
INJECTION INTRAMUSCULAR; INTRAVENOUS
Status: DISCONTINUED | OUTPATIENT
Start: 2022-02-09 | End: 2022-02-09

## 2022-02-09 RX ORDER — OXYCODONE HYDROCHLORIDE 5 MG/1
10 TABLET ORAL
Status: CANCELLED | OUTPATIENT
Start: 2022-02-09

## 2022-02-09 RX ORDER — SODIUM CHLORIDE 9 MG/ML
INJECTION, SOLUTION INTRAVENOUS CONTINUOUS
Status: CANCELLED | OUTPATIENT
Start: 2022-02-09

## 2022-02-09 RX ORDER — HYDRALAZINE HYDROCHLORIDE 20 MG/ML
10 INJECTION INTRAMUSCULAR; INTRAVENOUS ONCE
Status: COMPLETED | OUTPATIENT
Start: 2022-02-09 | End: 2022-02-09

## 2022-02-09 RX ORDER — PROPOFOL 10 MG/ML
VIAL (ML) INTRAVENOUS
Status: DISCONTINUED | OUTPATIENT
Start: 2022-02-09 | End: 2022-02-09

## 2022-02-09 RX ORDER — ACETAMINOPHEN 325 MG/1
650 TABLET ORAL EVERY 8 HOURS PRN
Status: CANCELLED | OUTPATIENT
Start: 2022-02-09

## 2022-02-09 RX ORDER — SUCCINYLCHOLINE CHLORIDE 20 MG/ML
INJECTION INTRAMUSCULAR; INTRAVENOUS
Status: DISCONTINUED | OUTPATIENT
Start: 2022-02-09 | End: 2022-02-09

## 2022-02-09 RX ORDER — OXYCODONE HYDROCHLORIDE 5 MG/1
5 TABLET ORAL
Status: CANCELLED | OUTPATIENT
Start: 2022-02-09

## 2022-02-09 RX ORDER — BISACODYL 10 MG
10 SUPPOSITORY, RECTAL RECTAL DAILY PRN
Status: CANCELLED | OUTPATIENT
Start: 2022-02-09

## 2022-02-09 RX ORDER — DOCUSATE SODIUM 100 MG/1
100 CAPSULE, LIQUID FILLED ORAL 2 TIMES DAILY
Status: CANCELLED | OUTPATIENT
Start: 2022-02-09

## 2022-02-09 RX ORDER — CEFAZOLIN SODIUM 2 G/50ML
2 SOLUTION INTRAVENOUS
Status: COMPLETED | OUTPATIENT
Start: 2022-02-09 | End: 2022-02-09

## 2022-02-09 RX ORDER — ONDANSETRON 2 MG/ML
4 INJECTION INTRAMUSCULAR; INTRAVENOUS EVERY 12 HOURS PRN
Status: CANCELLED | OUTPATIENT
Start: 2022-02-09

## 2022-02-09 RX ORDER — HYDROMORPHONE HYDROCHLORIDE 2 MG/ML
0.2 INJECTION, SOLUTION INTRAMUSCULAR; INTRAVENOUS; SUBCUTANEOUS EVERY 5 MIN PRN
Status: DISCONTINUED | OUTPATIENT
Start: 2022-02-09 | End: 2022-02-09 | Stop reason: HOSPADM

## 2022-02-09 RX ORDER — CHLORHEXIDINE GLUCONATE ORAL RINSE 1.2 MG/ML
10 SOLUTION DENTAL 2 TIMES DAILY
Status: CANCELLED | OUTPATIENT
Start: 2022-02-09 | End: 2022-02-14

## 2022-02-09 RX ORDER — ACETAMINOPHEN 325 MG/1
650 TABLET ORAL EVERY 4 HOURS PRN
Status: DISCONTINUED | OUTPATIENT
Start: 2022-02-09 | End: 2022-12-15

## 2022-02-09 RX ORDER — NEOSTIGMINE METHYLSULFATE 1 MG/ML
INJECTION, SOLUTION INTRAVENOUS
Status: DISCONTINUED | OUTPATIENT
Start: 2022-02-09 | End: 2022-02-09

## 2022-02-09 RX ORDER — LIDOCAINE HYDROCHLORIDE 10 MG/ML
INJECTION, SOLUTION EPIDURAL; INFILTRATION; INTRACAUDAL; PERINEURAL
Status: DISCONTINUED | OUTPATIENT
Start: 2022-02-09 | End: 2022-02-09

## 2022-02-09 RX ORDER — ONDANSETRON 2 MG/ML
4 INJECTION INTRAMUSCULAR; INTRAVENOUS DAILY PRN
Status: DISCONTINUED | OUTPATIENT
Start: 2022-02-09 | End: 2022-02-09 | Stop reason: HOSPADM

## 2022-02-09 RX ORDER — CEFAZOLIN SODIUM 2 G/50ML
2 SOLUTION INTRAVENOUS
Status: CANCELLED | OUTPATIENT
Start: 2022-02-09 | End: 2022-02-10

## 2022-02-09 RX ORDER — ROPIVACAINE/EPI/CLONIDINE/KET 2.46-0.005
SYRINGE (ML) INJECTION
Status: DISCONTINUED | OUTPATIENT
Start: 2022-02-09 | End: 2022-02-09 | Stop reason: HOSPADM

## 2022-02-09 RX ORDER — SODIUM CHLORIDE 9 MG/ML
INJECTION, SOLUTION INTRAVENOUS CONTINUOUS
Status: DISCONTINUED | OUTPATIENT
Start: 2022-02-09 | End: 2022-12-15

## 2022-02-09 RX ORDER — MORPHINE SULFATE 4 MG/ML
2 INJECTION, SOLUTION INTRAMUSCULAR; INTRAVENOUS EVERY 4 HOURS PRN
Status: CANCELLED | OUTPATIENT
Start: 2022-02-09

## 2022-02-09 RX ADMIN — HYDROMORPHONE HYDROCHLORIDE 0.2 MG: 2 INJECTION INTRAMUSCULAR; INTRAVENOUS; SUBCUTANEOUS at 01:02

## 2022-02-09 RX ADMIN — ROCURONIUM BROMIDE 5 MG: 10 INJECTION, SOLUTION INTRAVENOUS at 10:02

## 2022-02-09 RX ADMIN — TRANEXAMIC ACID 1000 MG: 100 INJECTION, SOLUTION INTRAVENOUS at 11:02

## 2022-02-09 RX ADMIN — OXYCODONE HYDROCHLORIDE AND ACETAMINOPHEN 1 TABLET: 5; 325 TABLET ORAL at 01:02

## 2022-02-09 RX ADMIN — HYDROMORPHONE HYDROCHLORIDE 0.2 MG: 2 INJECTION INTRAMUSCULAR; INTRAVENOUS; SUBCUTANEOUS at 12:02

## 2022-02-09 RX ADMIN — DEXAMETHASONE SODIUM PHOSPHATE 8 MG: 4 INJECTION INTRA-ARTICULAR; INTRALESIONAL; INTRAMUSCULAR; INTRAVENOUS; SOFT TISSUE at 10:02

## 2022-02-09 RX ADMIN — DIPHENHYDRAMINE HYDROCHLORIDE 6.25 MG: 50 INJECTION INTRAMUSCULAR; INTRAVENOUS at 10:02

## 2022-02-09 RX ADMIN — SUCCINYLCHOLINE CHLORIDE 160 MG: 20 INJECTION, SOLUTION INTRAMUSCULAR; INTRAVENOUS at 10:02

## 2022-02-09 RX ADMIN — CEFAZOLIN SODIUM 2 G: 2 SOLUTION INTRAVENOUS at 10:02

## 2022-02-09 RX ADMIN — LIDOCAINE HYDROCHLORIDE 100 MG: 10 INJECTION, SOLUTION EPIDURAL; INFILTRATION; INTRACAUDAL; PERINEURAL at 10:02

## 2022-02-09 RX ADMIN — ACETAMINOPHEN 650 MG: 325 TABLET ORAL at 09:02

## 2022-02-09 RX ADMIN — FENTANYL CITRATE 50 MCG: 50 INJECTION, SOLUTION INTRAMUSCULAR; INTRAVENOUS at 10:02

## 2022-02-09 RX ADMIN — MIDAZOLAM 2 MG: 1 INJECTION INTRAMUSCULAR; INTRAVENOUS at 09:02

## 2022-02-09 RX ADMIN — ROCURONIUM BROMIDE 20 MG: 10 INJECTION, SOLUTION INTRAVENOUS at 10:02

## 2022-02-09 RX ADMIN — GLYCOPYRROLATE 0.8 MG: 0.2 INJECTION, SOLUTION INTRAMUSCULAR; INTRAVENOUS at 11:02

## 2022-02-09 RX ADMIN — ONDANSETRON 4 MG: 2 INJECTION, SOLUTION INTRAMUSCULAR; INTRAVENOUS at 11:02

## 2022-02-09 RX ADMIN — CHLORHEXIDINE GLUCONATE 0.12% ORAL RINSE 10 ML: 1.2 LIQUID ORAL at 09:02

## 2022-02-09 RX ADMIN — HYDRALAZINE HYDROCHLORIDE 10 MG: 20 INJECTION, SOLUTION INTRAMUSCULAR; INTRAVENOUS at 01:02

## 2022-02-09 RX ADMIN — SODIUM CHLORIDE, POTASSIUM CHLORIDE, SODIUM LACTATE AND CALCIUM CHLORIDE: 600; 310; 30; 20 INJECTION, SOLUTION INTRAVENOUS at 09:02

## 2022-02-09 RX ADMIN — FENTANYL CITRATE 100 MCG: 50 INJECTION, SOLUTION INTRAMUSCULAR; INTRAVENOUS at 10:02

## 2022-02-09 RX ADMIN — TRANEXAMIC ACID 1000 MG: 100 INJECTION, SOLUTION INTRAVENOUS at 10:02

## 2022-02-09 RX ADMIN — ROCURONIUM BROMIDE 20 MG: 10 INJECTION, SOLUTION INTRAVENOUS at 11:02

## 2022-02-09 RX ADMIN — DEXAMETHASONE SODIUM PHOSPHATE 8 MG: 4 INJECTION, SOLUTION INTRAMUSCULAR; INTRAVENOUS at 10:02

## 2022-02-09 RX ADMIN — NEOSTIGMINE METHYLSULFATE 5 MG: 1 INJECTION INTRAVENOUS at 11:02

## 2022-02-09 RX ADMIN — PROPOFOL 5 MG: 10 INJECTION, EMULSION INTRAVENOUS at 10:02

## 2022-02-09 RX ADMIN — ROCURONIUM BROMIDE 25 MG: 10 INJECTION, SOLUTION INTRAVENOUS at 10:02

## 2022-02-09 RX ADMIN — GABAPENTIN 300 MG: 300 CAPSULE ORAL at 09:02

## 2022-02-09 NOTE — H&P
Subjective:     Patient ID: Merari Orosco is a 41 y.o. female.    Chief Complaint: No chief complaint on file.   Pain in right hip and left hip  HPI:  02/01/2021  40-year-old been having pain in both hips for more than a year now.  She was not diagnosed by sickle cell or lupus or any other diseases and was seen by Rheumatology.  She does take Fosamax.  She had MRI CT scan x-rays that I reviewed personally as well as was told to the patient that she has collapse of the femoral head.  She received a right hip steroid injection by Dr. Walden and now she stain that had helped quite a bit.  The left hip now hurts more than the right side.  She had not taking any Tylenol or any 0 NSAIDs over-the-counter.  She was seen by Rheumatology Department and she stated no blood work was done.  Her pain is around 2-4/10.  She is ambulating without any assistive devices.  She states that she cannot spread the legs to get it in and out of the car is difficult as well going to the bathroom to clean herself.  She does live with family like mother and multiple siblings.  She does have a 20-year-old son.    Denies any fever or chills or shortness of breath or difficulty with chewing or swallowing loss of bowel bladder control or blurry vision or double vision loss of sense smell or taste or or numbness or tingling in the legs or low back      10/25/2021  Bilateral hip avascular necrosis.  Things are getting worse.  Her pain is 9/10.  The steroid injections given by Dr. Walden last 4 months.  She wanted repeated however he she was told to come and see us.  She wants to hold off if she can on having hip replacements at least till after the holidays.  She had tried Mobic and Naprosyn and ibuprofen etcetera.  Her pain is 9/10.  Having difficulty getting around.  She said she will be interested in having her hip injected again since they help.  She does take Tylenol.  Requesting pain medications.  I did tell her I do not prescribe pain  medications/narcotics except after surgery.    No fever no chills no shortness of breath or difficulty with chewing or swallowing loss of bowel bladder control.    2021  Bilateral hip avascular necrosis with resultant arthritis.  She feels the right leg shorter than the left.  Steroid injection seems to help a little bit.  She takes tramadol and nabumetone.  We discussed her total hip replacement today.  We asked and answered a lot of questions for her she wants to do both of them at the same time I did tell her 0 I usually stage does I do not perform bilateral joints because the risk of mortality is high.  She expressed understanding.  I did tell her cannot make both hips to be the same because they may differ in size.  She might have some difference in leg length.  We went over total hip replacement in details.  She wants to proceed after the holidays.  She is ambulating using a cane today but she said the injections did seem to help a little bit.  No fever no chills no shortness of breath or difficulty with chewing swallowing loss of bowel bladder control  Past Medical History:   Diagnosis Date    Anxiety     Depression     Hypertension      Past Surgical History:   Procedure Laterality Date     SECTION      x1    INJECTION OF JOINT Bilateral 11/10/2021    Procedure: Injection, Joint;  Surgeon: Satnam Deleon MD;  [facility];  Service: General;  Laterality: Bilateral;  under fluoroscopy    WISDOM TOOTH EXTRACTION       Family History   Problem Relation Age of Onset    Colon cancer Father     Diabetes Father     Hypertension Father     Hypertension Mother     Diabetes Sister     Hypertension Sister     Breast cancer Neg Hx     Cancer Neg Hx     Miscarriages / Stillbirths Neg Hx     Ovarian cancer Neg Hx      labor Neg Hx     Stroke Neg Hx      Social History     Socioeconomic History    Marital status: Single    Number of children: 1   Tobacco Use    Smoking  status: Never Smoker    Smokeless tobacco: Never Used   Substance and Sexual Activity    Alcohol use: Yes     Comment: socially    Drug use: No    Sexual activity: Yes     Partners: Male     Birth control/protection: Condom     Social Determinants of Health     Financial Resource Strain: Low Risk     Difficulty of Paying Living Expenses: Not hard at all   Food Insecurity: No Food Insecurity    Worried About Running Out of Food in the Last Year: Never true    Ran Out of Food in the Last Year: Never true   Transportation Needs: Unmet Transportation Needs    Lack of Transportation (Medical): Yes    Lack of Transportation (Non-Medical): No   Physical Activity: Inactive    Days of Exercise per Week: 0 days    Minutes of Exercise per Session: 0 min   Stress: No Stress Concern Present    Feeling of Stress : Only a little   Social Connections: Unknown    Frequency of Communication with Friends and Family: More than three times a week    Frequency of Social Gatherings with Friends and Family: Once a week    Active Member of Clubs or Organizations: No    Attends Club or Organization Meetings: Never    Marital Status:    Housing Stability: Low Risk     Unable to Pay for Housing in the Last Year: No    Number of Places Lived in the Last Year: 1    Unstable Housing in the Last Year: No     Medication List with Changes/Refills   Current Medications    ALENDRONATE (FOSAMAX) 70 MG TABLET    Take 1 tablet (70 mg total) by mouth every 7 days. Take 1st thing in the morning with 8oz water, sit upright and only water for 30min    ALPRAZOLAM (XANAX) 0.25 MG TABLET    Take 1 tablet (0.25 mg total) by mouth daily as needed for Anxiety.    BLOOD PRESSURE MONITOR (IHEALTH EASE) LG ARM SIZE (OP)    by Other route as needed for High Blood Pressure.    CHLORTHALIDONE (HYGROTEN) 25 MG TAB    Take 1 tablet (25 mg total) by mouth once daily.    COVID-19 VAC, JOSUE,PFIZER,,PF, (PFIZER COVID-19) 30 MCG/0.3 ML INJECTION     Inject into the muscle.    DULOXETINE (CYMBALTA) 60 MG CAPSULE    Take 1 capsule (60 mg total) by mouth once daily.    ERGOCALCIFEROL (ERGOCALCIFEROL) 50,000 UNIT CAP    Take 1 capsule (50,000 Units total) by mouth every 7 days.    FLUORIDE, SODIUM, (PREVIDENT 5000) 1.1 % PSTE    Use once daily, do not rinse mouth for 30 minutes after use.    GABAPENTIN (NEURONTIN) 300 MG CAPSULE    Take 1 capsule (300 mg total) by mouth every evening.    METOPROLOL SUCCINATE (TOPROL-XL) 50 MG 24 HR TABLET    Take 1 tablet (50 mg total) by mouth once daily.    MULTIVITAMIN CAPSULE    Take 1 capsule by mouth once daily.    NABUMETONE (RELAFEN) 750 MG TABLET    Take 1 tablet (750 mg total) by mouth 2 (two) times daily as needed for Pain. Take with food    TRAMADOL (ULTRAM) 50 MG TABLET    Take 1 tablet (50 mg total) by mouth every 6 (six) hours as needed for Pain.     Review of patient's allergies indicates:  No Known Allergies  Review of Systems   Constitutional: Negative for decreased appetite.   HENT: Negative for tinnitus.    Eyes: Negative for double vision.   Cardiovascular: Negative for chest pain.   Respiratory: Negative for wheezing.    Hematologic/Lymphatic: Negative for bleeding problem.   Skin: Negative for dry skin.   Musculoskeletal: Positive for joint pain, joint swelling and stiffness. Negative for back pain, gout and neck pain.   Gastrointestinal: Negative for abdominal pain.   Genitourinary: Negative for bladder incontinence.   Neurological: Negative for numbness, paresthesias and sensory change.   Psychiatric/Behavioral: Negative for altered mental status.       Objective:   Body mass index is 32.28 kg/m².  There were no vitals filed for this visit.       General    Constitutional: She is oriented to person, place, and time. She appears well-developed.   HENT:   Head: Atraumatic.   Eyes: EOM are normal.   Cardiovascular: Normal rate.    Pulmonary/Chest: Effort normal.   Neurological: She is alert and oriented to  person, place, and time.   Psychiatric: Judgment normal.            Gait with slight limp  Cervical rotation is functional  Bilateral upper extremity neurovascularly intact.  Radial ulnar pulses 2+.  Strength is 5/5.  Sensory intact to touch.  Negative Tinel  Lumbar without discomfort  Pelvis is level  Right hip internal rotation 5° external rotation 10° with severe pain in the groin as well as on the left hip.  Hip flexors are slightly weak at 4/5.  Abduction is 15° with pelvis tilting.  Has approximately 5° of flexion contractures bilateral hips.  Quads and hamstrings ankle extensors and flexors inverters and everters are all 5/5  Bilateral knees full motion no pain in the knee joints.  No swelling no effusion.  Collaterals and cruciates are stable.  Negative Ino's.  Calves are soft nontender  DP 1+ PT 1+  Skin is warm to touch no obvious lesions    Relevant imaging results reviewed and interpreted by me, discussed with the patient and / or family today     X-ray bilateral hips 10/25/21 reviewed.  There is extensive cystic changes and loss of joint space both hips which is a little bit more progressed since last x-ray.  Avascular necrosis with resulting end-stage arthritis.  There is sclerosis of the femoral heads.  X-Ray Chest PA And Lateral Pre-OP  Narrative: EXAMINATION:  XR CHEST PA AND LATERAL PRE-OP    CLINICAL HISTORY:  Encounter for other preprocedural examination    TECHNIQUE:  PA and lateral views of the chest were performed.    COMPARISON:  None    FINDINGS:  Cardiac silhouette and mediastinal contours are normal.  Lungs are clear.  Osseous structures are intact.  Impression: No acute cardiopulmonary process.    Electronically signed by: Agustin Stark MD  Date:    01/06/2022  Time:    10:55   X-ray and electronic record bilateral hips with avascular necrosis of both femoral heads with 2-3 mm collapse on both of him.  CT scan showing collapse  MRI showing large lesions on both femoral  heads    Assessment:     Encounter Diagnoses   Name Primary?    Avascular necrosis of bone of left hip Yes    Avascular necrosis of bone of hip, right     Chronic pain of both hips     Arthritis of left hip     Arthritis of right hip         Plan:   Arthritis of right hip  -     Case Request Operating Room: ARTHROPLASTY, HIP    Altered gait         There are no outpatient Patient Instructions on file for this admission. Patient stated that was not diagnosed with sickle cell or lupus or Gaucher's disease.  We are going to consider this idiopathic.  She already been through Rheumatology .  She is already been am going to physical therapy at [facility]  Patient is not a candidate for core decompression and bone grafting it is a little bit too late with the collapse.  Not much treatment except symptomatic Marco Antonio until that fails and then she would need total hip replacements.  She got good relief from the intra-articular injection of steroid given by Dr. Walden will refer back to Dr. Walden for injection of the left hip.  We will start her on meloxicam and Tylenol to take on as-needed basis.  She also tried naproxen and meloxicam see cam and ibuprofen and they have stopped working right now.  Brochure about total hip would be given.    I did discuss repeat injection of steroid in the pros and cons of infection and neurovascular compromise that could be a problem.  However if the help and will last her at least 3 months then she can have her surgeries/total hip replacement after the holidays.  I did tell her x-rays have shown progression of the disease in that most likely she is going to end up needing total hip replacements in both hips sooner than later.  Long discussion about injections in the risks involved the possibility of making things worst as well as increasing blood pressure heart rate and blood sugar level.  Procedure, common risks and benefits,alternatives discussed in details.All questions  answered.Patient expressed understanding.Patient instructed to call for any questions that could arise in the future.    Most common Risks:  Infection  Leg-length discrepancy  Dislocation  Neuro-vascular injury ( resulting in loss of motor and sensory functions)  Pain  Blood clot  Fat clot  Loss of motion  Fracture of bone  Failure of procedure to achieve its intended purpose  Failure of hardware  Non-union or mal-union of bone  Malalignment  Death  Long discussion about total hip we went over all the instructions in details as well as the surgery.  Disclaimer: This note was prepared using a voice recognition system and is likely to have sound alike errors within the text.

## 2022-02-09 NOTE — CONSULTS
Patient received walker prior to surgery per patients sister Lauren. Cm sent  referral to [facility], pending acceptance.     Patient accepted by [facility].   02/09/2022   12:56PM

## 2022-02-09 NOTE — PLAN OF CARE
[facility] - Surgery (Hospital)  Initial Discharge Assessment       Primary Care Provider: YESY Stone MD    Admission Diagnosis: Arthritis of right hip [M16.11]  Avascular necrosis of bone of hip, right [M87.051]    Admission Date: 2/9/2022  Expected Discharge Date:     Discharge Barriers Identified: None    Payor: MEDICAID / Plan: [place] / Product Type: Managed Medicaid /     Extended Emergency Contact Information  Primary Emergency Contact: Lauren Orosco  Mobile Phone: [phone]  Relation: Sister  Secondary Emergency Contact: Haim Orosco   [facility]  Home Phone: [phone]  Relation: Brother    Discharge Plan A: Home  Discharge Plan B: Home      [id number] - [place] - [address]  [address] [place] [address]  Phone: [phone] Fax: [phone]    [facility]  [id number] [place] [address]  Phone: [phone] Fax: [phone]      Initial Assessment (most recent)     Adult Discharge Assessment - 02/09/22 1135        Discharge Assessment    Assessment Type Discharge Planning Assessment     Confirmed/corrected address, phone number and insurance Yes     Confirmed Demographics Correct on Facesheet     Source of Information family   Patients sister Lauren    Communicated TANNER with patient/caregiver Date not available/Unable to determine;Yes     Reason For Admission Arthritis of right hip     Lives With child(leonor), adult     Facility Arrived From: home     Do you expect to return to your current living situation? Yes     Do you have help at home or someone to help you manage your care at home? No     Prior to hospitilization cognitive status: Alert/Oriented     Current cognitive status: Unable to Assess     Walking or Climbing Stairs Difficulty none     Dressing/Bathing Difficulty none     Equipment Currently Used at Home none     Readmission within 30 days? No     Patient  currently being followed by outpatient case management? No     Do you currently have service(s) that help you manage your care at home? No     Do you take prescription medications? Yes     Do you have prescription coverage? Yes     Coverage [employer] medicaid     Do you have any problems affording any of your prescribed medications? No     Is the patient taking medications as prescribed? yes     Who is going to help you get home at discharge? patients sister hamida     How do you get to doctors appointments? family or friend will provide     Are you on dialysis? No     Do you take coumadin? No     Discharge Plan A Home     Discharge Plan B Home     DME Needed Upon Discharge  none     Discharge Plan discussed with: Sibling     Name(s) and Number(s) Hamida Orosco   [phone]     Discharge Barriers Identified None                  Initial assessment completed with patient's sister Hamida. Patient's sister reports independence with ADL's  prior to hospitalization. Patient uses no DME at home. Patient received her walker prior to surger. Patient currently has no cm needs upon DC. CM will continue following to assist with other needs.   CM provided information on advanced directives, information on pharmacy bedside delivery, and discharge planning begins on admission with contact information for any needs/questions.

## 2022-02-09 NOTE — PLAN OF CARE
SEE EVAL FOR DETAILS. PT DISPLAYED DEFICITS WITH FUNCTIONAL MOBILITY , AND  ADL'S SKILLS . PT WILL CONTINUE TO BENEFIT FROM SKILLED O.T. RECOMMEND: HOME HEALTH O.T.

## 2022-02-09 NOTE — TRANSFER OF CARE
"Anesthesia Transfer of Care Note    Patient: Merari Orosco    Procedure(s) Performed: Procedure(s) (LRB):  ARTHROPLASTY, HIP (Right)    Patient location: PACU    Anesthesia Type: general    Transport from OR: Transported from OR on room air with adequate spontaneous ventilation    Post pain: adequate analgesia    Post assessment: no apparent anesthetic complications and tolerated procedure well    Post vital signs: stable    Level of consciousness: awake, responds to stimulation and alert    Nausea/Vomiting: no nausea/vomiting    Complications: none    Transfer of care protocol was followed      Last vitals:   Visit Vitals  BP (!) 159/106 (BP Location: Right arm, Patient Position: Sitting)   Pulse 87   Temp 36.6 °C (97.9 °F) (Temporal)   Resp 20   Ht 5' 6" (1.676 m)   Wt 98.8 kg (217 lb 13 oz)   SpO2 100%   Breastfeeding No   BMI 35.16 kg/m²     "

## 2022-02-09 NOTE — OP NOTE
[facility] ([facility])  Orthopedic Surgery  Operative Note    SUMMARY     Date of Procedure: 2/9/2022     Procedure: Procedure(s) (LRB):  ARTHROPLASTY, HIP (Right)       Surgeon(s) and Role:     * Satnam Deleon MD - Primary    Assisting Surgeon: william PALACIO    Pre-Operative Diagnosis: Arthritis of right hip [M16.11]  Avascular necrosis of bone of hip, right [M87.051]    Post-Operative Diagnosis: Post-Op Diagnosis Codes:     * Arthritis of right hip [M16.11]     * Avascular necrosis of bone of hip, right [M87.051]    Anesthesia: General    Injections/Meds: ISAIAS with 40 cc of injectable saline    Tourniquet time:  None    Significant Surgical Tasks Conducted by the Assistant(s), if Applicable:  No assistant    Complications: none     Estimated Blood Loss (EBL):  200 mL           Implants: [place] orthopedic primary total hip replacement acetabulum size 54 with 2 screws and neutral acetabular insert, femur/tapered stem size number 8 standard, 1/36 ceramic head    Specimens: None           Condition: Good    Disposition: PACU - hemodynamically stable.  Patient does have bilateral hip arthritis.  We want this hip to be extremely stable since she is only 41 years old.  We might have made her slightly longer on the side to provide very stable hip.  Once we do the left hip then will be balanced  Attestation: I performed the procedure.    Description:  Posterior approach performed for right MARCO. After administering appropriate meds patient positioned in the lateral decubitus for a posterior approach.  Operative site was prepped and draped in usual sterile fashion. Incision was made for posterior approach to the hip carried down through subcutaneous tissues.  Full-thickness skin flap raised anterior and posteriorly. Iliotibial band and gluteus fascia was split .self-retaining retractor applied.  We identified the external rotators tagged those and released them. Capsule was opened in a T-fashion and partially  resected. Hip was dislocated posteriorly without difficulty.  We marked our cut on the femoral neck and was cut with the oscillating saw.  The femoral head was measured.  Proceeded with preparing the femoral canal after we put the femoral neck retractor.  Canal Finders inserted into the femoral canal and then a cookie cutter was used to lateralize.  We sequentially broached up to the appropriate tight fit. The broach was removed.  He then directed attention to the acetabulum anterior and posterior retractors applied.  We proceeded sequentially reaming the acetabulum using the Innomed inclination angle guide at 45° of inclination.  the acetabulum was reamed out on 15-20 degrees of anteversion/45 deg inclination.. Once we had some punctate bleeding and the acetabulum  appropriate size trial was applied . All the time with multiple retractors to protect the sciatic nerve. We went ahead at that point inserted the real acetabular component in a Press-Fit mode.  Screws applied in posterior superior quadrant.  Trial acetabular insert applied. We proceeded putting a broach in the femur for trial and sequentially tried numerous head sizes.  We put the hip through 90° of flexion neutral abduction and put through maneuvers of dislocation until we felt that it is appropriate and stable. We took all the trials out . pulse lavage the wounds.  Bleeders cauterized.  Proceeded with regional block injection into the soft tissue around the hip. Appropriate acetabular insert locked in place.  The femoral component was inserted and we tried again with a trial head.  We confirmed the size and then proceeded to insert the real femoral head. Tested the hip again felt stable proceed with closing the capsule with 1.  Vicryl and then reattaching the external rotators to the greater trochanter. The iliotibial band and the gluteus fascia was closed 1.  Vicryl figure-of-eight.  Subcutaneous tissue was closed 1.  Vicryl inverted stitch.  Skin  using staple gun.  Sterile dressing applied.  Tolerated procedure well

## 2022-02-09 NOTE — PT/OT/SLP EVAL
Occupational Therapy   Evaluation    Name: Merari Orosco  MRN: [id number]  Admitting Diagnosis:  <principal problem not specified>  Recent Surgery: Procedure(s) (LRB):  ARTHROPLASTY, HIP (Right) Day of Surgery    Recommendations:     Discharge Recommendations: home health OT  Discharge Equipment Recommendations:  bedside commode,walker, rolling  Barriers to discharge:       Assessment:     Merari Orosco is a 41 y.o. female with a medical diagnosis of <principal problem not specified>.  She presents with DEBILITY AND GENERALIZED WEAKNESS. Performance deficits affecting function: weakness,impaired functional mobilty,decreased safety awareness,impaired endurance,gait instability,impaired balance,decreased upper extremity function,impaired self care skills,decreased lower extremity function.      Rehab Prognosis: Good; patient would benefit from acute skilled OT services to address these deficits and reach maximum level of function.       Plan:     Patient to be seen DEBILITY AND GENERALIZED WEAKNESS to address the above listed problems via self-care/home management,therapeutic activities,therapeutic exercises  · Plan of Care Expires:    · Plan of Care Reviewed with:      Subjective     Chief Complaint: DEBILITY AND GENERALIZED WEAKNESS  Patient/Family Comments/goals:     Occupational Profile:  Living Environment: LIVES WITH FAMILY IN [address] [place] [address]SE  Previous level of function: (I) WITH ADL'S AND FUNCTIONAL MOBILITY. PT REPORTS DO [address] OR WORK  Roles and Routines: OCCUPATIONAL THERAPY  Equipment Used at Home:  none  Assistance upon Discharge:     Pain/Comfort:  · Pain Rating 1: 4/10  · Location - Side 1: Right  · Location - Orientation 1: lower  · Location 1: hip    Patients cultural, spiritual, Jew conflicts given the current situation:      Objective:     Communicated with: NURSE AND Epic CHART REVIEW prior to session.  Patient found sitting edge of bed with   upon OT entry to room.    General Precautions:  Standard, fall   Orthopedic Precautions:RLE weight bearing as tolerated   Braces: Knee immobilizer  Respiratory Status: Room air    Occupational Performance:    Functional Mobility/Transfers:  · Patient completed Sit <> Stand Transfer with contact guard assistance  with  rolling walker   · Patient completed Bed <> Chair Transfer using Step Transfer technique with contact guard assistance with rolling walker  · Functional Mobility:     Cognitive/Visual Perceptual:  Cognitive/Psychosocial Skills:     -       Oriented to: Person, Place, Time and Situation   -       Follows Commands/attention:Follows multistep  commands  -       Communication: clear/fluent  -       Memory: No Deficits noted  -       Safety awareness/insight to disability: impaired   Visual/Perceptual:      -Intact .    Physical Exam:  Upper Extremity Range of Motion:     -       Right Upper Extremity: WFL  -       Left Upper Extremity: WFL    AMPAC 6 Click ADL:  AMPAC Total Score: 17    Treatment & Education:  O.T. EVAL COMPLETED. PT EDUCATED ON O.T. POC. PT DISPLAYED DEFICITS WITH FUNCTIONAL MOBILITY/TRANSFER AND  DEFICITS WITH ADL'S SKILLSEducation:  . Tx: PT EDUCATED ON LE DRESSING VIA HIP KIT. PT REQ MOD A  AND MAX VERBAL CUES USING HIP KIT ( REACHER) TO  IRON PANTS. PT WILL CONTINUE TO BENEFIT FROM SKILLED O.T. . RECOMMEND: HOME HEALTH O.T.  Patient left UP IN WHEEL CHAIR with all lines intact, call button in reach, NURSE notified and  NURSE present    GOALS:   Multidisciplinary Problems     Occupational Therapy Goals        Problem: Occupational Therapy Goal    Goal Priority Disciplines Outcome Interventions   Occupational Therapy Goal     OT, PT/OT     Description: O.T. GOALS TO BE MET BY 2-23-22  S WITH LE DRESSING USING AE  S WITH TOILET T/F'S  PT WILL VERBALIZED AND FOLLOW THROUGH WITH R POSTERIOR HIP PRECAUTIONS                   History:     Past Medical History:   Diagnosis Date    Anxiety     Depression     Hypertension        Past  Surgical History:   Procedure Laterality Date     SECTION      x1    INJECTION OF JOINT Bilateral 11/10/2021    Procedure: Injection, Joint;  Surgeon: Satnam Deleon MD;  [facility];  Service: General;  Laterality: Bilateral;  under fluoroscopy    WISDOM TOOTH EXTRACTION         Time Tracking:     OT Date of Treatment: 22  OT Start Time: 1515  OT Stop Time: 1540  OT Total Time (min): 25 min    Billable Minutes:Evaluation 15 MINUTES  Self Care/Home Management  10 MINUTES    2022

## 2022-02-09 NOTE — PT/OT/SLP EVAL
"Physical Therapy Evaluation    Patient Name:  Merari Orosco   MRN:  [id number]    Recommendations:     Discharge Recommendations:  home health PT   Discharge Equipment Recommendations: walker, rolling   Barriers to discharge: None    Assessment:     Merari Orosco is a 41 y.o. female admitted with a medical diagnosis of <principal problem not specified>.  She presents with the following impairments/functional limitations:  weakness,impaired endurance,decreased ROM,decreased lower extremity function,orthopedic precautions,impaired balance,gait instability,impaired functional mobilty,impaired self care skills,pain .    Rehab Prognosis: Good; patient would benefit from acute skilled PT services to address these deficits and reach maximum level of function.    Recent Surgery: Procedure(s) (LRB):  ARTHROPLASTY, HIP (Right) Day of Surgery    Plan:     During this hospitalization, patient to be seen 3 x/week to address the identified rehab impairments via gait training,therapeutic activities,therapeutic exercises and progress toward the following goals:    · Plan of Care Expires:  02/23/22    Subjective     Chief Complaint: " MY LEG FEELS LONGER."   Patient/Family Comments/goals: WALK   Pain/Comfort:  · Pain Rating 1: 4/10  · Location - Side 1: Right  · Location 1: hip  · Pain Addressed 1: Reposition  · Pain Rating Post-Intervention 1: 4/10    Patients cultural, spiritual, Zoroastrian conflicts given the current situation:      Living Environment:  PT LIVES AT HOME WITH MOTHER AND SISTER AND BROTHERS  Prior to admission, patients level of function was IND @ HOME AND DOESN'T WORK OR DRIVE.  Equipment used at home: none.  DME owned (not currently used): rolling walker and bedside commode.  Upon discharge, patient will have assistance from FAMILY .    Objective:     Communicated with NURSE AND Epic CHART REVIEW  prior to session.  Patient found sitting edge of bed with peripheral IV,knee immobilizer  upon PT entry to " room.    General Precautions: Standard, fall   Orthopedic Precautions:RLE weight bearing as tolerated,RLE posterior precautions   Braces: Knee immobilizer  Respiratory Status: Room air    Exams:  · RLE ROM: LIMITED  · RLE Strength: NA  · LLE ROM: WFL  · LLE Strength: WFL    Functional Mobility:  · Bed Mobility:     · Supine to Sit: NA  · Transfers:     · Sit to Stand:  stand by assistance with rolling walker  · Bed to Chair: stand by assistance with  rolling walker  using  Stand Pivot  · Gait: PT GT TRAINED X 100' WITH RW AND SBA    Therapeutic Activities and Exercises:   PT EDUCATED ON HIP PRECAUTIONS AND P.T. ASSIST PT IN DONNING KNEE IMMOBILIZER AFTER DONNING PANTS. PT LEFT SEATED IN WC WITH ALL NEEDS MET     AM-PAC 6 CLICK MOBILITY  Total Score:21     Patient left up in chair with NURSE present.    GOALS:   Multidisciplinary Problems     Physical Therapy Goals        Problem: Physical Therapy Goal    Goal Priority Disciplines Outcome Goal Variances Interventions   Physical Therapy Goal     PT, PT/OT      Description: LT22  1. PT WILL COMPLETE BED MOBILITY IND  2. PT WILL RECALL 3/3 HIP PRECAUTIONS IND  3. PT WILL GT TRAIN X 150' WITH RW THERESE                     History:     Past Medical History:   Diagnosis Date    Anxiety     Depression     Hypertension        Past Surgical History:   Procedure Laterality Date     SECTION      x1    INJECTION OF JOINT Bilateral 11/10/2021    Procedure: Injection, Joint;  Surgeon: Satnam Deleon MD;  Location: [facility];  Service: General;  Laterality: Bilateral;  under fluoroscopy    WISDOM TOOTH EXTRACTION         Time Tracking:     PT Received On: 22  PT Start Time: 1515     PT Stop Time: 1538  PT Total Time (min): 23 min     Billable Minutes: Evaluation 13 and Therapeutic Activity 10      2022

## 2022-02-09 NOTE — ANESTHESIA POSTPROCEDURE EVALUATION
Anesthesia Post Evaluation    Patient: Merari Orosco    Procedure(s) Performed: Procedure(s) (LRB):  ARTHROPLASTY, HIP (Right)    Final Anesthesia Type: general      Patient location during evaluation: PACU  Patient participation: Yes- Able to Participate  Level of consciousness: awake and alert  Post-procedure vital signs: reviewed and stable  Pain management: adequate  Airway patency: patent  MAJOR mitigation strategies: Verification of full reversal of neuromuscular block  PONV status at discharge: No PONV  Anesthetic complications: no      Cardiovascular status: hemodynamically stable  Respiratory status: spontaneous ventilation  Hydration status: euvolemic  Follow-up not needed.          Vitals Value Taken Time   /99 02/09/22 1425   Temp 36.8 °C (98.2 °F) 02/09/22 1425   Pulse 82 02/09/22 1425   Resp 20 02/09/22 1425   SpO2 99 % 02/09/22 1425         Event Time   Out of Recovery 14:23:00         Pain/Tricia Score: Pain Rating Prior to Med Admin: 6 (2/9/2022  1:14 PM)  Tricia Score: 8 (2/9/2022 12:30 PM)

## 2022-02-09 NOTE — ANESTHESIA PROCEDURE NOTES
Intubation    Date/Time: 2/9/2022 10:10 AM  Performed by: Royal Nice CRNA  Authorized by: Chace Mcclure II, MD     Intubation:     Induction:  Intravenous    Intubated:  Postinduction    Mask Ventilation:  Easy mask    Attempts:  1    Attempted By:  CRNA    Method of Intubation:  Direct    Blade:  Art 3    Laryngeal View Grade: Grade I - full view of cords      Difficult Airway Encountered?: No      Complications:  None    Airway Device:  Oral endotracheal tube    Airway Device Size:  7.0    Style/Cuff Inflation:  Cuffed (inflated to minimal occlusive pressure)    Tube secured:  21    Secured at:  The lips    Placement Verified By:  Capnometry    Complicating Factors:  None    Findings Post-Intubation:  BS equal bilateral and atraumatic/condition of teeth unchanged

## 2022-02-09 NOTE — PATIENT INSTRUCTIONS
Patient instructions for joint replacement    Before surgery  1.  Shower with Hibiclens soap/antibacterial for 3 days prior to surgery to decrease risk of infection  2..  Stop all blood thinners/aspirin, Coumadin, warfarin, Plavix, Eliquis, Xarelto etc 5 days prior to surgery  3.  Take Celebrex 400 mg the night prior to surgery after dinner or meloxicam 15 mg  4.  Take Tylenol 650 mg the night prior to surgery    Ask physicians for prescription of Celebrex or Mobic if needed    After surgery at home  1.  Take Tylenol 650 mg 3 times a day for 14 days then as needed for mild pain  2.  Take gabapentin 300 mg nightly for 6 weeks  3.   resume her Relafen/nabumetone  4.  Must take aspirin 81 mg twice a day for 6 weeks unless you are on other blood thinners/Plavix, Eliquis, Xarelto, Coumadin etc  5.  Must wear compressive stockings for 6 weeks minimum to decrease the risk of blood clot and swelling  6.  Hydrocodone/Norco or oxycodone/Percocet will be prescribed to take every 6 hr as needed for breakthrough pain  7.  May apply ice on the knee to help with decreasing pain  8.  Keep wound dry for 2 weeks until stitches/staples removed than you will be allowed to shower in 24 hr and get the wound wet.  No soaking of the wound in the tub or swimming for 4 weeks after surgery  9.  No driving for 4 weeks unless specified by physician  10.  Avoid touching the wound or surrounding area /at least 2 inches on each side of the surgical incision until staples are removed/stitches   11.  May change the surgical dressing if extremely bloody or has drainage on it. May clean the wound with peroxide or Betadine and apply sterile dressing and Ace wrap over it  12.  Leave hospital dressing on for 3 days then may change by applying sterile 4 x 4 and Ace wrap after cleaning with Betadine or peroxide.  May leave this dressing change to home health nurses

## 2022-02-09 NOTE — ANESTHESIA PREPROCEDURE EVALUATION
2022  Merari Orosco is a 41 y.o., female.  Patient Active Problem List   Diagnosis    Essential hypertension    Presence of IUD    Other fatigue    Major depressive disorder, recurrent, moderate    Generalized anxiety disorder    Class 1 obesity due to excess calories with serious comorbidity and body mass index (BMI) of 33.0 to 33.9 in adult    Right hip pain    Decreased range of right hip movement    Stiffness of right hip joint    Weakness of right hip    Altered gait    Difficulty navigating stairs     Past Surgical History:   Procedure Laterality Date     SECTION      x1    INJECTION OF JOINT Bilateral 11/10/2021    Procedure: Injection, Joint;  Surgeon: Satnam Deleon MD;  [facility]: [facility] OR;  Service: General;  Laterality: Bilateral;  under fluoroscopy    WISDOM TOOTH EXTRACTION         Anesthesia Evaluation    I have reviewed the Patient Summary Reports.    I have reviewed the Nursing Notes. I have reviewed the NPO Status.      Review of Systems  Cardiovascular:   Hypertension, well controlled ECG has been reviewed.    Psych:   Psychiatric History anxiety depression          Physical Exam  General:  Well nourished, Obesity    Airway/Jaw/Neck:  Airway Findings: Mouth Opening: Normal Tongue: Normal  Pre-Existing Airway Tube(s): Oral Endotracheal tube  General Airway Assessment: Adult  Mallampati: II  Improves to II with phonation.  TM Distance: Normal, at least 6 cm  Jaw/Neck Findings:     Neck ROM: Normal ROM     Eyes/Ears/Nose:  Eyes/Ears/Nose Findings:    Dental:  Dental Findings: In tact   Chest/Lungs:  Chest/Lungs Findings: Clear to auscultation     Heart/Vascular:  Heart Findings: Rate: Normal  Rhythm: Regular Rhythm  Sounds: Normal        Mental Status:  Mental Status Findings:  Cooperative, Alert and Oriented         Anesthesia Plan  Type of Anesthesia, risks  & benefits discussed:  Anesthesia Type:  general    Patient's Preference:   Plan Factors:          Intra-op Monitoring Plan: standard ASA monitors  Intra-op Monitoring Plan Comments:   Post Op Pain Control Plan: IV/PO Opioids PRN  Post Op Pain Control Plan Comments:     Induction:   IV  Beta Blocker:  Patient is on a Beta-Blocker and has received one dose within the past 24 hours (No further documentation required).       Informed Consent: Patient understands risks and agrees with Anesthesia plan.  Questions answered. Anesthesia consent signed with patient.  ASA Score: 2     Day of Surgery Review of History & Physical:  There are no significant changes.          Ready For Surgery From Anesthesia Perspective.     Lab Results   Component Value Date    WBC 6.76 01/06/2022    HGB 10.8 (L) 01/06/2022    HCT 35.0 (L) 01/06/2022     (H) 01/06/2022     (H) 01/06/2022         Chemistry        Component Value Date/Time     01/06/2022 1118    K 3.6 01/06/2022 1118     01/06/2022 1118    CO2 23 01/06/2022 1118    BUN 8 01/06/2022 1118    CREATININE 0.7 01/06/2022 1118    GLU 79 01/06/2022 1118        Component Value Date/Time    CALCIUM 9.5 01/06/2022 1118    ALKPHOS 41 (L) 01/06/2022 1118    AST 18 01/06/2022 1118    ALT 13 01/06/2022 1118    BILITOT 0.3 01/06/2022 1118    ESTGFRAFRICA >60.0 01/06/2022 1118    EGFRNONAA >60.0 01/06/2022 1118        Normal sinus rhythm   Normal ECG   When compared with ECG of 15-AUG-2016 11:27,   No significant change was found   Confirmed by MD ARIN, BRYANT ([id number]) on 1/6/2022 9:31:13 PM

## 2022-02-09 NOTE — DISCHARGE SUMMARY
[facility] - Surgery (Hospital)  Discharge Note  Short Stay    Procedure(s) (LRB):  ARTHROPLASTY, HIP (Right)    OUTCOME: Patient tolerated treatment/procedure well without complication and is now ready for discharge.    DISPOSITION: Home-Health Care [facility]    FINAL DIAGNOSIS:  <principal problem not specified>  Right hip arthritis  FOLLOWUP: In clinic    DISCHARGE INSTRUCTIONS:  No discharge procedures on file.     TIME SPENT ON DISCHARGE: 25 minutes

## 2022-02-10 ENCOUNTER — TELEPHONE (OUTPATIENT)
Dept: ORTHOPEDICS | Facility: CLINIC | Age: 42
End: 2022-02-10
Payer: MEDICAID

## 2022-02-10 NOTE — PLAN OF CARE
[facility] - Surgery (Hospital)  Discharge Final Note    Primary Care Provider: YESY Stone MD    Expected Discharge Date:     Final Discharge Note (most recent)     Final Note - 02/10/22 0929        Final Note    Assessment Type Final Discharge Note     Anticipated Discharge Disposition [place] [facility]     Hospital Resources/Appts/Education Provided Provided patient/caregiver with written discharge plan information;Appointments scheduled and added to AVS        Post-Acute Status    Discharge Delays None known at this time                 Important Message from Medicare             Contact Info     Satnam Deleon MD   Specialty: Orthopedic Surgery    [address] [place] [address] [address] [id number]   Phone: [phone]       Next Steps: Follow up in 2 week(s)

## 2022-02-10 NOTE — TELEPHONE ENCOUNTER
Spoke with [facility] nurse - she reports that when temp was taken it was extremely hot in the house so she was unclear how accurate low grade temp really was - she did inform patient to continue to monitor temp and to let us know if it got higher than 100.6 -- patient verbalized understanding and thankful for call

## 2022-02-10 NOTE — TELEPHONE ENCOUNTER
----- Message from Jennifer Gallegos sent at 2/10/2022  1:39 PM CST -----  Contact: Stephanie with [facility] [phone]  Patient would like to get medical advice.    Comments:   Calling regarding the patient having a low grade fever. Calling from home health services please advise.

## 2022-02-12 ENCOUNTER — PATIENT MESSAGE (OUTPATIENT)
Dept: ADMINISTRATIVE | Facility: OTHER | Age: 42
End: 2022-02-12
Payer: MEDICAID

## 2022-02-22 ENCOUNTER — PATIENT MESSAGE (OUTPATIENT)
Dept: ORTHOPEDICS | Facility: CLINIC | Age: 42
End: 2022-02-22
Payer: MEDICAID

## 2022-02-22 RX ORDER — OXYCODONE AND ACETAMINOPHEN 10; 325 MG/1; MG/1
1 TABLET ORAL EVERY 6 HOURS PRN
Qty: 28 TABLET | Refills: 0 | Status: SHIPPED | OUTPATIENT
Start: 2022-02-22 | End: 2022-02-24 | Stop reason: SDUPTHER

## 2022-02-24 ENCOUNTER — OFFICE VISIT (OUTPATIENT)
Dept: ORTHOPEDICS | Facility: CLINIC | Age: 42
End: 2022-02-24
Payer: MEDICAID

## 2022-02-24 VITALS
HEIGHT: 66 IN | WEIGHT: 217 LBS | SYSTOLIC BLOOD PRESSURE: 128 MMHG | DIASTOLIC BLOOD PRESSURE: 89 MMHG | BODY MASS INDEX: 34.87 KG/M2 | HEART RATE: 93 BPM

## 2022-02-24 DIAGNOSIS — Z96.649 STATUS POST HIP REPLACEMENT, UNSPECIFIED LATERALITY: Primary | ICD-10-CM

## 2022-02-24 DIAGNOSIS — Z96.641 S/P TOTAL RIGHT HIP ARTHROPLASTY: Primary | ICD-10-CM

## 2022-02-24 PROCEDURE — 3079F PR MOST RECENT DIASTOLIC BLOOD PRESSURE 80-89 MM HG: ICD-10-PCS | Mod: CPTII,,, | Performed by: PHYSICIAN ASSISTANT

## 2022-02-24 PROCEDURE — 1159F MED LIST DOCD IN RCRD: CPT | Mod: CPTII,,, | Performed by: PHYSICIAN ASSISTANT

## 2022-02-24 PROCEDURE — 99213 OFFICE O/P EST LOW 20 MIN: CPT | Mod: PBBFAC | Performed by: PHYSICIAN ASSISTANT

## 2022-02-24 PROCEDURE — 1159F PR MEDICATION LIST DOCUMENTED IN MEDICAL RECORD: ICD-10-PCS | Mod: CPTII,,, | Performed by: PHYSICIAN ASSISTANT

## 2022-02-24 PROCEDURE — 3074F PR MOST RECENT SYSTOLIC BLOOD PRESSURE < 130 MM HG: ICD-10-PCS | Mod: CPTII,,, | Performed by: PHYSICIAN ASSISTANT

## 2022-02-24 PROCEDURE — 3074F SYST BP LT 130 MM HG: CPT | Mod: CPTII,,, | Performed by: PHYSICIAN ASSISTANT

## 2022-02-24 PROCEDURE — 3008F PR BODY MASS INDEX (BMI) DOCUMENTED: ICD-10-PCS | Mod: CPTII,,, | Performed by: PHYSICIAN ASSISTANT

## 2022-02-24 PROCEDURE — 99024 POSTOP FOLLOW-UP VISIT: CPT | Mod: ,,, | Performed by: PHYSICIAN ASSISTANT

## 2022-02-24 PROCEDURE — 3079F DIAST BP 80-89 MM HG: CPT | Mod: CPTII,,, | Performed by: PHYSICIAN ASSISTANT

## 2022-02-24 PROCEDURE — 3008F BODY MASS INDEX DOCD: CPT | Mod: CPTII,,, | Performed by: PHYSICIAN ASSISTANT

## 2022-02-24 PROCEDURE — 99999 PR PBB SHADOW E&M-EST. PATIENT-LVL III: CPT | Mod: PBBFAC,,, | Performed by: PHYSICIAN ASSISTANT

## 2022-02-24 PROCEDURE — 99024 PR POST-OP FOLLOW-UP VISIT: ICD-10-PCS | Mod: ,,, | Performed by: PHYSICIAN ASSISTANT

## 2022-02-24 PROCEDURE — 99999 PR PBB SHADOW E&M-EST. PATIENT-LVL III: ICD-10-PCS | Mod: PBBFAC,,, | Performed by: PHYSICIAN ASSISTANT

## 2022-02-24 RX ORDER — ASPIRIN 81 MG/1
81 TABLET ORAL 2 TIMES DAILY
Qty: 60 TABLET | Refills: 0 | Status: SHIPPED | OUTPATIENT
Start: 2022-02-24 | End: 2022-04-04

## 2022-02-24 RX ORDER — OXYCODONE AND ACETAMINOPHEN 10; 325 MG/1; MG/1
1 TABLET ORAL EVERY 6 HOURS PRN
Qty: 28 TABLET | Refills: 0 | Status: SHIPPED | OUTPATIENT
Start: 2022-02-24 | End: 2022-02-24

## 2022-02-24 RX ORDER — OXYCODONE AND ACETAMINOPHEN 10; 325 MG/1; MG/1
1 TABLET ORAL EVERY 6 HOURS PRN
Qty: 28 TABLET | Refills: 0 | Status: CANCELLED | OUTPATIENT
Start: 2022-02-24

## 2022-02-24 RX ORDER — OXYCODONE AND ACETAMINOPHEN 10; 325 MG/1; MG/1
1 TABLET ORAL EVERY 8 HOURS PRN
Qty: 28 TABLET | Refills: 0 | Status: SHIPPED | OUTPATIENT
Start: 2022-02-24 | End: 2022-03-11 | Stop reason: SDUPTHER

## 2022-02-24 NOTE — PROGRESS NOTES
Patient ID: Merari Orosco is a 41 y.o. female.    Chief Complaint: Pain and Post-op Evaluation of the Right Hip      HPI: Merari Orosco  is a 41 y.o. female who c/o Pain and Post-op Evaluation of the Right Hip     Post op visit 1   Patient notes pain is 7/10  She states she is doing okay for the most part although she is needing a refill on her pain medication  She has progressed activity of daily living as well as quality of life  Pain is increased with use and activity over the course the day  Pain is relieved with medication as above as well as rest  Additionally we discussed ice as needed 20 minutes on 20 minutes off over the incisional site  Patient has been fully compliant with postop instructions and keeping the extremity dry    Equipment she is using a walker to assist with ambulation although states PT told her she could be provided with a cane today.  I will ask our DME rep to do so for her  DVT she is not wearing the compression socks she states she needs another pair.  She is taking the aspirin as directed  Therapy compliant    Patient is presently denying any shortness of breath, chest pain, fever/chills, nausea/vomiting, loss of taste or smell, numbness/tingling or sensation changes, loss of bladder or bowel function, loss of taste/smell.     Surgery: Right Total hip    Surgery Date:  2022    Past Medical History:   Diagnosis Date    Anxiety     Depression     Hypertension      Past Surgical History:   Procedure Laterality Date     SECTION      x1    HIP ARTHROPLASTY Right 2022    Procedure: ARTHROPLASTY, HIP;  Surgeon: Satnam Deleon MD;  Location: [facility] OR;  Service: Orthopedics;  Laterality: Right;    INJECTION OF JOINT Bilateral 11/10/2021    Procedure: Injection, Joint;  Surgeon: Satnam Deleon MD;  Location: [facility] OR;  Service: General;  Laterality: Bilateral;  under fluoroscopy    WISDOM TOOTH EXTRACTION       Family History   Problem Relation Age of Onset     Colon cancer Father     Diabetes Father     Hypertension Father     Hypertension Mother     Diabetes Sister     Hypertension Sister     Breast cancer Neg Hx     Cancer Neg Hx     Miscarriages / Stillbirths Neg Hx     Ovarian cancer Neg Hx      labor Neg Hx     Stroke Neg Hx      Social History     Socioeconomic History    Marital status: Single    Number of children: 1   Tobacco Use    Smoking status: Never Smoker    Smokeless tobacco: Never Used   Substance and Sexual Activity    Alcohol use: Yes     Comment: socially    Drug use: No    Sexual activity: Yes     Partners: Male     Birth control/protection: Condom     Social Determinants of Health     Financial Resource Strain: Low Risk     Difficulty of Paying Living Expenses: Not hard at all   Food Insecurity: No Food Insecurity    Worried About Running Out of Food in the Last Year: Never true    Ran Out of Food in the Last Year: Never true   Transportation Needs: Unmet Transportation Needs    Lack of Transportation (Medical): Yes    Lack of Transportation (Non-Medical): No   Physical Activity: Inactive    Days of Exercise per Week: 0 days    Minutes of Exercise per Session: 0 min   Stress: No Stress Concern Present    Feeling of Stress : Only a little   Social Connections: Unknown    Frequency of Communication with Friends and Family: More than three times a week    Frequency of Social Gatherings with Friends and Family: Once a week    Active Member of Clubs or Organizations: No    Attends Club or Organization Meetings: Never    Marital Status:    Housing Stability: Low Risk     Unable to Pay for Housing in the Last Year: No    Number of Places Lived in the Last Year: 1    Unstable Housing in the Last Year: No     Medication List with Changes/Refills   New Medications    ASPIRIN (ECOTRIN) 81 MG EC TABLET    Take 1 tablet (81 mg total) by mouth 2 (two) times a day.    OXYCODONE-ACETAMINOPHEN (PERCOCET)  MG  PER TABLET    Take 1 tablet by mouth every 8 (eight) hours as needed for Pain.   Current Medications    ALENDRONATE (FOSAMAX) 70 MG TABLET    Take 1 tablet (70 mg total) by mouth every 7 days. Take 1st thing in the morning with 8oz water, sit upright and only water for 30min    ALPRAZOLAM (XANAX) 0.25 MG TABLET    Take 1 tablet (0.25 mg total) by mouth daily as needed for Anxiety.    BLOOD PRESSURE MONITOR (IHEALTH EASE) LG ARM SIZE (OP)    by Other route as needed for High Blood Pressure.    CHLORTHALIDONE (HYGROTEN) 25 MG TAB    Take 1 tablet (25 mg total) by mouth once daily.    DULOXETINE (CYMBALTA) 60 MG CAPSULE    Take 1 capsule (60 mg total) by mouth once daily.    ERGOCALCIFEROL (ERGOCALCIFEROL) 50,000 UNIT CAP    Take 1 capsule (50,000 Units total) by mouth every 7 days.    FLUORIDE, SODIUM, (PREVIDENT 5000) 1.1 % PSTE    Use once daily, do not rinse mouth for 30 minutes after use.    GABAPENTIN (NEURONTIN) 300 MG CAPSULE    Take 1 capsule (300 mg total) by mouth every evening.    METOPROLOL SUCCINATE (TOPROL-XL) 50 MG 24 HR TABLET    Take 1 tablet (50 mg total) by mouth once daily.    MULTIVITAMIN CAPSULE    Take 1 capsule by mouth once daily.    NABUMETONE (RELAFEN) 750 MG TABLET    Take 1 tablet (750 mg total) by mouth 2 (two) times daily as needed for Pain. Take with food    ONDANSETRON (ZOFRAN-ODT) 4 MG TBDL    Take 2 tablets (8 mg total) by mouth every 8 (eight) hours as needed.   Discontinued Medications    OXYCODONE-ACETAMINOPHEN (PERCOCET)  MG PER TABLET    Take 1 tablet by mouth every 6 (six) hours as needed for Pain.     Review of patient's allergies indicates:  No Known Allergies    Objective:     Right Lower Extremity  NVI  WWP foot  Comp soft  Cap refill < 2 sec  Calf NT  (-) Mirna sign  JACK  ROM : Patient is able to easily exhibit full flexion and extension on passive range of motion.   Wiggles toes  DF/PF intact  Sensation intact  Inc C/D/I  No SOI  Denies groin pain    Assessment:        No diagnosis found.       Plan:       There are no diagnoses linked to this encounter.    Merari Orosco is an established pt here for postop follow-up.  Pain medication was refilled appropriately. The incision was cleaned with hydrogen peroxide.  All staples were removed, and suture strips were applied across the incision.  They should remain in place until they fall off in approximately 1-2 weeks. The patient was instructed not to soak the incision in standing water but may clean the incision with clean running water and antibacterial soap.  Patient should notify the office of any signs or symptoms of infection including fevers, erythema, purulent drainage, increasing pain.  Patient will continue with DVT prophylaxis.  Patient will start outpatient physical therapy.  Will follow-up in 4 weeks.  Patient verbalized understanding of all instructions and agreed with the above plan.    No follow-ups on file.    The patient understands, chooses and consents to this plan and accepts all   the risks which include but are not limited to the risks mentioned above.     Disclaimer: This note was prepared using a voice recognition system and is likely to have sound alike errors within the text.

## 2022-02-28 ENCOUNTER — PATIENT MESSAGE (OUTPATIENT)
Dept: ORTHOPEDICS | Facility: CLINIC | Age: 42
End: 2022-02-28
Payer: MEDICAID

## 2022-02-28 DIAGNOSIS — Z96.641 S/P TOTAL RIGHT HIP ARTHROPLASTY: Primary | ICD-10-CM

## 2022-03-07 ENCOUNTER — CLINICAL SUPPORT (OUTPATIENT)
Dept: REHABILITATION | Facility: HOSPITAL | Age: 42
End: 2022-03-07
Attending: ORTHOPAEDIC SURGERY
Payer: MEDICAID

## 2022-03-07 ENCOUNTER — TELEPHONE (OUTPATIENT)
Dept: REHABILITATION | Facility: HOSPITAL | Age: 42
End: 2022-03-07
Payer: MEDICAID

## 2022-03-07 DIAGNOSIS — Z96.641 S/P TOTAL RIGHT HIP ARTHROPLASTY: ICD-10-CM

## 2022-03-07 DIAGNOSIS — R53.1 DECREASED STRENGTH, ENDURANCE, AND MOBILITY: ICD-10-CM

## 2022-03-07 DIAGNOSIS — M25.551 ACUTE RIGHT HIP PAIN: Primary | ICD-10-CM

## 2022-03-07 DIAGNOSIS — R68.89 DECREASED STRENGTH, ENDURANCE, AND MOBILITY: ICD-10-CM

## 2022-03-07 DIAGNOSIS — Z74.09 DECREASED STRENGTH, ENDURANCE, AND MOBILITY: ICD-10-CM

## 2022-03-07 PROCEDURE — 97161 PT EVAL LOW COMPLEX 20 MIN: CPT | Performed by: PHYSICAL THERAPIST

## 2022-03-07 NOTE — PATIENT INSTRUCTIONS
GLUTE SETS      While lying on your back, squeeze your buttocks together and hold. You can perform this exercise while seated or lying down.     Complete 2 sets of 10. Hold each contraction for 3 seconds. Perform 1 times per day.            KNEE EXTENSIONS (Sitting)    While sitting up tall, straighten knee fully with an extended ankle (pull your toes toward your nose), then lower slowly. Repeat on other side. Perform the motion slow and controlled.     Complete 3 sets of 10. Perform 1 times per day.            HEEL TOE RAISES (Sitting)    While sitting tall, raise heels fully off the floor while keeping your toes and pads of your feet on the floor, then slowly lower your heels down. Next, raise your toes and pads of your feet off of the floor fully while keeping your heels on the floor. Repeat. Perform the motion slow and controlled.     Complete 3 sets of 10. Perform 1 times per day.      *All exercises listed above obtained from [email or web address]

## 2022-03-07 NOTE — PLAN OF CARE
[facility]   Physical Therapy Initial Evaluation   Date: 3/7/2022   Name: Merari Orosco  [facility] Number: [id number]    Therapy Diagnosis:    Encounter Diagnoses   Name Primary?    Acute right hip pain Yes    Decreased strength, endurance, and mobility     S/P total right hip arthroplasty       Physician: Satnam Deleon MD     Physician Orders: PT Eval and Treat  Medical Diagnosis from Referral: s/p total right hip arthroplasty   Evaluation Date: 3/7/2022  Authorization Period Expiration: 2/28/2022  Plan of Care Expiration: 5/5/2022  Progress Note Due: 4/6/2022  Visit # / Visits authorized: 1/ 1   FOTO: 1/ 3     Precautions: Standard and post op     **Right MARCO on 2/9/2022**    Time In: 1010  Time Out: 1050  Total Billable Time (timed & untimed codes): 40 minutes    SUBJECTIVE   Date of onset: 2/9/2022    History of current condition - Merari reports: that she was diagnosed with avascular necrosis of both hips. Her right hip was more painful, so a total hip replacement was performed on that side first on 2/9/2022. She states that her pain levels have been a lot better since the surgery; however, it seems that her left hip has been more painful since the surgery. She is unsure if it is due to compensating more on the left side or if it is due to her right leg now being longer than the left since surgery. Patient reports that she received HH PT that started the very next day following surgery all the way up until Friday of last week. They have educated her on her hip precautions, and she expressed understanding. Patient hopes to rehab this hip quickly in order to be able to get her left hip replaced soon as well.     Falls: none      Imaging: x-rays performed on 2/9/2022    Pain:  Current 4/10, worst 7/10, best 1/10   Location: right hip  Description: Aching and Tight  Aggravating Factors: prolonged sitting (when she goes to stand after)  Easing Factors: pain medication and moving  around to loosen it up    Prior Therapy: Yes - on hips prior to surgery  Social History: Pt lives with their family  Occupation: Pt is not working  Prior Level of Function: Independent and pain free with all ADL, IADL, community mobility and functional activities.   Current Level of Function: Independent with all ADL, IADL, community mobility and functional activities with reports of increased pain and need for increased time and frequent breaks.      Dominant Extremity: right    Pts goals: Pt reported goals are to decrease overall pain levels in order to return to prior functional level.       Medical History:   Past Medical History:   Diagnosis Date    Anxiety     Depression     Hypertension        Surgical History:   Merari Orosco  has a past surgical history that includes [place] tooth extraction;  section; Injection of joint (Bilateral, 11/10/2021); and Hip Arthroplasty (Right, 2022).    Medications:   Merari has a current medication list which includes the following prescription(s): alendronate, alprazolam, aspirin, chlorthalidone, blood pressure monitor, chlorthalidone, duloxetine, ergocalciferol, fluoride (sodium), gabapentin, metoprolol succinate, multivitamin, nabumetone, ondansetron, and oxycodone-acetaminophen, and the following Facility-Administered Medications: sodium chloride 0.9%, acetaminophen, chlorhexidine, dexamethasone, and gabapentin.    Allergies:   Review of patient's allergies indicates:  No Known Allergies       OBJECTIVE     RANGE OF MOTION:    Hip AROM/PROM Right Left Pain/Dysfunction with Movement Goal   Hip Flexion (120) 90 80 No pain with R hip, but pain in all directions with L hip 115   Hip Extension (30) 15 0  20   Hip Abduction (45) 15 10  40   Hip IR (45) NT 0  40   Hip ER (45) 10 5  40         STRENGTH:      L/E MMT Right Left Pain/Dysfunction with Movement Goal   Hip Flexion  4-/5 3+/5 Pain in left hip 4+/5 B   Hip Extension  NT NT  4+/5 B   Hip Abduction  4-/5  4-/5  4+/5 B   Knee Extension 4/5 4/5  5/5 B   Knee Flexion 4/5 4/5 Pain felt in left hip 5/5 B   Ankle DF 4+/5 4+/5  5/5 B   Ankle PF 4+/5 4+/5  5/5 B       MUSCLE LENGTH:     Muscle Tested  Right Left  Goal   Hip Flexors decreased decreased Normal B   Quadriceps decreased decreased Normal B   Hamstrings  decreased decreased Normal B   Piriformis  decreased decreased Normal B   Gastrocnemius  decreased decreased Normal B   Soleus  decreased decreased Normal B       Sensation:  Sensation is intact to light touch     Palpation: Increased tone noted with palpation of bilateral glutes, piriformis and hip flexors. Patient denies TTP of right hip at this time.     Posture:  Pt presents with postural abnormalities which include: forward head, rounded shoulders  and increased lumbar lordosis    Gait Analysis: The patient ambulated with the following assistive device: rolling walker; the pt presents with the following gait abnormalities: decreased step length bilateral, decreased stance time on right, decreased heel strike bilateral, decreased push off bilateral, decreased hip extension bilateral and decreased bilateral hip flexion. Patient ambulates on toes of L LE at times due to leg length discrepancy. Discussed brining tennis shoes in or finding a slip on tennis shoe where she does not have to tie laces. Patient also reports that she has progressed away from using her RW but brought it in with her today incase it was needed for standing activities. She is currently mainly using a quad cane.      Movement Analysis Observations noted   Sit to Stands Uses UE support, cautious of hip precautions                    FUNCTION:     CMS Impairment/Limitation/Restriction for FOTO Hip Survey    Therapist reviewed FOTO scores for Merari on 3/7/2022.   FOTO documents entered into [employer] - see Media section.    Limitation Score: 45%         TREATMENT       Orasetta received the treatments listed below:        THERAPEUTIC EXERCISES to  develop strength, endurance, ROM, flexibility, posture and core stabilization for (5) minutes including:    Intervention Performed Today    HEP established x Discussed with patient what she has been keeping up with as part of HH PT. Advised her to keep up with these exercises as they are good exercises and appropriate to continue as part of HEP. She expressed understanding. Exercises listed in patient instructions are a few of the exercises she reported that she is currently performing as part of HEP.                                         Plan for Next Visit:          PATIENT EDUCATION AND HOME EXERCISES     Education provided:    Patient educated on the impairments noted above and the effects of physical therapy intervention to improve overall condition and QOL.    Patient was educated on all the above exercise prior/during/after for proper posture, positioning, and execution for safe performance with home exercise program.    Patient educated on the importance of improved core and upper and lower extremity strength in order to improve alignment of the spine and upper and lower extremities with static positions and dynamic movement.    Patient educated on the importance of strong core and lower extremity musculature in order to improve both static and dynamic balance, improve gait mechanics, reduce fall risk and improve household and community mobility.       Written Home Exercises Provided: yes.  Exercises were reviewed and Merari was able to demonstrate them prior to the end of the session.  Dorona demonstrated good  understanding of the education provided. See EMR under Patient Instructions for exercises provided during therapy sessions.    ASSESSMENT     Merari is a 41 y.o. female referred to outpatient Physical Therapy with a medical diagnosis of s/p total right hip arthroplasty. Pt presents with impairments including: decreased ROM, decreased strength, decreased muscle length, postural  "abnormalities, gait abnormalities and decreased overall function.    Pt prognosis is Good.   Pt will benefit from skilled outpatient Physical Therapy to address the deficits stated above and in the chart below, provide pt/family education, and to maximize pt's level of independence.     Plan of care discussed with patient: Yes  Pt's spiritual, cultural and educational needs considered and patient is agreeable to the plan of care and goals as stated below:     Anticipated Barriers for therapy: co-morbidities and transportation    Medical Necessity is demonstrated by the following  History  Co-morbidities and personal factors that may impact the plan of care Co-morbidities:   anxiety, depression, high BMI, HTN and L hip pain and avascular necrosis     Personal Factors:   no deficits     high   Examination  Body Structures and Functions, activity limitations and participation restrictions that may impact the plan of care Body Regions:   lower extremities    Body Systems:    ROM  strength  gait  transfers  transitions  motor control  motor learning    Participation Restrictions:   See above in "Current Level of Function"     Activity limitations:   Learning and applying knowledge  no deficits    General Tasks and Commands  no deficits    Communication  no deficits    Mobility  lifting and carrying objects  walking    Self care  washing oneself (bathing, drying, washing hands)  caring for body parts (brushing teeth, shaving, grooming)  toileting  dressing    Domestic Life  shopping  cooking  doing house work (cleaning house, washing dishes, laundry)    Interactions/Relationships  no deficits    Life Areas  no deficits    Community and Social Life  community life  recreation and leisure         high   Clinical Presentation stable and uncomplicated low   Decision Making/ Complexity Score: low       GOALS:    Short Term Goals:  6 weeks Progress    1. Pain: Pt will demonstrate improved pain by reports of less than or equal " to 5/10 worst pain on the verbal rating scale in order to progress toward maximal functional ability and improve QOL. PC   2. Function: Patient will demonstrate improved function as indicated by a functional limitation score of less than or equal to 37 out of 100 on FOTO. PC   3. Mobility: Patient will improve AROM to 50% of stated goals, listed in objective measures above, in order to progress towards independence with functional activities.  PC   4. Strength: Patient will improve strength to 50% of stated goals, listed in objective measures above, in order to progress towards independence with functional activities.  PC   5. Gait: Patient will demonstrate improved gait mechanics in order to improve functional mobility, improve quality of life, and decrease risk of further injury or fall.  PC   6. HEP: Patient will demonstrate independence with HEP in order to progress toward functional independence. PC   7. Improve postural awareness. PC     Long Term Goals:  12 weeks Progress   1. Pain: Pt will demonstrate improved pain by reports of less than or equal to 3/10 worst pain on the verbal rating scale in order to progress toward maximal functional ability and improve QOL.   PC   2. Function: Patient will demonstrate improved function as indicated by a functional limitation score of less than or equal to 28 out of 100 on FOTO. PC   3. Mobility: Patient will improve AROM to stated goals, listed in objective measures above, in order to return to maximal functional potential and improve quality of life. PC   4. Strength: Patient will improve strength to stated goals, listed in objective measures above, in order to improve functional independence and quality of life. PC   5. Gait: Patient will demonstrate normalized gait mechanics with minimal compensation in order to return to PLOF. PC   6. Patient will return to normal ADL's, IADL's, community involvement, recreational activities, and work-related activities with less  than or equal to 1/10 pain and maximal function.  PC     Goals Key:  PC= progressing/continue; PM= partially met;        DC= discontinue    PLAN   Plan of care Certification: 3/7/2022 to 5/5/2022.    Outpatient Physical Therapy 2 times weekly for 12 weeks to include any combination of the following interventions: virtual visits, dry needling, modalities, electrical stimulation (IFC, Pre-Mod, Attended with Functional Dry Needling), Cervical/Lumbar Traction, Gait Training, Manual Therapy, Neuromuscular Re-ed, Patient Education, Self Care, Therapeutic Exercise and Therapeutic Activites     Neelima Hill, PT, DPT      I CERTIFY THE NEED FOR THESE SERVICES FURNISHED UNDER THIS PLAN OF TREATMENT AND WHILE UNDER MY CARE   Physician's comments:     Physician's Signature: ___________________________________________________

## 2022-03-11 ENCOUNTER — IMMUNIZATION (OUTPATIENT)
Dept: PHARMACY | Facility: CLINIC | Age: 42
End: 2022-03-11
Payer: MEDICAID

## 2022-03-11 ENCOUNTER — CLINICAL SUPPORT (OUTPATIENT)
Dept: REHABILITATION | Facility: HOSPITAL | Age: 42
End: 2022-03-11
Payer: MEDICAID

## 2022-03-11 DIAGNOSIS — M25.551 ACUTE RIGHT HIP PAIN: Primary | ICD-10-CM

## 2022-03-11 DIAGNOSIS — R53.1 DECREASED STRENGTH, ENDURANCE, AND MOBILITY: ICD-10-CM

## 2022-03-11 DIAGNOSIS — Z74.09 DECREASED STRENGTH, ENDURANCE, AND MOBILITY: ICD-10-CM

## 2022-03-11 DIAGNOSIS — R68.89 DECREASED STRENGTH, ENDURANCE, AND MOBILITY: ICD-10-CM

## 2022-03-11 DIAGNOSIS — Z23 NEED FOR VACCINATION: Primary | ICD-10-CM

## 2022-03-11 DIAGNOSIS — Z96.641 S/P TOTAL RIGHT HIP ARTHROPLASTY: ICD-10-CM

## 2022-03-11 PROCEDURE — 97110 THERAPEUTIC EXERCISES: CPT | Performed by: PHYSICAL THERAPIST

## 2022-03-11 RX ORDER — OXYCODONE AND ACETAMINOPHEN 10; 325 MG/1; MG/1
1 TABLET ORAL EVERY 8 HOURS PRN
Qty: 28 TABLET | Refills: 0 | Status: SHIPPED | OUTPATIENT
Start: 2022-03-11 | End: 2022-06-09

## 2022-03-11 NOTE — PROGRESS NOTES
"[facility]   Physical Therapy Treatment Note     Name: Merari Orosco  [facility] Number: [id number]    Therapy Diagnosis:   Encounter Diagnoses   Name Primary?    Acute right hip pain Yes    Decreased strength, endurance, and mobility     S/P total right hip arthroplasty      Physician: Satnam Deleon MD    Visit Date: 3/11/2022    Physician Orders: PT Eval and Treat  Medical Diagnosis from Referral: s/p total right hip arthroplasty   Evaluation Date: 3/7/2022  Authorization Period Expiration: 2/28/2022  Plan of Care Expiration: 5/5/2022  Progress Note Due: 4/6/2022  Visit # / Visits authorized: 1 (+1 for eval)   FOTO: 1/ 3      Precautions: Standard and post op     PTA Visit #: 0/5     Time In: 1222  Time Out: 1305  Total Billable Time: 40 minutes (Billing reflects 1 on 1 treatment time spent with patient)     SUBJECTIVE     Patient reports: that her right hip continues to feel better. Her left hip is the painful hip. Patient reports that her leg length discrepancy still really bothers her. She has ordered some tennis shoes that should be coming in soon.     He/She was compliant with home exercise program.  Response to previous treatment: good  Functional change: improving strength and decreasing pain levels in right hip    Pain: 0/10     Location: right hip    OBJECTIVE     Objective Measures updated at progress report unless specified.       TREATMENT     Total Billed TherEx Time: (40) minutes -  Billing reflects [place] medicaid guidelines, billing all therapy as therapeutic-exercise    Orasetta received the treatments listed below:       THERAPEUTIC EXERCISES to develop strength, endurance, ROM, flexibility, posture and core stabilization for (40) minutes including:    Intervention Performed Today    Pelvic tilts x 3', 3" holds   Bridges  x 3 x 10   Clams -pillow between knees x 2 x 10, R LE only   Supine march x 4 x 5 B LE   Abdominal bracing in table top hold on stability " "ball (not past 90 degrees flexion) x 3', 3" holds   Sit to Stands x 10x hi-low mat   Standing Heel Raises x 3 x 10   Step ups x Leading with R, stepping down with L, 2 x 10   Side stepping x In II bars, down and back 3x (stepping in only to neutral)   Gastroc Stretch x Wedge, 3'          Plan for Next Visit:        PATIENT EDUCATION AND HOME EXERCISES     Home Exercises Provided and Patient Education Provided     Education provided:    Patient educated on biomechanical justification for therapeutic exercise and importance of compliance with HEP in order to improve overall impairments and QOL    Patient was educated on all the above exercise prior/during/after for proper posture, positioning, and execution for safe performance with home exercise program.    Patient educated on the importance of improved core and upper and lower extremity strength in order to improve alignment of the spine and upper and lower extremities with static positions and dynamic movement.    Patient educated on the importance of strong core and lower extremity musculature in order to improve both static and dynamic balance, improve gait mechanics, reduce fall risk and improve household and community mobility.       Written Home Exercises Provided: yes.  Exercises were reviewed and Merari was able to demonstrate them prior to the end of the session.  Merari demonstrated good  understanding of the education provided. See EMR under Patient Instructions for exercises provided during therapy sessions.      ASSESSMENT     Patient tolerated treatment well. She completed exercises with only minimal difficulty due to decreased right hip strength. However, patient is limited most by left hip pain at this time. She did not have any complaints of pain in right hip this visit. Focused on core exercises this visit, but patient is appropriate to continue to progress per tolerance. Leg length discrepancy very visible during gait and with standing " exercises, as patient tends to bend right knee with activity as compensation or stand on ball of left foot. Patient would benefit from a heel lift in the meantime until surgery is performed on left hip once her tennis shoes come in.     Merari is progressing well towards her goals.   Pt prognosis is Good.     Pt will continue to benefit from skilled outpatient physical therapy to address the deficits listed in the problem list box on initial evaluation, provide pt/family education and to maximize pt's level of independence in the home and community environment.     Pt's spiritual, cultural and educational needs considered and pt agreeable to plan of care and goals.       Anticipated Barriers for therapy: co-morbidities and transportation      GOALS:     Short Term Goals:  6 weeks Progress    1. Pain: Pt will demonstrate improved pain by reports of less than or equal to 5/10 worst pain on the verbal rating scale in order to progress toward maximal functional ability and improve QOL. PC   2. Function: Patient will demonstrate improved function as indicated by a functional limitation score of less than or equal to 37 out of 100 on FOTO. PC   3. Mobility: Patient will improve AROM to 50% of stated goals, listed in objective measures above, in order to progress towards independence with functional activities.  PC   4. Strength: Patient will improve strength to 50% of stated goals, listed in objective measures above, in order to progress towards independence with functional activities.  PC   5. Gait: Patient will demonstrate improved gait mechanics in order to improve functional mobility, improve quality of life, and decrease risk of further injury or fall.  PC   6. HEP: Patient will demonstrate independence with HEP in order to progress toward functional independence. PC   7. Improve postural awareness. PC      Long Term Goals:  12 weeks Progress   1. Pain: Pt will demonstrate improved pain by reports of less than or  equal to 3/10 worst pain on the verbal rating scale in order to progress toward maximal functional ability and improve QOL.   PC   2. Function: Patient will demonstrate improved function as indicated by a functional limitation score of less than or equal to 28 out of 100 on FOTO. PC   3. Mobility: Patient will improve AROM to stated goals, listed in objective measures above, in order to return to maximal functional potential and improve quality of life. PC   4. Strength: Patient will improve strength to stated goals, listed in objective measures above, in order to improve functional independence and quality of life. PC   5. Gait: Patient will demonstrate normalized gait mechanics with minimal compensation in order to return to PLOF. PC   6. Patient will return to normal ADL's, IADL's, community involvement, recreational activities, and work-related activities with less than or equal to 1/10 pain and maximal function.  PC      Goals Key:  PC= progressing/continue; PM= partially met;        DC= discontinue    PLAN     Continue Plan of Care (POC) and progress per patient tolerance. See treatment section for details on planned progressions next session.    3/7/2022 (eval): Outpatient Physical Therapy 2 times weekly for 12 weeks to include any combination of the following interventions: virtual visits, dry needling, modalities, electrical stimulation (IFC, Pre-Mod, Attended with Functional Dry Needling), Cervical/Lumbar Traction, Gait Training, Manual Therapy, Neuromuscular Re-ed, Patient Education, Self Care, Therapeutic Exercise and Therapeutic Activites     Neelima Hill, PT

## 2022-03-16 ENCOUNTER — CLINICAL SUPPORT (OUTPATIENT)
Dept: REHABILITATION | Facility: HOSPITAL | Age: 42
End: 2022-03-16
Payer: MEDICAID

## 2022-03-16 DIAGNOSIS — R68.89 DECREASED STRENGTH, ENDURANCE, AND MOBILITY: ICD-10-CM

## 2022-03-16 DIAGNOSIS — R53.1 DECREASED STRENGTH, ENDURANCE, AND MOBILITY: ICD-10-CM

## 2022-03-16 DIAGNOSIS — Z74.09 DECREASED STRENGTH, ENDURANCE, AND MOBILITY: ICD-10-CM

## 2022-03-16 DIAGNOSIS — Z96.641 S/P TOTAL RIGHT HIP ARTHROPLASTY: ICD-10-CM

## 2022-03-16 DIAGNOSIS — M25.551 ACUTE RIGHT HIP PAIN: Primary | ICD-10-CM

## 2022-03-16 PROCEDURE — 97110 THERAPEUTIC EXERCISES: CPT | Performed by: PHYSICAL THERAPIST

## 2022-03-16 NOTE — PROGRESS NOTES
"[facility]   Physical Therapy Treatment Note     Name: Merari Orosco  [facility] Number: [id number]    Therapy Diagnosis:   Encounter Diagnoses   Name Primary?    Acute right hip pain Yes    Decreased strength, endurance, and mobility     S/P total right hip arthroplasty      Physician: Satnam Deleon MD    Visit Date: 3/16/2022    Physician Orders: PT Eval and Treat  Medical Diagnosis from Referral: s/p total right hip arthroplasty   Evaluation Date: 3/7/2022  Authorization Period Expiration: 2/28/2022  Plan of Care Expiration: 5/5/2022  Progress Note Due: 4/6/2022  Visit # / Visits authorized: 2/20 (+1 for eval)   FOTO: 1/ 3      Precautions: Standard and post op     PTA Visit #: 0/5     Time In: 1348  Time Out: 1430  Total Billable Time: 40 minutes (Billing reflects 1 on 1 treatment time spent with patient)     SUBJECTIVE     Patient reports: that her right hip is still feeling good, and she feels it is getting stronger. She was able to order tennis shoes, but she thinks they are a little tight.     He/She was compliant with home exercise program.  Response to previous treatment: good  Functional change: improving strength and decreasing pain levels in right hip    Pain: 0/10     Location: right hip    OBJECTIVE     Objective Measures updated at progress report unless specified.       TREATMENT     Total Billed TherEx Time: (40) minutes -  Billing reflects [place] medicaid guidelines, billing all therapy as therapeutic-exercise    Merari received the treatments listed below:       THERAPEUTIC EXERCISES to develop strength, endurance, ROM, flexibility, posture and core stabilization for (40) minutes including:    Intervention Performed Today    Pelvic tilts x 3', 3" holds   Bridges  x 3 x 10   Clams -pillow between knees x 2 x 10, R LE only   Supine march x 4 x 5 B LE   Abdominal bracing in table top hold on stability ball (not past 90 degrees flexion) x 3', 3" holds   LAQ x 3 " x 10, 2#   Sit to Stands x 10x hi-low mat   Standing Heel Raises x 3 x 10   Step ups x Leading with R, stepping down with L, 2 x 10   Side stepping x In II bars, down and back 3x (stepping in only to neutral)   Gastroc Stretch x Wedge, 1'   Standing March x 1 x 10 (within hip precautions, slow to focus on SLS as well)                              Plan for Next Visit:        PATIENT EDUCATION AND HOME EXERCISES     Home Exercises Provided and Patient Education Provided     Education provided:    Patient educated on biomechanical justification for therapeutic exercise and importance of compliance with HEP in order to improve overall impairments and QOL    Patient was educated on all the above exercise prior/during/after for proper posture, positioning, and execution for safe performance with home exercise program.    Patient educated on the importance of improved core and upper and lower extremity strength in order to improve alignment of the spine and upper and lower extremities with static positions and dynamic movement.    Patient educated on the importance of strong core and lower extremity musculature in order to improve both static and dynamic balance, improve gait mechanics, reduce fall risk and improve household and community mobility.       Written Home Exercises Provided: yes.  Exercises were reviewed and Merari was able to demonstrate them prior to the end of the session.  Merari demonstrated good  understanding of the education provided. See EMR under Patient Instructions for exercises provided during therapy sessions.      ASSESSMENT     Patient did well with treatment again today. She completed exercises with less difficulty and without increased complaint. Improving R LE strength noted. Patient also presents today with less antalgic gait on R LE. Improved stance time, step length, and alec. She did not really have to use her cane today and mainly carried it. Patient feels at this point she  needs the cane more for the left LE rather than the right.      Merari is progressing well towards her goals.   Pt prognosis is Good.     Pt will continue to benefit from skilled outpatient physical therapy to address the deficits listed in the problem list box on initial evaluation, provide pt/family education and to maximize pt's level of independence in the home and community environment.     Pt's spiritual, cultural and educational needs considered and pt agreeable to plan of care and goals.       Anticipated Barriers for therapy: co-morbidities and transportation      GOALS:     Short Term Goals:  6 weeks Progress    1. Pain: Pt will demonstrate improved pain by reports of less than or equal to 5/10 worst pain on the verbal rating scale in order to progress toward maximal functional ability and improve QOL. PC   2. Function: Patient will demonstrate improved function as indicated by a functional limitation score of less than or equal to 37 out of 100 on FOTO. PC   3. Mobility: Patient will improve AROM to 50% of stated goals, listed in objective measures above, in order to progress towards independence with functional activities.  PC   4. Strength: Patient will improve strength to 50% of stated goals, listed in objective measures above, in order to progress towards independence with functional activities.  PC   5. Gait: Patient will demonstrate improved gait mechanics in order to improve functional mobility, improve quality of life, and decrease risk of further injury or fall.  PC   6. HEP: Patient will demonstrate independence with HEP in order to progress toward functional independence. PC   7. Improve postural awareness. PC      Long Term Goals:  12 weeks Progress   1. Pain: Pt will demonstrate improved pain by reports of less than or equal to 3/10 worst pain on the verbal rating scale in order to progress toward maximal functional ability and improve QOL.   PC   2. Function: Patient will demonstrate  improved function as indicated by a functional limitation score of less than or equal to 28 out of 100 on FOTO. PC   3. Mobility: Patient will improve AROM to stated goals, listed in objective measures above, in order to return to maximal functional potential and improve quality of life. PC   4. Strength: Patient will improve strength to stated goals, listed in objective measures above, in order to improve functional independence and quality of life. PC   5. Gait: Patient will demonstrate normalized gait mechanics with minimal compensation in order to return to PLOF. PC   6. Patient will return to normal ADL's, IADL's, community involvement, recreational activities, and work-related activities with less than or equal to 1/10 pain and maximal function.  PC      Goals Key:  PC= progressing/continue; PM= partially met;        DC= discontinue    PLAN     Continue Plan of Care (POC) and progress per patient tolerance. See treatment section for details on planned progressions next session.    3/7/2022 (eval): Outpatient Physical Therapy 2 times weekly for 12 weeks to include any combination of the following interventions: virtual visits, dry needling, modalities, electrical stimulation (IFC, Pre-Mod, Attended with Functional Dry Needling), Cervical/Lumbar Traction, Gait Training, Manual Therapy, Neuromuscular Re-ed, Patient Education, Self Care, Therapeutic Exercise and Therapeutic Activites     Neelima Hill, PT

## 2022-03-18 ENCOUNTER — CLINICAL SUPPORT (OUTPATIENT)
Dept: REHABILITATION | Facility: HOSPITAL | Age: 42
End: 2022-03-18
Payer: MEDICAID

## 2022-03-18 DIAGNOSIS — Z96.641 S/P TOTAL RIGHT HIP ARTHROPLASTY: ICD-10-CM

## 2022-03-18 DIAGNOSIS — M25.551 ACUTE RIGHT HIP PAIN: Primary | ICD-10-CM

## 2022-03-18 DIAGNOSIS — Z74.09 DECREASED STRENGTH, ENDURANCE, AND MOBILITY: ICD-10-CM

## 2022-03-18 DIAGNOSIS — R68.89 DECREASED STRENGTH, ENDURANCE, AND MOBILITY: ICD-10-CM

## 2022-03-18 DIAGNOSIS — R53.1 DECREASED STRENGTH, ENDURANCE, AND MOBILITY: ICD-10-CM

## 2022-03-18 PROCEDURE — 97110 THERAPEUTIC EXERCISES: CPT | Performed by: PHYSICAL THERAPIST

## 2022-03-18 NOTE — PROGRESS NOTES
"[facility]   Physical Therapy Treatment Note     Name: Merari Orosco  [facility] Number: [id number]    Therapy Diagnosis:   Encounter Diagnoses   Name Primary?    Acute right hip pain Yes    Decreased strength, endurance, and mobility     S/P total right hip arthroplasty      Physician: Satnam Deleon MD    Visit Date: 3/18/2022    Physician Orders: PT Eval and Treat  Medical Diagnosis from Referral: s/p total right hip arthroplasty   Evaluation Date: 3/7/2022  Authorization Period Expiration: 2/28/2022  Plan of Care Expiration: 5/5/2022  Progress Note Due: 4/6/2022  Visit # / Visits authorized: 3/20 (+1 for eval)   FOTO: 1/ 3      Precautions: Standard and post op     PTA Visit #: 0/5     Time In: 1305  Time Out: 1348  Total Billable Time: 40 minutes (Billing reflects 1 on 1 treatment time spent with patient)     SUBJECTIVE     Patient reports: no new complaints. Her right hip continues to feel better overall. Patient was offered a heel lift for left shoe today, but she states that she mainly just wears them for PT and does not feels she needs it at this time.     He/She was compliant with home exercise program.  Response to previous treatment: good  Functional change: improving strength and decreasing pain levels in right hip    Pain: 0/10     Location: right hip    OBJECTIVE     Objective Measures updated at progress report unless specified.       TREATMENT     Total Billed TherEx Time: (40) minutes -  Billing reflects [place] medicaid guidelines, billing all therapy as therapeutic-exercise    Merari received the treatments listed below:       THERAPEUTIC EXERCISES to develop strength, endurance, ROM, flexibility, posture and core stabilization for (32) minutes including:    Intervention Performed Today    Pelvic tilts x 3', 3" holds   Bridges  x 3 x 10   Clams -pillow between knees x 2 x 10, R LE only   Supine march x 4 x 5 B LE   Abdominal bracing in table top hold on " "stability ball (not past 90 degrees flexion)  3', 3" holds   LAQ x 3 x 10, 2#   Sit to Stands x 2 x 10 hi-low mat   Standing Heel Raises x 3 x 10   Step ups  Leading with R, stepping down with L, 2 x 10   Side stepping  In II bars, down and back 3x (stepping in only to neutral)   Gastroc Stretch  Wedge, 1'   Standing March  1 x 10                              Plan for Next Visit:        MANUAL THERAPY TECHNIQUES were applied for (8) minutes, including:    Manual Intervention Performed Today    Soft Tissue Mobilization x right glutes   Joint Mobilizations     Mobilization with movement          Functional Dry Needling        Plan for Next Visit:         PATIENT EDUCATION AND HOME EXERCISES     Home Exercises Provided and Patient Education Provided     Education provided:    Patient educated on biomechanical justification for therapeutic exercise and importance of compliance with HEP in order to improve overall impairments and QOL    Patient was educated on all the above exercise prior/during/after for proper posture, positioning, and execution for safe performance with home exercise program.    Patient educated on the importance of improved core and upper and lower extremity strength in order to improve alignment of the spine and upper and lower extremities with static positions and dynamic movement.    Patient educated on the importance of strong core and lower extremity musculature in order to improve both static and dynamic balance, improve gait mechanics, reduce fall risk and improve household and community mobility.       Written Home Exercises Provided: yes.  Exercises were reviewed and Merari was able to demonstrate them prior to the end of the session.  Merari demonstrated good  understanding of the education provided. See EMR under Patient Instructions for exercises provided during therapy sessions.      ASSESSMENT     Patient tolerated treatment well and without increased complaint. She presents " again today with improved gait on R LE. Patient's right LE strength continues to improve each visit. She is most limited by left hip at this time, but would benefit from additional strength and muscular endurance of right LE prior to having MARCO on left. Minimal to moderate soft tissue adhesions noted along right gluteal musculature today, which decreased following manual therapy.     Merari is progressing well towards her goals.   Pt prognosis is Good.     Pt will continue to benefit from skilled outpatient physical therapy to address the deficits listed in the problem list box on initial evaluation, provide pt/family education and to maximize pt's level of independence in the home and community environment.     Pt's spiritual, cultural and educational needs considered and pt agreeable to plan of care and goals.       Anticipated Barriers for therapy: co-morbidities and transportation      GOALS:     Short Term Goals:  6 weeks Progress    1. Pain: Pt will demonstrate improved pain by reports of less than or equal to 5/10 worst pain on the verbal rating scale in order to progress toward maximal functional ability and improve QOL. PC   2. Function: Patient will demonstrate improved function as indicated by a functional limitation score of less than or equal to 37 out of 100 on FOTO. PC   3. Mobility: Patient will improve AROM to 50% of stated goals, listed in objective measures above, in order to progress towards independence with functional activities.  PC   4. Strength: Patient will improve strength to 50% of stated goals, listed in objective measures above, in order to progress towards independence with functional activities.  PC   5. Gait: Patient will demonstrate improved gait mechanics in order to improve functional mobility, improve quality of life, and decrease risk of further injury or fall.  PC   6. HEP: Patient will demonstrate independence with HEP in order to progress toward functional independence. PC    7. Improve postural awareness. PC      Long Term Goals:  12 weeks Progress   1. Pain: Pt will demonstrate improved pain by reports of less than or equal to 3/10 worst pain on the verbal rating scale in order to progress toward maximal functional ability and improve QOL.   PC   2. Function: Patient will demonstrate improved function as indicated by a functional limitation score of less than or equal to 28 out of 100 on FOTO. PC   3. Mobility: Patient will improve AROM to stated goals, listed in objective measures above, in order to return to maximal functional potential and improve quality of life. PC   4. Strength: Patient will improve strength to stated goals, listed in objective measures above, in order to improve functional independence and quality of life. PC   5. Gait: Patient will demonstrate normalized gait mechanics with minimal compensation in order to return to PLOF. PC   6. Patient will return to normal ADL's, IADL's, community involvement, recreational activities, and work-related activities with less than or equal to 1/10 pain and maximal function.  PC      Goals Key:  PC= progressing/continue; PM= partially met;        DC= discontinue    PLAN     Continue Plan of Care (POC) and progress per patient tolerance. See treatment section for details on planned progressions next session.    3/7/2022 (eval): Outpatient Physical Therapy 2 times weekly for 12 weeks to include any combination of the following interventions: virtual visits, dry needling, modalities, electrical stimulation (IFC, Pre-Mod, Attended with Functional Dry Needling), Cervical/Lumbar Traction, Gait Training, Manual Therapy, Neuromuscular Re-ed, Patient Education, Self Care, Therapeutic Exercise and Therapeutic Activites     Neelima Hill, PT

## 2022-03-21 ENCOUNTER — CLINICAL SUPPORT (OUTPATIENT)
Dept: REHABILITATION | Facility: HOSPITAL | Age: 42
End: 2022-03-21
Payer: MEDICAID

## 2022-03-21 DIAGNOSIS — M25.551 ACUTE RIGHT HIP PAIN: Primary | ICD-10-CM

## 2022-03-21 DIAGNOSIS — Z96.641 S/P TOTAL RIGHT HIP ARTHROPLASTY: ICD-10-CM

## 2022-03-21 DIAGNOSIS — R53.1 DECREASED STRENGTH, ENDURANCE, AND MOBILITY: ICD-10-CM

## 2022-03-21 DIAGNOSIS — Z74.09 DECREASED STRENGTH, ENDURANCE, AND MOBILITY: ICD-10-CM

## 2022-03-21 DIAGNOSIS — R68.89 DECREASED STRENGTH, ENDURANCE, AND MOBILITY: ICD-10-CM

## 2022-03-21 PROCEDURE — 97110 THERAPEUTIC EXERCISES: CPT

## 2022-03-21 NOTE — PROGRESS NOTES
"[facility]   Physical Therapy Treatment Note     Name: Merari Orosco  [facility] Number: [id number]    Therapy Diagnosis:   Encounter Diagnoses   Name Primary?    Acute right hip pain Yes    Decreased strength, endurance, and mobility     S/P total right hip arthroplasty      Physician: Satnam Deleon MD    Visit Date: 3/21/2022    Physician Orders: PT Eval and Treat  Medical Diagnosis from Referral: S/p total right hip arthroplasty   Evaluation Date: 3/7/2022  Authorization Period Expiration: 5/11/2022  Plan of Care Expiration: 5/5/2022  Visit # / Visits authorized: 4/20 (+1 for eval)   FOTO: 1/ 3      Precautions: Standard and post op right TKA on 2/9/2022    PTA Visit #: 0/5     Progress Note Due: 4/6/2022    Time In: 1509  Time Out: 1550  Total Billable Time: 40 minutes (Billing reflects 1 on 1 treatment time spent with patient)     SUBJECTIVE     Patient reports: that her right hip feels fine while her left hip (non surgical LE) continues to cause her pain.    He/She was compliant with home exercise program.  Response to previous treatment: good  Functional change: improving strength and decreasing pain levels in right hip    Pain: 0/10     Location: right hip    OBJECTIVE     Objective Measures updated at progress report unless specified.     TREATMENT     Total Billed TherEx Time: (40) minutes -  Billing reflects [place] medicaid guidelines, billing all therapy as therapeutic-exercise    Merari received the treatments listed below:     THERAPEUTIC EXERCISES to develop strength, endurance, ROM, flexibility, posture and core stabilization for (40) minutes including:    Intervention Performed Today    Pelvic tilts x 3', 3" holds   Bridges  x 3 x 10   Hip Adduction Ball Squeezes (added) x 3 x 10    Clams -pillow between knees x 3 x 10, R LE only   Supine march  4 x 5 B LE   Abdominal bracing in table top hold on stability ball (not past 90 degrees flexion)  3', 3" holds   Long " Arc Quads x 3 x 10, 2# B   Sit to Stands x 2 x 10 hi-low mat   Standing Heel Raises  3 x 10   Step ups  Leading with R, stepping down with L, 2 x 10   Side stepping  In II bars, down and back 3x (stepping in only to neutral)   Gastroc Stretch  Wedge, 1'   Standing March  1 x 10                              Plan for Next Visit:      MANUAL THERAPY TECHNIQUES were applied for (0) minutes, including:    Manual Intervention Performed Today    Soft Tissue Mobilization  right glutes   Joint Mobilizations     Mobilization with movement          Functional Dry Needling        Plan for Next Visit:         PATIENT EDUCATION AND HOME EXERCISES     Home Exercises Provided and Patient Education Provided     Education provided:    Patient educated on biomechanical justification for therapeutic exercise and importance of compliance with HEP in order to improve overall impairments and QOL    Patient was educated on all the above exercise prior/during/after for proper posture, positioning, and execution for safe performance with home exercise program.    Patient educated on the importance of improved core and upper and lower extremity strength in order to improve alignment of the spine and upper and lower extremities with static positions and dynamic movement.    Patient educated on the importance of strong core and lower extremity musculature in order to improve both static and dynamic balance, improve gait mechanics, reduce fall risk and improve household and community mobility.       Written Home Exercises Provided: yes.  Exercises were reviewed and Ortrentona was able to demonstrate them prior to the end of the session.  Orasetta demonstrated good  understanding of the education provided. See EMR under Patient Instructions for exercises provided during therapy sessions.      ASSESSMENT     Patient tolerated all exercises well today. When performing sitting to supine transition patient noted with excessive right femoral  internal rotation, educated patient that this position should be avoided due to post operative precautions. Patient reported good understanding. Patient noted with good muscular fatigue with all exercises performed today including hip adduction ball squeezes incorporated today.     Merari is progressing well towards her goals.   Pt prognosis is Good.     Pt will continue to benefit from skilled outpatient physical therapy to address the deficits listed in the problem list box on initial evaluation, provide pt/family education and to maximize pt's level of independence in the home and community environment.     Pt's spiritual, cultural and educational needs considered and pt agreeable to plan of care and goals.     Anticipated Barriers for therapy: co-morbidities and transportation    GOALS:     Short Term Goals:  6 weeks Progress    1. Pain: Pt will demonstrate improved pain by reports of less than or equal to 5/10 worst pain on the verbal rating scale in order to progress toward maximal functional ability and improve QOL. PC   2. Function: Patient will demonstrate improved function as indicated by a functional limitation score of less than or equal to 37 out of 100 on FOTO. PC   3. Mobility: Patient will improve AROM to 50% of stated goals, listed in objective measures above, in order to progress towards independence with functional activities.  PC   4. Strength: Patient will improve strength to 50% of stated goals, listed in objective measures above, in order to progress towards independence with functional activities.  PC   5. Gait: Patient will demonstrate improved gait mechanics in order to improve functional mobility, improve quality of life, and decrease risk of further injury or fall.  PC   6. HEP: Patient will demonstrate independence with HEP in order to progress toward functional independence. PC   7. Improve postural awareness. PC      Long Term Goals:  12 weeks Progress   1. Pain: Pt will demonstrate  improved pain by reports of less than or equal to 3/10 worst pain on the verbal rating scale in order to progress toward maximal functional ability and improve QOL.   PC   2. Function: Patient will demonstrate improved function as indicated by a functional limitation score of less than or equal to 28 out of 100 on FOTO. PC   3. Mobility: Patient will improve AROM to stated goals, listed in objective measures above, in order to return to maximal functional potential and improve quality of life. PC   4. Strength: Patient will improve strength to stated goals, listed in objective measures above, in order to improve functional independence and quality of life. PC   5. Gait: Patient will demonstrate normalized gait mechanics with minimal compensation in order to return to PLOF. PC   6. Patient will return to normal ADL's, IADL's, community involvement, recreational activities, and work-related activities with less than or equal to 1/10 pain and maximal function.  PC      Goals Key:  PC= progressing/continue; PM= partially met;        DC= discontinue    PLAN     Continue Plan of Care (POC) and progress per patient tolerance. See treatment section for details on planned progressions next session.    3/7/2022 (eval): Outpatient Physical Therapy 2 times weekly for 12 weeks to include any combination of the following interventions: virtual visits, dry needling, modalities, electrical stimulation (IFC, Pre-Mod, Attended with Functional Dry Needling), Cervical/Lumbar Traction, Gait Training, Manual Therapy, Neuromuscular Re-ed, Patient Education, Self Care, Therapeutic Exercise and Therapeutic Activites     Kaye Brandt, PT, DPT

## 2022-03-22 ENCOUNTER — TELEPHONE (OUTPATIENT)
Dept: OBSTETRICS AND GYNECOLOGY | Facility: CLINIC | Age: 42
End: 2022-03-22
Payer: MEDICAID

## 2022-03-22 NOTE — TELEPHONE ENCOUNTER
----- Message from Kim Bustamante sent at 3/22/2022 11:47 AM CDT -----  Contact: LORI CREWS [[id number]] [phone]  Type: Appointment Request    Name of Caller: LORI CREWS [[id number]]  When is the first available appointment? Do not have access  Reason for Visit:  Est care with a new OBGYN, annual, and replace IUD. 3 months overdue  Best Call Back Number: [phone]  Additional Information: Prefers a female doctor at [facility] location, first opening. Pt declined messaging the department; requested these providers specifically. Has Medicaid.

## 2022-03-22 NOTE — TELEPHONE ENCOUNTER
Returned patient call for appointment request. Pt stated that she need to est. Care with a female provider, needed an annual, and was 3 mo overdue for her IUD removal. Advised pt that would be 2 different appointments b/c IUD needed to be ordered. Scheduled pt for her annual with Yadira Ortiz at [facility] on 4/6/22 at 10:30 am. Pt verbalized understanding and agreed to date, time , and location.

## 2022-03-23 ENCOUNTER — CLINICAL SUPPORT (OUTPATIENT)
Dept: REHABILITATION | Facility: HOSPITAL | Age: 42
End: 2022-03-23
Payer: MEDICAID

## 2022-03-23 DIAGNOSIS — Z74.09 DECREASED STRENGTH, ENDURANCE, AND MOBILITY: ICD-10-CM

## 2022-03-23 DIAGNOSIS — Z96.641 S/P TOTAL RIGHT HIP ARTHROPLASTY: ICD-10-CM

## 2022-03-23 DIAGNOSIS — R53.1 DECREASED STRENGTH, ENDURANCE, AND MOBILITY: ICD-10-CM

## 2022-03-23 DIAGNOSIS — M25.551 ACUTE RIGHT HIP PAIN: Primary | ICD-10-CM

## 2022-03-23 DIAGNOSIS — R68.89 DECREASED STRENGTH, ENDURANCE, AND MOBILITY: ICD-10-CM

## 2022-03-23 PROCEDURE — 97110 THERAPEUTIC EXERCISES: CPT

## 2022-03-23 NOTE — PROGRESS NOTES
"[facility]   Physical Therapy Treatment Note     Name: Merari Orosco  [facility] Number: [id number]    Therapy Diagnosis:   Encounter Diagnoses   Name Primary?    Acute right hip pain Yes    Decreased strength, endurance, and mobility     S/P total right hip arthroplasty      Physician: Satnam Deleon MD    Visit Date: 3/23/2022    Physician Orders: PT Eval and Treat  Medical Diagnosis from Referral: S/p total right hip arthroplasty   Evaluation Date: 3/7/2022  Authorization Period Expiration: 5/11/2022  Plan of Care Expiration: 5/5/2022  Visit # / Visits authorized: 5/20 (+1 for eval)   FOTO: 1/ 3      Precautions: Standard and post op right TKA on 2/9/2022    PTA Visit #: 0/5     Progress Note Due: 4/6/2022    Time In: 0746  Time Out: 0828  Total Billable Time: 38 minutes (Billing reflects 1 on 1 treatment time spent with patient)     SUBJECTIVE     Patient reports: that her right hip continues to feel fine while her left hip not really bothering her too much this morning.     He/She was compliant with home exercise program.  Response to previous treatment: good  Functional change: improving strength and decreasing pain levels in right hip    Pain: 0/10     Location: right hip    OBJECTIVE     Objective Measures updated at progress report unless specified.     TREATMENT     Total Billed TherEx Time: (38) minutes -  Billing reflects [place] medicaid guidelines, billing all therapy as therapeutic-exercise    Merari received the treatments listed below:     THERAPEUTIC EXERCISES to develop strength, endurance, ROM, flexibility, posture and core stabilization for (38) minutes including:    Intervention Performed Today    Pelvic tilts  3', 3" holds   Bridges  x 3 x 10   Hip Adduction Ball Squeezes (added) x 3 x 10    Clams -pillow between knees x 3 x 10, R LE only   Supine march  4 x 5 B LE   Abdominal bracing in table top hold on stability ball (not past 90 degrees flexion)  3', 3" " holds   Long Arc Quads x 3 x 10, 2# B   Sit to Stands x 2 x 10 armed chair with foam pad no UE use   Standing Heel Raises x 3 x 10   Step ups x Leading with R, stepping down with L, 2 x 10   Side stepping  In II bars, down and back 3x (stepping in only to neutral)   Gastroc Stretch  Wedge, 1'   Standing March x 2 x 10   Straight Leg Raise (added) x x10 reps left, 2x10 reps right (significant pain on left with assistance required from PT)                         Plan for Next Visit:      MANUAL THERAPY TECHNIQUES were applied for (0) minutes, including:    Manual Intervention Performed Today    Soft Tissue Mobilization  right glutes   Joint Mobilizations     Mobilization with movement          Functional Dry Needling        Plan for Next Visit:         PATIENT EDUCATION AND HOME EXERCISES     Home Exercises Provided and Patient Education Provided     Education provided:    Patient educated on biomechanical justification for therapeutic exercise and importance of compliance with HEP in order to improve overall impairments and QOL    Patient was educated on all the above exercise prior/during/after for proper posture, positioning, and execution for safe performance with home exercise program.    Patient educated on the importance of improved core and upper and lower extremity strength in order to improve alignment of the spine and upper and lower extremities with static positions and dynamic movement.    Patient educated on the importance of strong core and lower extremity musculature in order to improve both static and dynamic balance, improve gait mechanics, reduce fall risk and improve household and community mobility.       Written Home Exercises Provided: yes.  Exercises were reviewed and Merari was able to demonstrate them prior to the end of the session.  Anabellatta demonstrated good  understanding of the education provided. See EMR under Patient Instructions for exercises provided during therapy  sessions.      ASSESSMENT     Patient tolerated all exercises well today focusing a little more on functional strengthening today. Patient required a few rest breaks throughout the session due to muscular fatigue. Incorporated straight leg raises today with good tolerance on right although left side caused pain and required assistance. Educated patient on the importance of getting her in a heel/foot lift to decrease compensatory strategies related to leg length discrepancy. Patient deferred today but is more willing to try in future sessions.     Merari is progressing well towards her goals.   Pt prognosis is Good.     Pt will continue to benefit from skilled outpatient physical therapy to address the deficits listed in the problem list box on initial evaluation, provide pt/family education and to maximize pt's level of independence in the home and community environment.     Pt's spiritual, cultural and educational needs considered and pt agreeable to plan of care and goals.     Anticipated Barriers for therapy: co-morbidities and transportation    GOALS:     Short Term Goals:  6 weeks Progress    1. Pain: Pt will demonstrate improved pain by reports of less than or equal to 5/10 worst pain on the verbal rating scale in order to progress toward maximal functional ability and improve QOL. PC   2. Function: Patient will demonstrate improved function as indicated by a functional limitation score of less than or equal to 37 out of 100 on FOTO. PC   3. Mobility: Patient will improve AROM to 50% of stated goals, listed in objective measures above, in order to progress towards independence with functional activities.  PC   4. Strength: Patient will improve strength to 50% of stated goals, listed in objective measures above, in order to progress towards independence with functional activities.  PC   5. Gait: Patient will demonstrate improved gait mechanics in order to improve functional mobility, improve quality of life,  and decrease risk of further injury or fall.  PC   6. HEP: Patient will demonstrate independence with HEP in order to progress toward functional independence. PC   7. Improve postural awareness. PC      Long Term Goals:  12 weeks Progress   1. Pain: Pt will demonstrate improved pain by reports of less than or equal to 3/10 worst pain on the verbal rating scale in order to progress toward maximal functional ability and improve QOL.   PC   2. Function: Patient will demonstrate improved function as indicated by a functional limitation score of less than or equal to 28 out of 100 on FOTO. PC   3. Mobility: Patient will improve AROM to stated goals, listed in objective measures above, in order to return to maximal functional potential and improve quality of life. PC   4. Strength: Patient will improve strength to stated goals, listed in objective measures above, in order to improve functional independence and quality of life. PC   5. Gait: Patient will demonstrate normalized gait mechanics with minimal compensation in order to return to PLOF. PC   6. Patient will return to normal ADL's, IADL's, community involvement, recreational activities, and work-related activities with less than or equal to 1/10 pain and maximal function.  PC      Goals Key:  PC= progressing/continue; PM= partially met;        DC= discontinue    PLAN     Continue Plan of Care (POC) and progress per patient tolerance. See treatment section for details on planned progressions next session.    3/7/2022 (eval): Outpatient Physical Therapy 2 times weekly for 12 weeks to include any combination of the following interventions: virtual visits, dry needling, modalities, electrical stimulation (IFC, Pre-Mod, Attended with Functional Dry Needling), Cervical/Lumbar Traction, Gait Training, Manual Therapy, Neuromuscular Re-ed, Patient Education, Self Care, Therapeutic Exercise and Therapeutic Activites     Kaye Brandt, PT, DPT

## 2022-03-30 ENCOUNTER — CLINICAL SUPPORT (OUTPATIENT)
Dept: REHABILITATION | Facility: HOSPITAL | Age: 42
End: 2022-03-30
Payer: MEDICAID

## 2022-03-30 DIAGNOSIS — M25.551 ACUTE RIGHT HIP PAIN: Primary | ICD-10-CM

## 2022-03-30 DIAGNOSIS — Z96.641 S/P TOTAL RIGHT HIP ARTHROPLASTY: ICD-10-CM

## 2022-03-30 DIAGNOSIS — R53.1 DECREASED STRENGTH, ENDURANCE, AND MOBILITY: ICD-10-CM

## 2022-03-30 DIAGNOSIS — Z74.09 DECREASED STRENGTH, ENDURANCE, AND MOBILITY: ICD-10-CM

## 2022-03-30 DIAGNOSIS — R68.89 DECREASED STRENGTH, ENDURANCE, AND MOBILITY: ICD-10-CM

## 2022-03-30 PROCEDURE — 97110 THERAPEUTIC EXERCISES: CPT | Performed by: PHYSICAL THERAPIST

## 2022-03-30 NOTE — PROGRESS NOTES
"[facility]   Physical Therapy Treatment Note     Name: Merari Orosco  [facility] Number: [id number]    Therapy Diagnosis:   Encounter Diagnoses   Name Primary?    Acute right hip pain Yes    Decreased strength, endurance, and mobility     S/P total right hip arthroplasty      Physician: Satnam Deleon MD    Visit Date: 3/30/2022    Physician Orders: PT Eval and Treat  Medical Diagnosis from Referral: S/p total right hip arthroplasty   Evaluation Date: 3/7/2022  Authorization Period Expiration: 5/11/2022  Plan of Care Expiration: 5/5/2022  Visit # / Visits authorized: 6/20 (+1 for eval)   FOTO: 1/ 3      Precautions: Standard and post op right TKA on 2/9/2022    PTA Visit #: 0/5     Progress Note Due: 4/6/2022    Time In: 1216  Time Out: 1305  Total Billable Time: 40 minutes (Billing reflects 1 on 1 treatment time spent with patient)     SUBJECTIVE     Patient reports: that she could not fit her feet in her tennis shoes this morning because her ankles/feet are swollen. She thinks this is because she has been eating crawfish every day for the past few days. Her right hip is still feeling better overall.     He/She was compliant with home exercise program.  Response to previous treatment: good  Functional change: improving strength and decreasing pain levels in right hip    Pain: 0/10     Location: right hip    OBJECTIVE     Objective Measures updated at progress report unless specified.     TREATMENT     Total Billed TherEx Time: (40) minutes -  Billing reflects [place] medicaid guidelines, billing all therapy as therapeutic-exercise    Merari received the treatments listed below:     THERAPEUTIC EXERCISES to develop strength, endurance, ROM, flexibility, posture and core stabilization for (38) minutes including:    Intervention Performed Today    Pelvic tilts  3', 3" holds   Bridges  x 3 x 10   Hip Adduction Ball Squeezes  x 3 x 10    Clams -pillow between knees x 3 x 10, R LE " "only   Supine march  4 x 5 B LE   Abdominal bracing in table top hold on stability ball (not past 90 degrees flexion)  3', 3" holds   Long Arc Quads x 3 x 10, 2# B   Sit to Stands x 2 x 10 armed chair with foam pad no UE use   Standing Heel Raises x 3 x 10   Step ups x Leading with R, stepping down with L, 2 x 10   Side stepping x In II bars, down and back 3x (stepping in only to neutral)   Gastroc Stretch  Wedge, 1'   Standing March x 2 x 10   Straight Leg Raise  x 2x10 reps right    Prone Hamstring Curls x 3 x 10, 2#                    Plan for Next Visit: SLS R LE, standing hip 3-way     MANUAL THERAPY TECHNIQUES were applied for (0) minutes, including:    Manual Intervention Performed Today    Soft Tissue Mobilization  right glutes   Joint Mobilizations     Mobilization with movement          Functional Dry Needling        Plan for Next Visit:         PATIENT EDUCATION AND HOME EXERCISES     Home Exercises Provided and Patient Education Provided     Education provided:    Patient educated on biomechanical justification for therapeutic exercise and importance of compliance with HEP in order to improve overall impairments and QOL    Patient was educated on all the above exercise prior/during/after for proper posture, positioning, and execution for safe performance with home exercise program.    Patient educated on the importance of improved core and upper and lower extremity strength in order to improve alignment of the spine and upper and lower extremities with static positions and dynamic movement.    Patient educated on the importance of strong core and lower extremity musculature in order to improve both static and dynamic balance, improve gait mechanics, reduce fall risk and improve household and community mobility.       Written Home Exercises Provided: yes.  Exercises were reviewed and Merari was able to demonstrate them prior to the end of the session.  Merari demonstrated good  understanding of " the education provided. See EMR under Patient Instructions for exercises provided during therapy sessions.      ASSESSMENT     Patient did well with exercises today and completed exercises without increased complaint. Improving R LE strength noted. Patient was not able to wear tennis shoes today due to swelling in feet. She still does not want to attempt heel lift at this time, as she only wears the tennis shoes during PT and no other time. Patient was able to be progressed this visit without issue and is progressing well towards goals.     Merari is progressing well towards her goals.   Pt prognosis is Good.     Pt will continue to benefit from skilled outpatient physical therapy to address the deficits listed in the problem list box on initial evaluation, provide pt/family education and to maximize pt's level of independence in the home and community environment.     Pt's spiritual, cultural and educational needs considered and pt agreeable to plan of care and goals.     Anticipated Barriers for therapy: co-morbidities and transportation    GOALS:     Short Term Goals:  6 weeks Progress    1. Pain: Pt will demonstrate improved pain by reports of less than or equal to 5/10 worst pain on the verbal rating scale in order to progress toward maximal functional ability and improve QOL. PC   2. Function: Patient will demonstrate improved function as indicated by a functional limitation score of less than or equal to 37 out of 100 on FOTO. PC   3. Mobility: Patient will improve AROM to 50% of stated goals, listed in objective measures above, in order to progress towards independence with functional activities.  PC   4. Strength: Patient will improve strength to 50% of stated goals, listed in objective measures above, in order to progress towards independence with functional activities.  PC   5. Gait: Patient will demonstrate improved gait mechanics in order to improve functional mobility, improve quality of life, and  decrease risk of further injury or fall.  PC   6. HEP: Patient will demonstrate independence with HEP in order to progress toward functional independence. PC   7. Improve postural awareness. PC      Long Term Goals:  12 weeks Progress   1. Pain: Pt will demonstrate improved pain by reports of less than or equal to 3/10 worst pain on the verbal rating scale in order to progress toward maximal functional ability and improve QOL.   PC   2. Function: Patient will demonstrate improved function as indicated by a functional limitation score of less than or equal to 28 out of 100 on FOTO. PC   3. Mobility: Patient will improve AROM to stated goals, listed in objective measures above, in order to return to maximal functional potential and improve quality of life. PC   4. Strength: Patient will improve strength to stated goals, listed in objective measures above, in order to improve functional independence and quality of life. PC   5. Gait: Patient will demonstrate normalized gait mechanics with minimal compensation in order to return to PLOF. PC   6. Patient will return to normal ADL's, IADL's, community involvement, recreational activities, and work-related activities with less than or equal to 1/10 pain and maximal function.  PC      Goals Key:  PC= progressing/continue; PM= partially met;        DC= discontinue    PLAN     Continue Plan of Care (POC) and progress per patient tolerance. See treatment section for details on planned progressions next session.    3/7/2022 (eval): Outpatient Physical Therapy 2 times weekly for 12 weeks to include any combination of the following interventions: virtual visits, dry needling, modalities, electrical stimulation (IFC, Pre-Mod, Attended with Functional Dry Needling), Cervical/Lumbar Traction, Gait Training, Manual Therapy, Neuromuscular Re-ed, Patient Education, Self Care, Therapeutic Exercise and Therapeutic Activites     Neelima Hill, PT, DPT

## 2022-04-01 ENCOUNTER — CLINICAL SUPPORT (OUTPATIENT)
Dept: REHABILITATION | Facility: HOSPITAL | Age: 42
End: 2022-04-01
Payer: MEDICAID

## 2022-04-01 DIAGNOSIS — Z96.641 S/P TOTAL RIGHT HIP ARTHROPLASTY: ICD-10-CM

## 2022-04-01 DIAGNOSIS — M25.551 ACUTE RIGHT HIP PAIN: Primary | ICD-10-CM

## 2022-04-01 DIAGNOSIS — Z74.09 DECREASED STRENGTH, ENDURANCE, AND MOBILITY: ICD-10-CM

## 2022-04-01 DIAGNOSIS — R53.1 DECREASED STRENGTH, ENDURANCE, AND MOBILITY: ICD-10-CM

## 2022-04-01 DIAGNOSIS — R68.89 DECREASED STRENGTH, ENDURANCE, AND MOBILITY: ICD-10-CM

## 2022-04-01 PROCEDURE — 97110 THERAPEUTIC EXERCISES: CPT | Performed by: PHYSICAL THERAPIST

## 2022-04-01 NOTE — PROGRESS NOTES
"[facility]   Physical Therapy Treatment Note     Name: Merari Orosco  [facility] Number: [id number]    Therapy Diagnosis:   Encounter Diagnoses   Name Primary?    Acute right hip pain Yes    Decreased strength, endurance, and mobility     S/P total right hip arthroplasty      Physician: Satnam Deleon MD    Visit Date: 4/1/2022    Physician Orders: PT Eval and Treat  Medical Diagnosis from Referral: S/p total right hip arthroplasty   Evaluation Date: 3/7/2022  Authorization Period Expiration: 5/11/2022  Plan of Care Expiration: 5/5/2022  Visit # / Visits authorized: 7/20 (+1 for eval)   FOTO: 1/ 3      Precautions: Standard and post op right TKA on 2/9/2022    PTA Visit #: 0/5     Progress Note Due: 4/6/2022    Time In: 0920  Time Out: 1005  Total Billable Time: 40 minutes (Billing reflects 1 on 1 treatment time spent with patient)     SUBJECTIVE     Patient reports: feeling okay today, no new complaints. She reports that she actually forgot her cane today. She uses it mainly out of caution in community distances. She states that she does not have as much swelling in her ankles/feet, but her shoes still were too tight.     He/She was compliant with home exercise program.  Response to previous treatment: good  Functional change: improving strength and decreasing pain levels in right hip    Pain: 0/10     Location: right hip    OBJECTIVE     Objective Measures updated at progress report unless specified.     TREATMENT     Total Billed TherEx Time: (40) minutes -  Billing reflects [place] medicaid guidelines, billing all therapy as therapeutic-exercise    Merari received the treatments listed below:     THERAPEUTIC EXERCISES to develop strength, endurance, ROM, flexibility, posture and core stabilization for (40) minutes including:    Intervention Performed Today    Pelvic tilts  3', 3" holds   Bridges  x 3 x 10   Hip Adduction Ball Squeezes  x 3 x 10    Clams -pillow between knees " "x 3 x 10, R LE only   Supine march  4 x 5 B LE   Abdominal bracing in table top hold on stability ball (not past 90 degrees flexion)  3', 3" holds   Long Arc Quads x 3 x 10, 2# B   Sit to Stands x 2 x 10 armed chair with foam pad no UE use   Standing Heel Raises x 3 x 10   Step ups  Leading with R, stepping down with L, 2 x 10   Side stepping x In II bars, down and back 3x (stepping in only to neutral)   Gastroc Stretch  Wedge, 1'   Standing March  2 x 10   Straight Leg Raise  x 2x10 reps right    Prone Hamstring Curls x 3 x 10, 2#   SLS - R LE x 3 x 30"   Shuttle Squats            Plan for Next Visit: SLS R LE, standing hip 3-way     MANUAL THERAPY TECHNIQUES were applied for (0) minutes, including:    Manual Intervention Performed Today    Soft Tissue Mobilization  right glutes   Joint Mobilizations     Mobilization with movement          Functional Dry Needling        Plan for Next Visit:         PATIENT EDUCATION AND HOME EXERCISES     Home Exercises Provided and Patient Education Provided     Education provided:    Patient educated on biomechanical justification for therapeutic exercise and importance of compliance with HEP in order to improve overall impairments and QOL    Patient was educated on all the above exercise prior/during/after for proper posture, positioning, and execution for safe performance with home exercise program.    Patient educated on the importance of improved core and upper and lower extremity strength in order to improve alignment of the spine and upper and lower extremities with static positions and dynamic movement.    Patient educated on the importance of strong core and lower extremity musculature in order to improve both static and dynamic balance, improve gait mechanics, reduce fall risk and improve household and community mobility.       Written Home Exercises Provided: yes.  Exercises were reviewed and Merari was able to demonstrate them prior to the end of the session.  " Merari demonstrated good  understanding of the education provided. See EMR under Patient Instructions for exercises provided during therapy sessions.      ASSESSMENT     Patient tolerated treatment well again this visit and without complaint. Patient ambulated well and safely without straight cane, and was mainly limited by leg length discrepancy. Patient was able to be progressed to SLS on R LE this visit without issue. Improving strength and stabilization noted each visit with R LE.     Merari is progressing well towards her goals.   Pt prognosis is Good.     Pt will continue to benefit from skilled outpatient physical therapy to address the deficits listed in the problem list box on initial evaluation, provide pt/family education and to maximize pt's level of independence in the home and community environment.     Pt's spiritual, cultural and educational needs considered and pt agreeable to plan of care and goals.     Anticipated Barriers for therapy: co-morbidities and transportation    GOALS:     Short Term Goals:  6 weeks Progress    1. Pain: Pt will demonstrate improved pain by reports of less than or equal to 5/10 worst pain on the verbal rating scale in order to progress toward maximal functional ability and improve QOL. PC   2. Function: Patient will demonstrate improved function as indicated by a functional limitation score of less than or equal to 37 out of 100 on FOTO. PC   3. Mobility: Patient will improve AROM to 50% of stated goals, listed in objective measures above, in order to progress towards independence with functional activities.  PC   4. Strength: Patient will improve strength to 50% of stated goals, listed in objective measures above, in order to progress towards independence with functional activities.  PC   5. Gait: Patient will demonstrate improved gait mechanics in order to improve functional mobility, improve quality of life, and decrease risk of further injury or fall.  PC    6. HEP: Patient will demonstrate independence with HEP in order to progress toward functional independence. PC   7. Improve postural awareness. PC      Long Term Goals:  12 weeks Progress   1. Pain: Pt will demonstrate improved pain by reports of less than or equal to 3/10 worst pain on the verbal rating scale in order to progress toward maximal functional ability and improve QOL.   PC   2. Function: Patient will demonstrate improved function as indicated by a functional limitation score of less than or equal to 28 out of 100 on FOTO. PC   3. Mobility: Patient will improve AROM to stated goals, listed in objective measures above, in order to return to maximal functional potential and improve quality of life. PC   4. Strength: Patient will improve strength to stated goals, listed in objective measures above, in order to improve functional independence and quality of life. PC   5. Gait: Patient will demonstrate normalized gait mechanics with minimal compensation in order to return to PLOF. PC   6. Patient will return to normal ADL's, IADL's, community involvement, recreational activities, and work-related activities with less than or equal to 1/10 pain and maximal function.  PC      Goals Key:  PC= progressing/continue; PM= partially met;        DC= discontinue    PLAN     Continue Plan of Care (POC) and progress per patient tolerance. See treatment section for details on planned progressions next session.    3/7/2022 (eval): Outpatient Physical Therapy 2 times weekly for 12 weeks to include any combination of the following interventions: virtual visits, dry needling, modalities, electrical stimulation (IFC, Pre-Mod, Attended with Functional Dry Needling), Cervical/Lumbar Traction, Gait Training, Manual Therapy, Neuromuscular Re-ed, Patient Education, Self Care, Therapeutic Exercise and Therapeutic Activites     Neelima Hill, PT, DPT

## 2022-04-04 ENCOUNTER — OFFICE VISIT (OUTPATIENT)
Dept: ORTHOPEDICS | Facility: CLINIC | Age: 42
End: 2022-04-04
Payer: MEDICAID

## 2022-04-04 ENCOUNTER — HOSPITAL ENCOUNTER (OUTPATIENT)
Dept: RADIOLOGY | Facility: HOSPITAL | Age: 42
Discharge: HOME | End: 2022-04-04
Attending: ORTHOPAEDIC SURGERY
Payer: MEDICAID

## 2022-04-04 VITALS — WEIGHT: 217 LBS | BODY MASS INDEX: 34.87 KG/M2 | HEIGHT: 66 IN

## 2022-04-04 DIAGNOSIS — M16.11 ARTHRITIS OF RIGHT HIP: ICD-10-CM

## 2022-04-04 DIAGNOSIS — M16.12 ARTHRITIS OF LEFT HIP: ICD-10-CM

## 2022-04-04 DIAGNOSIS — Z96.641 S/P TOTAL RIGHT HIP ARTHROPLASTY: Primary | ICD-10-CM

## 2022-04-04 DIAGNOSIS — M87.052 AVASCULAR NECROSIS OF BONE OF LEFT HIP: ICD-10-CM

## 2022-04-04 DIAGNOSIS — M87.051 AVASCULAR NECROSIS OF BONE OF HIP, RIGHT: ICD-10-CM

## 2022-04-04 DIAGNOSIS — Z01.818 PREOP TESTING: Primary | ICD-10-CM

## 2022-04-04 PROCEDURE — 3008F PR BODY MASS INDEX (BMI) DOCUMENTED: ICD-10-PCS | Mod: CPTII,,, | Performed by: ORTHOPAEDIC SURGERY

## 2022-04-04 PROCEDURE — 99999 PR PBB SHADOW E&M-EST. PATIENT-LVL III: CPT | Mod: PBBFAC,,, | Performed by: ORTHOPAEDIC SURGERY

## 2022-04-04 PROCEDURE — 99999 PR PBB SHADOW E&M-EST. PATIENT-LVL III: ICD-10-PCS | Mod: PBBFAC,,, | Performed by: ORTHOPAEDIC SURGERY

## 2022-04-04 PROCEDURE — 99024 PR POST-OP FOLLOW-UP VISIT: ICD-10-PCS | Mod: S$PBB,,, | Performed by: ORTHOPAEDIC SURGERY

## 2022-04-04 PROCEDURE — 3008F BODY MASS INDEX DOCD: CPT | Mod: CPTII,,, | Performed by: ORTHOPAEDIC SURGERY

## 2022-04-04 PROCEDURE — 99024 POSTOP FOLLOW-UP VISIT: CPT | Mod: S$PBB,,, | Performed by: ORTHOPAEDIC SURGERY

## 2022-04-04 PROCEDURE — 73502 XR HIP WITH PELVIS WHEN PERFORMED, 2 OR 3  VIEWS RIGHT: ICD-10-PCS | Mod: 26,RT,, | Performed by: RADIOLOGY

## 2022-04-04 PROCEDURE — 1159F MED LIST DOCD IN RCRD: CPT | Mod: CPTII,,, | Performed by: ORTHOPAEDIC SURGERY

## 2022-04-04 PROCEDURE — 99213 OFFICE O/P EST LOW 20 MIN: CPT | Mod: PBBFAC | Performed by: ORTHOPAEDIC SURGERY

## 2022-04-04 PROCEDURE — 73502 X-RAY EXAM HIP UNI 2-3 VIEWS: CPT | Mod: TC,RT

## 2022-04-04 PROCEDURE — 73502 X-RAY EXAM HIP UNI 2-3 VIEWS: CPT | Mod: 26,RT,, | Performed by: RADIOLOGY

## 2022-04-04 PROCEDURE — 1159F PR MEDICATION LIST DOCUMENTED IN MEDICAL RECORD: ICD-10-PCS | Mod: CPTII,,, | Performed by: ORTHOPAEDIC SURGERY

## 2022-04-04 RX ORDER — OXYCODONE AND ACETAMINOPHEN 5; 325 MG/1; MG/1
1 TABLET ORAL EVERY 6 HOURS PRN
Qty: 40 EACH | Refills: 0 | Status: ON HOLD | OUTPATIENT
Start: 2022-04-04 | End: 2022-05-31 | Stop reason: HOSPADM

## 2022-04-04 NOTE — PROGRESS NOTES
Subjective:     Patient ID: Merari Orosco is a 42 y.o. female.    Chief Complaint: Post-op Evaluation of the Right Hip   Pain in right hip and left hip  HPI:  02/01/2021  40-year-old been having pain in both hips for more than a year now.  She was not diagnosed by sickle cell or lupus or any other diseases and was seen by Rheumatology.  She does take Fosamax.  She had MRI CT scan x-rays that I reviewed personally as well as was told to the patient that she has collapse of the femoral head.  She received a right hip steroid injection by Dr. Walden and now she stain that had helped quite a bit.  The left hip now hurts more than the right side.  She had not taking any Tylenol or any 0 NSAIDs over-the-counter.  She was seen by Rheumatology Department and she stated no blood work was done.  Her pain is around 2-4/10.  She is ambulating without any assistive devices.  She states that she cannot spread the legs to get it in and out of the car is difficult as well going to the bathroom to clean herself.  She does live with family like mother and multiple siblings.  She does have a 20-year-old son.    Denies any fever or chills or shortness of breath or difficulty with chewing or swallowing loss of bowel bladder control or blurry vision or double vision loss of sense smell or taste or or numbness or tingling in the legs or low back      10/25/2021  Bilateral hip avascular necrosis.  Things are getting worse.  Her pain is 9/10.  The steroid injections given by Dr. Walden last 4 months.  She wanted repeated however he she was told to come and see us.  She wants to hold off if she can on having hip replacements at least till after the holidays.  She had tried Mobic and Naprosyn and ibuprofen etcetera.  Her pain is 9/10.  Having difficulty getting around.  She said she will be interested in having her hip injected again since they help.  She does take Tylenol.  Requesting pain medications.  I did tell her I do not prescribe  pain medications/narcotics except after surgery.    No fever no chills no shortness of breath or difficulty with chewing or swallowing loss of bowel bladder control.    2021  Bilateral hip avascular necrosis with resultant arthritis.  She feels the right leg shorter than the left.  Steroid injection seems to help a little bit.  She takes tramadol and nabumetone.  We discussed her total hip replacement today.  We asked and answered a lot of questions for her she wants to do both of them at the same time I did tell her 0 I usually stage does I do not perform bilateral joints because the risk of mortality is high.  She expressed understanding.  I did tell her cannot make both hips to be the same because they may differ in size.  She might have some difference in leg length.  We went over total hip replacement in details.  She wants to proceed after the holidays.  She is ambulating using a cane today but she said the injections did seem to help a little bit.  No fever no chills no shortness of breath or difficulty with chewing swallowing loss of bowel bladder control    2022  Right MARCO 2022..  Her right hip is not hurting what is hurting her the most is the left hip.  She would like to have the left hip replaced.  She is a little bit short on that left side and she notices it.  She is using a cane to ambulate she goes to [facility] on the [place] for physical therapy .  Right hip pain scale 0/10 left hip hurts her more 6/10  Past Medical History:   Diagnosis Date    Anxiety     Depression     Hypertension      Past Surgical History:   Procedure Laterality Date     SECTION      x1    HIP ARTHROPLASTY Right 2022    Procedure: ARTHROPLASTY, HIP;  Surgeon: Satnam Deleon MD;  Location: [facility] OR;  Service: Orthopedics;  Laterality: Right;    INJECTION OF JOINT Bilateral 11/10/2021    Procedure: Injection, Joint;  Surgeon: Satnam Deleon MD;  Location: [facility] OR;  Service: General;   Laterality: Bilateral;  under fluoroscopy    WISDOM TOOTH EXTRACTION       Family History   Problem Relation Age of Onset    Colon cancer Father     Diabetes Father     Hypertension Father     Hypertension Mother     Diabetes Sister     Hypertension Sister     Breast cancer Neg Hx     Cancer Neg Hx     Miscarriages / Stillbirths Neg Hx     Ovarian cancer Neg Hx      labor Neg Hx     Stroke Neg Hx      Social History     Socioeconomic History    Marital status: Single    Number of children: 1   Tobacco Use    Smoking status: Never Smoker    Smokeless tobacco: Never Used   Substance and Sexual Activity    Alcohol use: Yes     Comment: socially    Drug use: No    Sexual activity: Yes     Partners: Male     Birth control/protection: Condom     Social Determinants of Health     Financial Resource Strain: Low Risk     Difficulty of Paying Living Expenses: Not hard at all   Food Insecurity: No Food Insecurity    Worried About Running Out of Food in the Last Year: Never true    Ran Out of Food in the Last Year: Never true   Transportation Needs: Unmet Transportation Needs    Lack of Transportation (Medical): Yes    Lack of Transportation (Non-Medical): No   Physical Activity: Inactive    Days of Exercise per Week: 0 days    Minutes of Exercise per Session: 0 min   Stress: No Stress Concern Present    Feeling of Stress : Only a little   Social Connections: Unknown    Frequency of Communication with Friends and Family: More than three times a week    Frequency of Social Gatherings with Friends and Family: Once a week    Active Member of Clubs or Organizations: No    Attends Club or Organization Meetings: Never    Marital Status:    Housing Stability: Low Risk     Unable to Pay for Housing in the Last Year: No    Number of Places Lived in the Last Year: 1    Unstable Housing in the Last Year: No     Medication List with Changes/Refills   New Medications     OXYCODONE-ACETAMINOPHEN (PERCOCET) 5-325 MG PER TABLET    Take 1 tablet by mouth every 6 (six) hours as needed for Pain.   Current Medications    ALPRAZOLAM (XANAX) 0.25 MG TABLET    Take 1 tablet (0.25 mg total) by mouth daily as needed for Anxiety.    BLOOD PRESSURE MONITOR (EAL EASE) LG ARM SIZE (OP)    by Other route as needed for High Blood Pressure.    CHLORTHALIDONE (HYGROTEN) 25 MG TAB    Take by mouth once daily.    COVID-19 VAC, JOSUE,PFIZER,,PF, (PFIZER COVID-19 JOSUE VACCN,PF,) 30 MCG/0.3 ML INJECTION    Inject into the muscle.    ERGOCALCIFEROL (ERGOCALCIFEROL) 50,000 UNIT CAP    Take 1 capsule (50,000 Units total) by mouth every 7 days.    METOPROLOL SUCCINATE (TOPROL-XL) 50 MG 24 HR TABLET    Take 1 tablet (50 mg total) by mouth once daily.    OXYCODONE-ACETAMINOPHEN (PERCOCET)  MG PER TABLET    Take 1 tablet by mouth every 8 (eight) hours as needed for Pain.   Discontinued Medications    ALENDRONATE (FOSAMAX) 70 MG TABLET    Take 1 tablet (70 mg total) by mouth every 7 days. Take 1st thing in the morning with 8oz water, sit upright and only water for 30min    ASPIRIN (ECOTRIN) 81 MG EC TABLET    Take 1 tablet (81 mg total) by mouth 2 (two) times a day.    CHLORTHALIDONE (HYGROTEN) 25 MG TAB    Take 1 tablet (25 mg total) by mouth once daily.    DULOXETINE (CYMBALTA) 60 MG CAPSULE    Take 1 capsule (60 mg total) by mouth once daily.    FLUORIDE, SODIUM, (PREVIDENT 5000) 1.1 % PSTE    Use once daily, do not rinse mouth for 30 minutes after use.    GABAPENTIN (NEURONTIN) 300 MG CAPSULE    Take 1 capsule (300 mg total) by mouth every evening.    MULTIVITAMIN CAPSULE    Take 1 capsule by mouth once daily.    NABUMETONE (RELAFEN) 750 MG TABLET    Take 1 tablet (750 mg total) by mouth 2 (two) times daily as needed for Pain. Take with food    ONDANSETRON (ZOFRAN-ODT) 4 MG TBDL    Take 2 tablets (8 mg total) by mouth every 8 (eight) hours as needed.     Review of patient's allergies indicates:  No  Known Allergies  Review of Systems   Constitutional: Negative for decreased appetite.   HENT: Negative for tinnitus.    Eyes: Negative for double vision.   Cardiovascular: Negative for chest pain.   Respiratory: Negative for wheezing.    Hematologic/Lymphatic: Negative for bleeding problem.   Skin: Negative for dry skin.   Musculoskeletal: Positive for joint pain, joint swelling and stiffness. Negative for back pain, gout and neck pain.   Gastrointestinal: Negative for abdominal pain.   Genitourinary: Negative for bladder incontinence.   Neurological: Negative for numbness, paresthesias and sensory change.   Psychiatric/Behavioral: Negative for altered mental status.       Objective:   Body mass index is 35.02 kg/m².  There were no vitals filed for this visit.       General    Constitutional: She is oriented to person, place, and time. She appears well-developed.   HENT:   Head: Atraumatic.   Eyes: EOM are normal.   Cardiovascular: Normal rate.    Pulmonary/Chest: Effort normal.   Neurological: She is alert and oriented to person, place, and time.   Psychiatric: Judgment normal.            Gait with slight limp  Cervical rotation is functional  Bilateral upper extremity neurovascularly intact.  Radial ulnar pulses 2+.  Strength is 5/5.  Sensory intact to touch.  Negative Tinel  Lumbar without discomfort  Pelvis is level  Right hip surgical scar healed well.  Passive range of motion with minimal discomfort if any.  Excellent motion.  Still slightly weak with musculature around  Left hip with severely limited range of motion.  Internal rotation 5° external rotation 10° with around 5° flexion contracture Pelvis tilting with motion.  Slightly weak hip flexors.  Quads and hamstrings ankle extensors and flexors inverters and everters are all 5/5  Bilateral knees full motion no pain in the knee joints.  No swelling no effusion.  Collaterals and cruciates are stable.  Negative Ino's.  Calves are soft nontender  DP 1+  PT 1+  Skin is warm to touch no obvious lesions    Relevant imaging results reviewed and interpreted by me, discussed with the patient and / or family today   X-ray and electronic record showing right total hip arthroplasty in excellent alignment performed 04/04/2021.  There is severe arthritis and collapse of the femoral head on the left side  X-ray bilateral hips 10/25/21 reviewed.  There is extensive cystic changes and loss of joint space both hips which is a little bit more progressed since last x-ray.  Avascular necrosis with resulting end-stage arthritis.  There is sclerosis of the femoral heads.   X-ray and electronic record bilateral hips with avascular necrosis of both femoral heads with 2-3 mm collapse on both of him.  CT scan showing collapse  MRI showing large lesions on both femoral heads    Assessment:     Encounter Diagnoses   Name Primary?    S/P total right hip arthroplasty Yes    Avascular necrosis of bone of left hip     Arthritis of left hip         Plan:   S/P total right hip arthroplasty    Avascular necrosis of bone of left hip    Arthritis of left hip  -     oxyCODONE-acetaminophen (PERCOCET) 5-325 mg per tablet; Take 1 tablet by mouth every 6 (six) hours as needed for Pain.  Dispense: 40 each; Refill: 0         Patient Instructions   X-ray showing right total hip replacement in excellent alignment.  I around 1 cm longer than compared to the other side.  Your left hip the femoral head is collapsed and that is why her leg is shorter  You very pleased with the right total hip replacement you would like to have the left 1 done  We will try to match You as much as we can to even your leg length however what predicts us is to make sure that the hip does not dislocate    We will proceed with left total hip replacement  No need to get cardiac clearance since you already done that for the 1st hip  No need to go to the class You already been through it  [place] [facility] [place]    Procedure, common risks and benefits,alternatives discussed in details.All questions answered.Patient expressed understanding.Patient instructed to call for any questions that could arise in the future.    Most common Risks:  Infection  Leg-length discrepancy  Dislocation  Neuro-vascular injury ( resulting in loss of motor and sensory functions)  Pain  Blood clot  Fat clot  Loss of motion  Fracture of bone  Failure of procedure to achieve its intended purpose  Failure of hardware  Non-union or mal-union of bone  Malalignment  Death     Patient stated that was not diagnosed with sickle cell or lupus or Gaucher's disease.  We are going to consider this idiopathic.  She already been through Rheumatology .  She is already been am going [place] [facility] [place]  Patient is not a candidate for core decompression and bone grafting it is a little bit too late with the collapse.  Not much treatment except symptomatic [place] until that fails and then she would need total hip replacements.  She got good relief from the intra-articular injection of steroid given by Dr. Walden will refer back to Dr. Walden for injection of the left hip.  We will start her on meloxicam and Tylenol to take on as-needed basis.  She also tried naproxen and meloxicam see cam and ibuprofen and they have stopped working right now.  Brochure about total hip would be given.    I did discuss repeat injection of steroid in the pros and cons of infection and neurovascular compromise that could be a problem.  However if the help and will last her at least 3 months then she can have her surgeries/total hip replacement after the holidays.  I did tell her x-rays have shown progression of the disease in that most likely she is going to end up needing total hip replacements in both hips sooner than later.  Long discussion about injections in the risks involved the possibility of making things worst as well as increasing blood pressure  heart rate and blood sugar level.  Disclaimer: This note was prepared using a voice recognition system and is likely to have sound alike errors within the text.

## 2022-04-04 NOTE — PATIENT INSTRUCTIONS
X-ray showing right total hip replacement in excellent alignment.  I around 1 cm longer than compared to the other side.  Your left hip the femoral head is collapsed and that is why her leg is shorter  You very pleased with the right total hip replacement you would like to have the left 1 done  We will try to match You as much as we can to even your leg length however what predicts us is to make sure that the hip does not dislocate    We will proceed with left total hip replacement  No need to get cardiac clearance since you already done that for the 1st hip  No need to go to the class You already been through it  [place] [facility] [place]    Procedure, common risks and benefits,alternatives discussed in details.All questions answered.Patient expressed understanding.Patient instructed to call for any questions that could arise in the future.    Most common Risks:  Infection  Leg-length discrepancy  Dislocation  Neuro-vascular injury ( resulting in loss of motor and sensory functions)  Pain  Blood clot  Fat clot  Loss of motion  Fracture of bone  Failure of procedure to achieve its intended purpose  Failure of hardware  Non-union or mal-union of bone  Malalignment  Death

## 2022-04-05 ENCOUNTER — DOCUMENTATION ONLY (OUTPATIENT)
Dept: REHABILITATION | Facility: HOSPITAL | Age: 42
End: 2022-04-05
Payer: MEDICAID

## 2022-04-05 ENCOUNTER — PATIENT OUTREACH (OUTPATIENT)
Dept: ADMINISTRATIVE | Facility: OTHER | Age: 42
End: 2022-04-05
Payer: MEDICAID

## 2022-04-05 ENCOUNTER — PATIENT MESSAGE (OUTPATIENT)
Dept: RESEARCH | Facility: HOSPITAL | Age: 42
End: 2022-04-05
Payer: MEDICAID

## 2022-04-05 ENCOUNTER — TELEPHONE (OUTPATIENT)
Dept: REHABILITATION | Facility: HOSPITAL | Age: 42
End: 2022-04-05
Payer: MEDICAID

## 2022-04-05 DIAGNOSIS — Z12.31 ENCOUNTER FOR SCREENING MAMMOGRAM FOR BREAST CANCER: Primary | ICD-10-CM

## 2022-04-05 NOTE — PROGRESS NOTES
"Arian El notified me at approximately 1:05pm that she was notified from the front of the building that Ms. Orosco was in the lobby and had "an accident", urinating on herself. Myself and co-treating PT, Kaye Brandt, went to check on the patient and offer her a pair of scrub shorts to change into. Patient denied urinating on herself. She allowed us to take her BP, which was found to be 99/71mmHg, but she adamantly refused any further assessment. Patient kept reiterating that she was fine and needed to go because her transportation was here. PT supivisor was notified as well as securty; however, patient denied further assistance from them as well. Care was transferred over to security as patient waited for transportation.  Neelima Hill, PT, DPT  "

## 2022-04-06 ENCOUNTER — TELEPHONE (OUTPATIENT)
Dept: ORTHOPEDICS | Facility: CLINIC | Age: 42
End: 2022-04-06
Payer: MEDICAID

## 2022-04-06 ENCOUNTER — TELEPHONE (OUTPATIENT)
Dept: OBSTETRICS AND GYNECOLOGY | Facility: CLINIC | Age: 42
End: 2022-04-06
Payer: MEDICAID

## 2022-04-06 NOTE — TELEPHONE ENCOUNTER
----- Message from Yamilka Rod sent at 4/6/2022 11:04 AM CDT -----  Contact: Self   Pt is requesting to reschedule appt. Please advise

## 2022-04-06 NOTE — TELEPHONE ENCOUNTER
Called pt to reschedule appt, Pt states she will call back to be  reschedule         Flavia FARLEY LPN  OB/GYN

## 2022-04-06 NOTE — TELEPHONE ENCOUNTER
----- Message from Yamilka Rod sent at 4/6/2022 11:05 AM CDT -----  Contact: Self   Pt is requesting a call to reschedule. Please advise

## 2022-04-08 ENCOUNTER — HOSPITAL ENCOUNTER (OUTPATIENT)
Dept: CARDIOLOGY | Facility: HOSPITAL | Age: 42
Discharge: HOME | End: 2022-04-08
Attending: INTERNAL MEDICINE
Payer: MEDICAID

## 2022-04-08 DIAGNOSIS — I10 ESSENTIAL HYPERTENSION: Primary | ICD-10-CM

## 2022-04-08 DIAGNOSIS — I10 ESSENTIAL HYPERTENSION: ICD-10-CM

## 2022-04-08 PROCEDURE — 93005 ELECTROCARDIOGRAM TRACING: CPT

## 2022-04-08 PROCEDURE — 93010 ELECTROCARDIOGRAM REPORT: CPT | Mod: ,,, | Performed by: INTERNAL MEDICINE

## 2022-04-08 PROCEDURE — 93010 EKG 12-LEAD: ICD-10-PCS | Mod: ,,, | Performed by: INTERNAL MEDICINE

## 2022-04-13 ENCOUNTER — PATIENT MESSAGE (OUTPATIENT)
Dept: REHABILITATION | Facility: HOSPITAL | Age: 42
End: 2022-04-13
Payer: MEDICAID

## 2022-05-06 ENCOUNTER — TELEPHONE (OUTPATIENT)
Dept: RHEUMATOLOGY | Facility: CLINIC | Age: 42
End: 2022-05-06
Payer: MEDICAID

## 2022-05-06 NOTE — TELEPHONE ENCOUNTER
Called and spoke to pt regarding her cane being left here. Pt stated she did not need the cane right now and would come pick it up at her next appt at [facility]. Informed pt we would not be here on Mondays but Tues-Fri someone will be here for rheumatology.   (Leroy is in [place]'s office currently)

## 2022-05-07 NOTE — PROGRESS NOTES
"Hi, Orasetta.    Your test results have some minor abnormalities. They do not necessarily indicate something bad.    We'll discuss your test results, what they mean, and what - if anything - needs to be done, at your upcoming appointment with me, Thursday, February 13, 2020 at 9:40 AM.    I look forward to seeing you then. In the meantime, you can learn more about your test results and what they mean by logging in to your [facility] account at [email or web address]. From the "View test results" tab, click on the test you want to know more about. Then, click on the "About This Test" link.    If you have any questions, be sure to write them down and bring them to your appointment.  If you have any urgent questions in the meantime, please send me a message using [facility], or you can call my office at [phone].    Thanks for letting me care for you, thanks for trusting us with your healthcare needs, and thanks for using [facility].    Sincerely,    MARTINE Stone MD    P.S. - Please tell me how I'm doing and review me at [email or web address]." none

## 2022-05-23 NOTE — PRE ADMISSION SCREENING
Pre op instructions reviewed with patient per phone:    To confirm, Your surgeon has instructed you:  Surgery is scheduled 5/31/22at 12:45pm.      Please report to [facility] floor main lobby by 10:30am.   Pre admit office to call afternoon prior to surgery with final arrival time     INSTRUCTIONS IMPORTANT!!!  ¨ Do not eat, drink, or smoke after 12 midnight prior to surgery, including water. OK to brush teeth, no gum, candy or mints!    ¨ Take only these medicines with a small swallow of water-morning of surgery.  Xanax, if needed  metoprolol    -------   Do not shave legs/underarms 3 days prior to surgery  ____   1 visitor is  allowed in the pre operative area, no one under the age of 16,and must adhere to social distancing guidelines.  1 visitor/family member is allowed to              visit non covid  in-patients in their room    ____   Family/caregivers will be updated re pt status via text/cell phone    ____  Do not wear makeup, including mascara.  ____  No powder, lotions or creams to surgical area.  ____  Please remove all jewelry, including piercings and leave at home.  ____  No money or valuables needed. Please leave at home.  ____  Please bring identification and insurance information to hospital.  ____  If going home the same day, arrange for a ride home. You will not be able to   drive if Anesthesia was used.  ____  Children, under 12 years old, must remain in the waiting room with an adult.  They are not allowed in patient areas.  ____  Wear loose fitting clothing. Allow for dressings, bandages.  ____  Stop Aspirin, Ibuprofen, Motrin and Aleve at least 5-7 days before surgery, unless otherwise instructed by your doctor, or the nurse.   You MAY use Tylenol/acetaminophen until day of surgery.  ____  If you take diabetic medication, do not take am of surgery unless instructed by   Doctor.  ____ Stop taking any Fish Oil supplement or any Vitamins that contain Vitamin E at least  5 days prior to surgery.          Bathing Instructions-- The night before surgery and the morning prior to coming to the hospital:   -Do not shave the surgical area.   -Shower and wash your hair and body as usual with anti-bacterial  soap and shampoo.   -Rinse your hair and body completely.   -Use one packet of hibiclens to wash the surgical site (using your hand) gently for 5 minutes.  Do not scrub you skin too hard.   -Do not use hibiclens on your head, face, or genitals.   -Do not wash with anti-bacterial soap after you use the hibiclens.   -Rinse your body thoroughly.   -Dry with clean, soft towel.  Do not use lotion, cream, deodorant, or powders on   the surgical site.    Use antibacterial soap in place of hibiclens if your surgery is on the head, face or genitals.         Surgical Site Infection    Prevention of surgical site infections:     -Keep incisions clean and dry.   -Do not soak/submerge incisions in water until completely healed.   -Do not apply lotions, powders, creams, or deodorants to site.   -Always make sure hands are cleaned with antibacterial soap/ alcohol-based   prior to touching the surgical site.  (This includes doctors, nurses, staff, and yourself.)    Signs and symptoms:   -Redness and pain around the area where you had surgery   -Drainage of cloudy fluid from your surgical wound   -Fever over 100.4

## 2022-05-27 ENCOUNTER — LAB VISIT (OUTPATIENT)
Dept: LAB | Facility: HOSPITAL | Age: 42
End: 2022-05-27
Attending: ORTHOPAEDIC SURGERY
Payer: MEDICAID

## 2022-05-27 DIAGNOSIS — Z01.818 PREOP TESTING: ICD-10-CM

## 2022-05-27 DIAGNOSIS — F41.1 GENERALIZED ANXIETY DISORDER: Chronic | ICD-10-CM

## 2022-05-27 LAB
ALBUMIN SERPL BCP-MCNC: 3.8 G/DL (ref 3.5–5.2)
ALP SERPL-CCNC: 63 U/L (ref 55–135)
ALT SERPL W/O P-5'-P-CCNC: 8 U/L (ref 10–44)
AMORPH CRY UR QL COMP ASSIST: NORMAL
ANION GAP SERPL CALC-SCNC: 10 MMOL/L (ref 8–16)
AST SERPL-CCNC: 15 U/L (ref 10–40)
BACTERIA #/AREA URNS AUTO: NORMAL /HPF
BASOPHILS # BLD AUTO: 0.04 K/UL (ref 0–0.2)
BASOPHILS NFR BLD: 0.8 % (ref 0–1.9)
BILIRUB SERPL-MCNC: 0.4 MG/DL (ref 0.1–1)
BILIRUB UR QL STRIP: NEGATIVE
BUN SERPL-MCNC: 12 MG/DL (ref 6–20)
CALCIUM SERPL-MCNC: 9.1 MG/DL (ref 8.7–10.5)
CHLORIDE SERPL-SCNC: 106 MMOL/L (ref 95–110)
CLARITY UR REFRACT.AUTO: ABNORMAL
CO2 SERPL-SCNC: 24 MMOL/L (ref 23–29)
COLOR UR AUTO: YELLOW
CREAT SERPL-MCNC: 0.8 MG/DL (ref 0.5–1.4)
DIFFERENTIAL METHOD: ABNORMAL
EOSINOPHIL # BLD AUTO: 0.1 K/UL (ref 0–0.5)
EOSINOPHIL NFR BLD: 2.2 % (ref 0–8)
ERYTHROCYTE [DISTWIDTH] IN BLOOD BY AUTOMATED COUNT: 14.6 % (ref 11.5–14.5)
EST. GFR  (AFRICAN AMERICAN): >60 ML/MIN/1.73 M^2
EST. GFR  (NON AFRICAN AMERICAN): >60 ML/MIN/1.73 M^2
GLUCOSE SERPL-MCNC: 70 MG/DL (ref 70–110)
GLUCOSE UR QL STRIP: NEGATIVE
HCT VFR BLD AUTO: 36.1 % (ref 37–48.5)
HGB BLD-MCNC: 11.6 G/DL (ref 12–16)
HGB UR QL STRIP: NEGATIVE
HYALINE CASTS UR QL AUTO: 0 /LPF
IMM GRANULOCYTES # BLD AUTO: 0.01 K/UL (ref 0–0.04)
IMM GRANULOCYTES NFR BLD AUTO: 0.2 % (ref 0–0.5)
KETONES UR QL STRIP: NEGATIVE
LEUKOCYTE ESTERASE UR QL STRIP: ABNORMAL
LYMPHOCYTES # BLD AUTO: 2.7 K/UL (ref 1–4.8)
LYMPHOCYTES NFR BLD: 54 % (ref 18–48)
MCH RBC QN AUTO: 31 PG (ref 27–31)
MCHC RBC AUTO-ENTMCNC: 32.1 G/DL (ref 32–36)
MCV RBC AUTO: 97 FL (ref 82–98)
MICROSCOPIC COMMENT: NORMAL
MONOCYTES # BLD AUTO: 0.3 K/UL (ref 0.3–1)
MONOCYTES NFR BLD: 6.3 % (ref 4–15)
NEUTROPHILS # BLD AUTO: 1.9 K/UL (ref 1.8–7.7)
NEUTROPHILS NFR BLD: 36.5 % (ref 38–73)
NITRITE UR QL STRIP: NEGATIVE
NRBC BLD-RTO: 0 /100 WBC
PH UR STRIP: 6 [PH] (ref 5–8)
PLATELET # BLD AUTO: 122 K/UL (ref 150–450)
PMV BLD AUTO: 11.4 FL (ref 9.2–12.9)
POTASSIUM SERPL-SCNC: 3.5 MMOL/L (ref 3.5–5.1)
PROT SERPL-MCNC: 7 G/DL (ref 6–8.4)
PROT UR QL STRIP: ABNORMAL
RBC # BLD AUTO: 3.74 M/UL (ref 4–5.4)
RBC #/AREA URNS AUTO: 0 /HPF (ref 0–4)
SODIUM SERPL-SCNC: 140 MMOL/L (ref 136–145)
SP GR UR STRIP: >=1.03 (ref 1–1.03)
URN SPEC COLLECT METH UR: ABNORMAL
WBC # BLD AUTO: 5.06 K/UL (ref 3.9–12.7)
WBC #/AREA URNS AUTO: 0 /HPF (ref 0–5)

## 2022-05-27 PROCEDURE — 85025 COMPLETE CBC W/AUTO DIFF WBC: CPT | Performed by: ORTHOPAEDIC SURGERY

## 2022-05-27 PROCEDURE — 80053 COMPREHEN METABOLIC PANEL: CPT | Performed by: ORTHOPAEDIC SURGERY

## 2022-05-27 PROCEDURE — 36415 COLL VENOUS BLD VENIPUNCTURE: CPT | Performed by: ORTHOPAEDIC SURGERY

## 2022-05-27 PROCEDURE — 81001 URINALYSIS AUTO W/SCOPE: CPT | Performed by: ORTHOPAEDIC SURGERY

## 2022-05-27 NOTE — TELEPHONE ENCOUNTER
Care Due:                  Date            Visit Type   Department     Provider  --------------------------------------------------------------------------------    Last Visit: None Found      None         None Found  Next Visit: None Scheduled  None         None Found                                                            Last  Test          Frequency    Reason                     Performed    Due Date  --------------------------------------------------------------------------------    Office Visit  12 months..  chlorthalidone,            Not Found    Overdue                             metoprolol...............    [facility] Embedded Care Gaps. Reference number: [id number]. 5/27/2022   7:20:16 AM T

## 2022-05-28 ENCOUNTER — PATIENT MESSAGE (OUTPATIENT)
Dept: SURGERY | Facility: HOSPITAL | Age: 42
End: 2022-05-28
Payer: MEDICAID

## 2022-05-30 ENCOUNTER — TELEPHONE (OUTPATIENT)
Dept: PREADMISSION TESTING | Facility: HOSPITAL | Age: 42
End: 2022-05-30
Payer: MEDICAID

## 2022-05-30 ENCOUNTER — OFFICE VISIT (OUTPATIENT)
Dept: CARDIOLOGY | Facility: CLINIC | Age: 42
End: 2022-05-30
Payer: MEDICAID

## 2022-05-30 VITALS
WEIGHT: 211.63 LBS | SYSTOLIC BLOOD PRESSURE: 120 MMHG | OXYGEN SATURATION: 99 % | DIASTOLIC BLOOD PRESSURE: 70 MMHG | HEART RATE: 84 BPM | BODY MASS INDEX: 34.01 KG/M2 | HEIGHT: 66 IN

## 2022-05-30 DIAGNOSIS — E78.00 PURE HYPERCHOLESTEROLEMIA: ICD-10-CM

## 2022-05-30 DIAGNOSIS — Z01.810 PREOP CARDIOVASCULAR EXAM: ICD-10-CM

## 2022-05-30 DIAGNOSIS — I10 ESSENTIAL HYPERTENSION: Primary | ICD-10-CM

## 2022-05-30 PROCEDURE — 99213 OFFICE O/P EST LOW 20 MIN: CPT | Mod: PBBFAC | Performed by: INTERNAL MEDICINE

## 2022-05-30 PROCEDURE — 1159F MED LIST DOCD IN RCRD: CPT | Mod: CPTII,,, | Performed by: INTERNAL MEDICINE

## 2022-05-30 PROCEDURE — 99215 PR OFFICE/OUTPT VISIT, EST, LEVL V, 40-54 MIN: ICD-10-PCS | Mod: S$PBB,,, | Performed by: INTERNAL MEDICINE

## 2022-05-30 PROCEDURE — 1160F RVW MEDS BY RX/DR IN RCRD: CPT | Mod: CPTII,,, | Performed by: INTERNAL MEDICINE

## 2022-05-30 PROCEDURE — 3008F BODY MASS INDEX DOCD: CPT | Mod: CPTII,,, | Performed by: INTERNAL MEDICINE

## 2022-05-30 PROCEDURE — 3008F PR BODY MASS INDEX (BMI) DOCUMENTED: ICD-10-PCS | Mod: CPTII,,, | Performed by: INTERNAL MEDICINE

## 2022-05-30 PROCEDURE — 3074F PR MOST RECENT SYSTOLIC BLOOD PRESSURE < 130 MM HG: ICD-10-PCS | Mod: CPTII,,, | Performed by: INTERNAL MEDICINE

## 2022-05-30 PROCEDURE — 3074F SYST BP LT 130 MM HG: CPT | Mod: CPTII,,, | Performed by: INTERNAL MEDICINE

## 2022-05-30 PROCEDURE — 99215 OFFICE O/P EST HI 40 MIN: CPT | Mod: S$PBB,,, | Performed by: INTERNAL MEDICINE

## 2022-05-30 PROCEDURE — 99999 PR PBB SHADOW E&M-EST. PATIENT-LVL III: ICD-10-PCS | Mod: PBBFAC,,, | Performed by: INTERNAL MEDICINE

## 2022-05-30 PROCEDURE — 3078F DIAST BP <80 MM HG: CPT | Mod: CPTII,,, | Performed by: INTERNAL MEDICINE

## 2022-05-30 PROCEDURE — 3078F PR MOST RECENT DIASTOLIC BLOOD PRESSURE < 80 MM HG: ICD-10-PCS | Mod: CPTII,,, | Performed by: INTERNAL MEDICINE

## 2022-05-30 PROCEDURE — 99999 PR PBB SHADOW E&M-EST. PATIENT-LVL III: CPT | Mod: PBBFAC,,, | Performed by: INTERNAL MEDICINE

## 2022-05-30 PROCEDURE — 1159F PR MEDICATION LIST DOCUMENTED IN MEDICAL RECORD: ICD-10-PCS | Mod: CPTII,,, | Performed by: INTERNAL MEDICINE

## 2022-05-30 PROCEDURE — 1160F PR REVIEW ALL MEDS BY PRESCRIBER/CLIN PHARMACIST DOCUMENTED: ICD-10-PCS | Mod: CPTII,,, | Performed by: INTERNAL MEDICINE

## 2022-05-30 NOTE — TELEPHONE ENCOUNTER
Called and spoke with Pt about the following:    Please arrive to [facility] main lobby at 10:30am on 5/31/22 for your scheduled procedure. NOTHING to eat or drink after midnight, except medications instructed by the Pre-admit Nurse. Pt verbalized understanding.

## 2022-05-30 NOTE — PROGRESS NOTES
Subjective:   Patient ID:  Merari Orosco is a 42 y.o. female who presents for follow-up of No chief complaint on file.  Pt presents for CV clearance L hip replacement. Pt is s/p r hip replacement .  Patient denies CP, angina or anginal equivalent.EKG sinus tachycardia otherwise no changes    Hypertension  This is a chronic problem. The current episode started more than 1 year ago. The problem has been gradually improving since onset. Pertinent negatives include no chest pain, palpitations or shortness of breath. Past treatments include beta blockers and diuretics. The current treatment provides moderate improvement. There are no compliance problems.        Review of Systems   Constitutional: Negative. Negative for weight gain.   HENT: Negative.    Eyes: Negative.    Cardiovascular: Negative.  Negative for chest pain, leg swelling and palpitations.   Respiratory: Negative.  Negative for shortness of breath.    Endocrine: Negative.    Hematologic/Lymphatic: Negative.    Skin: Negative.    Musculoskeletal: Negative for muscle weakness.   Gastrointestinal: Negative.    Genitourinary: Negative.    Neurological: Negative.  Negative for dizziness.   Psychiatric/Behavioral: Negative.    Allergic/Immunologic: Negative.      Family History   Problem Relation Age of Onset    Colon cancer Father     Diabetes Father     Hypertension Father     Hypertension Mother     Diabetes Sister     Hypertension Sister     Breast cancer Neg Hx     Cancer Neg Hx     Miscarriages / Stillbirths Neg Hx     Ovarian cancer Neg Hx      labor Neg Hx     Stroke Neg Hx      Past Medical History:   Diagnosis Date    Anxiety     Depression     Hypertension      Social History     Socioeconomic History    Marital status: Single    Number of children: 1   Tobacco Use    Smoking status: Never Smoker    Smokeless tobacco: Never Used   Substance and Sexual Activity    Alcohol use: Yes     Comment: socially    Drug use: No     Sexual activity: Yes     Partners: Male     Birth control/protection: Condom     Social Determinants of Health     Financial Resource Strain: Low Risk     Difficulty of Paying Living Expenses: Not very hard   Food Insecurity: No Food Insecurity    Worried About Running Out of Food in the Last Year: Never true    Ran Out of Food in the Last Year: Never true   Transportation Needs: Unmet Transportation Needs    Lack of Transportation (Medical): Yes    Lack of Transportation (Non-Medical): Yes   Physical Activity: Inactive    Days of Exercise per Week: 0 days    Minutes of Exercise per Session: 0 min   Stress: Stress Concern Present    Feeling of Stress : To some extent   Social Connections: Unknown    Frequency of Communication with Friends and Family: More than three times a week    Frequency of Social Gatherings with Friends and Family: Twice a week    Active Member of Clubs or Organizations: No    Attends Club or Organization Meetings: Never    Marital Status:    Housing Stability: Low Risk     Unable to Pay for Housing in the Last Year: No    Number of Places Lived in the Last Year: 1    Unstable Housing in the Last Year: No     Current Outpatient Medications on File Prior to Visit   Medication Sig Dispense Refill    blood pressure monitor ([employer] EASE) LG arm size (OP) by Other route as needed for High Blood Pressure. 1 each 0    chlorthalidone (HYGROTEN) 25 MG Tab Take 1 tablet by mouth once daily. 30 tablet 3    COVID-19 vac, josue,Pfizer,,PF, (PFIZER COVID-19 JOSUE VACCN,PF,) 30 mcg/0.3 mL injection Inject into the muscle. 0.3 mL 0    ergocalciferol (ERGOCALCIFEROL) 50,000 unit Cap Take 1 capsule (50,000 Units total) by mouth every 7 days. 4 capsule 4    metoprolol succinate (TOPROL-XL) 50 MG 24 hr tablet Take 1 tablet (50 mg total) by mouth once daily. 30 tablet 3    ALPRAZolam (XANAX) 0.25 MG tablet Take 1 tablet (0.25 mg total) by mouth daily as needed for Anxiety. (Patient not  taking: Reported on 5/30/2022) 30 tablet 0    oxyCODONE-acetaminophen (PERCOCET)  mg per tablet Take 1 tablet by mouth every 8 (eight) hours as needed for Pain. 28 tablet 0    oxyCODONE-acetaminophen (PERCOCET) 5-325 mg per tablet Take 1 tablet by mouth every 6 (six) hours as needed for Pain. (Patient not taking: Reported on 5/30/2022) 40 each 0     Current Facility-Administered Medications on File Prior to Visit   Medication Dose Route Frequency Provider Last Rate Last Admin    0.9%  NaCl infusion   Intravenous Continuous Satnam Deleon MD        acetaminophen tablet 650 mg  650 mg Oral Q4H PRN Satnam Deleon MD   650 mg at 02/09/22 0921    chlorhexidine 0.12 % solution 10 mL  10 mL Mouth/Throat On Call Procedure Satnam Deleon MD   10 mL at 02/09/22 0922    dexamethasone injection 8 mg  8 mg Intravenous On Call Procedure Satnam Deleon MD   8 mg at 02/09/22 1015    gabapentin capsule 300 mg  300 mg Oral BID Satnam Deleon MD   300 mg at 02/09/22 0922     Review of patient's allergies indicates:  No Known Allergies    Objective:     Physical Exam  Vitals and nursing note reviewed.   Constitutional:       Appearance: She is well-developed.   HENT:      Head: Normocephalic and atraumatic.   Eyes:      Conjunctiva/sclera: Conjunctivae normal.      Pupils: Pupils are equal, round, and reactive to light.   Cardiovascular:      Rate and Rhythm: Normal rate and regular rhythm.      Pulses: Intact distal pulses.      Heart sounds: Normal heart sounds.   Pulmonary:      Effort: Pulmonary effort is normal.      Breath sounds: Normal breath sounds.   Abdominal:      General: Bowel sounds are normal.      Palpations: Abdomen is soft.   Musculoskeletal:         General: Normal range of motion.      Cervical back: Normal range of motion and neck supple.   Skin:     General: Skin is warm and dry.   Neurological:      Mental Status: She is alert and oriented to person, place, and time.          Assessment:     1. Essential hypertension    2. Pure hypercholesterolemia    3. Preop cardiovascular exam        Plan:     Essential hypertension    Pure hypercholesterolemia    Preop cardiovascular exam      Continue metoprolol, chlorthalidone-HTN  Pt cleared for surgery at moderate Cv risk

## 2022-05-31 ENCOUNTER — ANESTHESIA (OUTPATIENT)
Dept: SURGERY | Facility: HOSPITAL | Age: 42
End: 2022-05-31
Payer: MEDICAID

## 2022-05-31 ENCOUNTER — HOSPITAL ENCOUNTER (OUTPATIENT)
Facility: HOSPITAL | Age: 42
Discharge: HOME | End: 2022-05-31
Attending: ORTHOPAEDIC SURGERY | Admitting: ORTHOPAEDIC SURGERY
Payer: MEDICAID

## 2022-05-31 ENCOUNTER — ANESTHESIA EVENT (OUTPATIENT)
Dept: SURGERY | Facility: HOSPITAL | Age: 42
End: 2022-05-31
Payer: MEDICAID

## 2022-05-31 DIAGNOSIS — M16.12 ARTHRITIS OF LEFT HIP: Primary | ICD-10-CM

## 2022-05-31 LAB
ABO + RH BLD: NORMAL
B-HCG UR QL: NEGATIVE
BLD GP AB SCN CELLS X3 SERPL QL: NORMAL
CTP QC/QA: YES
HCT VFR BLD AUTO: 31.5 % (ref 37–48.5)
HGB BLD-MCNC: 10.8 G/DL (ref 12–16)

## 2022-05-31 PROCEDURE — 85018 HEMOGLOBIN: CPT | Performed by: ORTHOPAEDIC SURGERY

## 2022-05-31 PROCEDURE — 36000710: Performed by: ORTHOPAEDIC SURGERY

## 2022-05-31 PROCEDURE — 97161 PT EVAL LOW COMPLEX 20 MIN: CPT

## 2022-05-31 PROCEDURE — 86901 BLOOD TYPING SEROLOGIC RH(D): CPT | Performed by: ORTHOPAEDIC SURGERY

## 2022-05-31 PROCEDURE — 81025 URINE PREGNANCY TEST: CPT | Performed by: ORTHOPAEDIC SURGERY

## 2022-05-31 PROCEDURE — 63600175 PHARM REV CODE 636 W HCPCS: Performed by: NURSE ANESTHETIST, CERTIFIED REGISTERED

## 2022-05-31 PROCEDURE — 37000009 HC ANESTHESIA EA ADD 15 MINS: Performed by: ORTHOPAEDIC SURGERY

## 2022-05-31 PROCEDURE — 27201423 OPTIME MED/SURG SUP & DEVICES STERILE SUPPLY: Performed by: ORTHOPAEDIC SURGERY

## 2022-05-31 PROCEDURE — 25000003 PHARM REV CODE 250: Performed by: ANESTHESIOLOGY

## 2022-05-31 PROCEDURE — 63600175 PHARM REV CODE 636 W HCPCS: Performed by: ORTHOPAEDIC SURGERY

## 2022-05-31 PROCEDURE — 36000711: Performed by: ORTHOPAEDIC SURGERY

## 2022-05-31 PROCEDURE — 25000003 PHARM REV CODE 250: Performed by: NURSE ANESTHETIST, CERTIFIED REGISTERED

## 2022-05-31 PROCEDURE — 27130 PR TOTAL HIP ARTHROPLASTY: ICD-10-PCS | Mod: AS,LT,, | Performed by: PHYSICIAN ASSISTANT

## 2022-05-31 PROCEDURE — 71000033 HC RECOVERY, INTIAL HOUR: Performed by: ORTHOPAEDIC SURGERY

## 2022-05-31 PROCEDURE — 27130 PR TOTAL HIP ARTHROPLASTY: ICD-10-PCS | Mod: LT,,, | Performed by: ORTHOPAEDIC SURGERY

## 2022-05-31 PROCEDURE — 25000003 PHARM REV CODE 250: Performed by: ORTHOPAEDIC SURGERY

## 2022-05-31 PROCEDURE — 85014 HEMATOCRIT: CPT | Performed by: ORTHOPAEDIC SURGERY

## 2022-05-31 PROCEDURE — 71000015 HC POSTOP RECOV 1ST HR: Performed by: ORTHOPAEDIC SURGERY

## 2022-05-31 PROCEDURE — C1776 JOINT DEVICE (IMPLANTABLE): HCPCS | Performed by: ORTHOPAEDIC SURGERY

## 2022-05-31 PROCEDURE — 27130 TOTAL HIP ARTHROPLASTY: CPT | Mod: LT,,, | Performed by: ORTHOPAEDIC SURGERY

## 2022-05-31 PROCEDURE — C1713 ANCHOR/SCREW BN/BN,TIS/BN: HCPCS | Performed by: ORTHOPAEDIC SURGERY

## 2022-05-31 PROCEDURE — 97530 THERAPEUTIC ACTIVITIES: CPT

## 2022-05-31 PROCEDURE — 27130 TOTAL HIP ARTHROPLASTY: CPT | Mod: AS,LT,, | Performed by: PHYSICIAN ASSISTANT

## 2022-05-31 PROCEDURE — 63600175 PHARM REV CODE 636 W HCPCS: Performed by: ANESTHESIOLOGY

## 2022-05-31 PROCEDURE — 37000008 HC ANESTHESIA 1ST 15 MINUTES: Performed by: ORTHOPAEDIC SURGERY

## 2022-05-31 DEVICE — TI CANCELLOUS SCREW, Ø6.5 X20MM
Type: IMPLANTABLE DEVICE | Site: HIP | Status: FUNCTIONAL
Brand: TI CANCELLOUS SCREW

## 2022-05-31 DEVICE — U-MOTION II PS+ CUP, CLUSTER-HOLE, Ø56MM
Type: IMPLANTABLE DEVICE | Site: HIP | Status: FUNCTIONAL
Brand: U-MOTION II PS+ CUP, CLUSTER-HOLE

## 2022-05-31 DEVICE — CERAMIC FEMORAL HEAD, DELTA, O.D.Ï†36MM, 12/14- S
Type: IMPLANTABLE DEVICE | Site: HIP | Status: FUNCTIONAL
Brand: CERAMIC FEMORAL HEAD BIOLOXÂ® DELTA 12/14S

## 2022-05-31 DEVICE — TI CANCELLOUS SCREW, Ø6.5 X 25MM
Type: IMPLANTABLE DEVICE | Site: HIP | Status: FUNCTIONAL
Brand: TI CANCELLOUS SCREW

## 2022-05-31 RX ORDER — OXYCODONE HYDROCHLORIDE 5 MG/1
10 TABLET ORAL
Status: CANCELLED | OUTPATIENT
Start: 2022-05-31

## 2022-05-31 RX ORDER — MEPERIDINE HYDROCHLORIDE 25 MG/ML
12.5 INJECTION INTRAMUSCULAR; INTRAVENOUS; SUBCUTANEOUS ONCE
Status: DISCONTINUED | OUTPATIENT
Start: 2022-05-31 | End: 2022-05-31 | Stop reason: HOSPADM

## 2022-05-31 RX ORDER — ACETAMINOPHEN 325 MG/1
650 TABLET ORAL
Status: DISCONTINUED | OUTPATIENT
Start: 2022-05-31 | End: 2022-05-31 | Stop reason: HOSPADM

## 2022-05-31 RX ORDER — CEFAZOLIN SODIUM 2 G/50ML
2 SOLUTION INTRAVENOUS
Status: CANCELLED | OUTPATIENT
Start: 2022-05-31 | End: 2022-06-01

## 2022-05-31 RX ORDER — SODIUM CHLORIDE 9 MG/ML
INJECTION, SOLUTION INTRAVENOUS CONTINUOUS
Status: CANCELLED | OUTPATIENT
Start: 2022-05-31

## 2022-05-31 RX ORDER — SODIUM CHLORIDE 9 MG/ML
INJECTION, SOLUTION INTRAVENOUS CONTINUOUS
Status: DISCONTINUED | OUTPATIENT
Start: 2022-05-31 | End: 2022-05-31 | Stop reason: HOSPADM

## 2022-05-31 RX ORDER — PROPOFOL 10 MG/ML
VIAL (ML) INTRAVENOUS
Status: DISCONTINUED | OUTPATIENT
Start: 2022-05-31 | End: 2022-05-31

## 2022-05-31 RX ORDER — VANCOMYCIN HYDROCHLORIDE 1 G/20ML
INJECTION, POWDER, LYOPHILIZED, FOR SOLUTION INTRAVENOUS
Status: DISCONTINUED | OUTPATIENT
Start: 2022-05-31 | End: 2022-05-31 | Stop reason: ALTCHOICE

## 2022-05-31 RX ORDER — ONDANSETRON 2 MG/ML
4 INJECTION INTRAMUSCULAR; INTRAVENOUS DAILY PRN
Status: DISCONTINUED | OUTPATIENT
Start: 2022-05-31 | End: 2022-05-31 | Stop reason: HOSPADM

## 2022-05-31 RX ORDER — ACETAMINOPHEN 325 MG/1
650 TABLET ORAL EVERY 8 HOURS PRN
Status: CANCELLED | OUTPATIENT
Start: 2022-05-31

## 2022-05-31 RX ORDER — ROPIVACAINE/EPI/CLONIDINE/KET 2.46-0.005
SYRINGE (ML) INJECTION
Status: DISCONTINUED | OUTPATIENT
Start: 2022-05-31 | End: 2022-05-31 | Stop reason: HOSPADM

## 2022-05-31 RX ORDER — PROCHLORPERAZINE EDISYLATE 5 MG/ML
5 INJECTION INTRAMUSCULAR; INTRAVENOUS EVERY 30 MIN PRN
Status: DISCONTINUED | OUTPATIENT
Start: 2022-05-31 | End: 2022-05-31 | Stop reason: HOSPADM

## 2022-05-31 RX ORDER — HYDROMORPHONE HYDROCHLORIDE 2 MG/ML
0.2 INJECTION, SOLUTION INTRAMUSCULAR; INTRAVENOUS; SUBCUTANEOUS EVERY 5 MIN PRN
Status: DISCONTINUED | OUTPATIENT
Start: 2022-05-31 | End: 2022-05-31 | Stop reason: HOSPADM

## 2022-05-31 RX ORDER — ACETAMINOPHEN 325 MG/1
650 TABLET ORAL DAILY
Status: DISCONTINUED | OUTPATIENT
Start: 2022-05-31 | End: 2022-05-31

## 2022-05-31 RX ORDER — DEXAMETHASONE SODIUM PHOSPHATE 4 MG/ML
8 INJECTION, SOLUTION INTRA-ARTICULAR; INTRALESIONAL; INTRAMUSCULAR; INTRAVENOUS; SOFT TISSUE
Status: DISCONTINUED | OUTPATIENT
Start: 2022-05-31 | End: 2022-05-31 | Stop reason: HOSPADM

## 2022-05-31 RX ORDER — SODIUM CHLORIDE 0.9 % (FLUSH) 0.9 %
3 SYRINGE (ML) INJECTION
Status: DISCONTINUED | OUTPATIENT
Start: 2022-05-31 | End: 2022-05-31 | Stop reason: HOSPADM

## 2022-05-31 RX ORDER — CHLORHEXIDINE GLUCONATE ORAL RINSE 1.2 MG/ML
10 SOLUTION DENTAL 2 TIMES DAILY
Status: CANCELLED | OUTPATIENT
Start: 2022-05-31 | End: 2022-06-05

## 2022-05-31 RX ORDER — MORPHINE SULFATE 4 MG/ML
2 INJECTION, SOLUTION INTRAMUSCULAR; INTRAVENOUS EVERY 4 HOURS PRN
Status: CANCELLED | OUTPATIENT
Start: 2022-05-31

## 2022-05-31 RX ORDER — SCOLOPAMINE TRANSDERMAL SYSTEM 1 MG/1
1 PATCH, EXTENDED RELEASE TRANSDERMAL
Status: DISCONTINUED | OUTPATIENT
Start: 2022-05-31 | End: 2022-05-31 | Stop reason: HOSPADM

## 2022-05-31 RX ORDER — BISACODYL 10 MG
10 SUPPOSITORY, RECTAL RECTAL DAILY PRN
Status: CANCELLED | OUTPATIENT
Start: 2022-05-31

## 2022-05-31 RX ORDER — GABAPENTIN 300 MG/1
300 CAPSULE ORAL 3 TIMES DAILY
Status: DISCONTINUED | OUTPATIENT
Start: 2022-05-31 | End: 2022-05-31

## 2022-05-31 RX ORDER — DIPHENHYDRAMINE HYDROCHLORIDE 50 MG/ML
25 INJECTION INTRAMUSCULAR; INTRAVENOUS EVERY 6 HOURS PRN
Status: DISCONTINUED | OUTPATIENT
Start: 2022-05-31 | End: 2022-05-31 | Stop reason: HOSPADM

## 2022-05-31 RX ORDER — KETOROLAC TROMETHAMINE 30 MG/ML
15 INJECTION, SOLUTION INTRAMUSCULAR; INTRAVENOUS EVERY 8 HOURS PRN
Status: DISCONTINUED | OUTPATIENT
Start: 2022-05-31 | End: 2022-05-31 | Stop reason: HOSPADM

## 2022-05-31 RX ORDER — ACETAMINOPHEN 325 MG/1
650 TABLET ORAL DAILY
Status: DISCONTINUED | OUTPATIENT
Start: 2022-05-31 | End: 2022-05-31 | Stop reason: HOSPADM

## 2022-05-31 RX ORDER — ONDANSETRON 8 MG/1
8 TABLET, ORALLY DISINTEGRATING ORAL EVERY 8 HOURS PRN
Status: CANCELLED | OUTPATIENT
Start: 2022-05-31

## 2022-05-31 RX ORDER — ONDANSETRON 4 MG/1
8 TABLET, ORALLY DISINTEGRATING ORAL EVERY 8 HOURS PRN
Qty: 20 TABLET | Refills: 0 | Status: SHIPPED | OUTPATIENT
Start: 2022-05-31 | End: 2022-08-25

## 2022-05-31 RX ORDER — GABAPENTIN 300 MG/1
300 CAPSULE ORAL
Status: DISCONTINUED | OUTPATIENT
Start: 2022-05-31 | End: 2022-05-31 | Stop reason: HOSPADM

## 2022-05-31 RX ORDER — ALBUTEROL SULFATE 0.83 MG/ML
2.5 SOLUTION RESPIRATORY (INHALATION) EVERY 4 HOURS PRN
Status: DISCONTINUED | OUTPATIENT
Start: 2022-05-31 | End: 2022-05-31 | Stop reason: HOSPADM

## 2022-05-31 RX ORDER — LIDOCAINE HYDROCHLORIDE 20 MG/ML
INJECTION INTRAVENOUS
Status: DISCONTINUED | OUTPATIENT
Start: 2022-05-31 | End: 2022-05-31

## 2022-05-31 RX ORDER — ROCURONIUM BROMIDE 10 MG/ML
INJECTION, SOLUTION INTRAVENOUS
Status: DISCONTINUED | OUTPATIENT
Start: 2022-05-31 | End: 2022-05-31

## 2022-05-31 RX ORDER — MIDAZOLAM HYDROCHLORIDE 1 MG/ML
INJECTION, SOLUTION INTRAMUSCULAR; INTRAVENOUS
Status: DISCONTINUED | OUTPATIENT
Start: 2022-05-31 | End: 2022-05-31

## 2022-05-31 RX ORDER — OXYCODONE HYDROCHLORIDE 5 MG/1
5 TABLET ORAL
Status: DISCONTINUED | OUTPATIENT
Start: 2022-05-31 | End: 2022-05-31 | Stop reason: HOSPADM

## 2022-05-31 RX ORDER — ONDANSETRON 2 MG/ML
4 INJECTION INTRAMUSCULAR; INTRAVENOUS EVERY 12 HOURS PRN
Status: CANCELLED | OUTPATIENT
Start: 2022-05-31

## 2022-05-31 RX ORDER — OXYCODONE AND ACETAMINOPHEN 10; 325 MG/1; MG/1
1 TABLET ORAL EVERY 6 HOURS PRN
Qty: 28 TABLET | Refills: 0 | Status: SHIPPED | OUTPATIENT
Start: 2022-05-31 | End: 2022-06-08 | Stop reason: SDUPTHER

## 2022-05-31 RX ORDER — OXYCODONE HYDROCHLORIDE 5 MG/1
5 TABLET ORAL
Status: CANCELLED | OUTPATIENT
Start: 2022-05-31

## 2022-05-31 RX ORDER — FENTANYL CITRATE 50 UG/ML
INJECTION, SOLUTION INTRAMUSCULAR; INTRAVENOUS
Status: DISCONTINUED | OUTPATIENT
Start: 2022-05-31 | End: 2022-05-31

## 2022-05-31 RX ORDER — CHLORHEXIDINE GLUCONATE ORAL RINSE 1.2 MG/ML
10 SOLUTION DENTAL
Status: DISCONTINUED | OUTPATIENT
Start: 2022-05-31 | End: 2022-05-31 | Stop reason: HOSPADM

## 2022-05-31 RX ORDER — DOCUSATE SODIUM 100 MG/1
100 CAPSULE, LIQUID FILLED ORAL 2 TIMES DAILY
Status: CANCELLED | OUTPATIENT
Start: 2022-05-31

## 2022-05-31 RX ORDER — TRANEXAMIC ACID 100 MG/ML
1000 INJECTION, SOLUTION INTRAVENOUS
Status: COMPLETED | OUTPATIENT
Start: 2022-05-31 | End: 2022-05-31

## 2022-05-31 RX ORDER — CEFAZOLIN SODIUM 2 G/50ML
2 SOLUTION INTRAVENOUS
Status: COMPLETED | OUTPATIENT
Start: 2022-05-31 | End: 2022-05-31

## 2022-05-31 RX ORDER — GABAPENTIN 300 MG/1
300 CAPSULE ORAL 3 TIMES DAILY
Status: DISCONTINUED | OUTPATIENT
Start: 2022-05-31 | End: 2022-05-31 | Stop reason: HOSPADM

## 2022-05-31 RX ADMIN — HYDROMORPHONE HYDROCHLORIDE 0.2 MG: 2 INJECTION, SOLUTION INTRAMUSCULAR; INTRAVENOUS; SUBCUTANEOUS at 04:05

## 2022-05-31 RX ADMIN — TRANEXAMIC ACID 1000 MG: 100 INJECTION, SOLUTION INTRAVENOUS at 02:05

## 2022-05-31 RX ADMIN — SCOPOLAMINE 1 PATCH: 1.5 PATCH, EXTENDED RELEASE TRANSDERMAL at 11:05

## 2022-05-31 RX ADMIN — CEFAZOLIN SODIUM 2 G: 2 SOLUTION INTRAVENOUS at 01:05

## 2022-05-31 RX ADMIN — TRANEXAMIC ACID 1000 MG: 100 INJECTION, SOLUTION INTRAVENOUS at 01:05

## 2022-05-31 RX ADMIN — SODIUM CHLORIDE, SODIUM LACTATE, POTASSIUM CHLORIDE, AND CALCIUM CHLORIDE: .6; .31; .03; .02 INJECTION, SOLUTION INTRAVENOUS at 03:05

## 2022-05-31 RX ADMIN — FENTANYL CITRATE 100 MCG: 50 INJECTION, SOLUTION INTRAMUSCULAR; INTRAVENOUS at 01:05

## 2022-05-31 RX ADMIN — ROCURONIUM BROMIDE 50 MG: 10 INJECTION, SOLUTION INTRAVENOUS at 01:05

## 2022-05-31 RX ADMIN — SUGAMMADEX 200 MG: 100 INJECTION, SOLUTION INTRAVENOUS at 03:05

## 2022-05-31 RX ADMIN — LIDOCAINE HYDROCHLORIDE 100 MG: 20 INJECTION, SOLUTION INTRAVENOUS at 01:05

## 2022-05-31 RX ADMIN — OXYCODONE HYDROCHLORIDE 5 MG: 5 TABLET ORAL at 04:05

## 2022-05-31 RX ADMIN — CHLORHEXIDINE GLUCONATE 0.12% ORAL RINSE 10 ML: 1.2 LIQUID ORAL at 11:05

## 2022-05-31 RX ADMIN — MIDAZOLAM 2 MG: 1 INJECTION INTRAMUSCULAR; INTRAVENOUS at 01:05

## 2022-05-31 RX ADMIN — GABAPENTIN 300 MG: 300 CAPSULE ORAL at 11:05

## 2022-05-31 RX ADMIN — ROCURONIUM BROMIDE 20 MG: 10 INJECTION, SOLUTION INTRAVENOUS at 02:05

## 2022-05-31 RX ADMIN — PROPOFOL 140 MG: 10 INJECTION, EMULSION INTRAVENOUS at 01:05

## 2022-05-31 RX ADMIN — DEXAMETHASONE SODIUM PHOSPHATE 8 MG: 4 INJECTION, SOLUTION INTRAMUSCULAR; INTRAVENOUS at 01:05

## 2022-05-31 RX ADMIN — KETOROLAC TROMETHAMINE 15 MG: 30 INJECTION, SOLUTION INTRAMUSCULAR at 04:05

## 2022-05-31 RX ADMIN — ACETAMINOPHEN 650 MG: 325 TABLET ORAL at 11:05

## 2022-05-31 RX ADMIN — SODIUM CHLORIDE, SODIUM LACTATE, POTASSIUM CHLORIDE, AND CALCIUM CHLORIDE: .6; .31; .03; .02 INJECTION, SOLUTION INTRAVENOUS at 01:05

## 2022-05-31 NOTE — PLAN OF CARE
JERRY SPIVEY, PT CURRENTLY MOD FOR BED MOBILITY, TF'S, AND GAIT WITH RW.  P.T. RECOMMENDS HHPT AT D/C

## 2022-05-31 NOTE — TRANSFER OF CARE
"Anesthesia Transfer of Care Note    Patient: Merari Orosco    Procedure(s) Performed: Procedure(s) (LRB):  ARTHROPLASTY, HIP (Left)    Patient location: PACU    Anesthesia Type: general    Transport from OR: Transported from OR on room air with adequate spontaneous ventilation    Post pain: adequate analgesia    Post assessment: no apparent anesthetic complications    Post vital signs: stable    Level of consciousness: awake    Nausea/Vomiting: no nausea/vomiting    Complications: none    Transfer of care protocol was followed      Last vitals:   Visit Vitals  BP (!) 138/92   Pulse 67   Temp 36.4 °C (97.5 °F) (Temporal)   Resp 18   Ht 5' 6" (1.676 m)   Wt 94.8 kg (209 lb 1.7 oz)   SpO2 100%   Breastfeeding No   BMI 33.75 kg/m²     "

## 2022-05-31 NOTE — PT/OT/SLP EVAL
Physical Therapy Evaluation    Patient Name:  Merari Orosco   MRN:  [id number]    Recommendations:     Discharge Recommendations:  home health PT   Discharge Equipment Recommendations: none   Barriers to discharge: None    Assessment:     Merari Orosco is a 42 y.o. female admitted with a medical diagnosis of <principal problem not specified>.  She presents with the following impairments/functional limitations:  impaired functional mobilty, gait instability, impaired balance, decreased safety awareness, decreased coordination, orthopedic precautions.    Rehab Prognosis: Good; patient would benefit from acute skilled PT services to address these deficits and reach maximum level of function.    Recent Surgery: Procedure(s) (LRB):  ARTHROPLASTY, HIP (Left) Day of Surgery    Plan:     During this hospitalization, patient to be seen 3 x/week to address the identified rehab impairments via gait training, therapeutic activities, therapeutic exercises and progress toward the following goals:    · Plan of Care Expires:  06/07/22    Subjective     Chief Complaint:   Patient/Family Comments/goals:   Pain/Comfort:  · Pain Rating 1: 2/10  · Location - Side 1: Left  · Location - Orientation 1: generalized  · Location 1: hip  · Pain Addressed 1: Reposition, Distraction    Patients cultural, spiritual, Latter-day conflicts given the current situation:     Living Environment:  PT LIVES WITH MOTHER, SISTER, AND BROTHER 1 STORY HOUSE WITH 3 STEPS TO ENTER WITH RAIL, AMB INDEP COMMUNITY DISTANCES, DOES NOT DRIVE, INDEP WITH ADL'S  Prior to admission, patients level of function was INDEP.  Equipment used at home: none (PT HAS RW, QUAD CANE, BSC, REACHER AT HOME THAT SHE DID NOT USE PTA).  DME owned (not currently used): none.  Upon discharge, patient will have assistance from FAMILY.    Objective:     Communicated with NURSE SHANTEL prior to session.  Patient found supine with peripheral IV  upon PT entry to room.    General Precautions:  Standard, fall   Orthopedic Precautions:LLE weight bearing as tolerated, LLE posterior precautions   Braces: Knee immobilizer  Respiratory Status: Room air    Exams:  · Cognitive Exam:  Patient is oriented to Person, Place, Time and Situation  · Postural Exam:  Patient presented with the following abnormalities:    · -       No postural abnormalities identified  · Sensation:    · -       Intact  · RLE ROM: WNL  · RLE Strength: WNL  · LLE ROM: WFL  · LLE Strength: NT    Functional Mobility:  · Bed Mobility:     · Rolling Left:  modified independence  · Scooting: modified independence  · Supine to Sit: modified independence  · Transfers:     · Sit to Stand:  modified independence with rolling walker  · Bed to Chair: modified independence with  rolling walker  using  Step Transfer  · Gait: PT AMB 90' WITH RW THERESE, NO LOB OR SOB ON ROOM AIR, STEP THROUGH GAIT PATTERN  · Balance: GOOD    Therapeutic Activities and Exercises:   PT EDUCATED IN ROLE OF P.T. AND POC, EDUCATED IN WBAT FOR LLE AS WELL AS POSTERIOR HIP PRECAUTIONS, EDUCATED IN RW USE AND SAFETY DURING TF'S AND GAIT, EDUCATED IN LLE THEREX TO PERFORM WHILE SUPINE OR SEATED: HIP FLEX/EXT, LAQ, QUAD SET, AP'S.  PT EDUCATED IN ASC/DESC. 3 STEPS USING 1 SIDE RAIL, PT ENCOURAGED TO AMBULATE HOURLY AS WELL AS PERFORM BLE THEREX, ENCOURAGED TO UTILIZE ICE TO INCISION TO TOLERANCE.  PT AWARE TO USE KNEE IMMOBILIZER IN BED ONLY FOR LLE    AM-PAC 6 CLICK MOBILITY  Total Score:21     Patient left SEATED ON STRETCHER with all lines intact, NURSE notified and NURSE present.    GOALS:   Multidisciplinary Problems     Physical Therapy Goals        Problem: Physical Therapy    Goal Priority Disciplines Outcome Goal Variances Interventions   Physical Therapy Goal     PT, PT/OT      Description: LTG'S TO BE MET IN 7 DAYS (6-7-22)  1. PT WILL BE INDEP WITH BED MOBILITY  2. PT WILL BE INDEP WITH TF'S  3. PT WILL ' THERESE                   History:     Past Medical History:    Diagnosis Date    Anxiety     Depression     Hypertension        Past Surgical History:   Procedure Laterality Date     SECTION      x1    HIP ARTHROPLASTY Right 2022    Procedure: ARTHROPLASTY, HIP;  Surgeon: Satnam Deleon MD;  Location: [facility];  Service: Orthopedics;  Laterality: Right;    INJECTION OF JOINT Bilateral 11/10/2021    Procedure: Injection, Joint;  Surgeon: Satnam Deleon MD;  Location: [facility];  Service: General;  Laterality: Bilateral;  under fluoroscopy    WISDOM TOOTH EXTRACTION         Time Tracking:     PT Received On: 22  PT Start Time: 1630     PT Stop Time: 1653  PT Total Time (min): 23 min     Billable Minutes: Evaluation 15 and Therapeutic Activity 8    2022

## 2022-05-31 NOTE — PLAN OF CARE
[facility] ([facility])  Discharge Final Note    Primary Care Provider: YESY Stone MD    Expected Discharge Date:     Final Discharge Note (most recent)     Final Note - 05/31/22 1535        Final Note    Assessment Type --     Anticipated Discharge Disposition Home-Health Care Svc        Post-Acute Status    Post-Acute Authorization Home Health     Home Health Status Set-up Complete/Auth obtained     Discharge Delays None known at this time                 Contact Info     Satnam Deleon MD   Specialty: Orthopedic Surgery    [address] [place] [address] [address] [id number]   Phone: [phone]       Next Steps: Follow up in 2 week(s)        Pt already owns RW and has [facility] prearranged.    Zeus Guo LMSW 5/31/2022 3:37 PM

## 2022-05-31 NOTE — ANESTHESIA PROCEDURE NOTES
Intubation    Date/Time: 5/31/2022 1:23 PM  Performed by: Satnam Duarte CRNA  Authorized by: Manuel Holloway MD     Intubation:     Induction:  Intravenous    Intubated:  Postinduction    Mask Ventilation:  Easy mask    Attempts:  1    Attempted By:  CRNA    Method of Intubation:  Direct    Blade:  Art 3    Laryngeal View Grade: Grade I - full view of cords      Difficult Airway Encountered?: No      Complications:  None    Airway Device:  Oral endotracheal tube    Airway Device Size:  7.5    Placement Verified By:  Capnometry    Complicating Factors:  None    Findings Post-Intubation:  BS equal bilateral

## 2022-05-31 NOTE — ANESTHESIA RELEASE NOTE
"Anesthesia Release from PACU Note    Patient: Merari Orosco    Procedure(s) Performed: Procedure(s) (LRB):  ARTHROPLASTY, HIP (Left)    Anesthesia type: general    Post pain: Adequate analgesia    Post assessment: no apparent anesthetic complications    Last Vitals:   Visit Vitals  BP (!) 138/92   Pulse 67   Temp 36.4 °C (97.5 °F) (Temporal)   Resp 18   Ht 5' 6" (1.676 m)   Wt 94.8 kg (209 lb 1.7 oz)   SpO2 100%   Breastfeeding No   BMI 33.75 kg/m²       Post vital signs: stable    Level of consciousness: awake    Nausea/Vomiting: no nausea/no vomiting    Complications: none    Airway Patency: patent    Respiratory: unassisted    Cardiovascular: stable and blood pressure at baseline    Hydration: euvolemic  "

## 2022-05-31 NOTE — OP NOTE
[facility] ([facility])  Orthopedic Surgery  Operative Note    SUMMARY     Date of Procedure: 5/31/2022     Procedure: Procedure(s) (LRB):  ARTHROPLASTY, HIP (Left)       Surgeon(s) and Role:     * Satnam Deleon MD - Primary    Assisting Surgeon: william PALACIO    Pre-Operative Diagnosis: Avascular necrosis of bone of left hip [M87.052]  Arthritis of left hip [M16.12]    Post-Operative Diagnosis: Post-Op Diagnosis Codes:     * Avascular necrosis of bone of left hip [M87.052]     * Arthritis of left hip [M16.12]    Anesthesia: General    Injections/Meds: ISAIAS with 40 cc of injectable saline    Tourniquet time:  No tourniquet    Significant Surgical Tasks Conducted by the Assistant(s), if Applicable:  Needed to hold the extremity and multiple retractors in order to provide visualization and maneuvering of the extremity in order to be able to assess and perform surgery safely and efficiently    Complications: none     Estimated Blood Loss (EBL):  200 mL           Implants: [facility] primary hip system acetabulum size 56 with 2 screws and neutral acetabular liner, standard femur size 8.,-3/36 ceramic head, vancomycin powder 1 g     Specimens: None           Condition: Good    Disposition: PACU - hemodynamically stable.    Attestation: I performed the procedure.    Description:  Posterior approach performed for left MARCO. After administering appropriate meds patient positioned in the lateral decubitus for a posterior approach.  Operative site was prepped and draped in usual sterile fashion. Incision was made for posterior approach to the hip carried down through subcutaneous tissues.  Full-thickness skin flap raised anterior and posteriorly. Iliotibial band and gluteus fascia was split .self-retaining retractor applied.  We identified the external rotators tagged those and released them. Capsule was opened in a T-fashion and partially resected. Hip was dislocated posteriorly without difficulty.  We  marked our cut on the femoral neck and was cut with the oscillating saw.  The femoral head was measured.  Proceeded with preparing the femoral canal after we put the femoral neck retractor.  Canal Finders inserted into the femoral canal and then a cookie cutter was used to lateralize.  We sequentially broached up to the appropriate tight fit. The broach was removed.  He then directed attention to the acetabulum anterior and posterior retractors applied.  We proceeded sequentially reaming the acetabulum using the Innomed inclination angle guide at 45° of inclination.  the acetabulum was reamed out on 15-20 degrees of anteversion/45 deg inclination.. Once we had some punctate bleeding and the acetabulum  appropriate size trial was applied . All the time with multiple retractors to protect the sciatic nerve. We went ahead at that point inserted the real acetabular component in a Press-Fit mode.  Screws applied in posterior superior quadrant.  Trial acetabular insert applied. We proceeded putting a broach in the femur for trial and sequentially tried numerous head sizes.  We put the hip through 90° of flexion neutral abduction and put through maneuvers of dislocation until we felt that it is appropriate and stable. We took all the trials out . pulse lavage the wounds.  Bleeders cauterized.  Proceeded with regional block injection into the soft tissue around the hip. Appropriate acetabular insert locked in place.  The femoral component was inserted and we tried again with a trial head.  We confirmed the size and then proceeded to insert the real femoral head. Tested the hip again felt stable proceed with closing the capsule with 1.  Vicryl and then reattaching the external rotators to the greater trochanter. The iliotibial band and the gluteus fascia was closed 1.  Vicryl figure-of-eight.  Subcutaneous tissue was closed 1.  Vicryl inverted stitch.  Skin using staple gun.  Sterile dressing applied.  Tolerated procedure  well

## 2022-05-31 NOTE — PATIENT INSTRUCTIONS
Patient instructions for joint replacement    After surgery at home  1.  Take Tylenol 650 mg 3 times a day for 14 days then as needed for mild pain  2.  Take gabapentin 300 mg nightly for 6 weeks  3.  Take Celebrex 200 mg or meloxicam 15 mg daily for 6 weeks unless having cardiac issues to take for 2 weeks only  4.  Must take aspirin 81 mg twice a day for 6 weeks unless you are on other blood thinners/Plavix, Eliquis, Xarelto, Coumadin etc  5.  Must wear compressive stockings for 6 weeks minimum to decrease the risk of blood clot and swelling  6.  Hydrocodone/Norco or oxycodone/Percocet will be prescribed to take every 6 hr as needed for breakthrough pain  7.  May apply ice on the knee to help with decreasing pain  8.  Keep wound dry for 2 weeks until stitches/staples removed than you will be allowed to shower in 24 hr and get the wound wet.  No soaking of the wound in the tub or swimming for 4 weeks after surgery  9.  No driving for 4 weeks unless specified by physician  10.  Avoid touching the wound or surrounding area /at least 2 inches on each side of the surgical incision until staples are removed/stitches   11.  May change the surgical dressing if extremely bloody or has drainage on it. May clean the wound with peroxide or Betadine and apply sterile dressing and Ace wrap over it  12.  Leave hospital dressing on for 3 days then may change by applying sterile 4 x 4 and Ace wrap after cleaning with Betadine or peroxide.  May leave this dressing change to home health nurses

## 2022-05-31 NOTE — DISCHARGE SUMMARY
[facility] ([facility])  Discharge Note  Short Stay    Procedure(s) (LRB):  ARTHROPLASTY, HIP (Left)    OUTCOME: Patient tolerated treatment/procedure well without complication and is now ready for discharge.    DISPOSITION: Home-Health Care [facility]    FINAL DIAGNOSIS:  <principal problem not specified>  Arthritis of left hip  FOLLOWUP: In clinic    DISCHARGE INSTRUCTIONS:  No discharge procedures on file.     TIME SPENT ON DISCHARGE:  25 minutes

## 2022-05-31 NOTE — ANESTHESIA POSTPROCEDURE EVALUATION
Anesthesia Post Evaluation    Patient: Merari Orosco    Procedure(s) Performed: Procedure(s) (LRB):  ARTHROPLASTY, HIP (Left)    Final Anesthesia Type: general      Patient location during evaluation: PACU  Patient participation: Yes- Able to Participate  Level of consciousness: awake  Post-procedure vital signs: reviewed and stable  Pain management: adequate  Airway patency: patent    PONV status at discharge: No PONV  Anesthetic complications: no      Cardiovascular status: hemodynamically stable  Respiratory status: unassisted  Hydration status: euvolemic  Follow-up not needed.          Vitals Value Taken Time   /89 05/31/22 1620   Temp 36.6 °C (97.8 °F) 05/31/22 1615   Pulse 69 05/31/22 1627   Resp 47 05/31/22 1627   SpO2 97 % 05/31/22 1627   Vitals shown include unvalidated device data.      Event Time   Out of Recovery 16:29:16         Pain/Tricia Score: Pain Rating Prior to Med Admin: 6 (5/31/2022  4:15 PM)  Tricia Score: 10 (5/31/2022  4:25 PM)

## 2022-05-31 NOTE — ANESTHESIA PREPROCEDURE EVALUATION
05/31/2022  Merari Orosco is a 42 y.o., female.      Pre-op Assessment    I have reviewed the Patient Summary Reports.    I have reviewed the NPO Status.   I have reviewed the Medications.     Review of Systems  Anesthesia Hx:  No problems with previous Anesthesia  Denies Family Hx of Anesthesia complications.   Denies Personal Hx of Anesthesia complications.   Hematology/Oncology:     Oncology Normal    -- Anemia: Hematology Comments: Platelets 122K    EENT/Dental:EENT/Dental Normal   Cardiovascular:   Hypertension    Pulmonary:  Pulmonary Normal    Renal/:  Renal/ Normal     Hepatic/GI:  Hepatic/GI Normal    Musculoskeletal:   Arthritis     Neurological:  Neurology Normal    Endocrine:  Endocrine Normal  Obesity / BMI > 30  Dermatological:  Skin Normal    Psych:   anxiety depression          Physical Exam  General: Alert and Oriented    Airway:  Mallampati: II   Mouth Opening: Normal  TM Distance: Normal  Tongue: Normal  Neck ROM: Normal ROM        Anesthesia Plan  Type of Anesthesia, risks & benefits discussed:    Anesthesia Type: Gen ETT, Gen Supraglottic Airway  Post Op Pain Control Plan: multimodal analgesia  Induction:  IV  Informed Consent: Informed consent signed with the Patient and all parties understand the risks and agree with anesthesia plan.  All questions answered.   ASA Score: 3  Day of Surgery Review of History & Physical: I have interviewed and examined the patient. I have reviewed the patient's H&P dated:     Ready For Surgery From Anesthesia Perspective.     .

## 2022-05-31 NOTE — H&P
Subjective:     Patient ID: Merari Orosco is a 42 y.o. female.    Chief Complaint: No chief complaint on file.   Pain in right hip and left hip  HPI:  02/01/2021  40-year-old been having pain in both hips for more than a year now.  She was not diagnosed by sickle cell or lupus or any other diseases and was seen by Rheumatology.  She does take Fosamax.  She had MRI CT scan x-rays that I reviewed personally as well as was told to the patient that she has collapse of the femoral head.  She received a right hip steroid injection by Dr. Walden and now she stain that had helped quite a bit.  The left hip now hurts more than the right side.  She had not taking any Tylenol or any 0 NSAIDs over-the-counter.  She was seen by Rheumatology Department and she stated no blood work was done.  Her pain is around 2-4/10.  She is ambulating without any assistive devices.  She states that she cannot spread the legs to get it in and out of the car is difficult as well going to the bathroom to clean herself.  She does live with family like mother and multiple siblings.  She does have a 20-year-old son.    Denies any fever or chills or shortness of breath or difficulty with chewing or swallowing loss of bowel bladder control or blurry vision or double vision loss of sense smell or taste or or numbness or tingling in the legs or low back      10/25/2021  Bilateral hip avascular necrosis.  Things are getting worse.  Her pain is 9/10.  The steroid injections given by Dr. Walden last 4 months.  She wanted repeated however he she was told to come and see us.  She wants to hold off if she can on having hip replacements at least till after the holidays.  She had tried Mobic and Naprosyn and ibuprofen etcetera.  Her pain is 9/10.  Having difficulty getting around.  She said she will be interested in having her hip injected again since they help.  She does take Tylenol.  Requesting pain medications.  I did tell her I do not prescribe pain  medications/narcotics except after surgery.    No fever no chills no shortness of breath or difficulty with chewing or swallowing loss of bowel bladder control.    2021  Bilateral hip avascular necrosis with resultant arthritis.  She feels the right leg shorter than the left.  Steroid injection seems to help a little bit.  She takes tramadol and nabumetone.  We discussed her total hip replacement today.  We asked and answered a lot of questions for her she wants to do both of them at the same time I did tell her 0 I usually stage does I do not perform bilateral joints because the risk of mortality is high.  She expressed understanding.  I did tell her cannot make both hips to be the same because they may differ in size.  She might have some difference in leg length.  We went over total hip replacement in details.  She wants to proceed after the holidays.  She is ambulating using a cane today but she said the injections did seem to help a little bit.  No fever no chills no shortness of breath or difficulty with chewing swallowing loss of bowel bladder control    2022  Right MARCO 2022..  Her right hip is not hurting what is hurting her the most is the left hip.  She would like to have the left hip replaced.  She is a little bit short on that left side and she notices it.  She is using a cane to ambulate she [place] [facility] .  Right hip pain scale 0/10 left hip hurts her more 6/10  Past Medical History:   Diagnosis Date    Anxiety     Depression     Hypertension      Past Surgical History:   Procedure Laterality Date     SECTION      x1    HIP ARTHROPLASTY Right 2022    Procedure: ARTHROPLASTY, HIP;  Surgeon: Satnam Deleon MD;  Location: [facility] OR;  Service: Orthopedics;  Laterality: Right;    INJECTION OF JOINT Bilateral 11/10/2021    Procedure: Injection, Joint;  Surgeon: Satnam Deleon MD;  Location: [facility] OR;  Service: General;   Laterality: Bilateral;  under fluoroscopy    WISDOM TOOTH EXTRACTION       Family History   Problem Relation Age of Onset    Colon cancer Father     Diabetes Father     Hypertension Father     Hypertension Mother     Diabetes Sister     Hypertension Sister     Breast cancer Neg Hx     Cancer Neg Hx     Miscarriages / Stillbirths Neg Hx     Ovarian cancer Neg Hx      labor Neg Hx     Stroke Neg Hx      Social History     Socioeconomic History    Marital status: Single    Number of children: 1   Tobacco Use    Smoking status: Never Smoker    Smokeless tobacco: Never Used   Substance and Sexual Activity    Alcohol use: Yes     Comment: socially    Drug use: No    Sexual activity: Yes     Partners: Male     Birth control/protection: Condom     Social Determinants of Health     Financial Resource Strain: Low Risk     Difficulty of Paying Living Expenses: Not very hard   Food Insecurity: No Food Insecurity    Worried About Running Out of Food in the Last Year: Never true    Ran Out of Food in the Last Year: Never true   Transportation Needs: Unmet Transportation Needs    Lack of Transportation (Medical): Yes    Lack of Transportation (Non-Medical): Yes   Physical Activity: Inactive    Days of Exercise per Week: 0 days    Minutes of Exercise per Session: 0 min   Stress: Stress Concern Present    Feeling of Stress : To some extent   Social Connections: Unknown    Frequency of Communication with Friends and Family: More than three times a week    Frequency of Social Gatherings with Friends and Family: Twice a week    Active Member of Clubs or Organizations: No    Attends Club or Organization Meetings: Never    Marital Status:    Housing Stability: Low Risk     Unable to Pay for Housing in the Last Year: No    Number of Places Lived in the Last Year: 1    Unstable Housing in the Last Year: No     Medication List with Changes/Refills   Current Medications    ALPRAZOLAM  (XANAX) 0.25 MG TABLET    Take 1 tablet (0.25 mg total) by mouth daily as needed for Anxiety.    BLOOD PRESSURE MONITOR (IHEALTH EASE) LG ARM SIZE (OP)    by Other route as needed for High Blood Pressure.    CHLORTHALIDONE (HYGROTEN) 25 MG TAB    Take 1 tablet by mouth once daily.    COVID-19 VAC, JOSUE,PFIZER,,PF, (PFIZER COVID-19 JOSUE VACCN,PF,) 30 MCG/0.3 ML INJECTION    Inject into the muscle.    ERGOCALCIFEROL (ERGOCALCIFEROL) 50,000 UNIT CAP    Take 1 capsule (50,000 Units total) by mouth every 7 days.    METOPROLOL SUCCINATE (TOPROL-XL) 50 MG 24 HR TABLET    Take 1 tablet (50 mg total) by mouth once daily.    OXYCODONE-ACETAMINOPHEN (PERCOCET)  MG PER TABLET    Take 1 tablet by mouth every 8 (eight) hours as needed for Pain.    OXYCODONE-ACETAMINOPHEN (PERCOCET) 5-325 MG PER TABLET    Take 1 tablet by mouth every 6 (six) hours as needed for Pain.     Review of patient's allergies indicates:  No Known Allergies  Review of Systems   Constitutional: Negative for decreased appetite.   HENT: Negative for tinnitus.    Eyes: Negative for double vision.   Cardiovascular: Negative for chest pain.   Respiratory: Negative for wheezing.    Hematologic/Lymphatic: Negative for bleeding problem.   Skin: Negative for dry skin.   Musculoskeletal: Positive for joint pain, joint swelling and stiffness. Negative for back pain, gout and neck pain.   Gastrointestinal: Negative for abdominal pain.   Genitourinary: Negative for bladder incontinence.   Neurological: Negative for numbness, paresthesias and sensory change.   Psychiatric/Behavioral: Negative for altered mental status.       Objective:   Body mass index is 35.02 kg/m².  There were no vitals filed for this visit.       General    Constitutional: She is oriented to person, place, and time. She appears well-developed.   HENT:   Head: Atraumatic.   Eyes: EOM are normal.   Cardiovascular: Normal rate.    Pulmonary/Chest: Effort normal.   Neurological: She is alert and  oriented to person, place, and time.   Psychiatric: Judgment normal.            Gait with slight limp  Cervical rotation is functional  Bilateral upper extremity neurovascularly intact.  Radial ulnar pulses 2+.  Strength is 5/5.  Sensory intact to touch.  Negative Tinel  Lumbar without discomfort  Pelvis is level  Right hip surgical scar healed well.  Passive range of motion with minimal discomfort if any.  Excellent motion.  Still slightly weak with musculature around  Left hip with severely limited range of motion.  Internal rotation 5° external rotation 10° with around 5° flexion contracture Pelvis tilting with motion.  Slightly weak hip flexors.  Quads and hamstrings ankle extensors and flexors inverters and everters are all 5/5  Bilateral knees full motion no pain in the knee joints.  No swelling no effusion.  Collaterals and cruciates are stable.  Negative Ino's.  Calves are soft nontender  DP 1+ PT 1+  Skin is warm to touch no obvious lesions    Relevant imaging results reviewed and interpreted by me, discussed with the patient and / or family today   X-ray and electronic record showing right total hip arthroplasty in excellent alignment performed 04/04/2021.  There is severe arthritis and collapse of the femoral head on the left side  X-ray bilateral hips 10/25/21 reviewed.  There is extensive cystic changes and loss of joint space both hips which is a little bit more progressed since last x-ray.  Avascular necrosis with resulting end-stage arthritis.  There is sclerosis of the femoral heads.   X-ray and electronic record bilateral hips with avascular necrosis of both femoral heads with 2-3 mm collapse on both of him.  CT scan showing collapse  MRI showing large lesions on both femoral heads    Assessment:     Encounter Diagnoses   Name Primary?    S/P total right hip arthroplasty Yes    Avascular necrosis of bone of left hip     Arthritis of left hip         Plan:   Arthritis of left hip  -     Case  Request Operating Room: ARTHROPLASTY, HIP  -     Place in Outpatient; Standing  -     Vital signs; Standing  -     Insert peripheral IV; Standing  -     Surgeon requests no aspirin,enoxaparin,warfarin,clopidogrel,prasugrel or dabigatran; Standing  -     Clip and Prep Left Hip; Standing  -     Foot pump; send to OR with patient; Standing  -     Cleanse with Chlorhexidine (CHG); Standing  -     Apply Nozin; Standing  -     Diet NPO; Standing  -     Place TAZ hose; Standing  -     Place sequential compression device; Standing  -     Chlorohexidine Gluconate Bath; Standing  -     Nasal ; Standing    Other orders  -     0.9%  NaCl infusion  -     IP VTE LOW RISK PATIENT; Standing  -     cefazolin (ANCEF) 2 gram in dextrose 5% 50 mL IVPB (premix)  -     chlorhexidine 0.12 % solution 10 mL  -     dexamethasone injection 8 mg  -     tranexamic acid (CYKLOKAPRON) 1,000 mg in sodium chloride 0.9 % 100 mL (ready to mix system)  -     gabapentin capsule 300 mg  -     acetaminophen tablet 650 mg         There are no outpatient Patient Instructions on file for this admission. Patient stated that was not diagnosed with sickle cell or lupus or Gaucher's disease.  We are going to consider this idiopathic.  She already been through Rheumatology .  She is already been am going to physical therapy at [facility] [place]  Patient is not a candidate for core decompression and bone grafting it is a little bit too late with the collapse.  Not much treatment except symptomatic Marco Antonio until that fails and then she would need total hip replacements.  She got good relief from the intra-articular injection of steroid given by Dr. Walden will refer back to Dr. Walden for injection of the left hip.  We will start her on meloxicam and Tylenol to take on as-needed basis.  She also tried naproxen and meloxicam see cam and ibuprofen and they have stopped working right now.  Brochure about total hip would be given.    I did discuss repeat  injection of steroid in the pros and cons of infection and neurovascular compromise that could be a problem.  However if the help and will last her at least 3 months then she can have her surgeries/total hip replacement after the holidays.  I did tell her x-rays have shown progression of the disease in that most likely she is going to end up needing total hip replacements in both hips sooner than later.  Long discussion about injections in the risks involved the possibility of making things worst as well as increasing blood pressure heart rate and blood sugar level.    Left MARCO  Procedure, common risks and benefits,alternatives discussed in details.All questions answered.Patient expressed understanding.Patient instructed to call for any questions that could arise in the future.    Most common Risks:  Infection  Leg-length discrepancy  Dislocation  Neuro-vascular injury ( resulting in loss of motor and sensory functions)  Pain  Blood clot  Fat clot  Loss of motion  Fracture of bone  Failure of procedure to achieve its intended purpose  Failure of hardware  Non-union or mal-union of bone  Malalignment  Death    Disclaimer: This note was prepared using a voice recognition system and is likely to have sound alike errors within the text.

## 2022-06-01 PROCEDURE — G0180 PR HOME HEALTH MD CERTIFICATION: ICD-10-PCS | Mod: ,,, | Performed by: ORTHOPAEDIC SURGERY

## 2022-06-01 PROCEDURE — G0180 MD CERTIFICATION HHA PATIENT: HCPCS | Mod: ,,, | Performed by: ORTHOPAEDIC SURGERY

## 2022-06-01 RX ORDER — ALPRAZOLAM 0.25 MG/1
0.25 TABLET ORAL DAILY PRN
Qty: 30 TABLET | Refills: 0 | OUTPATIENT
Start: 2022-06-01 | End: 2022-07-01

## 2022-06-01 NOTE — TELEPHONE ENCOUNTER
LAST ENCOUNTER WITH ME:  1/30/2020   NEXT APPOINTMENT WITH ME: Visit date not found    Date Time Provider   6/9/2022  1:00 PM Chelsie Saucedo PA-C   6/16/2022 10:20 AM PIA Cantu   7/12/2022 10:00 AM Nadia Huitron MD   7/14/2022  9:45 AM ONLH XR1-DR   7/14/2022 10:20 AM PIA Cantu   8/25/2022 11:20 AM Satnam Deleon MD      CONCERNS:  It has been over 2 years since I last treated her.  She is overdue important health-maintenance interventions and a follow-up appointment with me.  This medicine is a controlled drug regulated by the [employer]. An appointment is required for additional refills.  She is concurrently on opioid analgesics.     To Chelsie Saucedo PA-C and Team:     I did not approve this refill request for the reasons listed above.   Merari has upcoming appointment with you (listed above).   Would you please close care gaps as possible and schedule Merari an appointment with me?    Thank you for your help caring for our patients!    -LM    HEALTH MAINTENANCE INTERVENTIONS - DUE OR DUE SOON  Health Maintenance Due   Topic Date Due    Sign Pain Contract  Never done    Complete Opioid Risk Tool  Never done    Urine Drug Screen  Never done    Naloxone Prescription  Never done    Mammogram  Never done     REFILL NOT APPROVED  Requested Prescriptions     Refused Prescriptions Disp Refills    ALPRAZolam (XANAX) 0.25 MG tablet 30 tablet 0     Sig: Take 1 tablet (0.25 mg total) by mouth daily as needed for Anxiety.     Refused By: YESY BURROWS     Reason for Refusal: Patient needs an appointment    #LMRX

## 2022-06-03 VITALS
BODY MASS INDEX: 33.61 KG/M2 | RESPIRATION RATE: 17 BRPM | HEIGHT: 66 IN | DIASTOLIC BLOOD PRESSURE: 89 MMHG | HEART RATE: 78 BPM | SYSTOLIC BLOOD PRESSURE: 133 MMHG | TEMPERATURE: 98 F | WEIGHT: 209.13 LBS | OXYGEN SATURATION: 94 %

## 2022-06-09 ENCOUNTER — OFFICE VISIT (OUTPATIENT)
Dept: INTERNAL MEDICINE | Facility: CLINIC | Age: 42
End: 2022-06-09
Payer: MEDICAID

## 2022-06-09 VITALS
SYSTOLIC BLOOD PRESSURE: 132 MMHG | WEIGHT: 211.63 LBS | HEART RATE: 115 BPM | BODY MASS INDEX: 34.01 KG/M2 | HEIGHT: 66 IN | OXYGEN SATURATION: 97 % | DIASTOLIC BLOOD PRESSURE: 88 MMHG

## 2022-06-09 DIAGNOSIS — F41.1 GENERALIZED ANXIETY DISORDER: Chronic | ICD-10-CM

## 2022-06-09 DIAGNOSIS — E78.00 PURE HYPERCHOLESTEROLEMIA: ICD-10-CM

## 2022-06-09 DIAGNOSIS — I10 ESSENTIAL HYPERTENSION: Primary | ICD-10-CM

## 2022-06-09 DIAGNOSIS — E55.9 VITAMIN D DEFICIENCY: ICD-10-CM

## 2022-06-09 DIAGNOSIS — F33.1 MAJOR DEPRESSIVE DISORDER, RECURRENT, MODERATE: Chronic | ICD-10-CM

## 2022-06-09 PROBLEM — Z01.810 PREOP CARDIOVASCULAR EXAM: Status: RESOLVED | Noted: 2022-05-30 | Resolved: 2022-06-09

## 2022-06-09 PROCEDURE — 3008F BODY MASS INDEX DOCD: CPT | Mod: CPTII,,, | Performed by: PHYSICIAN ASSISTANT

## 2022-06-09 PROCEDURE — 99214 OFFICE O/P EST MOD 30 MIN: CPT | Mod: S$PBB,,, | Performed by: PHYSICIAN ASSISTANT

## 2022-06-09 PROCEDURE — 99213 OFFICE O/P EST LOW 20 MIN: CPT | Mod: PBBFAC | Performed by: PHYSICIAN ASSISTANT

## 2022-06-09 PROCEDURE — 3008F PR BODY MASS INDEX (BMI) DOCUMENTED: ICD-10-PCS | Mod: CPTII,,, | Performed by: PHYSICIAN ASSISTANT

## 2022-06-09 PROCEDURE — 3079F PR MOST RECENT DIASTOLIC BLOOD PRESSURE 80-89 MM HG: ICD-10-PCS | Mod: CPTII,,, | Performed by: PHYSICIAN ASSISTANT

## 2022-06-09 PROCEDURE — 3079F DIAST BP 80-89 MM HG: CPT | Mod: CPTII,,, | Performed by: PHYSICIAN ASSISTANT

## 2022-06-09 PROCEDURE — 99214 PR OFFICE/OUTPT VISIT, EST, LEVL IV, 30-39 MIN: ICD-10-PCS | Mod: S$PBB,,, | Performed by: PHYSICIAN ASSISTANT

## 2022-06-09 PROCEDURE — 99999 PR PBB SHADOW E&M-EST. PATIENT-LVL III: CPT | Mod: PBBFAC,,, | Performed by: PHYSICIAN ASSISTANT

## 2022-06-09 PROCEDURE — 3075F SYST BP GE 130 - 139MM HG: CPT | Mod: CPTII,,, | Performed by: PHYSICIAN ASSISTANT

## 2022-06-09 PROCEDURE — 1159F MED LIST DOCD IN RCRD: CPT | Mod: CPTII,,, | Performed by: PHYSICIAN ASSISTANT

## 2022-06-09 PROCEDURE — 3075F PR MOST RECENT SYSTOLIC BLOOD PRESS GE 130-139MM HG: ICD-10-PCS | Mod: CPTII,,, | Performed by: PHYSICIAN ASSISTANT

## 2022-06-09 PROCEDURE — 99999 PR PBB SHADOW E&M-EST. PATIENT-LVL III: ICD-10-PCS | Mod: PBBFAC,,, | Performed by: PHYSICIAN ASSISTANT

## 2022-06-09 PROCEDURE — 1159F PR MEDICATION LIST DOCUMENTED IN MEDICAL RECORD: ICD-10-PCS | Mod: CPTII,,, | Performed by: PHYSICIAN ASSISTANT

## 2022-06-09 RX ORDER — CHLORTHALIDONE 25 MG/1
TABLET ORAL
COMMUNITY
Start: 2022-06-01 | End: 2022-06-09 | Stop reason: SDUPTHER

## 2022-06-09 RX ORDER — CHLORTHALIDONE 25 MG/1
TABLET ORAL DAILY
Qty: 30 TABLET | Refills: 3 | Status: SHIPPED | OUTPATIENT
Start: 2022-06-09 | End: 2022-12-07 | Stop reason: SDUPTHER

## 2022-06-09 RX ORDER — OXYCODONE AND ACETAMINOPHEN 10; 325 MG/1; MG/1
1 TABLET ORAL EVERY 6 HOURS PRN
Qty: 28 TABLET | Refills: 0 | Status: SHIPPED | OUTPATIENT
Start: 2022-06-09 | End: 2022-06-21 | Stop reason: SDUPTHER

## 2022-06-09 RX ORDER — OXYCODONE AND ACETAMINOPHEN 10; 325 MG/1; MG/1
TABLET ORAL
COMMUNITY
Start: 2022-05-31 | End: 2022-06-21

## 2022-06-09 RX ORDER — METOPROLOL SUCCINATE 50 MG/1
50 TABLET, EXTENDED RELEASE ORAL DAILY
Qty: 90 TABLET | Refills: 3 | Status: SHIPPED | OUTPATIENT
Start: 2022-06-09 | End: 2022-12-15 | Stop reason: DRUGHIGH

## 2022-06-09 RX ORDER — ALPRAZOLAM 0.25 MG/1
0.25 TABLET ORAL DAILY PRN
Qty: 15 TABLET | Refills: 0 | Status: SHIPPED | OUTPATIENT
Start: 2022-06-09 | End: 2022-08-25

## 2022-06-09 RX ORDER — ERGOCALCIFEROL 1.25 MG/1
50000 CAPSULE ORAL
Qty: 12 CAPSULE | Refills: 1 | Status: SHIPPED | OUTPATIENT
Start: 2022-06-09 | End: 2022-09-22

## 2022-06-09 NOTE — PROGRESS NOTES
Subjective:      Patient ID: Merari Orosco is a 42 y.o. female.    Chief Complaint: Annual Exam and Medication Refill    HPI   Ms. Orosco is here today for a follow up and refill on her medications. Blood pressure stable and controlled on current meds.   Takes xanax as needed for prn anxiety. Failed treatment with multiple SSRIs and wellbutrin in the past due to SE and bad dreams. Typically needs to take the xanax once a week.     Currently on pain medication but willing to stop taking it to get back on the xanax. Pt reports that her current pain can be controlled with tylenol.     Takes vitamin D once weekly for vitamin D deficiency.   Blood pressure stable and controlled on metoprolol and chlorthalidone.   Patient Active Problem List   Diagnosis    Essential hypertension    Presence of IUD    Other fatigue    Major depressive disorder, recurrent, moderate    Generalized anxiety disorder    Class 1 obesity due to excess calories with serious comorbidity and body mass index (BMI) of 33.0 to 33.9 in adult    Right hip pain    Decreased range of right hip movement    Stiffness of right hip joint    Weakness of right hip    Altered gait    Difficulty navigating stairs    S/P total right hip arthroplasty    Acute right hip pain    Decreased strength, endurance, and mobility    Pure hypercholesterolemia       Current Outpatient Medications:     blood pressure monitor (IHEALTH EASE) LG arm size (OP), by Other route as needed for High Blood Pressure., Disp: 1 each, Rfl: 0    ondansetron (ZOFRAN-ODT) 4 MG TbDL, Take 2 tablets (8 mg total) by mouth every 8 (eight) hours as needed., Disp: 20 tablet, Rfl: 0    oxyCODONE-acetaminophen (PERCOCET)  mg per tablet, Take 1 tablet by mouth every 6 (six) hours as needed for Pain., Disp: 28 tablet, Rfl: 0    oxyCODONE-acetaminophen (PERCOCET)  mg per tablet, 1 tablet EVERY 6 HOURS (route: oral), Disp: , Rfl:     vitamin D3-folic acid 125 mcg (5,000  unit)-1 mg Tab, 1 capsule WEEKLY (route: oral), Disp: , Rfl:     ALPRAZolam (XANAX) 0.25 MG tablet, Take 1 tablet (0.25 mg total) by mouth daily as needed for Anxiety., Disp: 15 tablet, Rfl: 0    chlorthalidone (HYGROTEN) 25 MG Tab, Take 1 tablet by mouth once daily., Disp: 30 tablet, Rfl: 3    ergocalciferol (ERGOCALCIFEROL) 50,000 unit Cap, Take 1 capsule (50,000 Units total) by mouth every 7 days., Disp: 12 capsule, Rfl: 1    metoprolol succinate (TOPROL-XL) 50 MG 24 hr tablet, Take 1 tablet (50 mg total) by mouth once daily., Disp: 90 tablet, Rfl: 3  No current facility-administered medications for this visit.    Facility-Administered Medications Ordered in Other Visits:     0.9%  NaCl infusion, , Intravenous, Continuous, Satnam Deleon MD    acetaminophen tablet 650 mg, 650 mg, Oral, Q4H PRN, Satnam Deleon MD, 650 mg at 02/09/22 0921    chlorhexidine 0.12 % solution 10 mL, 10 mL, Mouth/Throat, On Call Procedure, Satnam Deleon MD, 10 mL at 02/09/22 0922  Health Maintenance Due   Topic Date Due    Sign Pain Contract  Never done    Complete Opioid Risk Tool  Never done    Mammogram  Never done       Review of Systems   Constitutional: Positive for activity change. Negative for appetite change, chills, diaphoresis, fatigue, fever and unexpected weight change.   HENT: Negative.  Negative for congestion, hearing loss, postnasal drip, rhinorrhea, sore throat, trouble swallowing and voice change.    Eyes: Negative.  Negative for discharge and visual disturbance.   Respiratory: Negative.  Negative for cough, choking, chest tightness, shortness of breath and wheezing.    Cardiovascular: Negative for chest pain, palpitations and leg swelling.   Gastrointestinal: Negative for abdominal distention, abdominal pain, blood in stool, constipation, diarrhea, nausea and vomiting.   Endocrine: Negative for cold intolerance, heat intolerance, polydipsia and polyuria.   Genitourinary: Negative.   "Negative for difficulty urinating, dysuria, frequency, hematuria and menstrual problem.   Musculoskeletal: Negative for arthralgias, back pain, gait problem, joint swelling, myalgias and neck pain.   Skin: Negative for color change, pallor, rash and wound.   Neurological: Negative for dizziness, tremors, weakness, light-headedness, numbness and headaches.   Hematological: Negative for adenopathy.   Psychiatric/Behavioral: Positive for dysphoric mood. Negative for behavioral problems, confusion, self-injury, sleep disturbance and suicidal ideas. The patient is not nervous/anxious.      Objective:   /88 (BP Location: Right arm, Patient Position: Sitting, BP Method: Large (Manual))   Pulse (!) 115   Ht 5' 6" (1.676 m)   Wt 96 kg (211 lb 10.3 oz)   SpO2 97%   BMI 34.16 kg/m²     Physical Exam  Vitals and nursing note reviewed.   Constitutional:       General: She is not in acute distress.     Appearance: Normal appearance. She is well-developed. She is not ill-appearing, toxic-appearing or diaphoretic.   HENT:      Head: Normocephalic and atraumatic.      Right Ear: External ear normal.      Left Ear: External ear normal.      Nose: Nose normal.   Eyes:      General: No scleral icterus.        Right eye: No discharge.         Left eye: No discharge.      Conjunctiva/sclera: Conjunctivae normal.      Pupils: Pupils are equal, round, and reactive to light.   Neck:      Thyroid: No thyromegaly.      Vascular: Normal carotid pulses. No carotid bruit or JVD.   Cardiovascular:      Rate and Rhythm: Normal rate and regular rhythm.      Heart sounds: Normal heart sounds. No murmur heard.    No friction rub. No gallop.   Pulmonary:      Effort: Pulmonary effort is normal. No accessory muscle usage or respiratory distress.      Breath sounds: Normal breath sounds. No wheezing or rales.   Chest:      Chest wall: No tenderness.   Abdominal:      General: Bowel sounds are normal. There is no distension.      Palpations: " Abdomen is soft. There is no mass.      Tenderness: There is no abdominal tenderness. There is no guarding or rebound.   Musculoskeletal:         General: No tenderness or deformity. Normal range of motion.      Cervical back: Normal range of motion and neck supple. No edema.   Lymphadenopathy:      Cervical: No cervical adenopathy.   Skin:     General: Skin is warm and dry.      Coloration: Skin is not pale.      Findings: No erythema or rash.      Nails: There is no clubbing.   Neurological:      Mental Status: She is alert and oriented to person, place, and time.      Cranial Nerves: No cranial nerve deficit.      Sensory: No sensory deficit.      Motor: No abnormal muscle tone.      Coordination: Coordination normal.      Deep Tendon Reflexes: Reflexes are normal and symmetric. Reflexes normal.   Psychiatric:         Behavior: Behavior normal. Behavior is cooperative.         Thought Content: Thought content normal.         Judgment: Judgment normal.           Assessment:     1. Essential hypertension    2. Pure hypercholesterolemia    3. Major depressive disorder, recurrent, moderate    4. Generalized anxiety disorder    5. Vitamin D deficiency      Plan:   Essential hypertension  -     metoprolol succinate (TOPROL-XL) 50 MG 24 hr tablet; Take 1 tablet (50 mg total) by mouth once daily.  Dispense: 90 tablet; Refill: 3  -     chlorthalidone (HYGROTEN) 25 MG Tab; Take 1 tablet by mouth once daily.  Dispense: 30 tablet; Refill: 3  -     CBC Auto Differential; Future; Expected date: 12/09/2022  -     Comprehensive Metabolic Panel; Future; Expected date: 12/09/2022    Pure hypercholesterolemia  -     Lipid Panel; Future; Expected date: 12/09/2022    Major depressive disorder, recurrent, moderate    Generalized anxiety disorder  -     ALPRAZolam (XANAX) 0.25 MG tablet; Take 1 tablet (0.25 mg total) by mouth daily as needed for Anxiety.  Dispense: 15 tablet; Refill: 0  -no pain medication while on xanax. Risks  discussed in detail.     Vitamin D deficiency  -     ergocalciferol (ERGOCALCIFEROL) 50,000 unit Cap; Take 1 capsule (50,000 Units total) by mouth every 7 days.  Dispense: 12 capsule; Refill: 1    -labs in 6 months with follow up with Dr. Stone      Follow up in about 3 months (around 9/9/2022), or if symptoms worsen or fail to improve.

## 2022-06-16 ENCOUNTER — OFFICE VISIT (OUTPATIENT)
Dept: ORTHOPEDICS | Facility: CLINIC | Age: 42
End: 2022-06-16
Payer: MEDICAID

## 2022-06-16 VITALS — HEIGHT: 66 IN | WEIGHT: 211 LBS | BODY MASS INDEX: 33.91 KG/M2

## 2022-06-16 DIAGNOSIS — Z96.642 S/P TOTAL LEFT HIP ARTHROPLASTY: Primary | ICD-10-CM

## 2022-06-16 PROCEDURE — 1159F PR MEDICATION LIST DOCUMENTED IN MEDICAL RECORD: ICD-10-PCS | Mod: CPTII,,, | Performed by: PHYSICIAN ASSISTANT

## 2022-06-16 PROCEDURE — 3008F PR BODY MASS INDEX (BMI) DOCUMENTED: ICD-10-PCS | Mod: CPTII,,, | Performed by: PHYSICIAN ASSISTANT

## 2022-06-16 PROCEDURE — 3008F BODY MASS INDEX DOCD: CPT | Mod: CPTII,,, | Performed by: PHYSICIAN ASSISTANT

## 2022-06-16 PROCEDURE — 99999 PR PBB SHADOW E&M-EST. PATIENT-LVL IV: CPT | Mod: PBBFAC,,, | Performed by: PHYSICIAN ASSISTANT

## 2022-06-16 PROCEDURE — 99024 PR POST-OP FOLLOW-UP VISIT: ICD-10-PCS | Mod: ,,, | Performed by: PHYSICIAN ASSISTANT

## 2022-06-16 PROCEDURE — 99999 PR PBB SHADOW E&M-EST. PATIENT-LVL IV: ICD-10-PCS | Mod: PBBFAC,,, | Performed by: PHYSICIAN ASSISTANT

## 2022-06-16 PROCEDURE — 1160F RVW MEDS BY RX/DR IN RCRD: CPT | Mod: CPTII,,, | Performed by: PHYSICIAN ASSISTANT

## 2022-06-16 PROCEDURE — 99214 OFFICE O/P EST MOD 30 MIN: CPT | Mod: PBBFAC | Performed by: PHYSICIAN ASSISTANT

## 2022-06-16 PROCEDURE — 1160F PR REVIEW ALL MEDS BY PRESCRIBER/CLIN PHARMACIST DOCUMENTED: ICD-10-PCS | Mod: CPTII,,, | Performed by: PHYSICIAN ASSISTANT

## 2022-06-16 PROCEDURE — 1159F MED LIST DOCD IN RCRD: CPT | Mod: CPTII,,, | Performed by: PHYSICIAN ASSISTANT

## 2022-06-16 PROCEDURE — 99024 POSTOP FOLLOW-UP VISIT: CPT | Mod: ,,, | Performed by: PHYSICIAN ASSISTANT

## 2022-06-16 RX ORDER — GABAPENTIN 300 MG/1
300 CAPSULE ORAL NIGHTLY
Qty: 30 CAPSULE | Refills: 1 | Status: SHIPPED | OUTPATIENT
Start: 2022-06-16 | End: 2022-08-25

## 2022-06-16 NOTE — PROGRESS NOTES
Patient ID: Merari Orosco is a 42 y.o. female.    Chief Complaint: Pain and Post-op Evaluation of the Left Hip      HPI: Merari Orosco  is a 42 y.o. female who c/o Pain and Post-op Evaluation of the Left Hip     Post op visit 1   Patient notes pain is 5/10  The patient is doing okay since surgery although she does note that this side seems to be slower to heal than the right  She is discouraged as she is still using the rolling walker and had progressed to less assistance at this time doing her right hip  We did discuss that no 2 sides or the same with either surgery or progression of disease, that it will just take time and to be patient  She notes pain is increased with use and activity over the course the day relieved with rest elevation and ice techniques  She has been fully compliant with postop instructions    Equipment patient is using a rolling walker to assist with ambulation  DVT compliant  Therapy compliant    Patient is presently denying any shortness of breath, chest pain, fever/chills, nausea/vomiting, loss of taste or smell, numbness/tingling or sensation changes, loss of bladder or bowel function, loss of taste/smell.     Surgery: Left Total hip    Surgery Date:  2022    Past Medical History:   Diagnosis Date    Anxiety     Depression     Hypertension      Past Surgical History:   Procedure Laterality Date     SECTION      x1    HIP ARTHROPLASTY Right 2022    Procedure: ARTHROPLASTY, HIP;  Surgeon: Satnam Deleon MD;  Location: [facility] OR;  Service: Orthopedics;  Laterality: Right;    HIP ARTHROPLASTY Left 2022    Procedure: ARTHROPLASTY, HIP;  Surgeon: Satnam Deleon MD;  Location: [facility] OR;  Service: Orthopedics;  Laterality: Left;    INJECTION OF JOINT Bilateral 11/10/2021    Procedure: Injection, Joint;  Surgeon: Satnam Deleon MD;  Location: [facility] OR;  Service: General;  Laterality: Bilateral;  under fluoroscopy    WISDOM TOOTH EXTRACTION        Family History   Problem Relation Age of Onset    Colon cancer Father     Diabetes Father     Hypertension Father     Hypertension Mother     Diabetes Sister     Hypertension Sister     Breast cancer Neg Hx     Cancer Neg Hx     Miscarriages / Stillbirths Neg Hx     Ovarian cancer Neg Hx      labor Neg Hx     Stroke Neg Hx      Social History     Socioeconomic History    Marital status: Single    Number of children: 1   Tobacco Use    Smoking status: Never Smoker    Smokeless tobacco: Never Used   Substance and Sexual Activity    Alcohol use: Yes     Comment: socially    Drug use: No    Sexual activity: Yes     Partners: Male     Birth control/protection: Condom     Social Determinants of Health     Financial Resource Strain: Low Risk     Difficulty of Paying Living Expenses: Not hard at all   Food Insecurity: No Food Insecurity    Worried About Running Out of Food in the Last Year: Never true    Ran Out of Food in the Last Year: Never true   Transportation Needs: Unmet Transportation Needs    Lack of Transportation (Medical): Yes    Lack of Transportation (Non-Medical): Yes   Physical Activity: Inactive    Days of Exercise per Week: 0 days    Minutes of Exercise per Session: 0 min   Stress: Stress Concern Present    Feeling of Stress : To some extent   Social Connections: Unknown    Frequency of Communication with Friends and Family: More than three times a week    Frequency of Social Gatherings with Friends and Family: Three times a week    Active Member of Clubs or Organizations: No    Attends Club or Organization Meetings: Never    Marital Status:    Housing Stability: Low Risk     Unable to Pay for Housing in the Last Year: No    Number of Places Lived in the Last Year: 1    Unstable Housing in the Last Year: No     Medication List with Changes/Refills   New Medications    GABAPENTIN (NEURONTIN) 300 MG CAPSULE    Take 1 capsule (300 mg total) by mouth every  evening.   Current Medications    ALPRAZOLAM (XANAX) 0.25 MG TABLET    Take 1 tablet (0.25 mg total) by mouth daily as needed for Anxiety.    BLOOD PRESSURE MONITOR (IHEALTH EASE) LG ARM SIZE (OP)    by Other route as needed for High Blood Pressure.    CHLORTHALIDONE (HYGROTEN) 25 MG TAB    Take 1 tablet by mouth once daily.    ERGOCALCIFEROL (ERGOCALCIFEROL) 50,000 UNIT CAP    Take 1 capsule (50,000 Units total) by mouth every 7 days.    METOPROLOL SUCCINATE (TOPROL-XL) 50 MG 24 HR TABLET    Take 1 tablet (50 mg total) by mouth once daily.    ONDANSETRON (ZOFRAN-ODT) 4 MG TBDL    Take 2 tablets (8 mg total) by mouth every 8 (eight) hours as needed.    OXYCODONE-ACETAMINOPHEN (PERCOCET)  MG PER TABLET    Take 1 tablet by mouth every 6 (six) hours as needed for Pain.    OXYCODONE-ACETAMINOPHEN (PERCOCET)  MG PER TABLET    1 tablet EVERY 6 HOURS (route: oral)    VITAMIN D3-FOLIC ACID 125 MCG (5,000 UNIT)-1 MG TAB    1 capsule WEEKLY (route: oral)     Review of patient's allergies indicates:  No Known Allergies    Objective:     Left Lower Extremity  NVI  WWP foot  Comp soft  Cap refill < 2 sec  Calf NT  (-) Mirna sign  JACK  ROM : Patient is able to easily exhibit full flexion and extension on passive range of motion.   Wiggles toes  DF/PF intact  Sensation intact  Inc C/D/I  No SOI    Assessment:       Encounter Diagnosis   Name Primary?    S/P total left hip arthroplasty Yes          Plan:       Merari was seen today for pain and post-op evaluation.    Diagnoses and all orders for this visit:    S/P total left hip arthroplasty  -     Ambulatory referral/consult to Physical/Occupational Therapy; Future    Other orders  -     gabapentin (NEURONTIN) 300 MG capsule; Take 1 capsule (300 mg total) by mouth every evening.        Orneotracy Orosco is an established pt here for postop follow-up . The incision was cleaned with hydrogen peroxide.  All staples were removed, and suture strips were applied across the  incision.  They should remain in place until they fall off in approximately 1-2 weeks. The patient was instructed not to soak the incision in standing water but may clean the incision with clean running water and antibacterial soap.  Patient should notify the office of any signs or symptoms of infection including fevers, erythema, purulent drainage, increasing pain.  Patient will continue with DVT prophylaxis.  Patient will start outpatient physical therapy.  Will follow-up in 4 weeks.  Patient verbalized understanding of all instructions and agreed with the above plan.    No follow-ups on file.    The patient understands, chooses and consents to this plan and accepts all   the risks which include but are not limited to the risks mentioned above.     Disclaimer: This note was prepared using a voice recognition system and is likely to have sound alike errors within the text.

## 2022-06-20 ENCOUNTER — EXTERNAL HOME HEALTH (OUTPATIENT)
Dept: HOME HEALTH SERVICES | Facility: HOSPITAL | Age: 42
End: 2022-06-20
Payer: MEDICAID

## 2022-06-21 RX ORDER — OXYCODONE AND ACETAMINOPHEN 10; 325 MG/1; MG/1
1 TABLET ORAL EVERY 8 HOURS PRN
Qty: 21 TABLET | Refills: 0 | Status: SHIPPED | OUTPATIENT
Start: 2022-06-21 | End: 2022-07-06 | Stop reason: SDUPTHER

## 2022-06-24 ENCOUNTER — TELEPHONE (OUTPATIENT)
Dept: ORTHOPEDICS | Facility: CLINIC | Age: 42
End: 2022-06-24
Payer: MEDICAID

## 2022-06-24 NOTE — TELEPHONE ENCOUNTER
Called patient to let her know we can send a new referral to [facility] since they will take her insurance. I left a voicemail for her to call back if she would like to proceed with that.

## 2022-06-24 NOTE — TELEPHONE ENCOUNTER
Called the patient back to let her know I do not know of any in that area she wants to go to but If she found one we were more than willing to send the referral. She said she would just like a referall sent to [facility].   Denies any pain in the left arm.

## 2022-06-24 NOTE — TELEPHONE ENCOUNTER
----- Message from Lucila London sent at 6/24/2022 12:05 PM CDT -----  Contact: patient  Patient states that the place for her out patient therapy does not take her insurance, needs another local on, please call her back at [phone]

## 2022-06-24 NOTE — TELEPHONE ENCOUNTER
----- Message from Kaye Sahu sent at 6/24/2022  1:44 PM CDT -----  Contact: Self [phone]  Patient is returning a phone call.    Who left a message for the patient:     Does patient know what this is regarding:  referral    Would you like a call back, or a response through your [facility] portal?:   call back    Comments:  Pt states she prefer a provider in the [facility].

## 2022-06-27 ENCOUNTER — TELEPHONE (OUTPATIENT)
Dept: ORTHOPEDICS | Facility: CLINIC | Age: 42
End: 2022-06-27
Payer: MEDICAID

## 2022-06-27 DIAGNOSIS — Z96.642 S/P TOTAL LEFT HIP ARTHROPLASTY: Primary | ICD-10-CM

## 2022-06-27 DIAGNOSIS — Z96.641 S/P TOTAL RIGHT HIP ARTHROPLASTY: ICD-10-CM

## 2022-06-27 NOTE — TELEPHONE ENCOUNTER
----- Message from Arian Gallegos sent at 6/27/2022 10:42 AM CDT -----  Regarding: Physical Therapy  Jerardo,     Please place an internal order for this patient for therapy. She would like to come to [facility]. Thank you.

## 2022-06-30 ENCOUNTER — CLINICAL SUPPORT (OUTPATIENT)
Dept: REHABILITATION | Facility: HOSPITAL | Age: 42
End: 2022-06-30
Attending: ORTHOPAEDIC SURGERY
Payer: MEDICAID

## 2022-06-30 DIAGNOSIS — M53.86 DECREASED ROM OF LUMBAR SPINE: ICD-10-CM

## 2022-06-30 DIAGNOSIS — Z96.642 S/P TOTAL LEFT HIP ARTHROPLASTY: ICD-10-CM

## 2022-06-30 DIAGNOSIS — Z74.09 DECREASED FUNCTIONAL MOBILITY AND ENDURANCE: ICD-10-CM

## 2022-06-30 DIAGNOSIS — R26.9 GAIT DIFFICULTY: ICD-10-CM

## 2022-06-30 DIAGNOSIS — M25.552 LEFT HIP PAIN: ICD-10-CM

## 2022-06-30 DIAGNOSIS — Z96.641 S/P TOTAL RIGHT HIP ARTHROPLASTY: ICD-10-CM

## 2022-06-30 DIAGNOSIS — M54.50 LUMBAR PAIN: ICD-10-CM

## 2022-06-30 PROBLEM — R53.1 DECREASED STRENGTH, ENDURANCE, AND MOBILITY: Status: RESOLVED | Noted: 2022-03-07 | Resolved: 2022-06-30

## 2022-06-30 PROBLEM — M25.551 ACUTE RIGHT HIP PAIN: Status: RESOLVED | Noted: 2022-03-07 | Resolved: 2022-06-30

## 2022-06-30 PROBLEM — R68.89 DECREASED STRENGTH, ENDURANCE, AND MOBILITY: Status: RESOLVED | Noted: 2022-03-07 | Resolved: 2022-06-30

## 2022-06-30 PROCEDURE — 97110 THERAPEUTIC EXERCISES: CPT

## 2022-06-30 PROCEDURE — 97162 PT EVAL MOD COMPLEX 30 MIN: CPT

## 2022-06-30 NOTE — PLAN OF CARE
[facility]  Physical Therapy Initial Evaluation    Name: Merari Orosco  Clinic Number: [id number]    Therapy Diagnosis:   Encounter Diagnoses   Name Primary?    S/P total left hip arthroplasty     S/P total right hip arthroplasty     Left hip pain     Lumbar pain     Decreased ROM of lumbar spine     Decreased functional mobility and endurance     Gait difficulty      Physician: Satnam Deleon MD    Physician Orders: PT Eval and Treat   Medical Diagnosis from Referral: Z96.642 (ICD-10-CM) - S/P total left hip arthroplasty  Evaluation Date: 2022  Authorization Period Expiration: 22  Plan of Care Expiration: 22  Visit # / Visits authorized:   FOTO: 1/3  Progress note due 30 days from 22    Precautions: Standard, HTN, anxiety, depression    Time In: 10:03 am  Time Out: 10:45 am  Total Time: 42 minutes      Subjective   Date of onset: 22  History of current condition - Merari reports: she had R hip MARCO in 22 then had this one done on 22. Pt states her hip precautions for a posterior approach.     Pain:  Current 4/10, worst 8/10, best 4/10   Location: L buttock  Description: sore ache, throbbing, stiff  Aggravating Factors: sitting too long, standing, bending over, laying in supine  Easing Factors: medicine, heat    Prior Therapy: yes  Social History: lives with family  Occupation:   Prior Level of Function: independent including working out at the gym  Current Level of Function: unable to work out at the gym, walking, driving and getting up/down is difficult    Imaging: see chart    Medical History:   Past Medical History:   Diagnosis Date    Anxiety     Depression     Hypertension        Surgical History:   Merari Orosco  has a past surgical history that includes [place] tooth extraction;  section; Injection of joint (Bilateral, 11/10/2021); Hip Arthroplasty (Right, 2022); and Hip Arthroplasty (Left,  5/31/2022).    Medications:   Merari has a current medication list which includes the following prescription(s): alprazolam, blood pressure monitor, chlorthalidone, ergocalciferol, gabapentin, metoprolol succinate, ondansetron, oxycodone-acetaminophen, and vitamin d3-folic acid, and the following Facility-Administered Medications: sodium chloride 0.9%, acetaminophen, and chlorhexidine.    Allergies:   Review of patient's allergies indicates:  No Known Allergies     Pts goals: walk and drive and maybe return to the gym and working    Objective       CMS Impairment/Limitation/Restriction for FOTO hip Survey    Therapist reviewed FOTO scores for Merari Orosco on 6/30/2022.   FOTO documents entered into [employer] - see Media section.    Limitation Score: 64%       Posture/Structure: Pt presents with forward head posture, rounded shoulder's, reduced cervical lordosis, reduced thoracic kyphosis, and accentuated lumbar lordosis.      Gait: narrow ALBERT, hips internally rotated mildly, short step length, R SBQC      Sensation:    Sensation: intact but scar has less sensation    Balance: Single leg stand R=2 sec, L=1 sec; half kneel unable; squat unable without UE support; sit to stand 30 sec test using B UE's to assist (modified) 3 reps    Lumbar spine AROM can't fully assess due to hip precautions but only fair reversal of curve with attempts at posterior pelvic tilt and pt lays in lordosis due to tight hip flexors B limiting laying flat at the hips by 15 degrees.     Degrees Pain Present (Y/N)   Flexion Posterior pelvic tilt 50 % ROM y sore   R side bend 10 stiff   L 10 stiff   Extend 20 stiff     Hip AROM:     Degrees (R/L) Pain Present (Y/N)   Hip flex 110/90 Y buttock stiffness   Hip Abd 30/25 stiff   Hip ER (sitting) 40/not tested stiff   Hip Ext (prone) 0 stiff       Strength:    R L   Hip flexors L2   4/5 3/5  Quadriceps L234  5/5 4/5    Hamstrings S1  4/5 4/5   Plantar flexion S1  2/5 2/5    Dorsiflexion  L4  5/5 5/5   Gluteus Medius L45S1 2+/5 2/5   Gluteus Christopher  2+/5 2/5   Great toe extension  5/5 5/5   Core support 2+/5    Palpation: tight lateral L hip tissue around her scar      TREATMENT   Treatment Time In: 10:20 am  Treatment Time Out: 10:45 am  Total Treatment time separate from Evaluation: 25 minutes    Merari received therapeutic exercises to develop strength, endurance, ROM, flexibility, posture and core stabilization for 25 minutes including:    Standing posterior pelvic tilt 3x 10  Sit to stand from 26 inch seat 3x 8  R hip single knee to chest 3x 10  Supine hip flexion to 90 with PPT 2x 10 B  Supine wind shield wipers 2x 10 B  PPT with bridges 2x 10  SLR with PPT 2x 10 B  Leaning on plinth hip extension 2x 10    Home Exercises and Patient Education Provided    Education provided:   -Education on condition, HEP, and PT reminded pt she no longer has R hip precautions but PT reviewed her L hip precautions and made sure she understand those with function. PT continued to encourage her to work on therex with PT as she is weak in both legs and will need a lot of PT to advance.    Written Home Exercises Provided: Patient instructed to cont prior HEP.  Exercises were reviewed and Merari was able to demonstrate them prior to the end of the session.  Merari demonstrated fair  understanding of the education provided.     See EMR under Patient Instructions for exercises provided 6/30/2022.    Assessment   Merari is a 42 y.o. female referred to outpatient Physical Therapy with a medical diagnosis of Z96.642 (ICD-10-CM) - S/P total left hip arthroplasty. The patient presents with signs and symptoms consistent with diagnosis along with hip pain, limited hip ROM, limited lumbar ROM, low back pain, R hip pain and with impairments which include decreased ROM, decreased strength, decreased joint mobility, decreased muscle length, impaired coordination, impaired balance, postural abnormalities, gait  abnormalities, decreased overall function and scar tissue.  These impairments are limiting patient's ability to walk level and unlevel gabby, manage steps, perform squatting for retrieving items of the floor and pt is not working at this time and having difficulty with household task.     Pt prognosis is Good.   Pt will benefit from skilled outpatient Physical Therapy to address the deficits stated above and in the chart below, provide pt/family education, and to maximize pt's level of independence.     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), not sleeping well, depends on transportation, far from clinic    Personal Factors:   coping style  fear/pain focused   high   Examination  Body Structures and Functions, activity limitations and participation restrictions that may impact the plan of care Body Regions:   back  lower extremities  trunk    Body Systems:    ROM  strength  gross coordinated movement  balance  gait  transfers  transitions  motor control    Participation Restrictions:   See current limitations    Activity limitations:   Learning and applying knowledge  watching  listening    General Tasks and Commands  undertaking a single task  undertaking multiple tasks    Communication  communicating with/receiving spoken language  communicating with/receiving non-verbal language    Mobility  lifting and carrying objects  walking  using transportation (bus, train, plane, car)  driving (bike, car, motorcycle)    Self care  washing oneself (bathing, drying, washing hands)  caring  for body parts (brushing teeth, shaving, grooming)  toileting  dressing  eating  drinking  looking after one's health    Domestic Life  shopping  cooking  doing house work (cleaning house, washing dishes, laundry)  assisting others    Interactions/Relationships  basic interpersonal interactions  complex interpersonal interactions  relating with strangers  formal relationships  family relationships    Life Areas  employment  basic economic transactions    Community and Social Life  community life  recreation and leisure         high   Clinical Presentation evolving clinical presentation with changing clinical characteristics moderate   Decision Making/ Complexity Score: moderate     Goals:  Short Term Goals: In 4 weeks   1.I with HEP  2.Pt to increase L hip flexion strength to 4+/5    3.Pt to increase hip extension to 5 degrees or better.  4.Pt to increase BLE strength by 1/2 grade with hip extension  5.Pt to have pain less than 3/10 or better at all times.  6.Pt to improve score on the FOTO by 10%.  7. Pt to be educated on postural/body mechanics awareness.  8. Sit to  30 seconds without UE support for 5 reps or better    Long Term Goals: In 10 weeks 9/8/22  1.Patient to improve score on the FOTO to 42%.  2. Patient to demo increase sit to stand test in 30 sec to 10 reps or better without UE support  3. Patient to have decreased pain to 2/10 or better at worst.  4. Patient to demo independence with scar mobilization.  5. Patient to increase glute strength to 4+/5 or better.  5. Patient to perform daily activities including dead lifting up to 25 pounds to simulate managing household tasks/groceries without limitation.      Plan   Plan of care Certification: 6/30/2022 to 9/8/22.    Outpatient Physical Therapy 2-3 times weekly for 10 weeks to include the following interventions: Electrical Stimulation IFC, NMES, Gait Training, Manual Therapy, Moist Heat/ Ice, Neuromuscular Re-ed, Patient Education, Self Care,  Therapeutic Activities, Therapeutic Exercise and DN.     Divya Mata, PT, CIDN, SFMA    Thank you for this referral.    These services are reasonable and necessary for the conditions set forth above while under my care.

## 2022-07-06 ENCOUNTER — PATIENT MESSAGE (OUTPATIENT)
Dept: ORTHOPEDICS | Facility: CLINIC | Age: 42
End: 2022-07-06
Payer: MEDICAID

## 2022-07-06 DIAGNOSIS — Z96.642 S/P TOTAL LEFT HIP ARTHROPLASTY: Primary | ICD-10-CM

## 2022-07-07 RX ORDER — OXYCODONE AND ACETAMINOPHEN 10; 325 MG/1; MG/1
1 TABLET ORAL EVERY 8 HOURS PRN
Qty: 21 TABLET | Refills: 0 | Status: SHIPPED | OUTPATIENT
Start: 2022-07-07 | End: 2022-07-23 | Stop reason: SDUPTHER

## 2022-07-07 NOTE — PROGRESS NOTES
[facility]   Physical Therapy Assistant Treatment Note     Name: Merari Orosco  [facility] Number: [id number]    Therapy Diagnosis:   Encounter Diagnoses   Name Primary?    Left hip pain Yes    Lumbar pain     Decreased ROM of lumbar spine     Decreased functional mobility and endurance     Gait difficulty      Physician: Satnam eDleon MD    Visit Date: 7/8/2022    Physician Orders: PT Eval and Treat   Medical Diagnosis from Referral: Z96.642 (ICD-10-CM) - S/P total left hip arthroplasty  Evaluation Date: 6/30/2022  Authorization Period Expiration: 7/8/22  Plan of Care Expiration: 9/8/22  Visit # / Visits authorized: 1/ 12  FOTO: 1/3  Progress note due 30 days from 6/30/22     Precautions: Standard, HTN, anxiety, depression, left posterior hip precautions    PTA Visit #: 1/5     Time In: 9:30  Time Out: 10:15  Total Billable Time: 45 minutes    SUBJECTIVE     Pt reports: recovering from left hip replacement is harder than right was. Her scar feels hard. She has increased pain in left lateral and posterior hip. She took pain medication prior to therapy.  She was not compliant with home exercise program.  Response to previous treatment: no complaints  Functional change: nothing significant    Pain: 6/10  Location: left hip      OBJECTIVE     Objective Measures updated at progress report unless specified.     Treatment     Merari received the treatments listed below:      Merari received therapeutic exercises to develop strength, endurance, ROM, flexibility, posture and core stabilization for 40 minutes including:     R hip single knee to chest 3x 10  PPT with bridges 2x 10  Supine hip flexion to 90 with PPT 2x 10 B  SLR with PPT 2x 10 B  Side lying hip abduction 2 x 10 L  Standing posterior pelvic tilt 3x 10- not today  Sit to stand from 26 inch seat 3x 8  Leaning on plinth hip extension 2x 10  Supine wind shield wipers 2x 10 B - not today    manual therapy techniques: Soft  tissue Mobilization were applied to the: left hip for 5 minutes, including:  Education on scar mobility  STM to glut med using roller        Patient Education and Home Exercises      Home Exercises and Patient Education Provided     Education provided:   -Education on condition, HEP, and continued to encourage her to work on therex with PT as she is weak in both legs and will need a lot of PT to advance.     Written Home Exercises Provided: Patient instructed to cont prior HEP.  Exercises were reviewed and Merari was able to demonstrate them prior to the end of the session.  Orasetta demonstrated fair  understanding of the education provided.      See EMR under Patient Instructions for exercises provided 6/30/2022.    ASSESSMENT     Patient presents to therapy ambulating with quad cane demonstrating minimal upper extremity use through cane. She has increased swelling, coning and shining of bilateral lower extremities. Discussed use of compression socks but patient was hesitant. Patient reports increased pain and has tenderness along scar tissue- education patient on scar mobilization to improve mobility of scar. Patient continued strengthening exercises to improve glute stability, gait and balance without any adverse symptoms. Significant difficulty with side lying and supine leg raises due to weakness. Patient will benefit from continued strengthening exercises to improve overall functional mobility    Orasettjulio Is progressing well towards her goals.   Pt prognosis is Good.     Pt will continue to benefit from skilled outpatient physical therapy to address the deficits listed in the problem list box on initial evaluation, provide pt/family education and to maximize pt's level of independence in the home and community environment.     Pt's spiritual, cultural and educational needs considered and pt agreeable to plan of care and goals.     Anticipated barriers to physical therapy: depends on transportation, far from  clinic, not sleeping well      Goals:   Short Term Goals: In 4 weeks   1.I with HEP  2.Pt to increase L hip flexion strength to 4+/5    3.Pt to increase hip extension to 5 degrees or better.  4.Pt to increase BLE strength by 1/2 grade with hip extension  5.Pt to have pain less than 3/10 or better at all times.  6.Pt to improve score on the FOTO by 10%.  7. Pt to be educated on postural/body mechanics awareness.  8. Sit to  30 seconds without UE support for 5 reps or better     Long Term Goals: In 10 weeks 9/8/22  1.Patient to improve score on the FOTO to 42%.  2. Patient to demo increase sit to stand test in 30 sec to 10 reps or better without UE support  3. Patient to have decreased pain to 2/10 or better at worst.  4. Patient to demo independence with scar mobilization.  5. Patient to increase glute strength to 4+/5 or better.  5. Patient to perform daily activities including dead lifting up to 25 pounds to simulate managing household tasks/groceries without limitation.        Plan   Plan of care Certification: 6/30/2022 to 9/8/22.     Outpatient Physical Therapy 2-3 times weekly for 10 weeks to include the following interventions: Electrical Stimulation IFC, NMES, Gait Training, Manual Therapy, Moist Heat/ Ice, Neuromuscular Re-ed, Patient Education, Self Care, Therapeutic Activities, Therapeutic Exercise and DN.     Mago Rodriguez, PTA

## 2022-07-08 ENCOUNTER — CLINICAL SUPPORT (OUTPATIENT)
Dept: REHABILITATION | Facility: HOSPITAL | Age: 42
End: 2022-07-08
Payer: MEDICAID

## 2022-07-08 DIAGNOSIS — M54.50 LUMBAR PAIN: ICD-10-CM

## 2022-07-08 DIAGNOSIS — Z74.09 DECREASED FUNCTIONAL MOBILITY AND ENDURANCE: ICD-10-CM

## 2022-07-08 DIAGNOSIS — R26.9 GAIT DIFFICULTY: ICD-10-CM

## 2022-07-08 DIAGNOSIS — M53.86 DECREASED ROM OF LUMBAR SPINE: ICD-10-CM

## 2022-07-08 DIAGNOSIS — M25.552 LEFT HIP PAIN: Primary | ICD-10-CM

## 2022-07-08 PROCEDURE — 97110 THERAPEUTIC EXERCISES: CPT | Mod: CQ

## 2022-07-08 NOTE — PROGRESS NOTES
[facility]   Physical Therapy Assistant Treatment Note     Name: Merari Orosco  [facility] Number: [id number]    Therapy Diagnosis:   Encounter Diagnoses   Name Primary?    Left hip pain Yes    Lumbar pain     Decreased ROM of lumbar spine     Decreased functional mobility and endurance     Gait difficulty      Physician: Satnam Deleon MD    Visit Date: 7/11/2022    Physician Orders: PT Eval and Treat   Medical Diagnosis from Referral: Z96.642 (ICD-10-CM) - S/P total left hip arthroplasty  Evaluation Date: 6/30/2022  Authorization Period Expiration: 7/8/22  Plan of Care Expiration: 9/8/22  Visit # / Visits authorized: 2/ 12  FOTO: 1/3  Progress note due 30 days from 6/30/22     Precautions: Standard, HTN, anxiety, depression, left posterior hip precautions    PTA Visit #: 2/5     Time In: 9:05  Time Out: 9:45  Total Billable Time: 40 minutes    SUBJECTIVE     Pt reports: took pain medication after therapy due to pain. She has been working on scar at home. She has upcoming doctor appointment this week. She is ready to get rid of the cane because she doesn't use it.  She was compliant with home exercise program.  Response to previous treatment: increased soreness  Functional change: nothing significant    Pain: 8/10  Location: left hip      OBJECTIVE     Objective Measures updated at progress report unless specified.     Treatment     Merari received the treatments listed below:      Merari received therapeutic exercises to develop strength, endurance, ROM, flexibility, posture and core stabilization for 40 minutes including:     Gait without quad cane in parallel bars 3 laps  Lateral stepping in parallel bars  3 laps  Backwards walking in parallel bars 3 laps  Sit to stand from 22 inch seat 2x 8  Heel raises 2x10  Supine clams with red theraband 3x10   Supine posterior pelvic tilt x 15  PPT with bridges 2x 10  Prone hamstring curls x10 B    Not enough time:  R hip single knee  to chest 3x 10  Supine hip flexion to 90 with PPT 2x 10 B  SLR with PPT 2x 10 B  Standing posterior pelvic tilt 3x 10  Leaning on plinth hip extension 2x 10  Standing hip abduction  Step ups  Modified dead lift    manual therapy techniques: Soft tissue Mobilization were applied to the: left hip for 0 minutes, including:  Education on scar mobility  STM to glut med using roller        Patient Education and Home Exercises      Home Exercises and Patient Education Provided     Education provided:   -Education on condition, HEP, and continued to encourage her to work on therex with PT as she is weak in both legs and will need a lot of PT to advance.     Written Home Exercises Provided: Patient instructed to cont prior HEP.  Exercises were reviewed and Anabellatracy was able to demonstrate them prior to the end of the session.  Anabellatracy demonstrated fair  understanding of the education provided.      See EMR under Patient Instructions for exercises provided 6/30/2022.    ASSESSMENT     Patient presents to therapy ambulating with quad cane demonstrating minimal upper extremity use through cane. She continues to demonstrate antalgic, trendelenburg gait pattern and decreased weight bearing through left hip. Began treatment with exercises to improve gait and weight bearing through left leg. Continued glute strengthening exercises but limited with side lying clams due to weakness. Patient will benefit from progressing gait training and more weight bearing activities but is limited due to muscle fatigue.     Merari Is progressing well towards her goals.   Pt prognosis is Good.     Pt will continue to benefit from skilled outpatient physical therapy to address the deficits listed in the problem list box on initial evaluation, provide pt/family education and to maximize pt's level of independence in the home and community environment.     Pt's spiritual, cultural and educational needs considered and pt agreeable to plan of care and  goals.     Anticipated barriers to physical therapy: depends on transportation, far from clinic, not sleeping well      Goals:   Short Term Goals: In 4 weeks   1.I with HEP  2.Pt to increase L hip flexion strength to 4+/5    3.Pt to increase hip extension to 5 degrees or better.  4.Pt to increase BLE strength by 1/2 grade with hip extension  5.Pt to have pain less than 3/10 or better at all times.  6.Pt to improve score on the FOTO by 10%.  7. Pt to be educated on postural/body mechanics awareness.  8. Sit to  30 seconds without UE support for 5 reps or better     Long Term Goals: In 10 weeks 9/8/22  1.Patient to improve score on the FOTO to 42%.  2. Patient to demo increase sit to stand test in 30 sec to 10 reps or better without UE support  3. Patient to have decreased pain to 2/10 or better at worst.  4. Patient to demo independence with scar mobilization.  5. Patient to increase glute strength to 4+/5 or better.  5. Patient to perform daily activities including dead lifting up to 25 pounds to simulate managing household tasks/groceries without limitation.        Plan   Plan of care Certification: 6/30/2022 to 9/8/22.     Outpatient Physical Therapy 2-3 times weekly for 10 weeks to include the following interventions: Electrical Stimulation IFC, NMES, Gait Training, Manual Therapy, Moist Heat/ Ice, Neuromuscular Re-ed, Patient Education, Self Care, Therapeutic Activities, Therapeutic Exercise and DN.     Mago Rodriguez, PTA

## 2022-07-11 ENCOUNTER — DOCUMENTATION ONLY (OUTPATIENT)
Dept: REHABILITATION | Facility: HOSPITAL | Age: 42
End: 2022-07-11
Payer: MEDICAID

## 2022-07-11 ENCOUNTER — CLINICAL SUPPORT (OUTPATIENT)
Dept: REHABILITATION | Facility: HOSPITAL | Age: 42
End: 2022-07-11
Payer: MEDICAID

## 2022-07-11 DIAGNOSIS — M25.552 LEFT HIP PAIN: Primary | ICD-10-CM

## 2022-07-11 DIAGNOSIS — R26.9 GAIT DIFFICULTY: ICD-10-CM

## 2022-07-11 DIAGNOSIS — Z74.09 DECREASED FUNCTIONAL MOBILITY AND ENDURANCE: ICD-10-CM

## 2022-07-11 DIAGNOSIS — M54.50 LUMBAR PAIN: ICD-10-CM

## 2022-07-11 DIAGNOSIS — M53.86 DECREASED ROM OF LUMBAR SPINE: ICD-10-CM

## 2022-07-11 PROCEDURE — 97110 THERAPEUTIC EXERCISES: CPT | Mod: CQ

## 2022-07-11 NOTE — PROGRESS NOTES
PT/PTA met face to face to discuss pt's treatment plan and progress towards established goals. Pt will be seen by a physical therapist minimally every 6th visit or every 30 days.    Mago Rodriguez PTA

## 2022-07-14 ENCOUNTER — HOSPITAL ENCOUNTER (OUTPATIENT)
Dept: RADIOLOGY | Facility: HOSPITAL | Age: 42
Discharge: HOME | End: 2022-07-14
Attending: ORTHOPAEDIC SURGERY
Payer: MEDICAID

## 2022-07-14 ENCOUNTER — OFFICE VISIT (OUTPATIENT)
Dept: ORTHOPEDICS | Facility: CLINIC | Age: 42
End: 2022-07-14
Payer: MEDICAID

## 2022-07-14 VITALS — BODY MASS INDEX: 33.91 KG/M2 | HEIGHT: 66 IN | WEIGHT: 211 LBS

## 2022-07-14 DIAGNOSIS — M16.12 ARTHRITIS OF LEFT HIP: ICD-10-CM

## 2022-07-14 DIAGNOSIS — M87.052 AVASCULAR NECROSIS OF BONE OF LEFT HIP: ICD-10-CM

## 2022-07-14 DIAGNOSIS — Z96.642 S/P TOTAL LEFT HIP ARTHROPLASTY: Primary | ICD-10-CM

## 2022-07-14 PROCEDURE — 73503 X-RAY EXAM HIP UNI 4/> VIEWS: CPT | Mod: 26,LT,, | Performed by: RADIOLOGY

## 2022-07-14 PROCEDURE — 3008F BODY MASS INDEX DOCD: CPT | Mod: CPTII,,, | Performed by: PHYSICIAN ASSISTANT

## 2022-07-14 PROCEDURE — 99024 PR POST-OP FOLLOW-UP VISIT: ICD-10-PCS | Mod: ,,, | Performed by: PHYSICIAN ASSISTANT

## 2022-07-14 PROCEDURE — 99024 POSTOP FOLLOW-UP VISIT: CPT | Mod: ,,, | Performed by: PHYSICIAN ASSISTANT

## 2022-07-14 PROCEDURE — 1159F MED LIST DOCD IN RCRD: CPT | Mod: CPTII,,, | Performed by: PHYSICIAN ASSISTANT

## 2022-07-14 PROCEDURE — 73503 X-RAY EXAM HIP UNI 4/> VIEWS: CPT | Mod: TC,LT

## 2022-07-14 PROCEDURE — 1160F PR REVIEW ALL MEDS BY PRESCRIBER/CLIN PHARMACIST DOCUMENTED: ICD-10-PCS | Mod: CPTII,,, | Performed by: PHYSICIAN ASSISTANT

## 2022-07-14 PROCEDURE — 3008F PR BODY MASS INDEX (BMI) DOCUMENTED: ICD-10-PCS | Mod: CPTII,,, | Performed by: PHYSICIAN ASSISTANT

## 2022-07-14 PROCEDURE — 99999 PR PBB SHADOW E&M-EST. PATIENT-LVL III: ICD-10-PCS | Mod: PBBFAC,,, | Performed by: PHYSICIAN ASSISTANT

## 2022-07-14 PROCEDURE — 1160F RVW MEDS BY RX/DR IN RCRD: CPT | Mod: CPTII,,, | Performed by: PHYSICIAN ASSISTANT

## 2022-07-14 PROCEDURE — 99999 PR PBB SHADOW E&M-EST. PATIENT-LVL III: CPT | Mod: PBBFAC,,, | Performed by: PHYSICIAN ASSISTANT

## 2022-07-14 PROCEDURE — 73503 XR HIP WITH PELVIS WHEN PERFORMED, 4 OR MORE VIEWS LEFT: ICD-10-PCS | Mod: 26,LT,, | Performed by: RADIOLOGY

## 2022-07-14 PROCEDURE — 99213 OFFICE O/P EST LOW 20 MIN: CPT | Mod: PBBFAC | Performed by: PHYSICIAN ASSISTANT

## 2022-07-14 PROCEDURE — 1159F PR MEDICATION LIST DOCUMENTED IN MEDICAL RECORD: ICD-10-PCS | Mod: CPTII,,, | Performed by: PHYSICIAN ASSISTANT

## 2022-07-14 NOTE — PROGRESS NOTES
Patient ID: Merari Orosco is a 42 y.o. female.    Chief Complaint: No chief complaint on file.      HPI: Merari Orosco  is a 42 y.o. female who c/o No chief complaint on file.     Post op visit 2  Patient notes pain is 9/10  Pain is increased with use and activity over the course of the day, muscular in nature  She denies any groin pain  The patient states she feels like the left side is very slower to heal especially in regards to pain than the right  She notes she is still very hesitant to increase activity of daily living and she has not gone back to driving or returning to work  She is not taking her gabapentin as she states she ran out  She still has Percocet which she was taking at night  She is not taking anything during the day.  We discussed switching the narcotics for Tylenol 650 mg 3 times a day and restarting the gabapentin  She has been fully compliant with postop instructions and keeping the extremity dry    Equipment patient presents today not using any devices to assist in ambulation although she does have a cane if needed  DVT compliant  Therapy compliant    Patient is presently denying any shortness of breath, chest pain, fever/chills, nausea/vomiting, loss of taste or smell, numbness/tingling or sensation changes, loss of bladder or bowel function, loss of taste/smell.     Surgery: Left Total hip    Surgery Date:  2022    Past Medical History:   Diagnosis Date    Anxiety     Depression     Hypertension      Past Surgical History:   Procedure Laterality Date     SECTION      x1    HIP ARTHROPLASTY Right 2022    Procedure: ARTHROPLASTY, HIP;  Surgeon: Satnam Deleon MD;  Location: [facility] OR;  Service: Orthopedics;  Laterality: Right;    HIP ARTHROPLASTY Left 2022    Procedure: ARTHROPLASTY, HIP;  Surgeon: Satnam Deleon MD;  Location: [facility] OR;  Service: Orthopedics;  Laterality: Left;    INJECTION OF JOINT Bilateral 11/10/2021    Procedure: Injection,  Joint;  Surgeon: Satnma Deleon MD;  Location: [facility] OR;  Service: General;  Laterality: Bilateral;  under fluoroscopy    WISDOM TOOTH EXTRACTION       Family History   Problem Relation Age of Onset    Colon cancer Father     Diabetes Father     Hypertension Father     Hypertension Mother     Diabetes Sister     Hypertension Sister     Breast cancer Neg Hx     Cancer Neg Hx     Miscarriages / Stillbirths Neg Hx     Ovarian cancer Neg Hx      labor Neg Hx     Stroke Neg Hx      Social History     Socioeconomic History    Marital status: Single    Number of children: 1   Tobacco Use    Smoking status: Never Smoker    Smokeless tobacco: Never Used   Substance and Sexual Activity    Alcohol use: Yes     Comment: socially    Drug use: No    Sexual activity: Yes     Partners: Male     Birth control/protection: Condom     Social Determinants of Health     Financial Resource Strain: Low Risk     Difficulty of Paying Living Expenses: Not hard at all   Food Insecurity: No Food Insecurity    Worried About Running Out of Food in the Last Year: Never true    Ran Out of Food in the Last Year: Never true   Transportation Needs: Unmet Transportation Needs    Lack of Transportation (Medical): Yes    Lack of Transportation (Non-Medical): Yes   Physical Activity: Inactive    Days of Exercise per Week: 0 days    Minutes of Exercise per Session: 0 min   Stress: Stress Concern Present    Feeling of Stress : To some extent   Social Connections: Unknown    Frequency of Communication with Friends and Family: More than three times a week    Frequency of Social Gatherings with Friends and Family: Three times a week    Active Member of Clubs or Organizations: No    Attends Club or Organization Meetings: Never    Marital Status:    Housing Stability: Low Risk     Unable to Pay for Housing in the Last Year: No    Number of Places Lived in the Last Year: 1    Unstable Housing in the Last  Year: No     Medication List with Changes/Refills   Current Medications    ALPRAZOLAM (XANAX) 0.25 MG TABLET    Take 1 tablet (0.25 mg total) by mouth daily as needed for Anxiety.    BLOOD PRESSURE MONITOR (IHEALTH EASE) LG ARM SIZE (OP)    by Other route as needed for High Blood Pressure.    CHLORTHALIDONE (HYGROTEN) 25 MG TAB    Take 1 tablet by mouth once daily.    ERGOCALCIFEROL (ERGOCALCIFEROL) 50,000 UNIT CAP    Take 1 capsule (50,000 Units total) by mouth every 7 days.    GABAPENTIN (NEURONTIN) 300 MG CAPSULE    Take 1 capsule (300 mg total) by mouth every evening.    METOPROLOL SUCCINATE (TOPROL-XL) 50 MG 24 HR TABLET    Take 1 tablet (50 mg total) by mouth once daily.    ONDANSETRON (ZOFRAN-ODT) 4 MG TBDL    Take 2 tablets (8 mg total) by mouth every 8 (eight) hours as needed.    OXYCODONE-ACETAMINOPHEN (PERCOCET)  MG PER TABLET    Take 1 tablet by mouth every 8 (eight) hours as needed for Pain.    VITAMIN D3-FOLIC ACID 125 MCG (5,000 UNIT)-1 MG TAB    1 capsule WEEKLY (route: oral)     Review of patient's allergies indicates:  No Known Allergies    Objective:     Left Lower Extremity  NVI  WWP foot  Comp soft  Cap refill < 2 sec  Calf NT  (-) Mirna sign  JACK  ROM : Patient is able to easily exhibit full flexion and extension on passive range of motion.   Abduction and adduction is full, although patient is very hesitant to engage in this range of motion in line quad full no defect noted  Leg length even although intermittently right foot does turn in during gait, patient states she never really noticed  Wiggles toes  DF/PF intact  Sensation intact  Inc C/D/I  No SOI    Imaging:    Bilateral total hip arthroplasties demonstrate expected appearance.  No evidence of periprosthetic fracture or osteolysis.  Postoperative soft tissue gas previously seen about the left hip is resolved.  Sacroiliac joints appear normal.  IUD present within the pelvis.    Assessment:       Encounter Diagnosis   Name Primary?     S/P total left hip arthroplasty Yes          Plan:       Diagnoses and all orders for this visit:    S/P total left hip arthroplasty        Merari Orosco is an established pt here for postop follow-up .  She will continue the current course of postop instructions as well as medication regimen.  The patient was instructed not to soak the incision in standing water but may clean the incision with clean running water and antibacterial soap.  Patient should notify the office of any signs or symptoms of infection including fevers, erythema, purulent drainage, increasing pain.  Patient will continue with DVT prophylaxis until at least 6 weeks postop.  Patient will continue outpatient physical therapy.  Will follow-up as scheduled. Patient verbalized understanding of all instructions and agreed with the above plan.    No follow-ups on file.    The patient understands, chooses and consents to this plan and accepts all   the risks which include but are not limited to the risks mentioned above.     Disclaimer: This note was prepared using a voice recognition system and is likely to have sound alike errors within the text.

## 2022-07-15 ENCOUNTER — DOCUMENTATION ONLY (OUTPATIENT)
Dept: REHABILITATION | Facility: HOSPITAL | Age: 42
End: 2022-07-15
Payer: MEDICAID

## 2022-07-20 ENCOUNTER — TELEPHONE (OUTPATIENT)
Dept: REHABILITATION | Facility: HOSPITAL | Age: 42
End: 2022-07-20
Payer: MEDICAID

## 2022-07-20 NOTE — TELEPHONE ENCOUNTER
PT called pt due to MV on Friday and she said she needed to cancel (after her appt had already passed) and confirmed she would attend Mon. Pt did not attend Monday or today so PT called and reminded pt of our no show cancellation policy and she will be removed from the schedule. If she wants to return to PT she can call her Dr. I provided our number for any questions. PT left this on her VM.

## 2022-08-07 ENCOUNTER — PATIENT MESSAGE (OUTPATIENT)
Dept: ADMINISTRATIVE | Facility: OTHER | Age: 42
End: 2022-08-07
Payer: MEDICAID

## 2022-08-10 DIAGNOSIS — Z96.642 S/P TOTAL LEFT HIP ARTHROPLASTY: ICD-10-CM

## 2022-08-10 RX ORDER — OXYCODONE AND ACETAMINOPHEN 10; 325 MG/1; MG/1
1 TABLET ORAL EVERY 8 HOURS PRN
Qty: 15 TABLET | Refills: 0 | Status: SHIPPED | OUTPATIENT
Start: 2022-08-10 | End: 2022-08-22 | Stop reason: SDUPTHER

## 2022-08-22 DIAGNOSIS — Z96.642 S/P TOTAL LEFT HIP ARTHROPLASTY: ICD-10-CM

## 2022-08-22 RX ORDER — OXYCODONE AND ACETAMINOPHEN 10; 325 MG/1; MG/1
1 TABLET ORAL EVERY 8 HOURS PRN
Qty: 15 TABLET | Refills: 0 | Status: SHIPPED | OUTPATIENT
Start: 2022-08-22 | End: 2022-09-20

## 2022-08-25 ENCOUNTER — OFFICE VISIT (OUTPATIENT)
Dept: ORTHOPEDICS | Facility: CLINIC | Age: 42
End: 2022-08-25
Payer: MEDICAID

## 2022-08-25 VITALS — HEIGHT: 66 IN | BODY MASS INDEX: 33.91 KG/M2 | WEIGHT: 211 LBS

## 2022-08-25 DIAGNOSIS — Z96.642 S/P TOTAL LEFT HIP ARTHROPLASTY: Primary | ICD-10-CM

## 2022-08-25 DIAGNOSIS — Z96.641 H/O TOTAL HIP ARTHROPLASTY, RIGHT: ICD-10-CM

## 2022-08-25 PROCEDURE — 3008F PR BODY MASS INDEX (BMI) DOCUMENTED: ICD-10-PCS | Mod: CPTII,,, | Performed by: ORTHOPAEDIC SURGERY

## 2022-08-25 PROCEDURE — 1159F PR MEDICATION LIST DOCUMENTED IN MEDICAL RECORD: ICD-10-PCS | Mod: CPTII,,, | Performed by: ORTHOPAEDIC SURGERY

## 2022-08-25 PROCEDURE — 99999 PR PBB SHADOW E&M-EST. PATIENT-LVL III: ICD-10-PCS | Mod: PBBFAC,,, | Performed by: ORTHOPAEDIC SURGERY

## 2022-08-25 PROCEDURE — 1159F MED LIST DOCD IN RCRD: CPT | Mod: CPTII,,, | Performed by: ORTHOPAEDIC SURGERY

## 2022-08-25 PROCEDURE — 1160F PR REVIEW ALL MEDS BY PRESCRIBER/CLIN PHARMACIST DOCUMENTED: ICD-10-PCS | Mod: CPTII,,, | Performed by: ORTHOPAEDIC SURGERY

## 2022-08-25 PROCEDURE — 99024 PR POST-OP FOLLOW-UP VISIT: ICD-10-PCS | Mod: ,,, | Performed by: ORTHOPAEDIC SURGERY

## 2022-08-25 PROCEDURE — 99213 OFFICE O/P EST LOW 20 MIN: CPT | Mod: PBBFAC | Performed by: ORTHOPAEDIC SURGERY

## 2022-08-25 PROCEDURE — 1160F RVW MEDS BY RX/DR IN RCRD: CPT | Mod: CPTII,,, | Performed by: ORTHOPAEDIC SURGERY

## 2022-08-25 PROCEDURE — 3008F BODY MASS INDEX DOCD: CPT | Mod: CPTII,,, | Performed by: ORTHOPAEDIC SURGERY

## 2022-08-25 PROCEDURE — 99024 POSTOP FOLLOW-UP VISIT: CPT | Mod: ,,, | Performed by: ORTHOPAEDIC SURGERY

## 2022-08-25 PROCEDURE — 99999 PR PBB SHADOW E&M-EST. PATIENT-LVL III: CPT | Mod: PBBFAC,,, | Performed by: ORTHOPAEDIC SURGERY

## 2022-08-25 RX ORDER — CYCLOBENZAPRINE HCL 10 MG
10 TABLET ORAL 3 TIMES DAILY PRN
Qty: 90 TABLET | Refills: 3 | Status: SHIPPED | OUTPATIENT
Start: 2022-08-25 | End: 2022-12-15 | Stop reason: SINTOL

## 2022-08-25 NOTE — PATIENT INSTRUCTIONS
Your x-ray from 07/14/2022 showing both total hips in excellent alignment  You have slight limitation and tightness in the left hip  Highly recommend that you do a lot of walking and stationary bicycle is ideally for repeat you will help with loosening up a little bit of the muscles  You do have some excellent strength in them on exam and you are ambulating without any assistive devices  You are happy that you do not have much of any pain except the tightness in the left hip is preventing you sometimes from getting some sleep   We went over you medications and the gabapentin you stop that because it was giving you hallucination and talking any asleep  I will give you Flexeril/cyclobenzaprine as a muscle relaxant 10 mg you may take it maximum 3 times a day.  I 1 you might make you sleepy I highly recommend only take it at night   You really need to continue with exercising and strengthening of the legs and as time goes by things improve.  They do improved 6 months to a year after surgery  Return in 6 months

## 2022-08-25 NOTE — PROGRESS NOTES
Subjective:     Patient ID: Merari Orosco is a 42 y.o. female.    Chief Complaint: Post-op Evaluation of the Left Hip   Pain in right hip and left hip  HPI:  02/01/2021  40-year-old been having pain in both hips for more than a year now.  She was not diagnosed by sickle cell or lupus or any other diseases and was seen by Rheumatology.  She does take Fosamax.  She had MRI CT scan x-rays that I reviewed personally as well as was told to the patient that she has collapse of the femoral head.  She received a right hip steroid injection by Dr. Walden and now she stain that had helped quite a bit.  The left hip now hurts more than the right side.  She had not taking any Tylenol or any 0 NSAIDs over-the-counter.  She was seen by Rheumatology Department and she stated no blood work was done.  Her pain is around 2-4/10.  She is ambulating without any assistive devices.  She states that she cannot spread the legs to get it in and out of the car is difficult as well going to the bathroom to clean herself.  She does live with family like mother and multiple siblings.  She does have a 20-year-old son.    Denies any fever or chills or shortness of breath or difficulty with chewing or swallowing loss of bowel bladder control or blurry vision or double vision loss of sense smell or taste or or numbness or tingling in the legs or low back      10/25/2021  Bilateral hip avascular necrosis.  Things are getting worse.  Her pain is 9/10.  The steroid injections given by Dr. Walden last 4 months.  She wanted repeated however he she was told to come and see us.  She wants to hold off if she can on having hip replacements at least till after the holidays.  She had tried Mobic and Naprosyn and ibuprofen etcetera.  Her pain is 9/10.  Having difficulty getting around.  She said she will be interested in having her hip injected again since they help.  She does take Tylenol.  Requesting pain medications.  I did tell her I do not prescribe  pain medications/narcotics except after surgery.    No fever no chills no shortness of breath or difficulty with chewing or swallowing loss of bowel bladder control.    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  She feels the right leg shorter than the left.  Steroid injection seems to help a little bit.  She takes tramadol and nabumetone.  We discussed her total hip replacement today.  We asked and answered a lot of questions for her she wants to do both of them at the same time I did tell her 0 I usually stage does I do not perform bilateral joints because the risk of mortality is high.  She expressed understanding.  I did tell her cannot make both hips to be the same because they may differ in size.  She might have some difference in leg length.  We went over total hip replacement in details.  She wants to proceed after the holidays.  She is ambulating using a cane today but she said the injections did seem to help a little bit.  No fever no chills no shortness of breath or difficulty with chewing swallowing loss of bowel bladder control    04/04/2022  Right MARCO 02/09/2022..  Her right hip is not hurting what is hurting her the most is the left hip.  She would like to have the left hip replaced.  She is a little bit short on that left side and she notices it.  She is using a cane to ambulate she goes to [facility] on the [place] for physical therapy .  Right hip pain scale 0/10 left hip hurts her more 6/10      08/25/2022   Left MARCO 05/31/2022, right MARCO 02/09/2022.  She stated the right hip is doing extremely well without problems.  She complains of slight stiffness on the left hip and that has not been 3 months yet.  I did tell her takes 3-6 months for things to heal and it will improve with time.  She does take a Percocet at night to help her sleep and I did tell her we cannot keep on providing her with the narcotics.  My assistant had told her that she may need to go to pain management  if she wants that.  I do not think pain management at this time will provide her with the narcotics.  She is having hard time sometimes sleeping and I did tell her will put on a muscle relaxant to take on as-needed basis.  In might make her sleepy.  She is overall very happy that she had her total hip replacement her pain is 1/10 just complains of stiffness slightly more on the left side.  I reviewed the preop range of motion and compared to postop which is markedly better than before surgery.  She is ambulating without any assistive devices.  No fever no chills no shortness of breath difficulty with chewing swallowing loss of bowel bladder control  She is happy with the results so far I did remind her that these are not perfect they are 90% successful decreasing pain increasing function but the on perfect we hope that these will last at least 15-20 years  Past Medical History:   Diagnosis Date    Anxiety     Depression     Hypertension      Past Surgical History:   Procedure Laterality Date     SECTION      x1    HIP ARTHROPLASTY Right 2022    Procedure: ARTHROPLASTY, HIP;  Surgeon: Satnam Deleon MD;  Location: [facility] OR;  Service: Orthopedics;  Laterality: Right;    HIP ARTHROPLASTY Left 2022    Procedure: ARTHROPLASTY, HIP;  Surgeon: Satnam Deleon MD;  Location: [facility] OR;  Service: Orthopedics;  Laterality: Left;    INJECTION OF JOINT Bilateral 11/10/2021    Procedure: Injection, Joint;  Surgeon: Satnam Deleon MD;  Location: [facility] OR;  Service: General;  Laterality: Bilateral;  under fluoroscopy    WISDOM TOOTH EXTRACTION       Family History   Problem Relation Age of Onset    Colon cancer Father     Diabetes Father     Hypertension Father     Hypertension Mother     Diabetes Sister     Hypertension Sister     Breast cancer Neg Hx     Cancer Neg Hx     Miscarriages / Stillbirths Neg Hx     Ovarian cancer Neg Hx      labor Neg Hx     Stroke Neg Hx       Social History     Socioeconomic History    Marital status: Single    Number of children: 1   Tobacco Use    Smoking status: Never Smoker    Smokeless tobacco: Never Used   Substance and Sexual Activity    Alcohol use: Yes     Comment: socially    Drug use: No    Sexual activity: Yes     Partners: Male     Birth control/protection: Condom     Social Determinants of Health     Financial Resource Strain: Low Risk     Difficulty of Paying Living Expenses: Not hard at all   Food Insecurity: No Food Insecurity    Worried About Running Out of Food in the Last Year: Never true    Ran Out of Food in the Last Year: Never true   Transportation Needs: Unmet Transportation Needs    Lack of Transportation (Medical): Yes    Lack of Transportation (Non-Medical): Yes   Physical Activity: Inactive    Days of Exercise per Week: 0 days    Minutes of Exercise per Session: 0 min   Stress: Stress Concern Present    Feeling of Stress : To some extent   Social Connections: Unknown    Frequency of Communication with Friends and Family: More than three times a week    Frequency of Social Gatherings with Friends and Family: Three times a week    Active Member of Clubs or Organizations: No    Attends Club or Organization Meetings: Never    Marital Status:    Housing Stability: Low Risk     Unable to Pay for Housing in the Last Year: No    Number of Places Lived in the Last Year: 1    Unstable Housing in the Last Year: No     Medication List with Changes/Refills   New Medications    CYCLOBENZAPRINE (FLEXERIL) 10 MG TABLET    Take 1 tablet (10 mg total) by mouth 3 (three) times daily as needed for Muscle spasms.   Current Medications    BLOOD PRESSURE MONITOR ([employer] EASE) LG ARM SIZE (OP)    by Other route as needed for High Blood Pressure.    CHLORTHALIDONE (HYGROTEN) 25 MG TAB    Take 1 tablet by mouth once daily.    ERGOCALCIFEROL (ERGOCALCIFEROL) 50,000 UNIT CAP    Take 1 capsule (50,000 Units total) by  mouth every 7 days.    METOPROLOL SUCCINATE (TOPROL-XL) 50 MG 24 HR TABLET    Take 1 tablet (50 mg total) by mouth once daily.    OXYCODONE-ACETAMINOPHEN (PERCOCET)  MG PER TABLET    Take 1 tablet by mouth every 8 (eight) hours as needed for Pain.   Discontinued Medications    ALPRAZOLAM (XANAX) 0.25 MG TABLET    Take 1 tablet (0.25 mg total) by mouth daily as needed for Anxiety.    GABAPENTIN (NEURONTIN) 300 MG CAPSULE    Take 1 capsule (300 mg total) by mouth every evening.    ONDANSETRON (ZOFRAN-ODT) 4 MG TBDL    Take 2 tablets (8 mg total) by mouth every 8 (eight) hours as needed.    VITAMIN D3-FOLIC ACID 125 MCG (5,000 UNIT)-1 MG TAB    1 capsule WEEKLY (route: oral)     Review of patient's allergies indicates:  No Known Allergies  Review of Systems   Constitutional: Negative for decreased appetite.   HENT: Negative for tinnitus.    Eyes: Negative for double vision.   Cardiovascular: Negative for chest pain.   Respiratory: Negative for wheezing.    Hematologic/Lymphatic: Negative for bleeding problem.   Skin: Negative for dry skin.   Musculoskeletal: Positive for joint pain, joint swelling and stiffness. Negative for back pain, gout and neck pain.   Gastrointestinal: Negative for abdominal pain.   Genitourinary: Negative for bladder incontinence.   Neurological: Negative for numbness, paresthesias and sensory change.   Psychiatric/Behavioral: Negative for altered mental status.       Objective:   Body mass index is 34.06 kg/m².  There were no vitals filed for this visit.       General    Constitutional: She is oriented to person, place, and time. She appears well-developed.   HENT:   Head: Atraumatic.   Eyes: EOM are normal.   Cardiovascular: Normal rate.    Pulmonary/Chest: Effort normal.   Neurological: She is alert and oriented to person, place, and time.   Psychiatric: Judgment normal.            Gait much improved without any limping today  Pelvis is level  Right hip surgical scar healed well.   Passive range of motion with minimal discomfort if any.  Excellent motion.  Still slightly weak with hip flexion at 5-/5 but otherwise abductors and adductors were 5/5  Left hip preop total hip arthroplasty with severely limited range of motion.  Internal rotation 5° external rotation 10° with around 5° flexion contracture Pelvis tilting with motion.  Slightly weak hip flexors..  Postop left MARCO 05/31/2022 on today's visit 08/25/2022 internal rotation 20° external rotation 30° with still slightly hip contracture at 5°.  There is no pelvis tilting with her passive motion.  Still having slightly weak hip flexors and abductors.  Adductors are strong at 5/5  Quads and hamstrings ankle extensors and flexors inverters and everters are all 5/5  Bilateral knees full motion no pain in the knee joints.  No swelling no effusion.  Collaterals and cruciates are stable.  Negative Ino's.  Calves are soft nontender  DP 1+ PT 1+  Skin is warm to touch no obvious lesions    Relevant imaging results reviewed and interpreted by me, discussed with the patient and / or family today     X-ray 07/14/2022 bilateral hips excellent total hip replacement without evidence of any failure.  Alignment is excellent leg length seems to be even  X-ray and electronic record showing right total hip arthroplasty in excellent alignment performed 04/04/2021.  There is severe arthritis and collapse of the femoral head on the left side  X-ray bilateral hips 10/25/21 reviewed.  There is extensive cystic changes and loss of joint space both hips which is a little bit more progressed since last x-ray.  Avascular necrosis with resulting end-stage arthritis.  There is sclerosis of the femoral heads.   X-ray and electronic record bilateral hips with avascular necrosis of both femoral heads with 2-3 mm collapse on both of him.  CT scan showing collapse  MRI showing large lesions on both femoral heads    Assessment:     Encounter Diagnoses   Name Primary?     S/P total left hip arthroplasty Yes    H/O total hip arthroplasty, right         Plan:   S/P total left hip arthroplasty  -     cyclobenzaprine (FLEXERIL) 10 MG tablet; Take 1 tablet (10 mg total) by mouth 3 (three) times daily as needed for Muscle spasms.  Dispense: 90 tablet; Refill: 3    H/O total hip arthroplasty, right         Patient Instructions   Your x-ray from 07/14/2022 showing both total hips in excellent alignment  You have slight limitation and tightness in the left hip  Highly recommend that you do a lot of walking and stationary bicycle is ideally for repeat you will help with loosening up a little bit of the muscles  You do have some excellent strength in them on exam and you are ambulating without any assistive devices  You are happy that you do not have much of any pain except the tightness in the left hip is preventing you sometimes from getting some sleep   We went over you medications and the gabapentin you stop that because it was giving you hallucination and talking any asleep  I will give you Flexeril/cyclobenzaprine as a muscle relaxant 10 mg you may take it maximum 3 times a day.  I 1 you might make you sleepy I highly recommend only take it at night   You really need to continue with exercising and strengthening of the legs and as time goes by things improve.  They do improved 6 months to a year after surgery  Return in 6 months  Disclaimer: This note was prepared using a voice recognition system and is likely to have sound alike errors within the text.

## 2022-09-13 DIAGNOSIS — Z96.642 S/P TOTAL LEFT HIP ARTHROPLASTY: ICD-10-CM

## 2022-09-15 RX ORDER — OXYCODONE AND ACETAMINOPHEN 10; 325 MG/1; MG/1
1 TABLET ORAL EVERY 8 HOURS PRN
Qty: 15 TABLET | Refills: 0 | OUTPATIENT
Start: 2022-09-15

## 2022-09-20 ENCOUNTER — OFFICE VISIT (OUTPATIENT)
Dept: INTERNAL MEDICINE | Facility: CLINIC | Age: 42
End: 2022-09-20
Payer: MEDICAID

## 2022-09-20 VITALS
WEIGHT: 226 LBS | HEART RATE: 96 BPM | BODY MASS INDEX: 36.32 KG/M2 | SYSTOLIC BLOOD PRESSURE: 118 MMHG | OXYGEN SATURATION: 99 % | DIASTOLIC BLOOD PRESSURE: 88 MMHG | HEIGHT: 66 IN | TEMPERATURE: 98 F

## 2022-09-20 DIAGNOSIS — F41.1 GENERALIZED ANXIETY DISORDER: ICD-10-CM

## 2022-09-20 DIAGNOSIS — Z12.31 ENCOUNTER FOR SCREENING MAMMOGRAM FOR MALIGNANT NEOPLASM OF BREAST: Primary | ICD-10-CM

## 2022-09-20 DIAGNOSIS — I10 ESSENTIAL HYPERTENSION: ICD-10-CM

## 2022-09-20 DIAGNOSIS — F33.1 MODERATE EPISODE OF RECURRENT MAJOR DEPRESSIVE DISORDER: ICD-10-CM

## 2022-09-20 PROCEDURE — 1159F PR MEDICATION LIST DOCUMENTED IN MEDICAL RECORD: ICD-10-PCS | Mod: CPTII,,, | Performed by: PHYSICIAN ASSISTANT

## 2022-09-20 PROCEDURE — 3074F SYST BP LT 130 MM HG: CPT | Mod: CPTII,,, | Performed by: PHYSICIAN ASSISTANT

## 2022-09-20 PROCEDURE — 3008F PR BODY MASS INDEX (BMI) DOCUMENTED: ICD-10-PCS | Mod: CPTII,,, | Performed by: PHYSICIAN ASSISTANT

## 2022-09-20 PROCEDURE — 90686 IIV4 VACC NO PRSV 0.5 ML IM: CPT | Mod: PBBFAC

## 2022-09-20 PROCEDURE — 1159F MED LIST DOCD IN RCRD: CPT | Mod: CPTII,,, | Performed by: PHYSICIAN ASSISTANT

## 2022-09-20 PROCEDURE — 3074F PR MOST RECENT SYSTOLIC BLOOD PRESSURE < 130 MM HG: ICD-10-PCS | Mod: CPTII,,, | Performed by: PHYSICIAN ASSISTANT

## 2022-09-20 PROCEDURE — 3079F DIAST BP 80-89 MM HG: CPT | Mod: CPTII,,, | Performed by: PHYSICIAN ASSISTANT

## 2022-09-20 PROCEDURE — 99214 OFFICE O/P EST MOD 30 MIN: CPT | Mod: PBBFAC | Performed by: PHYSICIAN ASSISTANT

## 2022-09-20 PROCEDURE — 99214 OFFICE O/P EST MOD 30 MIN: CPT | Mod: S$PBB,,, | Performed by: PHYSICIAN ASSISTANT

## 2022-09-20 PROCEDURE — 99999 PR PBB SHADOW E&M-EST. PATIENT-LVL IV: CPT | Mod: PBBFAC,,, | Performed by: PHYSICIAN ASSISTANT

## 2022-09-20 PROCEDURE — 3079F PR MOST RECENT DIASTOLIC BLOOD PRESSURE 80-89 MM HG: ICD-10-PCS | Mod: CPTII,,, | Performed by: PHYSICIAN ASSISTANT

## 2022-09-20 PROCEDURE — 99999 PR PBB SHADOW E&M-EST. PATIENT-LVL IV: ICD-10-PCS | Mod: PBBFAC,,, | Performed by: PHYSICIAN ASSISTANT

## 2022-09-20 PROCEDURE — 99214 PR OFFICE/OUTPT VISIT, EST, LEVL IV, 30-39 MIN: ICD-10-PCS | Mod: S$PBB,,, | Performed by: PHYSICIAN ASSISTANT

## 2022-09-20 PROCEDURE — 3008F BODY MASS INDEX DOCD: CPT | Mod: CPTII,,, | Performed by: PHYSICIAN ASSISTANT

## 2022-09-20 RX ORDER — FLUOXETINE HYDROCHLORIDE 20 MG/1
20 CAPSULE ORAL DAILY
Qty: 30 CAPSULE | Refills: 1 | Status: SHIPPED | OUTPATIENT
Start: 2022-09-20 | End: 2022-12-07 | Stop reason: SDUPTHER

## 2022-09-20 RX ORDER — ALPRAZOLAM 0.25 MG/1
0.25 TABLET ORAL 2 TIMES DAILY PRN
Qty: 15 TABLET | Refills: 1 | Status: SHIPPED | OUTPATIENT
Start: 2022-09-20 | End: 2022-12-29 | Stop reason: ALTCHOICE

## 2022-09-20 NOTE — PROGRESS NOTES
Subjective:      Patient ID: Merari Orosco is a 42 y.o. female.    Chief Complaint: Follow-up    HPI  Ms. Orosco is here today for a follow up for her medical problems.   Recently had left hip replacement.     Reports some lower extremity edema on and off. Doesn't wear compression socks. More frequent since she had her hip replacement. Usually resolves on its own.   Looking for a new job. Has been out of work for past year. Hasn't been able to find a job yet. Denied disability as well.   Anxiety stable on xanax. Needs around once a week.     Patient Active Problem List   Diagnosis    Essential hypertension    Presence of IUD    Other fatigue    Major depressive disorder, recurrent, moderate    Generalized anxiety disorder    Class 1 obesity due to excess calories with serious comorbidity and body mass index (BMI) of 33.0 to 33.9 in adult    Right hip pain    Decreased range of right hip movement    Stiffness of right hip joint    Weakness of right hip    Altered gait    Difficulty navigating stairs    Pure hypercholesterolemia    Left hip pain    Lumbar pain    Decreased ROM of lumbar spine    Decreased functional mobility and endurance    Gait difficulty       Current Outpatient Medications:     blood pressure monitor ([employer] EASE) LG arm size (OP), by Other route as needed for High Blood Pressure., Disp: 1 each, Rfl: 0    chlorthalidone (HYGROTEN) 25 MG Tab, Take 1 tablet by mouth once daily., Disp: 30 tablet, Rfl: 3    cyclobenzaprine (FLEXERIL) 10 MG tablet, Take 1 tablet (10 mg total) by mouth 3 (three) times daily as needed for Muscle spasms., Disp: 90 tablet, Rfl: 3    ergocalciferol (ERGOCALCIFEROL) 50,000 unit Cap, Take 1 capsule (50,000 Units total) by mouth every 7 days., Disp: 12 capsule, Rfl: 1    metoprolol succinate (TOPROL-XL) 50 MG 24 hr tablet, Take 1 tablet (50 mg total) by mouth once daily., Disp: 90 tablet, Rfl: 3    ALPRAZolam (XANAX) 0.25 MG tablet, Take 1 tablet (0.25 mg total) by mouth 2  (two) times daily as needed for Anxiety., Disp: 15 tablet, Rfl: 1    FLUoxetine 20 MG capsule, Take 1 capsule (20 mg total) by mouth once daily., Disp: 30 capsule, Rfl: 1  No current facility-administered medications for this visit.    Facility-Administered Medications Ordered in Other Visits:     0.9%  NaCl infusion, , Intravenous, Continuous, Satnam Deleon MD    acetaminophen tablet 650 mg, 650 mg, Oral, Q4H PRN, Satnam Deleon MD, 650 mg at 02/09/22 0921    chlorhexidine 0.12 % solution 10 mL, 10 mL, Mouth/Throat, On Call Procedure, Satnam Deleon MD, 10 mL at 02/09/22 0922  Health Maintenance Due   Topic Date Due    Sign Pain Contract  Never done    Complete Opioid Risk Tool  Never done    Mammogram  Never done    COVID-19 Vaccine (3 - Booster for Pfizer series) 08/11/2022    Influenza Vaccine (1) 09/01/2022       Review of Systems   Constitutional:  Negative for activity change, appetite change, chills, diaphoresis, fatigue, fever and unexpected weight change.   HENT: Negative.  Negative for congestion, hearing loss, postnasal drip, rhinorrhea, sore throat, trouble swallowing and voice change.    Eyes: Negative.  Negative for visual disturbance.   Respiratory: Negative.  Negative for cough, choking, chest tightness and shortness of breath.    Cardiovascular:  Positive for leg swelling. Negative for chest pain and palpitations.   Gastrointestinal:  Negative for abdominal distention, abdominal pain, blood in stool, constipation, diarrhea, nausea and vomiting.   Endocrine: Negative for cold intolerance, heat intolerance, polydipsia and polyuria.   Genitourinary: Negative.  Negative for difficulty urinating and frequency.   Musculoskeletal:  Negative for arthralgias, back pain, gait problem, joint swelling and myalgias.   Skin:  Negative for color change, pallor, rash and wound.   Neurological:  Negative for dizziness, tremors, weakness, light-headedness, numbness and headaches.   Hematological:   "Negative for adenopathy.   Psychiatric/Behavioral:  Positive for dysphoric mood. Negative for behavioral problems, confusion, decreased concentration, hallucinations, self-injury, sleep disturbance and suicidal ideas. The patient is nervous/anxious. The patient is not hyperactive.    Objective:   /88 (BP Location: Left arm, Patient Position: Sitting, BP Method: Large (Manual))   Pulse 96   Temp 97.6 °F (36.4 °C) (Tympanic)   Ht 5' 6" (1.676 m)   Wt 102.5 kg (225 lb 15.5 oz)   SpO2 99%   BMI 36.47 kg/m²     Physical Exam  Vitals reviewed.   Constitutional:       General: She is not in acute distress.     Appearance: Normal appearance. She is well-developed. She is not ill-appearing, toxic-appearing or diaphoretic.   HENT:      Head: Normocephalic and atraumatic.      Right Ear: External ear normal.      Left Ear: External ear normal.      Nose: Nose normal.   Eyes:      Conjunctiva/sclera: Conjunctivae normal.      Pupils: Pupils are equal, round, and reactive to light.   Cardiovascular:      Rate and Rhythm: Normal rate and regular rhythm.      Heart sounds: Normal heart sounds. No murmur heard.    No friction rub. No gallop.   Pulmonary:      Effort: Pulmonary effort is normal. No respiratory distress.      Breath sounds: Normal breath sounds. No wheezing or rales.   Chest:      Chest wall: No tenderness.   Abdominal:      General: There is no distension.      Palpations: Abdomen is soft.      Tenderness: There is no abdominal tenderness.   Musculoskeletal:         General: Normal range of motion.      Cervical back: Normal range of motion and neck supple.   Lymphadenopathy:      Cervical: No cervical adenopathy.   Skin:     General: Skin is warm and dry.      Capillary Refill: Capillary refill takes less than 2 seconds.      Findings: No rash.   Neurological:      Mental Status: She is alert and oriented to person, place, and time.      Motor: No weakness.      Coordination: Coordination normal.      " Gait: Gait normal.   Psychiatric:         Attention and Perception: Attention and perception normal.         Mood and Affect: Mood normal.         Behavior: Behavior normal.         Thought Content: Thought content normal. Thought content is not paranoid or delusional. Thought content does not include homicidal or suicidal ideation. Thought content does not include homicidal or suicidal plan.         Judgment: Judgment normal.       Assessment:     1. Encounter for screening mammogram for malignant neoplasm of breast    2. Moderate episode of recurrent major depressive disorder    3. Generalized anxiety disorder    4. Essential hypertension      Plan:   Encounter for screening mammogram for malignant neoplasm of breast  -     Mammo Digital Screening Bilat w/ Jose Angel; Future; Expected date: 09/20/2022    Moderate episode of recurrent major depressive disorder  -     FLUoxetine 20 MG capsule; Take 1 capsule (20 mg total) by mouth once daily.  Dispense: 30 capsule; Refill: 1    Generalized anxiety disorder  -     ALPRAZolam (XANAX) 0.25 MG tablet; Take 1 tablet (0.25 mg total) by mouth 2 (two) times daily as needed for Anxiety.  Dispense: 15 tablet; Refill: 1    Essential hypertension  -diagnosis and treatment options discussed. Agree to retry prozac. Recheck in 1 month.  -a list of medicaid psychiatry providers provided to her today to try and call to get an appointment.   -cont to monitor BP through digital HTN program. Will review in 1 month.     Follow up in about 4 weeks (around 10/18/2022), or if symptoms worsen or fail to improve.

## 2022-09-20 NOTE — PATIENT INSTRUCTIONS
've listed below some mental health providers whom I believe are accepting new patients with your insurance. You should be able to get the mental health care you need at one of the centers listed below. Please contact them ASAP to schedule your first appointment. Tell them you were referred by your primary care provider.    [facility]  [address], [place] [address]  Phone: [phone]  Web:  [email or web address]    [facility]  [address], [address]  [place] [address]  Phone: [phone]  Web: [email or web address]    [facility] (Satellite Location)  [address]  [address], [place] [address]  Phone: [phone]  Web: [email or web address]    [facility]  [address]  [place] [address]   Phone: [phone]    [facility]  [address] [place] [address]  Phone: [phone]  Web: [email or web address]    [facility]  [address]  [place] [address]  Phone: [phone]  Web: [email or web address]    [facility]  [address] [place] [address]  Phone: [phone]  Web: [email or web address]    [facility]  [address] [place] [address]  Phone: [phone]  Web: [email or web address]    IMPORANT: If you experience an emotional crisis, go to the nearest emergency room or call the crisis intervention center at [phone].

## 2022-11-08 ENCOUNTER — PATIENT MESSAGE (OUTPATIENT)
Dept: ADMINISTRATIVE | Facility: OTHER | Age: 42
End: 2022-11-08
Payer: MEDICAID

## 2022-11-09 ENCOUNTER — OFFICE VISIT (OUTPATIENT)
Dept: CARDIOLOGY | Facility: CLINIC | Age: 42
End: 2022-11-09
Payer: MEDICAID

## 2022-11-09 VITALS
DIASTOLIC BLOOD PRESSURE: 120 MMHG | WEIGHT: 230.63 LBS | HEIGHT: 66 IN | BODY MASS INDEX: 37.06 KG/M2 | SYSTOLIC BLOOD PRESSURE: 160 MMHG | OXYGEN SATURATION: 98 % | HEART RATE: 70 BPM

## 2022-11-09 DIAGNOSIS — E78.00 PURE HYPERCHOLESTEROLEMIA: ICD-10-CM

## 2022-11-09 DIAGNOSIS — E66.09 CLASS 1 OBESITY DUE TO EXCESS CALORIES WITH SERIOUS COMORBIDITY AND BODY MASS INDEX (BMI) OF 33.0 TO 33.9 IN ADULT: Chronic | ICD-10-CM

## 2022-11-09 DIAGNOSIS — Z74.09 DECREASED FUNCTIONAL MOBILITY AND ENDURANCE: ICD-10-CM

## 2022-11-09 DIAGNOSIS — F41.1 GENERALIZED ANXIETY DISORDER: Chronic | ICD-10-CM

## 2022-11-09 DIAGNOSIS — I10 ESSENTIAL HYPERTENSION: Primary | ICD-10-CM

## 2022-11-09 PROCEDURE — 3080F DIAST BP >= 90 MM HG: CPT | Mod: CPTII,,, | Performed by: INTERNAL MEDICINE

## 2022-11-09 PROCEDURE — 99999 PR PBB SHADOW E&M-EST. PATIENT-LVL III: ICD-10-PCS | Mod: PBBFAC,,, | Performed by: INTERNAL MEDICINE

## 2022-11-09 PROCEDURE — 99215 OFFICE O/P EST HI 40 MIN: CPT | Mod: S$PBB,,, | Performed by: INTERNAL MEDICINE

## 2022-11-09 PROCEDURE — 3008F PR BODY MASS INDEX (BMI) DOCUMENTED: ICD-10-PCS | Mod: CPTII,,, | Performed by: INTERNAL MEDICINE

## 2022-11-09 PROCEDURE — 99215 PR OFFICE/OUTPT VISIT, EST, LEVL V, 40-54 MIN: ICD-10-PCS | Mod: S$PBB,,, | Performed by: INTERNAL MEDICINE

## 2022-11-09 PROCEDURE — 3077F PR MOST RECENT SYSTOLIC BLOOD PRESSURE >= 140 MM HG: ICD-10-PCS | Mod: CPTII,,, | Performed by: INTERNAL MEDICINE

## 2022-11-09 PROCEDURE — 3080F PR MOST RECENT DIASTOLIC BLOOD PRESSURE >= 90 MM HG: ICD-10-PCS | Mod: CPTII,,, | Performed by: INTERNAL MEDICINE

## 2022-11-09 PROCEDURE — 99999 PR PBB SHADOW E&M-EST. PATIENT-LVL III: CPT | Mod: PBBFAC,,, | Performed by: INTERNAL MEDICINE

## 2022-11-09 PROCEDURE — 99213 OFFICE O/P EST LOW 20 MIN: CPT | Mod: PBBFAC | Performed by: INTERNAL MEDICINE

## 2022-11-09 PROCEDURE — 3077F SYST BP >= 140 MM HG: CPT | Mod: CPTII,,, | Performed by: INTERNAL MEDICINE

## 2022-11-09 PROCEDURE — 3008F BODY MASS INDEX DOCD: CPT | Mod: CPTII,,, | Performed by: INTERNAL MEDICINE

## 2022-11-09 RX ORDER — CLONIDINE HYDROCHLORIDE 0.1 MG/1
0.3 TABLET ORAL ONCE
Status: DISCONTINUED | OUTPATIENT
Start: 2022-11-09 | End: 2022-12-15

## 2022-11-09 RX ORDER — CLONIDINE HYDROCHLORIDE 0.1 MG/1
0.2 TABLET ORAL ONCE
Status: DISCONTINUED | OUTPATIENT
Start: 2022-11-09 | End: 2022-11-09

## 2022-11-09 RX ORDER — AMLODIPINE BESYLATE 5 MG/1
5 TABLET ORAL DAILY
Qty: 30 TABLET | Refills: 11 | Status: SHIPPED | OUTPATIENT
Start: 2022-11-09 | End: 2022-12-15 | Stop reason: SDUPTHER

## 2022-11-09 NOTE — PROGRESS NOTES
Subjective:   Patient ID:  Merari Orosco is a 42 y.o. female who presents for evaluation of No chief complaint on file.      HPI  .  Comes in for follow-up.  So Dr. Mrogan in the interim in May.    She had both hip surgeries done.    She states that she could not get her medications and she has been out of metoprolol and chlorthalidone for weeks.    On arrival her blood pressure was 160/120.  She had her Toprol and chlorthalidone on her and she took both of them.  We also gave her clonidine 0.3 mg in the clinic.  Her blood pressure starts to go down.    She denies any chest pain, headache, syncope or syncope no palpitations.    She is not compliant with  low-salt diet.          40 yo female, started doing cigars occ recently no prior smoking, HTN controlled on diuretic and toprol, bilateral AVN  Denies hx of cocaine use, no family hx of cad   No hx of cad, cva, chf, dm or ckd \  Comes in for preop evaluation for right hip replacement     Denies any chest pain, she is limited with her activity however she denies any hx of chest pain, dyspnea, palpitations, syncope or presyncope   She is able to ambulate with difficulty  Unclear reason of her AVN to her hips as per review of notes , no hx of autoimmune disease, she denies hx of cocaine use  Compliant with meds       Past Medical History:   Diagnosis Date    Anxiety     Depression     Hypertension        Past Surgical History:   Procedure Laterality Date     SECTION      x1    HIP ARTHROPLASTY Right 2022    Procedure: ARTHROPLASTY, HIP;  Surgeon: Satnam Deleon MD;  Location: [facility] OR;  Service: Orthopedics;  Laterality: Right;    HIP ARTHROPLASTY Left 2022    Procedure: ARTHROPLASTY, HIP;  Surgeon: Satnam Deleon MD;  Location: [facility] OR;  Service: Orthopedics;  Laterality: Left;    INJECTION OF JOINT Bilateral 11/10/2021    Procedure: Injection, Joint;  Surgeon: Satnam Deleon MD;  Location: [facility] OR;  Service:  General;  Laterality: Bilateral;  under fluoroscopy    WISDOM TOOTH EXTRACTION         Social History     Tobacco Use    Smoking status: Never    Smokeless tobacco: Never   Substance Use Topics    Alcohol use: Yes     Comment: socially    Drug use: No       Family History   Problem Relation Age of Onset    Colon cancer Father     Diabetes Father     Hypertension Father     Hypertension Mother     Diabetes Sister     Hypertension Sister     Breast cancer Neg Hx     Cancer Neg Hx     Miscarriages / Stillbirths Neg Hx     Ovarian cancer Neg Hx      labor Neg Hx     Stroke Neg Hx        Review of Systems   Constitutional: Negative for fever and malaise/fatigue.   HENT:  Negative for sore throat.    Eyes:  Negative for blurred vision.   Cardiovascular:  Negative for chest pain, claudication, cyanosis, dyspnea on exertion, irregular heartbeat, leg swelling, near-syncope, orthopnea, palpitations, paroxysmal nocturnal dyspnea and syncope.   Respiratory:  Negative for cough and hemoptysis.    Hematologic/Lymphatic: Negative for bleeding problem.   Skin:  Negative for rash.   Musculoskeletal:  Positive for arthritis. Negative for falls.   Gastrointestinal:  Negative for abdominal pain.   Genitourinary: Negative.    Neurological: Negative.    Psychiatric/Behavioral:  Negative for altered mental status and substance abuse.      Current Outpatient Medications on File Prior to Visit   Medication Sig    ALPRAZolam (XANAX) 0.25 MG tablet Take 1 tablet (0.25 mg total) by mouth 2 (two) times daily as needed for Anxiety.    blood pressure monitor (IHE[employer] EASE) LG arm size (OP) by Other route as needed for High Blood Pressure.    chlorthalidone (HYGROTEN) 25 MG Tab Take 1 tablet by mouth once daily.    cyclobenzaprine (FLEXERIL) 10 MG tablet Take 1 tablet (10 mg total) by mouth 3 (three) times daily as needed for Muscle spasms.    FLUoxetine 20 MG capsule Take 1 capsule (20 mg total) by mouth once daily.    metoprolol succinate  (TOPROL-XL) 50 MG 24 hr tablet Take 1 tablet (50 mg total) by mouth once daily.     Current Facility-Administered Medications on File Prior to Visit   Medication    0.9%  NaCl infusion    acetaminophen tablet 650 mg    chlorhexidine 0.12 % solution 10 mL       Objective:   Objective:  Wt Readings from Last 3 Encounters:   11/09/22 104.6 kg (230 lb 9.6 oz)   09/20/22 102.5 kg (225 lb 15.5 oz)   08/25/22 95.7 kg (211 lb)     Temp Readings from Last 3 Encounters:   09/20/22 97.6 °F (36.4 °C) (Tympanic)   05/31/22 97.8 °F (36.6 °C) (Temporal)   02/09/22 98.2 °F (36.8 °C) (Temporal)     BP Readings from Last 3 Encounters:   11/09/22 (!) 160/120   09/20/22 118/88   06/09/22 132/88     Pulse Readings from Last 3 Encounters:   11/09/22 70   09/20/22 96   06/09/22 (!) 115       Physical Exam  Vitals reviewed.   Constitutional:       Appearance: She is well-developed.   HENT:      Head: Normocephalic and atraumatic.   Eyes:      General: No scleral icterus.     Conjunctiva/sclera: Conjunctivae normal.   Cardiovascular:      Rate and Rhythm: Normal rate and regular rhythm.      Pulses: Intact distal pulses.      Heart sounds: Normal heart sounds. No murmur heard.  Pulmonary:      Effort: No respiratory distress.      Breath sounds: No wheezing or rales.   Chest:      Chest wall: No tenderness.   Abdominal:      General: Bowel sounds are normal. There is no distension.      Palpations: Abdomen is soft.      Tenderness: There is no guarding.   Musculoskeletal:         General: Normal range of motion.      Cervical back: Normal range of motion and neck supple.   Skin:     General: Skin is warm.   Neurological:      Mental Status: She is alert and oriented to person, place, and time.       Lab Results   Component Value Date    CHOL 228 (H) 08/24/2021    CHOL 220 (H) 01/30/2020    CHOL 167 08/29/2017     Lab Results   Component Value Date    HDL 58 08/24/2021    HDL 69 01/30/2020    HDL 47 08/29/2017     Lab Results   Component  Value Date    LDLCALC 147.2 08/24/2021    LDLCALC 135.4 01/30/2020    LDLCALC 85.4 08/29/2017     Lab Results   Component Value Date    TRIG 114 08/24/2021    TRIG 78 01/30/2020    TRIG 173 (H) 08/29/2017     Lab Results   Component Value Date    CHOLHDL 25.4 08/24/2021    CHOLHDL 31.4 01/30/2020    CHOLHDL 28.1 08/29/2017       Chemistry        Component Value Date/Time     05/27/2022 1000    K 3.5 05/27/2022 1000     05/27/2022 1000    CO2 24 05/27/2022 1000    BUN 12 05/27/2022 1000    CREATININE 0.8 05/27/2022 1000    GLU 70 05/27/2022 1000        Component Value Date/Time    CALCIUM 9.1 05/27/2022 1000    ALKPHOS 63 05/27/2022 1000    AST 15 05/27/2022 1000    ALT 8 (L) 05/27/2022 1000    BILITOT 0.4 05/27/2022 1000    ESTGFRAFRICA >60.0 05/27/2022 1000    EGFRNONAA >60.0 05/27/2022 1000          Lab Results   Component Value Date    TSH 0.725 01/30/2020     No results found for: INR, PROTIME  Lab Results   Component Value Date    WBC 5.06 05/27/2022    HGB 10.8 (L) 05/31/2022    HCT 31.5 (L) 05/31/2022    MCV 97 05/27/2022     (L) 05/27/2022     BNP  @LABRCNTIP(BNP,BNPTRIAGEBLO)@  CrCl cannot be calculated (Patient's most recent lab result is older than the maximum 7 days allowed.).     Imaging:  ======  No results found for this or any previous visit.    No results found for this or any previous visit.    No results found for this or any previous visit.    Results for orders placed during the hospital encounter of 08/15/16    X-Ray Chest PA And Lateral    Narrative  Comparison: None    2 views    Findings:    Heart and pulmonary vasculature are within normal limits.  Midline trachea.  Minimal smooth pleural thickening extensive apices.  No consolidation or effusion.  Osseous structures intact.  IMPRESSION:      No acute infiltrate.  See above.  ______________________________________    Electronically signed by: JASE MAZA III, MD  Date:     08/15/16  Time:    11:56    No results found  for this or any previous visit.    No valid procedures specified.    Diagnostic Results:  ECG: Reviewed  Nsr, normal ecg     The 10-year ASCVD risk score (Sudha CHANG, et al., 2019) is: 5%    Values used to calculate the score:      Age: 42 years      Sex: Female      Is Non- : Yes      Diabetic: No      Tobacco smoker: No      Systolic Blood Pressure: 160 mmHg      Is BP treated: Yes      HDL Cholesterol: 58 mg/dL      Total Cholesterol: 228 mg/dL    Assessment and Plan:   Essential hypertension  -     amLODIPine (NORVASC) 5 MG tablet; Take 1 tablet (5 mg total) by mouth once daily.  Dispense: 30 tablet; Refill: 11  -     Discontinue: cloNIDine tablet 0.2 mg  -     cloNIDine tablet 0.3 mg    Generalized anxiety disorder    Class 1 obesity due to excess calories with serious comorbidity and body mass index (BMI) of 33.0 to 33.9 in adult    Decreased functional mobility and endurance    Pure hypercholesterolemia      Reviewed all tests and above medical conditions with patient in detail and formulated treatment plan.  Risk factor modification discussed.   Cardiac low salt diet discussed.  Maintaining healthy weight and weight loss goals were discussed in clinic.  Resume Toprol and chlorthalidone.    Add Norvasc to her daily regimen.    Given clonidine in the clinic.    Stressed importance of compliance with medication and to continue to check her blood pressure at home.        Follow up in 3 months

## 2022-11-10 ENCOUNTER — PATIENT MESSAGE (OUTPATIENT)
Dept: ADMINISTRATIVE | Facility: OTHER | Age: 42
End: 2022-11-10
Payer: MEDICAID

## 2022-12-07 DIAGNOSIS — I10 ESSENTIAL HYPERTENSION: ICD-10-CM

## 2022-12-07 DIAGNOSIS — F33.1 MODERATE EPISODE OF RECURRENT MAJOR DEPRESSIVE DISORDER: ICD-10-CM

## 2022-12-08 RX ORDER — FLUOXETINE HYDROCHLORIDE 20 MG/1
20 CAPSULE ORAL DAILY
Qty: 30 CAPSULE | Refills: 1 | Status: SHIPPED | OUTPATIENT
Start: 2022-12-08 | End: 2022-12-15 | Stop reason: SDUPTHER

## 2022-12-08 RX ORDER — CHLORTHALIDONE 25 MG/1
TABLET ORAL DAILY
Qty: 30 TABLET | Refills: 3 | Status: SHIPPED | OUTPATIENT
Start: 2022-12-08 | End: 2022-12-15 | Stop reason: SDUPTHER

## 2022-12-09 ENCOUNTER — LAB VISIT (OUTPATIENT)
Dept: LAB | Facility: HOSPITAL | Age: 42
End: 2022-12-09
Attending: PHYSICIAN ASSISTANT
Payer: MEDICAID

## 2022-12-09 DIAGNOSIS — I10 ESSENTIAL HYPERTENSION: ICD-10-CM

## 2022-12-09 DIAGNOSIS — E78.00 PURE HYPERCHOLESTEROLEMIA: ICD-10-CM

## 2022-12-09 DIAGNOSIS — F41.1 GENERALIZED ANXIETY DISORDER: ICD-10-CM

## 2022-12-09 LAB
ALBUMIN SERPL BCP-MCNC: 3.7 G/DL (ref 3.5–5.2)
ALP SERPL-CCNC: 39 U/L (ref 55–135)
ALT SERPL W/O P-5'-P-CCNC: 18 U/L (ref 10–44)
ANION GAP SERPL CALC-SCNC: 8 MMOL/L (ref 8–16)
AST SERPL-CCNC: 22 U/L (ref 10–40)
BASOPHILS # BLD AUTO: 0.12 K/UL (ref 0–0.2)
BASOPHILS NFR BLD: 2.1 % (ref 0–1.9)
BILIRUB SERPL-MCNC: 0.3 MG/DL (ref 0.1–1)
BUN SERPL-MCNC: 9 MG/DL (ref 6–20)
CALCIUM SERPL-MCNC: 8.9 MG/DL (ref 8.7–10.5)
CHLORIDE SERPL-SCNC: 104 MMOL/L (ref 95–110)
CHOLEST SERPL-MCNC: 172 MG/DL (ref 120–199)
CHOLEST/HDLC SERPL: 2.6 {RATIO} (ref 2–5)
CO2 SERPL-SCNC: 30 MMOL/L (ref 23–29)
CREAT SERPL-MCNC: 0.8 MG/DL (ref 0.5–1.4)
DIFFERENTIAL METHOD: ABNORMAL
EOSINOPHIL # BLD AUTO: 0.1 K/UL (ref 0–0.5)
EOSINOPHIL NFR BLD: 1 % (ref 0–8)
ERYTHROCYTE [DISTWIDTH] IN BLOOD BY AUTOMATED COUNT: 15.1 % (ref 11.5–14.5)
EST. GFR  (NO RACE VARIABLE): >60 ML/MIN/1.73 M^2
GLUCOSE SERPL-MCNC: 72 MG/DL (ref 70–110)
HCT VFR BLD AUTO: 36.2 % (ref 37–48.5)
HDLC SERPL-MCNC: 65 MG/DL (ref 40–75)
HDLC SERPL: 37.8 % (ref 20–50)
HGB BLD-MCNC: 11.6 G/DL (ref 12–16)
IMM GRANULOCYTES # BLD AUTO: 0.01 K/UL (ref 0–0.04)
IMM GRANULOCYTES NFR BLD AUTO: 0.2 % (ref 0–0.5)
LDLC SERPL CALC-MCNC: 65.4 MG/DL (ref 63–159)
LYMPHOCYTES # BLD AUTO: 3.8 K/UL (ref 1–4.8)
LYMPHOCYTES NFR BLD: 65.5 % (ref 18–48)
MCH RBC QN AUTO: 32.5 PG (ref 27–31)
MCHC RBC AUTO-ENTMCNC: 32 G/DL (ref 32–36)
MCV RBC AUTO: 101 FL (ref 82–98)
MONOCYTES # BLD AUTO: 0.3 K/UL (ref 0.3–1)
MONOCYTES NFR BLD: 5.9 % (ref 4–15)
NEUTROPHILS # BLD AUTO: 1.5 K/UL (ref 1.8–7.7)
NEUTROPHILS NFR BLD: 25.3 % (ref 38–73)
NONHDLC SERPL-MCNC: 107 MG/DL
NRBC BLD-RTO: 0 /100 WBC
PLATELET # BLD AUTO: 457 K/UL (ref 150–450)
PMV BLD AUTO: 9.8 FL (ref 9.2–12.9)
POTASSIUM SERPL-SCNC: 3.7 MMOL/L (ref 3.5–5.1)
PROT SERPL-MCNC: 7.3 G/DL (ref 6–8.4)
RBC # BLD AUTO: 3.57 M/UL (ref 4–5.4)
SODIUM SERPL-SCNC: 142 MMOL/L (ref 136–145)
TRIGL SERPL-MCNC: 208 MG/DL (ref 30–150)
WBC # BLD AUTO: 5.77 K/UL (ref 3.9–12.7)

## 2022-12-09 PROCEDURE — 85025 COMPLETE CBC W/AUTO DIFF WBC: CPT | Performed by: PHYSICIAN ASSISTANT

## 2022-12-09 PROCEDURE — 80053 COMPREHEN METABOLIC PANEL: CPT | Performed by: PHYSICIAN ASSISTANT

## 2022-12-09 PROCEDURE — 80061 LIPID PANEL: CPT | Performed by: PHYSICIAN ASSISTANT

## 2022-12-09 PROCEDURE — 36415 COLL VENOUS BLD VENIPUNCTURE: CPT | Performed by: PHYSICIAN ASSISTANT

## 2022-12-09 RX ORDER — ALPRAZOLAM 0.25 MG/1
0.25 TABLET ORAL 2 TIMES DAILY PRN
Qty: 15 TABLET | Refills: 1 | Status: CANCELLED | OUTPATIENT
Start: 2022-12-09 | End: 2023-01-08

## 2022-12-15 ENCOUNTER — OFFICE VISIT (OUTPATIENT)
Dept: INTERNAL MEDICINE | Facility: CLINIC | Age: 42
End: 2022-12-15
Payer: MEDICAID

## 2022-12-15 DIAGNOSIS — D75.839 THROMBOCYTOSIS: Chronic | ICD-10-CM

## 2022-12-15 DIAGNOSIS — F33.0 MILD EPISODE OF RECURRENT MAJOR DEPRESSIVE DISORDER: Primary | ICD-10-CM

## 2022-12-15 DIAGNOSIS — I10 ESSENTIAL HYPERTENSION: ICD-10-CM

## 2022-12-15 DIAGNOSIS — F41.1 GENERALIZED ANXIETY DISORDER: Chronic | ICD-10-CM

## 2022-12-15 DIAGNOSIS — R00.0 SINUS TACHYCARDIA: ICD-10-CM

## 2022-12-15 DIAGNOSIS — F10.10 EXCESSIVE DRINKING OF ALCOHOL: ICD-10-CM

## 2022-12-15 DIAGNOSIS — E78.00 PURE HYPERCHOLESTEROLEMIA: ICD-10-CM

## 2022-12-15 DIAGNOSIS — D64.9 MILD ANEMIA: ICD-10-CM

## 2022-12-15 PROCEDURE — 1159F PR MEDICATION LIST DOCUMENTED IN MEDICAL RECORD: ICD-10-PCS | Mod: CPTII,,, | Performed by: FAMILY MEDICINE

## 2022-12-15 PROCEDURE — 99214 OFFICE O/P EST MOD 30 MIN: CPT | Mod: PBBFAC | Performed by: FAMILY MEDICINE

## 2022-12-15 PROCEDURE — 3008F BODY MASS INDEX DOCD: CPT | Mod: CPTII,,, | Performed by: FAMILY MEDICINE

## 2022-12-15 PROCEDURE — 1159F MED LIST DOCD IN RCRD: CPT | Mod: CPTII,,, | Performed by: FAMILY MEDICINE

## 2022-12-15 PROCEDURE — 3075F PR MOST RECENT SYSTOLIC BLOOD PRESS GE 130-139MM HG: ICD-10-PCS | Mod: CPTII,,, | Performed by: FAMILY MEDICINE

## 2022-12-15 PROCEDURE — 99215 PR OFFICE/OUTPT VISIT, EST, LEVL V, 40-54 MIN: ICD-10-PCS | Mod: S$PBB,,, | Performed by: FAMILY MEDICINE

## 2022-12-15 PROCEDURE — 3079F DIAST BP 80-89 MM HG: CPT | Mod: CPTII,,, | Performed by: FAMILY MEDICINE

## 2022-12-15 PROCEDURE — 3079F PR MOST RECENT DIASTOLIC BLOOD PRESSURE 80-89 MM HG: ICD-10-PCS | Mod: CPTII,,, | Performed by: FAMILY MEDICINE

## 2022-12-15 PROCEDURE — 99215 OFFICE O/P EST HI 40 MIN: CPT | Mod: S$PBB,,, | Performed by: FAMILY MEDICINE

## 2022-12-15 PROCEDURE — 3075F SYST BP GE 130 - 139MM HG: CPT | Mod: CPTII,,, | Performed by: FAMILY MEDICINE

## 2022-12-15 PROCEDURE — 3008F PR BODY MASS INDEX (BMI) DOCUMENTED: ICD-10-PCS | Mod: CPTII,,, | Performed by: FAMILY MEDICINE

## 2022-12-15 PROCEDURE — 99999 PR PBB SHADOW E&M-EST. PATIENT-LVL IV: ICD-10-PCS | Mod: PBBFAC,,, | Performed by: FAMILY MEDICINE

## 2022-12-15 PROCEDURE — 99999 PR PBB SHADOW E&M-EST. PATIENT-LVL IV: CPT | Mod: PBBFAC,,, | Performed by: FAMILY MEDICINE

## 2022-12-15 RX ORDER — METOPROLOL SUCCINATE 50 MG/1
50 TABLET, EXTENDED RELEASE ORAL DAILY
Qty: 90 TABLET | Refills: 3 | Status: CANCELLED | OUTPATIENT
Start: 2022-12-15 | End: 2023-03-15

## 2022-12-15 RX ORDER — METOPROLOL SUCCINATE 100 MG/1
100 TABLET, EXTENDED RELEASE ORAL DAILY
Qty: 90 TABLET | Refills: 0 | Status: SHIPPED | OUTPATIENT
Start: 2022-12-15 | End: 2022-12-29 | Stop reason: SDUPTHER

## 2022-12-15 RX ORDER — ALPRAZOLAM 0.25 MG/1
0.25 TABLET ORAL 2 TIMES DAILY PRN
Qty: 15 TABLET | Refills: 1 | OUTPATIENT
Start: 2022-12-15 | End: 2023-01-14

## 2022-12-15 RX ORDER — CHLORTHALIDONE 25 MG/1
TABLET ORAL DAILY
Qty: 90 TABLET | Refills: 0 | Status: SHIPPED | OUTPATIENT
Start: 2022-12-15 | End: 2022-12-29 | Stop reason: SDUPTHER

## 2022-12-15 RX ORDER — AMLODIPINE BESYLATE 5 MG/1
5 TABLET ORAL DAILY
Qty: 90 TABLET | Refills: 0 | Status: SHIPPED | OUTPATIENT
Start: 2022-12-15 | End: 2022-12-29 | Stop reason: SDUPTHER

## 2022-12-15 RX ORDER — CLONAZEPAM 0.5 MG/1
0.25 TABLET ORAL 2 TIMES DAILY PRN
Qty: 30 TABLET | Refills: 0 | Status: SHIPPED | OUTPATIENT
Start: 2022-12-15 | End: 2022-12-15 | Stop reason: CLARIF

## 2022-12-15 RX ORDER — FLUOXETINE HYDROCHLORIDE 20 MG/1
20 CAPSULE ORAL DAILY
Qty: 90 CAPSULE | Refills: 1 | OUTPATIENT
Start: 2022-12-15 | End: 2023-01-20

## 2022-12-15 NOTE — TELEPHONE ENCOUNTER
No new care gaps identified.  [facility] Embedded Care Gaps. Reference number: [id number]. 12/15/2022   5:20:31 PM CST

## 2022-12-15 NOTE — ASSESSMENT & PLAN NOTE
Lab Results   Component Value Date    CHOL 172 12/09/2022    CHOL 228 (H) 08/24/2021    TRIG 208 (H) 12/09/2022    TRIG 114 08/24/2021    HDL 65 12/09/2022    HDL 58 08/24/2021    LDLCALC 65.4 12/09/2022    LDLCALC 147.2 08/24/2021    NONHDLCHOL 107 12/09/2022    NONHDLCHOL 170 08/24/2021    AST 22 12/09/2022    ALT 18 12/09/2022     The 10-year ASCVD risk score (Sudha CHANG, et al., 2019) is: 0.8%    Values used to calculate the score:      Age: 42 years      Sex: Female      Is Non- : Yes      Diabetic: No      Tobacco smoker: No      Systolic Blood Pressure: 126 mmHg      Is BP treated: Yes      HDL Cholesterol: 65 mg/dL      Total Cholesterol: 172 mg/dL

## 2022-12-15 NOTE — PATIENT INSTRUCTIONS
We want you to have a excellent experience with your upcoming virtual visit, so here are some tips:  Ensure that you have the latest version of the [facility] casey installed if you use your mobile device for your virtual visit.  You can learn more about [facility] at [email or web address]. There you'll also find instructions and a short YouTube video.  Check-in for your virtual visit appointment 10-15 minutes early to allow time to troubleshoot any technical problems.  Call the [facility] Patient Support Team at [phone] if you need help getting ready for your virtual visit or checking in for a virtual visit.  We encourage you to use headphones or earbuds during the visit to make it easier to hear and be heard.  Remember that virtual visit appointments are like in-office appointments in that patients are seen in order of their appointment. If your appointment is at the start of the morning clinic or the start of the afternoon clinic, you shouldn't experience much of a wait. However, if your appointment is at the end of the morning clinic or the end of the afternoon clinic, you may experience a wait if an unexpected need has caused our clinic to run behind schedule. We will notify you if your wait is expected to be longer than usual. While waiting, please don't exit or abandon your virtual visit, as this may cause you to have to reschedule your appointment.

## 2022-12-15 NOTE — PROGRESS NOTES
OFFICE VISIT 12/15/22  2:00 PM CST    HEALTH MAINTENANCE INTERVENTIONS - UP TO DATE  Health Maintenance Topics with due status: Not Due       Topic Last Completion Date    TETANUS VACCINE 08/15/2016    Cervical Cancer Screening 11/18/2020       HEALTH MAINTENANCE INTERVENTIONS - DUE OR DUE SOON  Health Maintenance Due   Topic Date Due    Mammogram  Never done    COVID-19 Vaccine (3 - Booster for Pfizer series) 05/06/2022       CHIEF COMPLAINT: Follow-up     This appointment should have been rescheduled as a no-show because she showed up an hour later for her appointment today (12/15/2022). I agreed to see her because she no-showed her previous six appointments with me, and she needed follow-up for multiple chronic issues. We discussed strategies to help her overcome any barriers to adherence with treatment plan. She agreed to get labs done today and return soon to review test results.    [facility] records reviewed: [facility] ED 05/04/2017  Alcohol Level 408.5  Repeat Alcohol Level 368.1   - She reports only modest alcohol use presently.    Review of [place] Board of Pharmacy Controlled Prescription Drug Monitoring database reveals:  Narcotics (excluding Buprenorphine)   -30 Day Avg. MME 0.00   -90 Day Avg. MME 0.00    Buprenorphine   -30 Day Avg. mg/day 0.00   -90 Day Avg. mg/day 0.00    Sedatives   -30 Day Avg. LME 0.00   -90 Day Avg. LME 0.17    Prescriptions (sorted by date filled).    11/08/2022 - Alprazolam 0.25 Mg Tablet, QTY 15 (QS for 8 days), originally written 09/20/2022 by Al Lynn.    09/20/2022 - Alprazolam 0.25 Mg Tablet, QTY 15 (QS for 8 days), originally written 09/20/2022 by Al Lynn.    08/22/2022 - Oxycodone-Acetaminophen  Mg, QTY 15 (QS for 5 days), originally written 08/22/2022 by Ayana Richardson.      ASSESSMENT & PLAN  1. Mild episode of recurrent major depressive disorder  -     FLUoxetine 20 MG capsule; Take 1 capsule (20 mg total) by mouth once daily.  Dispense: 90 capsule; Refill: 1    2.  Essential hypertension  -     chlorthalidone (HYGROTEN) 25 MG Tab; Take 1 tablet by mouth once daily.  Dispense: 90 tablet; Refill: 0  -     amLODIPine (NORVASC) 5 MG tablet; Take 1 tablet (5 mg total) by mouth once daily.  Dispense: 90 tablet; Refill: 0  -     metoprolol succinate (TOPROL-XL) 100 MG 24 hr tablet; Take 1 tablet (100 mg total) by mouth once daily.  Dispense: 90 tablet; Refill: 0    3. Pure hypercholesterolemia  Assessment & Plan:  Lab Results   Component Value Date    CHOL 172 12/09/2022    CHOL 228 (H) 08/24/2021    TRIG 208 (H) 12/09/2022    TRIG 114 08/24/2021    HDL 65 12/09/2022    HDL 58 08/24/2021    LDLCALC 65.4 12/09/2022    LDLCALC 147.2 08/24/2021    NONHDLCHOL 107 12/09/2022    NONHDLCHOL 170 08/24/2021    AST 22 12/09/2022    ALT 18 12/09/2022     The 10-year ASCVD risk score (Sudha CHANG, et al., 2019) is: 0.8%    Values used to calculate the score:      Age: 42 years      Sex: Female      Is Non- : Yes      Diabetic: No      Tobacco smoker: No      Systolic Blood Pressure: 126 mmHg      Is BP treated: Yes      HDL Cholesterol: 65 mg/dL      Total Cholesterol: 172 mg/dL       4. Mild anemia  -     Iron and TIBC; Future; Expected date: 12/15/2022  -     Ferritin; Future; Expected date: 12/15/2022  -     Hemoglobin; Future; Expected date: 12/15/2022  -     Hematocrit; Future; Expected date: 12/15/2022    5. Thrombocytosis  -     Iron and TIBC; Future; Expected date: 12/15/2022    6. Sinus tachycardia  -     metoprolol succinate (TOPROL-XL) 100 MG 24 hr tablet; Take 1 tablet (100 mg total) by mouth once daily.  Dispense: 90 tablet; Refill: 0  -     Toxicology screen, urine; Future; Expected date: 12/15/2022    7. History of excessive drinking of alcohol  -     Phosphatidylethanol (PETH); Future; Expected date: 12/15/2022  -     GAMMA GT; Future; Expected date: 12/15/2022  -     Toxicology screen, urine; Future; Expected date: 12/15/2022    8. Generalized anxiety  disorder  -     FLUoxetine 20 MG capsule; Take 1 capsule (20 mg total) by mouth once daily.  Dispense: 90 capsule; Refill: 1  -     Discontinue: clonazePAM (KLONOPIN) 0.5 MG tablet; Take 0.5 tablets (0.25 mg total) by mouth 2 (two) times daily as needed for Anxiety.  Dispense: 30 tablet; Refill: 0      Unless noted herein, any chronic conditions are represented as and appear compensated/controlled and stable, and no other significant complaints or concerns were reported.     Follow up in about 1 week (around 12/22/2022) for review test results, discuss treatment plan, re-evaluate problem(s) discussed today.   Future Appointments   Date Time Provider Department Center   12/29/2022  2:30 PM YESY Stone MD [facility] [place]   1/31/2023  2:20 PM [facility] [id number]-SCR [facility] [place]   2/14/2023 10:20 AM Nadia Huitron MD ON CARDIO  [facility]   2/27/2023 10:00 AM Satnam Deleon MD ON ORTHO  [facility]   3/27/2023 10:15 AM Bere Villa NP [facility] [place]     PRESCRIPTION DRUG MANAGEMENT  Outpatient Medications Prior to Visit   Medication Sig Dispense Refill    blood pressure monitor (IHE[employer] EASE) LG arm size (OP) by Other route as needed for High Blood Pressure. 1 each 0    amLODIPine (NORVASC) 5 MG tablet Take 1 tablet (5 mg total) by mouth once daily. 30 tablet 11    chlorthalidone (HYGROTEN) 25 MG Tab Take 1 tablet by mouth once daily. 30 tablet 3    cyclobenzaprine (FLEXERIL) 10 MG tablet Take 1 tablet (10 mg total) by mouth 3 (three) times daily as needed for Muscle spasms. 90 tablet 3    FLUoxetine 20 MG capsule Take 1 capsule (20 mg total) by mouth once daily. 30 capsule 1    metoprolol succinate (TOPROL-XL) 50 MG 24 hr tablet Take 1 tablet (50 mg total) by mouth once daily. 90 tablet 3    ALPRAZolam (XANAX) 0.25 MG tablet Take 1 tablet (0.25 mg total) by mouth 2 (two) times daily as needed for Anxiety. 15 tablet 1    0.9%  NaCl infusion       acetaminophen tablet 650 mg        chlorhexidine 0.12 % solution 10 mL       cloNIDine tablet 0.3 mg        No facility-administered medications prior to visit.     Medications Discontinued During This Encounter   Medication Reason    cloNIDine tablet 0.3 mg     0.9%  NaCl infusion     chlorhexidine 0.12 % solution 10 mL     acetaminophen tablet 650 mg     metoprolol succinate (TOPROL-XL) 50 MG 24 hr tablet Dose adjustment    amLODIPine (NORVASC) 5 MG tablet Reorder    chlorthalidone (HYGROTEN) 25 MG Tab Reorder    FLUoxetine 20 MG capsule Reorder    clonazePAM (KLONOPIN) 0.5 MG tablet Error    cyclobenzaprine (FLEXERIL) 10 MG tablet Side effects     Medications Ordered This Encounter   Medications    amLODIPine (NORVASC) 5 MG tablet     Sig: Take 1 tablet (5 mg total) by mouth once daily.     Dispense:  90 tablet     Refill:  0     .    chlorthalidone (HYGROTEN) 25 MG Tab     Sig: Take 1 tablet by mouth once daily.     Dispense:  90 tablet     Refill:  0     .    FLUoxetine 20 MG capsule     Sig: Take 1 capsule (20 mg total) by mouth once daily.     Dispense:  90 capsule     Refill:  1    metoprolol succinate (TOPROL-XL) 100 MG 24 hr tablet     Sig: Take 1 tablet (100 mg total) by mouth once daily.     Dispense:  90 tablet     Refill:  0     .   Review of Systems   Respiratory:  Negative for chest tightness and shortness of breath.    Cardiovascular:  Negative for chest pain.   Endocrine: Negative for polydipsia and polyuria.   Neurological:  Negative for seizures and memory loss.   Psychiatric/Behavioral:  Positive for dysphoric mood (mild) and sleep disturbance. Negative for agitation, confusion, hallucinations and suicidal ideas. The patient is nervous/anxious. The patient is not hyperactive.     PHYSICAL EXAM  Vitals:    12/15/22 1520 12/15/22 1539 12/15/22 1557   BP: (!) 160/80 (!) 126/90 136/88   BP Location: Left arm     Patient Position: Sitting     BP Method: Large (Automatic)     Pulse: (!) 131  100   Temp: 97.1 °F (36.2 °C)     TempSrc:  "Tympanic     SpO2: 97%     Weight: 94.2 kg (207 lb 10.8 oz)     Height: 5' 6" (1.676 m)     Physical Exam  Vitals reviewed.   Constitutional:       General: She is not in acute distress.     Appearance: Normal appearance. She is not ill-appearing or diaphoretic.   Eyes:      General: No scleral icterus.  Cardiovascular:      Rate and Rhythm: Normal rate and regular rhythm.   Pulmonary:      Effort: Pulmonary effort is normal.      Breath sounds: Normal breath sounds.   Skin:     General: Skin is warm and dry.      Coloration: Skin is not jaundiced.   Neurological:      General: No focal deficit present.      Mental Status: She is alert and oriented to person, place, and time. Mental status is at baseline.   Psychiatric:         Mood and Affect: Mood normal.         Behavior: Behavior normal.         Judgment: Judgment normal.        PAST MEDICAL HISTORY  Merari has a past medical history of Anxiety, Depression, and Hypertension.    SURGICAL HISTORY  Merari has a past surgical history that includes [place] tooth extraction;  section; Injection of joint (Bilateral, 11/10/2021); Hip Arthroplasty (Right, 2022); Hip Arthroplasty (Left, 2022); and Joint replacement.    FAMILY HISTORY  Merari family history includes Colon cancer in her father; Diabetes in her father and sister; Hypertension in her father, mother, and sister.     ALLERGIES  Review of patient's allergies indicates:  No Known Allergies    SOCIAL HISTORY  Merari  reports that she has never smoked. She has never used smokeless tobacco. She reports current alcohol use of about 1.0 standard drink per week. She reports that she does not use drugs.     Orders Placed This Encounter    Phosphatidylethanol (PETH)    GAMMA GT    Iron and TIBC    Ferritin    Hemoglobin    Hematocrit    Toxicology screen, urine    FLUoxetine 20 MG capsule    chlorthalidone (HYGROTEN) 25 MG Tab    amLODIPine (NORVASC) 5 MG tablet    metoprolol succinate " "(TOPROL-XL) 100 MG 24 hr tablet     TOTAL TIME evaluating and managing this patient for this encounter was greater than or equal to 45 minutes. This time was spent personally by me on the following activities: pre-charting, review of patient's past medical history, assessing age-appropriate health maintenance needs, review of any interval history, medication reconciliation, reconciling/updating problem list, obtaining history from the patient, examination of the patient, review and interpretation of lab results, review of ED/Hospital records, reviewing outside records, evaluation of the patient's response to treatment, review of [facility] Controlled Prescription Drug Monitoring database records for the patient, managing and/or ordering prescription medications, ordering labs, educating patient and answering their questions about treatment plan, goals of treatment, and follow-up, educating patient about needed cancer screenings, discussing strategies to help overcome any barriers to adherence to treatment plan, and final documentation of the visit. This time was exclusive of any separately billable procedures for this patient and exclusive of time spent treating any other patients.     Documentation entered by me for this encounter may have been done in part using speech-recognition technology. Although I have made an effort to ensure accuracy, "sound like" errors may exist and should be interpreted in context.   "

## 2022-12-17 ENCOUNTER — PATIENT MESSAGE (OUTPATIENT)
Dept: INTERNAL MEDICINE | Facility: CLINIC | Age: 42
End: 2022-12-17
Payer: MEDICAID

## 2022-12-18 RX ORDER — CLONAZEPAM 0.5 MG/1
0.25 TABLET ORAL 2 TIMES DAILY PRN
Qty: 30 TABLET | Refills: 0 | OUTPATIENT
Start: 2022-12-18 | End: 2023-12-18

## 2022-12-19 NOTE — TELEPHONE ENCOUNTER
Spoke with patient and scheduled her for labs and appointment with Dr. Stone for follow up. She stated that she has been on alprazolam and hasn't requested the clonazepam from Dr. Stone.

## 2022-12-19 NOTE — TELEPHONE ENCOUNTER
I am unable to approve refills of any benzodiazepines until she gets labs ordered 12/15/22 and has a F/U appointment.

## 2022-12-21 ENCOUNTER — TELEPHONE (OUTPATIENT)
Dept: OBSTETRICS AND GYNECOLOGY | Facility: CLINIC | Age: 42
End: 2022-12-21
Payer: MEDICAID

## 2022-12-21 NOTE — TELEPHONE ENCOUNTER
----- Message from Norah Amato sent at 12/21/2022  3:47 PM CST -----  Regarding: Patient advice  Contact: Pt  Pt called in regards to scheduling an appointment , Unable to schedule Pt       Please advise , Pt can be reached at [phone]

## 2022-12-21 NOTE — TELEPHONE ENCOUNTER
----- Message from Norah Amato sent at 12/21/2022  3:48 PM CST -----  Regarding: Patient advice  Contact: Pt  Pt called in regards to scheduling an appointment , Unable to schedule Pt       Please advise , Pt can be reached at [phone]

## 2022-12-22 ENCOUNTER — LAB VISIT (OUTPATIENT)
Dept: LAB | Facility: HOSPITAL | Age: 42
End: 2022-12-22
Attending: FAMILY MEDICINE
Payer: MEDICAID

## 2022-12-22 DIAGNOSIS — R00.0 SINUS TACHYCARDIA: ICD-10-CM

## 2022-12-22 DIAGNOSIS — F10.10 EXCESSIVE DRINKING OF ALCOHOL: ICD-10-CM

## 2022-12-22 PROCEDURE — 80307 DRUG TEST PRSMV CHEM ANLYZR: CPT | Performed by: FAMILY MEDICINE

## 2022-12-23 LAB
AMPHET+METHAMPHET UR QL: NEGATIVE
BARBITURATES UR QL SCN>200 NG/ML: NEGATIVE
BENZODIAZ UR QL SCN>200 NG/ML: NEGATIVE
BZE UR QL SCN: NEGATIVE
CANNABINOIDS UR QL SCN: NEGATIVE
CREAT UR-MCNC: 50 MG/DL (ref 15–325)
ETHANOL UR-MCNC: <10 MG/DL
METHADONE UR QL SCN>300 NG/ML: NEGATIVE
OPIATES UR QL SCN: NEGATIVE
PCP UR QL SCN>25 NG/ML: NEGATIVE
TOXICOLOGY INFORMATION: NORMAL

## 2022-12-26 ENCOUNTER — PATIENT MESSAGE (OUTPATIENT)
Dept: INTERNAL MEDICINE | Facility: CLINIC | Age: 42
End: 2022-12-26
Payer: MEDICAID

## 2022-12-26 VITALS
WEIGHT: 207.69 LBS | OXYGEN SATURATION: 97 % | HEIGHT: 66 IN | DIASTOLIC BLOOD PRESSURE: 88 MMHG | TEMPERATURE: 97 F | HEART RATE: 100 BPM | SYSTOLIC BLOOD PRESSURE: 136 MMHG | BODY MASS INDEX: 33.38 KG/M2

## 2022-12-29 ENCOUNTER — OFFICE VISIT (OUTPATIENT)
Dept: INTERNAL MEDICINE | Facility: CLINIC | Age: 42
End: 2022-12-29
Payer: MEDICAID

## 2022-12-29 VITALS
HEIGHT: 66 IN | OXYGEN SATURATION: 99 % | HEART RATE: 92 BPM | SYSTOLIC BLOOD PRESSURE: 104 MMHG | BODY MASS INDEX: 37.21 KG/M2 | DIASTOLIC BLOOD PRESSURE: 70 MMHG | WEIGHT: 231.5 LBS | TEMPERATURE: 98 F

## 2022-12-29 DIAGNOSIS — F41.1 GENERALIZED ANXIETY DISORDER: Chronic | ICD-10-CM

## 2022-12-29 DIAGNOSIS — I10 ESSENTIAL HYPERTENSION: ICD-10-CM

## 2022-12-29 DIAGNOSIS — R74.8 ELEVATED SERUM GGT LEVEL: Primary | Chronic | ICD-10-CM

## 2022-12-29 DIAGNOSIS — F33.1 MAJOR DEPRESSIVE DISORDER, RECURRENT, MODERATE: Chronic | ICD-10-CM

## 2022-12-29 DIAGNOSIS — R00.0 SINUS TACHYCARDIA: ICD-10-CM

## 2022-12-29 DIAGNOSIS — F10.20 ALCOHOL DEPENDENCE, BINGE PATTERN: ICD-10-CM

## 2022-12-29 PROBLEM — R74.01 TRANSAMINITIS: Chronic | Status: ACTIVE | Noted: 2022-12-29

## 2022-12-29 PROBLEM — E78.00 PURE HYPERCHOLESTEROLEMIA: Chronic | Status: ACTIVE | Noted: 2022-05-30

## 2022-12-29 PROCEDURE — 1160F PR REVIEW ALL MEDS BY PRESCRIBER/CLIN PHARMACIST DOCUMENTED: ICD-10-PCS | Mod: CPTII,,, | Performed by: FAMILY MEDICINE

## 2022-12-29 PROCEDURE — 1160F RVW MEDS BY RX/DR IN RCRD: CPT | Mod: CPTII,,, | Performed by: FAMILY MEDICINE

## 2022-12-29 PROCEDURE — 3008F BODY MASS INDEX DOCD: CPT | Mod: CPTII,,, | Performed by: FAMILY MEDICINE

## 2022-12-29 PROCEDURE — 1159F MED LIST DOCD IN RCRD: CPT | Mod: CPTII,,, | Performed by: FAMILY MEDICINE

## 2022-12-29 PROCEDURE — 99214 PR OFFICE/OUTPT VISIT, EST, LEVL IV, 30-39 MIN: ICD-10-PCS | Mod: S$PBB,,, | Performed by: FAMILY MEDICINE

## 2022-12-29 PROCEDURE — 3008F PR BODY MASS INDEX (BMI) DOCUMENTED: ICD-10-PCS | Mod: CPTII,,, | Performed by: FAMILY MEDICINE

## 2022-12-29 PROCEDURE — 3078F PR MOST RECENT DIASTOLIC BLOOD PRESSURE < 80 MM HG: ICD-10-PCS | Mod: CPTII,,, | Performed by: FAMILY MEDICINE

## 2022-12-29 PROCEDURE — 99214 OFFICE O/P EST MOD 30 MIN: CPT | Mod: PBBFAC | Performed by: FAMILY MEDICINE

## 2022-12-29 PROCEDURE — 3074F PR MOST RECENT SYSTOLIC BLOOD PRESSURE < 130 MM HG: ICD-10-PCS | Mod: CPTII,,, | Performed by: FAMILY MEDICINE

## 2022-12-29 PROCEDURE — 3074F SYST BP LT 130 MM HG: CPT | Mod: CPTII,,, | Performed by: FAMILY MEDICINE

## 2022-12-29 PROCEDURE — 3078F DIAST BP <80 MM HG: CPT | Mod: CPTII,,, | Performed by: FAMILY MEDICINE

## 2022-12-29 PROCEDURE — 99999 PR PBB SHADOW E&M-EST. PATIENT-LVL IV: ICD-10-PCS | Mod: PBBFAC,,, | Performed by: FAMILY MEDICINE

## 2022-12-29 PROCEDURE — 99999 PR PBB SHADOW E&M-EST. PATIENT-LVL IV: CPT | Mod: PBBFAC,,, | Performed by: FAMILY MEDICINE

## 2022-12-29 PROCEDURE — 99214 OFFICE O/P EST MOD 30 MIN: CPT | Mod: S$PBB,,, | Performed by: FAMILY MEDICINE

## 2022-12-29 PROCEDURE — 1159F PR MEDICATION LIST DOCUMENTED IN MEDICAL RECORD: ICD-10-PCS | Mod: CPTII,,, | Performed by: FAMILY MEDICINE

## 2022-12-29 RX ORDER — METOPROLOL SUCCINATE 100 MG/1
100 TABLET, EXTENDED RELEASE ORAL DAILY
Qty: 90 TABLET | Refills: 1 | Status: SHIPPED | OUTPATIENT
Start: 2022-12-29 | End: 2023-02-14

## 2022-12-29 RX ORDER — DIAZEPAM 2 MG/1
2 TABLET ORAL 3 TIMES DAILY PRN
Qty: 60 TABLET | Refills: 5 | Status: SHIPPED | OUTPATIENT
Start: 2022-12-29 | End: 2023-04-27 | Stop reason: DRUGHIGH

## 2022-12-29 RX ORDER — CHLORTHALIDONE 25 MG/1
TABLET ORAL DAILY
Qty: 90 TABLET | Refills: 1 | Status: SHIPPED | OUTPATIENT
Start: 2022-12-29 | End: 2023-02-14

## 2022-12-29 RX ORDER — AMLODIPINE BESYLATE 5 MG/1
5 TABLET ORAL DAILY
Qty: 90 TABLET | Refills: 1 | Status: SHIPPED | OUTPATIENT
Start: 2022-12-29 | End: 2023-02-14

## 2022-12-29 NOTE — ASSESSMENT & PLAN NOTE
BP Readings from Last 6 Encounters:   12/29/22 104/70   12/15/22 136/88   11/09/22 (!) 160/120   09/20/22 118/88   06/09/22 132/88   05/31/22 133/89      Last 5 Patient Entered Readings                Current 30 Day Average: 119/87  Recent Readings 12/23/2022 12/23/2022 12/23/2022 12/22/2022 12/22/2022   SBP (mmHg) 136 140 141 113 126   DBP (mmHg) 96 99 103 84 91   Pulse 75 72 72 79 80               Lab Results   Component Value Date    EGFRNORACEVR >60.0 12/09/2022    CREATININE 0.8 12/09/2022    BUN 9 12/09/2022    K 3.7 12/09/2022     12/09/2022     12/09/2022     Results for orders placed or performed during the hospital encounter of 04/08/22   SCHEDULED EKG 12-LEAD (to Muse)    Collection Time: 04/08/22 10:21 AM    Narrative    Test Reason : I10,    Vent. Rate : 133 BPM     Atrial Rate : 133 BPM     P-R Int : 146 ms          QRS Dur : 080 ms      QT Int : 294 ms       P-R-T Axes : 078 065 048 degrees     QTc Int : 437 ms    Sinus tachycardia  Right atrial enlargement  Nonspecific T wave abnormality  When compared with ECG of 06-JAN-2022 10:00,  Vent. rate has increased BY  49 BPM  Nonspecific T wave abnormality, worse in Inferior leads  Nonspecific T wave abnormality now evident in Lateral leads  Confirmed by OSVALDO CANTU, YESSI KUMAR ([id number]) on 4/8/2022 8:37:30 PM    Referred By: GUILHERME OCASIO           Confirmed By:YESSI RILEY MD

## 2022-12-29 NOTE — ASSESSMENT & PLAN NOTE
Lab Results   Component Value Date    [id number] 706 12/22/2022    [id number] 450 12/22/2022     (H) 12/22/2022     [facility] 16:0/18:1 (POPEth) Interpretation   <10 ng/mL: Not detected   10 - 19 ng/mL: Abstinence or light alcohol consumption (<2 drinks per day for several days a week)   20 - 200 ng/mL: Moderate alcohol consumption (up to 4 drinks per day for several days a week)   >200 ng/mL: Heavy alcohol consumption or chronic alcohol use (at least 4 drinks per day several days a week)     She reports EtOH consumption less than predicted by PEth, but she does acknowledge binge drinking behavior, but denies EtOH in last 2-3 weeks. Abstinence encouraged. She agreed to this goal. Resources provided.

## 2022-12-29 NOTE — PATIENT INSTRUCTIONS
I've listed below some mental health providers whom I believe are accepting new patients with your insurance. You should be able to get the mental health care you need at one of the centers listed below. Please contact them ASAP to schedule your first appointment. Tell them you were referred by your primary care provider.    [facility]  [address], [place] [address]  Phone: [phone]  Fax: [phone]  [email or web address]  [address] [place] [address]  Phone: [phone]  Fax: [phone]  [email or web address]     [facility]  [address] [place] [address]  Phone: [phone]  [email or web address]    [facility]  [address] [address]  Phone: [phone]  [email or web address]    [facility]  [address] [place] [address]  Phone: [phone]  [email or web address]    Need Support Now?  If you or someone you know is struggling or in crisis, help is available. Call or text 289 or chat [employer]  981 offers 24/7 access to trained crisis counselors who can help people experiencing mental health-related distress. That could be:  Thoughts of suicide,  Mental health or substance use crisis, or  Any other kind of emotion distress.

## 2022-12-29 NOTE — PROGRESS NOTES
OFFICE VISIT 12/29/22  2:30 PM CST    CHIEF COMPLAINT: Follow-up, Results, and Medication Refill    Labs reviewed.  Lab Visit on 12/22/2022   Component Date Value    [facility] 16:0/18.1 (POPEth) 12/22/2022 706     [facility] 16:0/18.2 (PLPEth) 12/22/2022 450     PETH INTERPRETATION 12/22/2022 Positive.     GGT 12/22/2022 172 (H)     Iron 12/22/2022 53     Transferrin 12/22/2022 226     TIBC 12/22/2022 334     Saturated Iron 12/22/2022 16 (L)     Ferritin 12/22/2022 321 (H)     Hemoglobin 12/22/2022 11.2 (L)     Hematocrit 12/22/2022 34.4 (L)    Lab Visit on 12/22/2022   Component Date Value    Alcohol, Urine 12/22/2022 <10     Benzodiazepines 12/22/2022 Negative     Methadone metabolites 12/22/2022 Negative     Cocaine (Metab.) 12/22/2022 Negative     Opiate Scrn, Ur 12/22/2022 Negative     Barbiturate Screen, Ur 12/22/2022 Negative     Amphetamine Screen, Ur 12/22/2022 Negative     THC 12/22/2022 Negative     Phencyclidine 12/22/2022 Negative     Creatinine, Urine 12/22/2022 50.0     Toxicology Information 12/22/2022 SEE COMMENT      ASSESSMENT & PLAN  1. Elevated serum GGT level  Assessment & Plan:  Lab Results   Component Value Date    AST 22 12/09/2022    AST 15 05/27/2022    AST 18 01/06/2022    ALT 18 12/09/2022    ALT 8 (L) 05/27/2022    ALT 13 01/06/2022     (H) 12/22/2022        Orders:  -     HEPATITIS PANEL, ACUTE; Future; Expected date: 12/29/2022  -     US Abdomen Limited; Future; Expected date: 12/29/2022  -     GAMMA GT; Future; Expected date: 04/13/2023  -     Phosphatidylethanol (PETH); Future; Expected date: 04/13/2023    2. Generalized anxiety disorder  Assessment & Plan:  This is a chronic problem, stable.     Orders:  -     Ambulatory referral/consult to Psychiatry; Future; Expected date: 01/05/2023  -     diazePAM (VALIUM) 2 MG tablet; Take 1 tablet (2 mg total) by mouth 3 (three) times daily as needed for Anxiety.  Dispense: 60 tablet; Refill: 5    3. Major depressive disorder, recurrent,  moderate  Assessment & Plan:  This is a chronic problem, stable/improved.    Orders:  -     Ambulatory referral/consult to Psychiatry; Future; Expected date: 01/05/2023    4. Essential hypertension  Assessment & Plan:  BP Readings from Last 6 Encounters:   12/29/22 104/70   12/15/22 136/88   11/09/22 (!) 160/120   09/20/22 118/88   06/09/22 132/88   05/31/22 133/89      Last 5 Patient Entered Readings                Current 30 Day Average: 119/87  Recent Readings 12/23/2022 12/23/2022 12/23/2022 12/22/2022 12/22/2022   SBP (mmHg) 136 140 141 113 126   DBP (mmHg) 96 99 103 84 91   Pulse 75 72 72 79 80                 Lab Results   Component Value Date    EGFRNORACEVR >60.0 12/09/2022    CREATININE 0.8 12/09/2022    BUN 9 12/09/2022    K 3.7 12/09/2022     12/09/2022     12/09/2022     Results for orders placed or performed during the hospital encounter of 04/08/22   SCHEDULED EKG 12-LEAD (to Muse)    Collection Time: 04/08/22 10:21 AM    Narrative    Test Reason : I10,    Vent. Rate : 133 BPM     Atrial Rate : 133 BPM     P-R Int : 146 ms          QRS Dur : 080 ms      QT Int : 294 ms       P-R-T Axes : 078 065 048 degrees     QTc Int : 437 ms    Sinus tachycardia  Right atrial enlargement  Nonspecific T wave abnormality  When compared with ECG of 06-JAN-2022 10:00,  Vent. rate has increased BY  49 BPM  Nonspecific T wave abnormality, worse in Inferior leads  Nonspecific T wave abnormality now evident in Lateral leads  Confirmed by YESSI RILEY MD ([id number]) on 4/8/2022 8:37:30 PM    Referred By: GUILHERME OCASIO           Confirmed By:YESSI RILEY MD         Orders:  -     amLODIPine (NORVASC) 5 MG tablet; Take 1 tablet (5 mg total) by mouth once daily.  Dispense: 90 tablet; Refill: 1  -     chlorthalidone (HYGROTEN) 25 MG Tab; Take 1 tablet by mouth once daily.  Dispense: 90 tablet; Refill: 1  -     metoprolol succinate (TOPROL-XL) 100 MG 24 hr tablet; Take 1 tablet (100 mg total) by mouth once  daily.  Dispense: 90 tablet; Refill: 1    5. Sinus tachycardia  -     metoprolol succinate (TOPROL-XL) 100 MG 24 hr tablet; Take 1 tablet (100 mg total) by mouth once daily.  Dispense: 90 tablet; Refill: 1    6. Alcohol dependence, binge pattern  Assessment & Plan:  Lab Results   Component Value Date    [id number] 706 12/22/2022    [id number] 450 12/22/2022     (H) 12/22/2022     [facility] 16:0/18:1 (POPEth) Interpretation  <10 ng/mL: Not detected  10 - 19 ng/mL: Abstinence or light alcohol consumption (<2 drinks per day for several days a week)  20 - 200 ng/mL: Moderate alcohol consumption (up to 4 drinks per day for several days a week)  >200 ng/mL: Heavy alcohol consumption or chronic alcohol use (at least 4 drinks per day several days a week)     She reports EtOH consumption less than predicted by PEth, but she does acknowledge binge drinking behavior, but denies EtOH in last 2-3 weeks. Abstinence encouraged. She agreed to this goal. Resources provided.    Orders:  -     Ambulatory referral/consult to Psychiatry; Future; Expected date: 01/05/2023  -     GAMMA GT; Future; Expected date: 04/13/2023  -     Phosphatidylethanol (PETH); Future; Expected date: 04/13/2023      Unless noted herein, any chronic conditions are represented as and appear compensated/controlled and stable, and no other significant complaints or concerns were reported.  Send to lab for Hepatitis Panel today.  Schedule ultrasound and other labs (PETH, GGT) in mid-April, and F/U with me a few days later to review results.  Highlight patient instructions on AVS.  Follow up in about 4 months (around 4/29/2023) for review test results, discuss treatment plan, re-evaluate problem(s) discussed today.   Future Appointments   Date Time Provider Department Center   1/31/2023  2:20 PM [facility] [place]   2/14/2023 10:20 AM Nadia Huitron MD ON CARDIO [facility] C   2/27/2023 10:00 AM Satnam Deleon MD ON ORTHO BR Medical  C   3/27/2023 10:15 AM Bere Villa, NP [facility] [place]      PRESCRIPTION DR[facility] MANAGEMENT  Outpatient Medications Prior to Visit   Medication Sig Dispense Refill    blood pressure monitor (IHE[employer]umul[employer] EASE) LG arm size (OP) by Other route as needed for High Blood Pressure. 1 each 0    FLUoxetine 20 MG capsule Take 1 capsule (20 mg total) by mouth once daily. 90 capsule 1    ALPRAZolam (XANAX) 0.25 MG tablet Take 1 tablet (0.25 mg total) by mouth 2 (two) times daily as needed for Anxiety. 15 tablet 1    amLODIPine (NORVASC) 5 MG tablet Take 1 tablet (5 mg total) by mouth once daily. 90 tablet 0    chlorthalidone (HYGROTEN) 25 MG Tab Take 1 tablet by mouth once daily. 90 tablet 0    metoprolol succinate (TOPROL-XL) 100 MG 24 hr tablet Take 1 tablet (100 mg total) by mouth once daily. 90 tablet 0     No facility-administered medications prior to visit.     Medications Discontinued During This Encounter   Medication Reason    chlorthalidone (HYGROTEN) 25 MG Tab Reorder    amLODIPine (NORVASC) 5 MG tablet Reorder    metoprolol succinate (TOPROL-XL) 100 MG 24 hr tablet Reorder    ALPRAZolam (XANAX) 0.25 MG tablet Alternate therapy     Medications Ordered This Encounter   Medications    amLODIPine (NORVASC) 5 MG tablet     Sig: Take 1 tablet (5 mg total) by mouth once daily.     Dispense:  90 tablet     Refill:  1     .    chlorthalidone (HYGROTEN) 25 MG Tab     Sig: Take 1 tablet by mouth once daily.     Dispense:  90 tablet     Refill:  1     .    diazePAM (VALIUM) 2 MG tablet     Sig: Take 1 tablet (2 mg total) by mouth 3 (three) times daily as needed for Anxiety.     Dispense:  60 tablet     Refill:  5    metoprolol succinate (TOPROL-XL) 100 MG 24 hr tablet     Sig: Take 1 tablet (100 mg total) by mouth once daily.     Dispense:  90 tablet     Refill:  1     .   Review of Systems   Neurological:  Negative for seizures and memory loss.   Psychiatric/Behavioral:  Positive for dysphoric mood. Negative for agitation,  "confusion, hallucinations and suicidal ideas.     PHYSICAL EXAM  Vitals:    12/29/22 1420   BP: 104/70   BP Location: Left arm   Patient Position: Sitting   BP Method: Large (Manual)   Pulse: 92   Temp: 97.7 °F (36.5 °C)   TempSrc: Tympanic   SpO2: 99%   Weight: 105 kg (231 lb 7.7 oz)   Height: 5' 6" (1.676 m)   Physical Exam  Vitals reviewed.   Constitutional:       General: She is not in acute distress.     Appearance: Normal appearance. She is not ill-appearing, toxic-appearing or diaphoretic.   Eyes:      General: No scleral icterus.  Cardiovascular:      Rate and Rhythm: Normal rate and regular rhythm.   Pulmonary:      Effort: Pulmonary effort is normal.      Breath sounds: Normal breath sounds.   Abdominal:      General: There is no distension.      Tenderness: There is no abdominal tenderness.   Skin:     Coloration: Skin is not jaundiced.   Neurological:      Mental Status: She is alert and oriented to person, place, and time. Mental status is at baseline.   Psychiatric:         Mood and Affect: Mood normal.         Behavior: Behavior normal.         Judgment: Judgment normal.        Documentation entered by me for this encounter may have been done in part using speech-recognition technology. Although I have made an effort to ensure accuracy, "sound like" errors may exist and should be interpreted in context.   "

## 2022-12-29 NOTE — ASSESSMENT & PLAN NOTE
Lab Results   Component Value Date    AST 22 12/09/2022    AST 15 05/27/2022    AST 18 01/06/2022    ALT 18 12/09/2022    ALT 8 (L) 05/27/2022    ALT 13 01/06/2022     (H) 12/22/2022

## 2023-01-06 ENCOUNTER — IMMUNIZATION (OUTPATIENT)
Dept: PHARMACY | Facility: CLINIC | Age: 43
End: 2023-01-06
Payer: MEDICAID

## 2023-01-06 DIAGNOSIS — Z23 NEED FOR VACCINATION: Primary | ICD-10-CM

## 2023-01-10 ENCOUNTER — TELEPHONE (OUTPATIENT)
Dept: INTERNAL MEDICINE | Facility: CLINIC | Age: 43
End: 2023-01-10
Payer: MEDICAID

## 2023-01-10 NOTE — TELEPHONE ENCOUNTER
----- Message from Lisa Petersoncecilvidal sent at 1/10/2023  2:59 PM CST -----  Contact: pt  Pt is calling in regard to needing a referral for a dermatologist and ENT.  Please call her back at [phone] thanks/mpd  Please load on her chart

## 2023-01-13 ENCOUNTER — TELEPHONE (OUTPATIENT)
Dept: INTERNAL MEDICINE | Facility: CLINIC | Age: 43
End: 2023-01-13
Payer: MEDICAID

## 2023-01-13 NOTE — TELEPHONE ENCOUNTER
We did not discuss these issues at her last appointment.    We can address this at her next appointment with me on 4/27/2023.    Otherwise, she can schedule an appointment with one of the APPs if a sooner appointment is available.    THanks.

## 2023-01-13 NOTE — TELEPHONE ENCOUNTER
Called pt to update her on the status on her referral request. Gave pt the option to discuss at her upcoming appt or schedule with another provider. Pt stated that she would wait until her upcoming appt with her pcp. //DM

## 2023-01-20 ENCOUNTER — HOSPITAL ENCOUNTER (EMERGENCY)
Facility: HOSPITAL | Age: 43
Discharge: HOME | End: 2023-01-20
Attending: EMERGENCY MEDICINE
Payer: MEDICAID

## 2023-01-20 VITALS
RESPIRATION RATE: 18 BRPM | OXYGEN SATURATION: 98 % | SYSTOLIC BLOOD PRESSURE: 131 MMHG | TEMPERATURE: 98 F | HEART RATE: 79 BPM | DIASTOLIC BLOOD PRESSURE: 92 MMHG

## 2023-01-20 DIAGNOSIS — R53.83 CAREGIVER WITH FATIGUE: ICD-10-CM

## 2023-01-20 DIAGNOSIS — F32.A DEPRESSION, UNSPECIFIED DEPRESSION TYPE: Primary | ICD-10-CM

## 2023-01-20 DIAGNOSIS — F33.0 MILD EPISODE OF RECURRENT MAJOR DEPRESSIVE DISORDER: ICD-10-CM

## 2023-01-20 DIAGNOSIS — F41.1 GENERALIZED ANXIETY DISORDER: Chronic | ICD-10-CM

## 2023-01-20 DIAGNOSIS — F10.929 ALCOHOLIC INTOXICATION WITH COMPLICATION: ICD-10-CM

## 2023-01-20 LAB
ALBUMIN SERPL BCP-MCNC: 3.9 G/DL (ref 3.5–5.2)
ALP SERPL-CCNC: 44 U/L (ref 55–135)
ALT SERPL W/O P-5'-P-CCNC: 13 U/L (ref 10–44)
ANION GAP SERPL CALC-SCNC: 16 MMOL/L (ref 8–16)
AST SERPL-CCNC: 23 U/L (ref 10–40)
BASOPHILS # BLD AUTO: 0.07 K/UL (ref 0–0.2)
BASOPHILS NFR BLD: 1 % (ref 0–1.9)
BILIRUB SERPL-MCNC: 0.3 MG/DL (ref 0.1–1)
BUN SERPL-MCNC: 10 MG/DL (ref 6–20)
CALCIUM SERPL-MCNC: 8.8 MG/DL (ref 8.7–10.5)
CHLORIDE SERPL-SCNC: 102 MMOL/L (ref 95–110)
CO2 SERPL-SCNC: 24 MMOL/L (ref 23–29)
CREAT SERPL-MCNC: 0.7 MG/DL (ref 0.5–1.4)
DIFFERENTIAL METHOD: ABNORMAL
EOSINOPHIL # BLD AUTO: 0.1 K/UL (ref 0–0.5)
EOSINOPHIL NFR BLD: 2 % (ref 0–8)
ERYTHROCYTE [DISTWIDTH] IN BLOOD BY AUTOMATED COUNT: 13.8 % (ref 11.5–14.5)
EST. GFR  (NO RACE VARIABLE): >60 ML/MIN/1.73 M^2
ETHANOL SERPL-MCNC: 395 MG/DL
GLUCOSE SERPL-MCNC: 93 MG/DL (ref 70–110)
HCT VFR BLD AUTO: 37.3 % (ref 37–48.5)
HGB BLD-MCNC: 12.6 G/DL (ref 12–16)
IMM GRANULOCYTES # BLD AUTO: 0.01 K/UL (ref 0–0.04)
IMM GRANULOCYTES NFR BLD AUTO: 0.1 % (ref 0–0.5)
LYMPHOCYTES # BLD AUTO: 3.5 K/UL (ref 1–4.8)
LYMPHOCYTES NFR BLD: 51.5 % (ref 18–48)
MCH RBC QN AUTO: 31.4 PG (ref 27–31)
MCHC RBC AUTO-ENTMCNC: 33.8 G/DL (ref 32–36)
MCV RBC AUTO: 93 FL (ref 82–98)
MONOCYTES # BLD AUTO: 0.3 K/UL (ref 0.3–1)
MONOCYTES NFR BLD: 4.1 % (ref 4–15)
NEUTROPHILS # BLD AUTO: 2.8 K/UL (ref 1.8–7.7)
NEUTROPHILS NFR BLD: 41.3 % (ref 38–73)
NRBC BLD-RTO: 0 /100 WBC
PLATELET # BLD AUTO: 255 K/UL (ref 150–450)
PMV BLD AUTO: 9.3 FL (ref 9.2–12.9)
POTASSIUM SERPL-SCNC: 3.6 MMOL/L (ref 3.5–5.1)
PROT SERPL-MCNC: 7.5 G/DL (ref 6–8.4)
RBC # BLD AUTO: 4.01 M/UL (ref 4–5.4)
SODIUM SERPL-SCNC: 142 MMOL/L (ref 136–145)
WBC # BLD AUTO: 6.86 K/UL (ref 3.9–12.7)

## 2023-01-20 PROCEDURE — 99284 EMERGENCY DEPT VISIT MOD MDM: CPT | Mod: 25

## 2023-01-20 PROCEDURE — 96360 HYDRATION IV INFUSION INIT: CPT

## 2023-01-20 PROCEDURE — 80053 COMPREHEN METABOLIC PANEL: CPT | Performed by: NURSE PRACTITIONER

## 2023-01-20 PROCEDURE — 82077 ASSAY SPEC XCP UR&BREATH IA: CPT | Performed by: NURSE PRACTITIONER

## 2023-01-20 PROCEDURE — 25000003 PHARM REV CODE 250: Performed by: EMERGENCY MEDICINE

## 2023-01-20 PROCEDURE — 96361 HYDRATE IV INFUSION ADD-ON: CPT

## 2023-01-20 PROCEDURE — 85025 COMPLETE CBC W/AUTO DIFF WBC: CPT | Performed by: NURSE PRACTITIONER

## 2023-01-20 PROCEDURE — 25000003 PHARM REV CODE 250: Performed by: NURSE PRACTITIONER

## 2023-01-20 RX ORDER — SODIUM CHLORIDE 9 MG/ML
1000 INJECTION, SOLUTION INTRAVENOUS
Status: COMPLETED | OUTPATIENT
Start: 2023-01-20 | End: 2023-01-20

## 2023-01-20 RX ORDER — FLUOXETINE HYDROCHLORIDE 20 MG/1
20 CAPSULE ORAL DAILY
Qty: 30 CAPSULE | Refills: 0 | Status: SHIPPED | OUTPATIENT
Start: 2023-01-20 | End: 2023-03-14 | Stop reason: SDUPTHER

## 2023-01-20 RX ORDER — FLUOXETINE HYDROCHLORIDE 20 MG/1
20 CAPSULE ORAL DAILY
Qty: 30 CAPSULE | Refills: 0 | Status: SHIPPED | OUTPATIENT
Start: 2023-01-20 | End: 2023-01-20 | Stop reason: SDUPTHER

## 2023-01-20 RX ORDER — HYDROXYZINE HYDROCHLORIDE 25 MG/1
25 TABLET, FILM COATED ORAL EVERY 4 HOURS PRN
Qty: 18 TABLET | Refills: 0 | Status: SHIPPED | OUTPATIENT
Start: 2023-01-20 | End: 2023-01-27

## 2023-01-20 RX ORDER — FLUOXETINE HYDROCHLORIDE 20 MG/1
20 CAPSULE ORAL DAILY
Qty: 30 CAPSULE | Refills: 0 | Status: SHIPPED | OUTPATIENT
Start: 2023-01-20 | End: 2023-01-20 | Stop reason: HOSPADM

## 2023-01-20 RX ORDER — HYDROXYZINE PAMOATE 25 MG/1
25 CAPSULE ORAL
Status: COMPLETED | OUTPATIENT
Start: 2023-01-20 | End: 2023-01-20

## 2023-01-20 RX ADMIN — SODIUM CHLORIDE 1000 ML: 0.9 INJECTION, SOLUTION INTRAVENOUS at 04:01

## 2023-01-20 RX ADMIN — HYDROXYZINE PAMOATE 25 MG: 25 CAPSULE ORAL at 08:01

## 2023-01-20 NOTE — FIRST PROVIDER EVALUATION
Medical screening examination initiated.  I have conducted a focused provider triage encounter, findings are as follows:    Brief history of present illness:  42-year-old female brought in by ambulance because patient was drinking vodka and [facility].    Vitals:    01/20/23 1556   BP: 117/80   Pulse: 81   Resp: 17   SpO2: 97%       Pertinent physical exam:  pt sleeping, arouses to tactile stimuli

## 2023-01-21 NOTE — ED PROVIDER NOTES
SCRIBE #1 NOTE: I, Latoya Mena, am scribing for, and in the presence of, Sal Crocker MD. I have scribed the entire note.       History     Chief Complaint   Patient presents with    Alcohol Intoxication     Pt sitting outside of [place] [address] drinking Vodka. Pt denies falls. Ambulatory. No pain.      Review of patient's allergies indicates:  No Known Allergies      History of Present Illness     HPI    2023, 7:50 PM  History obtained from the patient      History of Present Illness: Merari Orosco is a 42 y.o. female patient with a PMHx of anxiety, depression and hypertension who presents to the Emergency Department for evaluation of alcohol intoxication which onset several hours PTA. Symptoms are constant and moderate in severity. Pt was found sitting outside of [place] [address] and was drinking vodka. She states she has been feeling more depressed and anxious lately s/t caring for her mother with dementia. Asking for a turkey sandwich and medication for depression/anxiety.  Patient denies any syncope, self-injury, and all other sxs at this time. No prior Tx reported. No further complaints or concerns at this time.       Arrival mode: Ambulance Service    PCP: YESY Stone MD        Past Medical History:  Past Medical History:   Diagnosis Date    Anxiety     Depression     Hypertension        Past Surgical History:  Past Surgical History:   Procedure Laterality Date     SECTION      x1    HIP ARTHROPLASTY Right 2022    Procedure: ARTHROPLASTY, HIP;  Surgeon: Satnam Deleon MD;  Location: [facility] OR;  Service: Orthopedics;  Laterality: Right;    HIP ARTHROPLASTY Left 2022    Procedure: ARTHROPLASTY, HIP;  Surgeon: Satnam Deleon MD;  Location: [facility] OR;  Service: Orthopedics;  Laterality: Left;    INJECTION OF JOINT Bilateral 11/10/2021    Procedure: Injection, Joint;  Surgeon: Satnam Deleon MD;  Location: [facility] OR;  Service: General;  Laterality: Bilateral;  under  fluoroscopy    JOINT REPLACEMENT      WISDOM TOOTH EXTRACTION           Family History:  Family History   Problem Relation Age of Onset    Colon cancer Father     Diabetes Father     Hypertension Father     Hypertension Mother     Diabetes Sister     Hypertension Sister     Breast cancer Neg Hx     Cancer Neg Hx     Miscarriages / Stillbirths Neg Hx     Ovarian cancer Neg Hx      labor Neg Hx     Stroke Neg Hx        Social History:  Social History     Tobacco Use    Smoking status: Never    Smokeless tobacco: Never   Substance and Sexual Activity    Alcohol use: Yes     Alcohol/week: 1.0 standard drink     Types: 1 Glasses of wine per week     Comment: socially    Drug use: No    Sexual activity: Not Currently     Partners: Male     Birth control/protection: I.U.D.        Review of Systems     Review of Systems   Unable to perform ROS: Acuity of condition   Neurological:  Negative for syncope.   Psychiatric/Behavioral:  Negative for self-injury. The patient is nervous/anxious.         (+) depressed      Physical Exam     Initial Vitals   BP Pulse Resp Temp SpO2   23 1556 23 1556 23 1556 23 2042 23 155   117/80 81 17 98.4 °F (36.9 °C) 97 %      MAP       --                 Physical Exam  Nursing Notes and Vital Signs reviewed.    Constitutional: Patient is in no acute distress.   Head: Normocephalic. Atraumatic.   Eyes:  Conjunctivae are not pale. No scleral icterus.   ENT: Mucous membranes moist.   Neck: Supple.   Cardiovascular: Regular rate. Regular rhythm.   Pulmonary: No respiratory distress.   Abdominal: Non-distended.   Musculoskeletal: Moves all extremities. No obvious deformities.   Skin: Warm and dry.   Neurological:  Alert, awake, and appropriate. Slurred speech. No acute lateralizing neurologic deficits appreciated.   Psychiatric: Depressed. Denies SI/HI/AVH.      ED Course   Procedures  ED Vital Signs:  Vitals:    23 1556 23   BP: 117/80 (!) 131/92    Pulse: 81 79   Resp: 17 18   Temp:  98.4 °F (36.9 °C)   TempSrc:  Oral   SpO2: 97% 98%       Abnormal Lab Results:  Labs Reviewed   CBC W/ AUTO DIFFERENTIAL - Abnormal; Notable for the following components:       Result Value    MCH 31.4 (*)     Lymph % 51.5 (*)     All other components within normal limits   COMPREHENSIVE METABOLIC PANEL - Abnormal; Notable for the following components:    Alkaline Phosphatase 44 (*)     All other components within normal limits   ALCOHOL,MEDICAL (ETHANOL) - Abnormal; Notable for the following components:    Alcohol, Serum 395 (*)     All other components within normal limits    Narrative:     ALC critical result(s) called and verbal readback obtained from   MACY CREWS RN by [email or web address] 01/20/2023 18:36        All Lab Results:  Results for orders placed or performed during the hospital encounter of 01/20/23   CBC auto differential   Result Value Ref Range    WBC 6.86 3.90 - 12.70 K/uL    RBC 4.01 4.00 - 5.40 M/uL    Hemoglobin 12.6 12.0 - 16.0 g/dL    Hematocrit 37.3 37.0 - 48.5 %    MCV 93 82 - 98 fL    MCH 31.4 (H) 27.0 - 31.0 pg    MCHC 33.8 32.0 - 36.0 g/dL    RDW 13.8 11.5 - 14.5 %    Platelets 255 150 - 450 K/uL    MPV 9.3 9.2 - 12.9 fL    Immature Granulocytes 0.1 0.0 - 0.5 %    Gran # (ANC) 2.8 1.8 - 7.7 K/uL    Immature Grans (Abs) 0.01 0.00 - 0.04 K/uL    Lymph # 3.5 1.0 - 4.8 K/uL    Mono # 0.3 0.3 - 1.0 K/uL    Eos # 0.1 0.0 - 0.5 K/uL    Baso # 0.07 0.00 - 0.20 K/uL    nRBC 0 0 /100 WBC    Gran % 41.3 38.0 - 73.0 %    Lymph % 51.5 (H) 18.0 - 48.0 %    Mono % 4.1 4.0 - 15.0 %    Eosinophil % 2.0 0.0 - 8.0 %    Basophil % 1.0 0.0 - 1.9 %    Differential Method Automated    Comprehensive metabolic panel   Result Value Ref Range    Sodium 142 136 - 145 mmol/L    Potassium 3.6 3.5 - 5.1 mmol/L    Chloride 102 95 - 110 mmol/L    CO2 24 23 - 29 mmol/L    Glucose 93 70 - 110 mg/dL    BUN 10 6 - 20 mg/dL    Creatinine 0.7 0.5 - 1.4 mg/dL    Calcium 8.8 8.7 - 10.5 mg/dL    Total  Protein 7.5 6.0 - 8.4 g/dL    Albumin 3.9 3.5 - 5.2 g/dL    Total Bilirubin 0.3 0.1 - 1.0 mg/dL    Alkaline Phosphatase 44 (L) 55 - 135 U/L    AST 23 10 - 40 U/L    ALT 13 10 - 44 U/L    Anion Gap 16 8 - 16 mmol/L    eGFR >60 >60 mL/min/1.73 m^2   Ethanol   Result Value Ref Range    Alcohol, Serum 395 (HH) <10 mg/dL        Imaging Results:  Imaging Results    None                   The Emergency Provider reviewed the vital signs and test results, which are outlined above.     ED Discussion     8:03 PM: Reassessed pt at this time. Discussed with pt all pertinent ED information and results. Discussed pt dx and plan of tx. Gave pt all f/u and return to the ED instructions. All questions and concerns were addressed at this time. Pt expresses understanding of information and instructions, and is comfortable with plan to discharge. Pt is stable for discharge.    I discussed with patient and/or family/caretaker that evaluation in the ED does not suggest any emergent or life threatening medical conditions requiring immediate intervention beyond what was provided in the ED, and I believe patient is safe for discharge.  Regardless, an unremarkable evaluation in the ED does not preclude the development or presence of a serious of life threatening condition. As such, patient was instructed to return immediately for any worsening or change in current symptoms.        Medical Decision Making:   Clinical Tests:   Lab Tests: Ordered and Reviewed         ED Medication(s):  Medications   0.9%  NaCl infusion (0 mLs Intravenous Stopped 1/20/23 2039)   hydrOXYzine pamoate capsule 25 mg (25 mg Oral Given 1/20/23 2039)       Discharge Medication List as of 1/20/2023  8:10 PM        START taking these medications    Details   hydrOXYzine HCL (ATARAX) 25 MG tablet Take 1 tablet (25 mg total) by mouth every 4 (four) hours as needed for Anxiety., Starting Fri 1/20/2023, Until Fri 1/27/2023 at 6397, Print              Follow-up Information        Schedule an appointment as soon as possible for a visit  with YESY Stone MD.    Specialty: Family Medicine  Contact information:  [address]  [place] [id number] [phone]               with psychiatrist/counselor -- see resources attached. Schedule an appointment as soon as possible for a visit in 3 days.               [facility] - Emergency Dept..    Specialty: Emergency Medicine  Why: As needed, If symptoms worsen  Contact information:  [address]  [facility] [address] [phone]                               Scribe Attestation:   Scribe #1: I performed the above scribed service and the documentation accurately describes the services I performed. I attest to the accuracy of the note.     Attending:   Physician Attestation Statement for Scribe #1: I, Sal Crocker MD, personally performed the services described in this documentation, as scribed by Latoya Mena, in my presence, and it is both accurate and complete.           Clinical Impression       ICD-10-CM ICD-9-CM   1. Depression, unspecified depression type  F32.A 311   2. Caregiver with fatigue  R53.83 780.79   3. Alcoholic intoxication with complication  F10.929 305.00   4. Generalized anxiety disorder  F41.1 300.02   5. Mild episode of recurrent major depressive disorder  F33.0 296.31       Disposition:   Disposition: Discharged  Condition: Stable      Sal Crocker MD  01/20/23 8649

## 2023-02-09 ENCOUNTER — PATIENT MESSAGE (OUTPATIENT)
Dept: ADMINISTRATIVE | Facility: OTHER | Age: 43
End: 2023-02-09
Payer: MEDICAID

## 2023-02-14 ENCOUNTER — TELEPHONE (OUTPATIENT)
Dept: PULMONOLOGY | Facility: CLINIC | Age: 43
End: 2023-02-14
Payer: MEDICAID

## 2023-02-14 ENCOUNTER — OFFICE VISIT (OUTPATIENT)
Dept: CARDIOLOGY | Facility: CLINIC | Age: 43
End: 2023-02-14
Payer: MEDICAID

## 2023-02-14 VITALS
WEIGHT: 254 LBS | BODY MASS INDEX: 40.82 KG/M2 | DIASTOLIC BLOOD PRESSURE: 102 MMHG | HEIGHT: 66 IN | SYSTOLIC BLOOD PRESSURE: 148 MMHG | OXYGEN SATURATION: 99 % | HEART RATE: 76 BPM

## 2023-02-14 DIAGNOSIS — I10 ESSENTIAL HYPERTENSION: Primary | Chronic | ICD-10-CM

## 2023-02-14 DIAGNOSIS — F10.20 ALCOHOL DEPENDENCE, BINGE PATTERN: Chronic | ICD-10-CM

## 2023-02-14 DIAGNOSIS — F41.1 GENERALIZED ANXIETY DISORDER: Chronic | ICD-10-CM

## 2023-02-14 DIAGNOSIS — R29.818 SUSPECTED SLEEP APNEA: Primary | ICD-10-CM

## 2023-02-14 DIAGNOSIS — E66.09 CLASS 1 OBESITY DUE TO EXCESS CALORIES WITH SERIOUS COMORBIDITY AND BODY MASS INDEX (BMI) OF 33.0 TO 33.9 IN ADULT: Chronic | ICD-10-CM

## 2023-02-14 DIAGNOSIS — E78.00 PURE HYPERCHOLESTEROLEMIA: Chronic | ICD-10-CM

## 2023-02-14 DIAGNOSIS — I10 ESSENTIAL HYPERTENSION: ICD-10-CM

## 2023-02-14 DIAGNOSIS — R00.0 SINUS TACHYCARDIA: ICD-10-CM

## 2023-02-14 PROCEDURE — 1159F MED LIST DOCD IN RCRD: CPT | Mod: CPTII,,, | Performed by: INTERNAL MEDICINE

## 2023-02-14 PROCEDURE — 3077F PR MOST RECENT SYSTOLIC BLOOD PRESSURE >= 140 MM HG: ICD-10-PCS | Mod: CPTII,,, | Performed by: INTERNAL MEDICINE

## 2023-02-14 PROCEDURE — 3080F DIAST BP >= 90 MM HG: CPT | Mod: CPTII,,, | Performed by: INTERNAL MEDICINE

## 2023-02-14 PROCEDURE — 99999 PR PBB SHADOW E&M-EST. PATIENT-LVL III: ICD-10-PCS | Mod: PBBFAC,,, | Performed by: INTERNAL MEDICINE

## 2023-02-14 PROCEDURE — 4010F ACE/ARB THERAPY RXD/TAKEN: CPT | Mod: CPTII,,, | Performed by: INTERNAL MEDICINE

## 2023-02-14 PROCEDURE — 3077F SYST BP >= 140 MM HG: CPT | Mod: CPTII,,, | Performed by: INTERNAL MEDICINE

## 2023-02-14 PROCEDURE — 99215 PR OFFICE/OUTPT VISIT, EST, LEVL V, 40-54 MIN: ICD-10-PCS | Mod: S$PBB,,, | Performed by: INTERNAL MEDICINE

## 2023-02-14 PROCEDURE — 99213 OFFICE O/P EST LOW 20 MIN: CPT | Mod: PBBFAC | Performed by: INTERNAL MEDICINE

## 2023-02-14 PROCEDURE — 99215 OFFICE O/P EST HI 40 MIN: CPT | Mod: S$PBB,,, | Performed by: INTERNAL MEDICINE

## 2023-02-14 PROCEDURE — 3008F BODY MASS INDEX DOCD: CPT | Mod: CPTII,,, | Performed by: INTERNAL MEDICINE

## 2023-02-14 PROCEDURE — 4010F PR ACE/ARB THEARPY RXD/TAKEN: ICD-10-PCS | Mod: CPTII,,, | Performed by: INTERNAL MEDICINE

## 2023-02-14 PROCEDURE — 3008F PR BODY MASS INDEX (BMI) DOCUMENTED: ICD-10-PCS | Mod: CPTII,,, | Performed by: INTERNAL MEDICINE

## 2023-02-14 PROCEDURE — 3080F PR MOST RECENT DIASTOLIC BLOOD PRESSURE >= 90 MM HG: ICD-10-PCS | Mod: CPTII,,, | Performed by: INTERNAL MEDICINE

## 2023-02-14 PROCEDURE — 99999 PR PBB SHADOW E&M-EST. PATIENT-LVL III: CPT | Mod: PBBFAC,,, | Performed by: INTERNAL MEDICINE

## 2023-02-14 PROCEDURE — 1159F PR MEDICATION LIST DOCUMENTED IN MEDICAL RECORD: ICD-10-PCS | Mod: CPTII,,, | Performed by: INTERNAL MEDICINE

## 2023-02-14 RX ORDER — LISINOPRIL 10 MG/1
10 TABLET ORAL DAILY
Qty: 30 TABLET | Refills: 11 | Status: SHIPPED | OUTPATIENT
Start: 2023-02-14 | End: 2023-04-27 | Stop reason: SDUPTHER

## 2023-02-14 RX ORDER — FUROSEMIDE 40 MG/1
40 TABLET ORAL DAILY
Qty: 30 TABLET | Refills: 11 | Status: SHIPPED | OUTPATIENT
Start: 2023-02-14 | End: 2023-04-27 | Stop reason: SDUPTHER

## 2023-02-14 RX ORDER — CARVEDILOL 12.5 MG/1
12.5 TABLET ORAL 2 TIMES DAILY WITH MEALS
Qty: 60 TABLET | Refills: 11 | Status: SHIPPED | OUTPATIENT
Start: 2023-02-14 | End: 2023-04-27 | Stop reason: SDUPTHER

## 2023-02-14 NOTE — PROGRESS NOTES
Subjective:   Patient ID:  Merari Orosco is a 42 y.o. female who presents for evaluation of No chief complaint on file.        HPI  2023  COMES IN FOR A THREE-MONTH FOLLOW-UP.    SHE SAYS SHE HAS BEEN TAKING HER MEDICATIONS CONSISTENTLY LATELY.    SHE HAS BILATERAL LOWER EXTREMITY SWELLING AS WELL AS HAND SWELLING.  THEY ARE WORSE AT THE END OF THE DAY HOWEVER THEY ARE STILL PRESENT IN THE MORNING.    SHE DENIES HOWEVER ANY ORTHOPNEA OR DYSPNEA ON EXERTION EXCEPT WITH HEAVY EXERTION.    SHE STATES THAT SHE CAN NOT TELL WHETHER SHE SNORES OR NOT BECAUSE HE HAS NO PARTNERS.  HOWEVER SHE DOES HAVE AT TIMES THEY  AND SOMNOLENCE.  MALLAMPATI OF 4.  LARGE TONGUE BASE AND UNCONTROLLED HYPERTENSION.  NEVER BEEN EVALUATED FOR SLEEP APNEA.    DENIES ANY CHEST PAIN.    SHE WAS IN THE EMERGENCY ROOM A MONTH AGO WITH ALCOHOL INTOXICATION.  SHE STATES THAT SHE DOES NOT DRINK ON OCCASION SOMETIMES SHE DRINKS TOO MUCH IN 1 OCCASION.  BUT SHE DOES NOT DO THAT ON A DAILY BASIS OR EVEN ONCE A WEEK.  SHE SAYS THAT SHE PROBABLY HAD MORE ALCOHOL ISSUES IN THE PAST BUT STILL SHE WILL NO DRINKING ON A CONSTANT BASE.    2022  Comes in for follow-up.  So Dr. Morgan in the interim in May.    She had both hip surgeries done.    She states that she could not get her medications and she has been out of metoprolol and chlorthalidone for weeks.    On arrival her blood pressure was 160/120.  She had her Toprol and chlorthalidone on her and she took both of them.  We also gave her clonidine 0.3 mg in the clinic.  Her blood pressure starts to go down.    She denies any chest pain, headache, syncope or syncope no palpitations.    She is not compliant with  low-salt diet.          40 yo female, started doing cigars occ recently no prior smoking, HTN controlled on diuretic and toprol, bilateral AVN  Denies hx of cocaine use, no family hx of cad   No hx of cad, cva, chf, dm or ckd \  Comes in for preop evaluation for right hip  replacement     Denies any chest pain, she is limited with her activity however she denies any hx of chest pain, dyspnea, palpitations, syncope or presyncope   She is able to ambulate with difficulty  Unclear reason of her AVN to her hips as per review of notes , no hx of autoimmune disease, she denies hx of cocaine use  Compliant with meds       Past Medical History:   Diagnosis Date    Anxiety     Depression     Hypertension        Past Surgical History:   Procedure Laterality Date     SECTION      x1    HIP ARTHROPLASTY Right 2022    Procedure: ARTHROPLASTY, HIP;  Surgeon: Satnam Deleon MD;  Location: [facility] OR;  Service: Orthopedics;  Laterality: Right;    HIP ARTHROPLASTY Left 2022    Procedure: ARTHROPLASTY, HIP;  Surgeon: Satnam Deleon MD;  Location: [facility] OR;  Service: Orthopedics;  Laterality: Left;    INJECTION OF JOINT Bilateral 11/10/2021    Procedure: Injection, Joint;  Surgeon: Satnam Deleon MD;  Location: [facility] OR;  Service: General;  Laterality: Bilateral;  under fluoroscopy    JOINT REPLACEMENT      WISDOM TOOTH EXTRACTION         Social History     Tobacco Use    Smoking status: Never    Smokeless tobacco: Never   Substance Use Topics    Alcohol use: Yes     Alcohol/week: 1.0 standard drink     Types: 1 Glasses of wine per week     Comment: socially    Drug use: No       Family History   Problem Relation Age of Onset    Colon cancer Father     Diabetes Father     Hypertension Father     Hypertension Mother     Diabetes Sister     Hypertension Sister     Breast cancer Neg Hx     Cancer Neg Hx     Miscarriages / Stillbirths Neg Hx     Ovarian cancer Neg Hx      labor Neg Hx     Stroke Neg Hx        Review of Systems   Constitutional: Negative for fever and malaise/fatigue.   HENT:  Negative for sore throat.    Eyes:  Negative for blurred vision.   Cardiovascular:  Negative for chest pain, claudication, cyanosis, dyspnea on exertion, irregular heartbeat, leg  swelling, near-syncope, orthopnea, palpitations, paroxysmal nocturnal dyspnea and syncope.   Respiratory:  Negative for cough and hemoptysis.    Hematologic/Lymphatic: Negative for bleeding problem.   Skin:  Negative for rash.   Musculoskeletal:  Positive for arthritis. Negative for falls.   Gastrointestinal:  Negative for abdominal pain.   Genitourinary: Negative.    Neurological: Negative.    Psychiatric/Behavioral:  Negative for altered mental status and substance abuse.      Current Outpatient Medications on File Prior to Visit   Medication Sig    blood pressure monitor ([employer] EASE) LG arm size (OP) by Other route as needed for High Blood Pressure.    diazePAM (VALIUM) 2 MG tablet Take 1 tablet (2 mg total) by mouth 3 (three) times daily as needed for Anxiety.    FLUoxetine 20 MG capsule Take 1 capsule (20 mg total) by mouth once daily.    [DISCONTINUED] amLODIPine (NORVASC) 5 MG tablet Take 1 tablet (5 mg total) by mouth once daily.    [DISCONTINUED] chlorthalidone (HYGROTEN) 25 MG Tab Take 1 tablet by mouth once daily.    [DISCONTINUED] metoprolol succinate (TOPROL-XL) 100 MG 24 hr tablet Take 1 tablet (100 mg total) by mouth once daily.     No current facility-administered medications on file prior to visit.       Objective:   Objective:  Wt Readings from Last 3 Encounters:   02/14/23 115.2 kg (253 lb 15.5 oz)   12/29/22 105 kg (231 lb 7.7 oz)   12/15/22 94.2 kg (207 lb 10.8 oz)     Temp Readings from Last 3 Encounters:   01/20/23 98.4 °F (36.9 °C) (Oral)   12/29/22 97.7 °F (36.5 °C) (Tympanic)   12/15/22 97.1 °F (36.2 °C) (Tympanic)     BP Readings from Last 3 Encounters:   02/14/23 (!) 148/102   01/20/23 (!) 131/92   12/29/22 104/70     Pulse Readings from Last 3 Encounters:   02/14/23 76   01/20/23 79   12/29/22 92       Physical Exam  Vitals reviewed.   Constitutional:       Appearance: She is well-developed.   HENT:      Head: Normocephalic and atraumatic.   Eyes:      General: No scleral icterus.      Conjunctiva/sclera: Conjunctivae normal.   Cardiovascular:      Rate and Rhythm: Normal rate and regular rhythm.      Pulses: Intact distal pulses.      Heart sounds: Normal heart sounds. No murmur heard.  Pulmonary:      Effort: No respiratory distress.      Breath sounds: No wheezing or rales.   Chest:      Chest wall: No tenderness.   Abdominal:      General: Bowel sounds are normal. There is no distension.      Palpations: Abdomen is soft.      Tenderness: There is no guarding.   Musculoskeletal:         General: Normal range of motion.      Cervical back: Normal range of motion and neck supple.   Skin:     General: Skin is warm.   Neurological:      Mental Status: She is alert and oriented to person, place, and time.       Lab Results   Component Value Date    CHOL 172 12/09/2022    CHOL 228 (H) 08/24/2021    CHOL 220 (H) 01/30/2020     Lab Results   Component Value Date    HDL 65 12/09/2022    HDL 58 08/24/2021    HDL 69 01/30/2020     Lab Results   Component Value Date    LDLCALC 65.4 12/09/2022    LDLCALC 147.2 08/24/2021    LDLCALC 135.4 01/30/2020     Lab Results   Component Value Date    TRIG 208 (H) 12/09/2022    TRIG 114 08/24/2021    TRIG 78 01/30/2020     Lab Results   Component Value Date    CHOLHDL 37.8 12/09/2022    CHOLHDL 25.4 08/24/2021    CHOLHDL 31.4 01/30/2020       Chemistry        Component Value Date/Time     01/20/2023 1730    K 3.6 01/20/2023 1730     01/20/2023 1730    CO2 24 01/20/2023 1730    BUN 10 01/20/2023 1730    CREATININE 0.7 01/20/2023 1730    GLU 93 01/20/2023 1730        Component Value Date/Time    CALCIUM 8.8 01/20/2023 1730    ALKPHOS 44 (L) 01/20/2023 1730    AST 23 01/20/2023 1730    ALT 13 01/20/2023 1730    BILITOT 0.3 01/20/2023 1730    ESTGFRAFRICA >60.0 05/27/2022 1000    EGFRNONAA >60.0 05/27/2022 1000          Lab Results   Component Value Date    TSH 0.725 01/30/2020     No results found for: INR, PROTIME  Lab Results   Component Value Date    WBC  6.86 01/20/2023    HGB 12.6 01/20/2023    HCT 37.3 01/20/2023    MCV 93 01/20/2023     01/20/2023     BNP  @LABRCNTIP(BNP,BNPTRIAGEBLO)@  CrCl cannot be calculated (Patient's most recent lab result is older than the maximum 7 days allowed.).     Imaging:  ======  No results found for this or any previous visit.    No results found for this or any previous visit.    No results found for this or any previous visit.    Results for orders placed during the hospital encounter of 08/15/16    X-Ray Chest PA And Lateral    Narrative  Comparison: None    2 views    Findings:    Heart and pulmonary vasculature are within normal limits.  Midline trachea.  Minimal smooth pleural thickening extensive apices.  No consolidation or effusion.  Osseous structures intact.  IMPRESSION:      No acute infiltrate.  See above.  ______________________________________    Electronically signed by: JASE MAZA III, MD  Date:     08/15/16  Time:    11:56    No results found for this or any previous visit.    No valid procedures specified.    Diagnostic Results:  ECG: Reviewed  Nsr, normal ecg     The 10-year ASCVD risk score (Sudha DK, et al., 2019) is: 1.9%    Values used to calculate the score:      Age: 42 years      Sex: Female      Is Non- : Yes      Diabetic: No      Tobacco smoker: No      Systolic Blood Pressure: 148 mmHg      Is BP treated: Yes      HDL Cholesterol: 65 mg/dL      Total Cholesterol: 172 mg/dL    Assessment and Plan:   Suspected sleep apnea  -     Home Sleep Study; Future    Essential hypertension  -     furosemide (LASIX) 40 MG tablet; Take 1 tablet (40 mg total) by mouth once daily.  Dispense: 30 tablet; Refill: 11  -     carvediloL (COREG) 12.5 MG tablet; Take 1 tablet (12.5 mg total) by mouth 2 (two) times daily with meals.  Dispense: 60 tablet; Refill: 11  -     lisinopriL 10 MG tablet; Take 1 tablet (10 mg total) by mouth once daily.  Dispense: 30 tablet; Refill: 11    Sinus  tachycardia    Pure hypercholesterolemia    Generalized anxiety disorder    Class 1 obesity due to excess calories with serious comorbidity and body mass index (BMI) of 33.0 to 33.9 in adult    Alcohol dependence, binge pattern          Reviewed all tests and above medical conditions with patient in detail and formulated treatment plan.  Risk factor modification discussed.   Cardiac low salt diet discussed.  Maintaining healthy weight and weight loss goals were discussed in clinic.   UNCONTROLLED HYPERTENSION.  WILL DC METOPROLOL, CHLORTHALIDONE AND AMLODIPINE.    WE WILL START LISINOPRIL, CARVEDILOL AND LASIX 40 MG DAILY.    FOLLOW-UP IN 4 WEEKS    Stressed importance of compliance with medication and to continue to check her blood pressure at home.  STATES THAT SHE STOPPED ALCOHOL SINCE LAST EPISODE OF INTOXICATION MONTH AGO.      Follow up in 4 WEEKS

## 2023-02-23 ENCOUNTER — PATIENT MESSAGE (OUTPATIENT)
Dept: ADMINISTRATIVE | Facility: OTHER | Age: 43
End: 2023-02-23
Payer: MEDICAID

## 2023-03-07 ENCOUNTER — LAB VISIT (OUTPATIENT)
Dept: LAB | Facility: HOSPITAL | Age: 43
End: 2023-03-07
Attending: NURSE PRACTITIONER
Payer: MEDICAID

## 2023-03-07 ENCOUNTER — OFFICE VISIT (OUTPATIENT)
Dept: OBSTETRICS AND GYNECOLOGY | Facility: CLINIC | Age: 43
End: 2023-03-07
Payer: MEDICAID

## 2023-03-07 VITALS
SYSTOLIC BLOOD PRESSURE: 123 MMHG | BODY MASS INDEX: 38.09 KG/M2 | HEIGHT: 66 IN | WEIGHT: 237 LBS | DIASTOLIC BLOOD PRESSURE: 87 MMHG

## 2023-03-07 DIAGNOSIS — N89.8 VAGINAL ODOR: ICD-10-CM

## 2023-03-07 DIAGNOSIS — Z11.3 SCREEN FOR STD (SEXUALLY TRANSMITTED DISEASE): ICD-10-CM

## 2023-03-07 DIAGNOSIS — Z01.419 ENCOUNTER FOR GYNECOLOGICAL EXAMINATION WITHOUT ABNORMAL FINDING: Primary | ICD-10-CM

## 2023-03-07 DIAGNOSIS — Z30.431 IUD CHECK UP: ICD-10-CM

## 2023-03-07 DIAGNOSIS — Z30.014 ENCOUNTER FOR INITIAL PRESCRIPTION OF INTRAUTERINE CONTRACEPTIVE DEVICE (IUD): ICD-10-CM

## 2023-03-07 LAB — HIV 1+2 AB+HIV1 P24 AG SERPL QL IA: NORMAL

## 2023-03-07 PROCEDURE — 87624 HPV HI-RISK TYP POOLED RSLT: CPT | Performed by: NURSE PRACTITIONER

## 2023-03-07 PROCEDURE — 1160F RVW MEDS BY RX/DR IN RCRD: CPT | Mod: CPTII,,, | Performed by: NURSE PRACTITIONER

## 2023-03-07 PROCEDURE — 87389 HIV-1 AG W/HIV-1&-2 AB AG IA: CPT | Performed by: NURSE PRACTITIONER

## 2023-03-07 PROCEDURE — 99213 OFFICE O/P EST LOW 20 MIN: CPT | Mod: PBBFAC | Performed by: NURSE PRACTITIONER

## 2023-03-07 PROCEDURE — 4010F ACE/ARB THERAPY RXD/TAKEN: CPT | Mod: CPTII,,, | Performed by: NURSE PRACTITIONER

## 2023-03-07 PROCEDURE — 81514 NFCT DS BV&VAGINITIS DNA ALG: CPT | Performed by: NURSE PRACTITIONER

## 2023-03-07 PROCEDURE — 4010F PR ACE/ARB THEARPY RXD/TAKEN: ICD-10-PCS | Mod: CPTII,,, | Performed by: NURSE PRACTITIONER

## 2023-03-07 PROCEDURE — 99396 PREV VISIT EST AGE 40-64: CPT | Mod: 25,S$PBB,, | Performed by: NURSE PRACTITIONER

## 2023-03-07 PROCEDURE — 3074F PR MOST RECENT SYSTOLIC BLOOD PRESSURE < 130 MM HG: ICD-10-PCS | Mod: CPTII,,, | Performed by: NURSE PRACTITIONER

## 2023-03-07 PROCEDURE — 88175 CYTOPATH C/V AUTO FLUID REDO: CPT | Performed by: NURSE PRACTITIONER

## 2023-03-07 PROCEDURE — 1159F PR MEDICATION LIST DOCUMENTED IN MEDICAL RECORD: ICD-10-PCS | Mod: CPTII,,, | Performed by: NURSE PRACTITIONER

## 2023-03-07 PROCEDURE — 1160F PR REVIEW ALL MEDS BY PRESCRIBER/CLIN PHARMACIST DOCUMENTED: ICD-10-PCS | Mod: CPTII,,, | Performed by: NURSE PRACTITIONER

## 2023-03-07 PROCEDURE — 99999 PR PBB SHADOW E&M-EST. PATIENT-LVL III: CPT | Mod: PBBFAC,,, | Performed by: NURSE PRACTITIONER

## 2023-03-07 PROCEDURE — 3074F SYST BP LT 130 MM HG: CPT | Mod: CPTII,,, | Performed by: NURSE PRACTITIONER

## 2023-03-07 PROCEDURE — 3079F DIAST BP 80-89 MM HG: CPT | Mod: CPTII,,, | Performed by: NURSE PRACTITIONER

## 2023-03-07 PROCEDURE — 3008F BODY MASS INDEX DOCD: CPT | Mod: CPTII,,, | Performed by: NURSE PRACTITIONER

## 2023-03-07 PROCEDURE — 3008F PR BODY MASS INDEX (BMI) DOCUMENTED: ICD-10-PCS | Mod: CPTII,,, | Performed by: NURSE PRACTITIONER

## 2023-03-07 PROCEDURE — 3079F PR MOST RECENT DIASTOLIC BLOOD PRESSURE 80-89 MM HG: ICD-10-PCS | Mod: CPTII,,, | Performed by: NURSE PRACTITIONER

## 2023-03-07 PROCEDURE — 1159F MED LIST DOCD IN RCRD: CPT | Mod: CPTII,,, | Performed by: NURSE PRACTITIONER

## 2023-03-07 PROCEDURE — 87591 N.GONORRHOEAE DNA AMP PROB: CPT | Performed by: NURSE PRACTITIONER

## 2023-03-07 PROCEDURE — 99396 PR PREVENTIVE VISIT,EST,40-64: ICD-10-PCS | Mod: 25,S$PBB,, | Performed by: NURSE PRACTITIONER

## 2023-03-07 PROCEDURE — 36415 COLL VENOUS BLD VENIPUNCTURE: CPT | Performed by: NURSE PRACTITIONER

## 2023-03-07 PROCEDURE — 99999 PR PBB SHADOW E&M-EST. PATIENT-LVL III: ICD-10-PCS | Mod: PBBFAC,,, | Performed by: NURSE PRACTITIONER

## 2023-03-07 PROCEDURE — 86592 SYPHILIS TEST NON-TREP QUAL: CPT | Performed by: NURSE PRACTITIONER

## 2023-03-07 NOTE — PROGRESS NOTES
CC: Well woman exam    Merari Orosco is a 42 y.o. female  presents for well woman exam.  LMP: No LMP recorded. Patient has had an implant..    Due for pap  Wants full STD testing  Has odor   IUD was due out 22 - pt wants removed - may want pregnancy at some time in future but for now wants it replaced      Past Medical History:   Diagnosis Date    Anxiety     Depression     Hypertension      Past Surgical History:   Procedure Laterality Date     SECTION      x1    HIP ARTHROPLASTY Right 2022    Procedure: ARTHROPLASTY, HIP;  Surgeon: Satnam Deleon MD;  Location: [facility] OR;  Service: Orthopedics;  Laterality: Right;    HIP ARTHROPLASTY Left 2022    Procedure: ARTHROPLASTY, HIP;  Surgeon: Satnam Deleon MD;  Location: [facility] OR;  Service: Orthopedics;  Laterality: Left;    INJECTION OF JOINT Bilateral 11/10/2021    Procedure: Injection, Joint;  Surgeon: Satnam eDleon MD;  Location: [facility] OR;  Service: General;  Laterality: Bilateral;  under fluoroscopy    JOINT REPLACEMENT      WISDOM TOOTH EXTRACTION       Social History     Socioeconomic History    Marital status: Single    Number of children: 1   Tobacco Use    Smoking status: Never    Smokeless tobacco: Never   Substance and Sexual Activity    Alcohol use: Yes     Alcohol/week: 1.0 standard drink     Types: 1 Glasses of wine per week     Comment: socially    Drug use: No    Sexual activity: Not Currently     Partners: Male     Birth control/protection: I.U.D.     Social Determinants of Health     Financial Resource Strain: Low Risk     Difficulty of Paying Living Expenses: Not hard at all   Food Insecurity: No Food Insecurity    Worried About Running Out of Food in the Last Year: Never true    Ran Out of Food in the Last Year: Never true   Transportation Needs: Unmet Transportation Needs    Lack of Transportation (Medical): Yes    Lack of Transportation (Non-Medical): Yes   Physical Activity: Inactive    Days of  "Exercise per Week: 0 days    Minutes of Exercise per Session: 0 min   Stress: Stress Concern Present    Feeling of Stress : To some extent   Social Connections: Unknown    Frequency of Communication with Friends and Family: More than three times a week    Frequency of Social Gatherings with Friends and Family: More than three times a week    Active Member of Clubs or Organizations: No    Attends Club or Organization Meetings: Patient refused    Marital Status:    Housing Stability: Low Risk     Unable to Pay for Housing in the Last Year: No    Number of Places Lived in the Last Year: 1    Unstable Housing in the Last Year: No     Family History   Problem Relation Age of Onset    Colon cancer Father     Diabetes Father     Hypertension Father     Hypertension Mother     Diabetes Sister     Hypertension Sister     Breast cancer Neg Hx     Cancer Neg Hx     Miscarriages / Stillbirths Neg Hx     Ovarian cancer Neg Hx      labor Neg Hx     Stroke Neg Hx      OB History          2    Para   1    Term   1            AB   1    Living             SAB        IAB        Ectopic        Multiple        Live Births                     /87   Ht 5' 6" (1.676 m)   Wt 107.5 kg (236 lb 15.9 oz)   BMI 38.25 kg/m²       ROS:  GENERAL: Denies weight gain or weight loss. Feeling well overall.   SKIN: Denies rash or lesions.   HEAD: Denies head injury or headache.   NODES: Denies enlarged lymph nodes.   CHEST: Denies chest pain or shortness of breath.   CARDIOVASCULAR: Denies palpitations or left sided chest pain.   ABDOMEN: No abdominal pain, constipation, diarrhea, nausea, vomiting or rectal bleeding.   URINARY: No frequency, dysuria, hematuria, or burning on urination.  REPRODUCTIVE: See HPI.   BREASTS: The patient performs breast self-examination and denies pain, lumps, or nipple discharge.   HEMATOLOGIC: No easy bruisability or excessive bleeding.   MUSCULOSKELETAL: Denies joint pain or swelling. "   NEUROLOGIC: Denies syncope or weakness.   PSYCHIATRIC: Denies depression, anxiety or mood swings.    PHYSICAL EXAM:  APPEARANCE: Well nourished, well developed, in no acute distress.  AFFECT: WNL, alert and oriented x 3  SKIN: No acne or hirsutism  NECK: Neck symmetric without masses or thyromegaly  NODES: No inguinal, cervical, axillary, or femoral lymph node enlargement  CHEST: Good respiratory effect  ABDOMEN: Soft.  No tenderness or masses.  No hepatosplenomegaly.  No hernias.  BREASTS: Symmetrical, no skin changes or visible lesions.  No palpable masses, nipple discharge bilaterally.  PELVIC: Normal external genitalia without lesions.  Normal hair distribution.  Adequate perineal body, normal urethral meatus.  Vagina moist and well rugated without lesions or discharge.  Cervix pink, without lesions, discharge or tenderness - IUD strings noted.  No significant cystocele or rectocele.  Bimanual exam shows uterus to be normal size, regular, mobile and nontender.  Adnexa without masses or tenderness.    EXTREMITIES: No edema.  Physical Exam    1. Encounter for gynecological examination without abnormal finding  Liquid-Based Pap Smear, Screening    HPV High Risk Genotypes, PCR      2. IUD check up  Device Authorization Order      3. Screen for STD (sexually transmitted disease)  C. trachomatis/N. gonorrhoeae by AMP DNA    HIV 1/2 Ag/Ab (4th Gen)    RPR    Vaginosis Screen by DNA Probe      4. Encounter for initial prescription of intrauterine contraceptive device (IUD)  Device Authorization Order      5. Vaginal odor  Vaginosis Screen by DNA Probe       AND PLAN:    Merari was seen today for annual exam.    Diagnoses and all orders for this visit:    Encounter for gynecological examination without abnormal finding  -     Liquid-Based Pap Smear, Screening  -     HPV High Risk Genotypes, PCR    IUD check up  -     Device Authorization Order    Screen for STD (sexually transmitted disease)  -     C. trachomatis/N.  gonorrhoeae by AMP DNA  -     HIV 1/2 Ag/Ab (4th Gen); Future  -     RPR; Future  -     Vaginosis Screen by DNA Probe    Encounter for initial prescription of intrauterine contraceptive device (IUD)  -     Device Authorization Order    Vaginal odor  -     Vaginosis Screen by DNA Probe       Patient was counseled today on A.C.S. Pap guidelines and recommendations for yearly pelvic exams, mammograms and monthly self breast exams; to see her PCP for other health maintenance.                    Answers submitted by the patient for this visit:  Vaginal Discharge Questionnaire  (Submitted on 2023)  Chief Complaint: Vaginal discharge  Chronicity: new  Onset: 1 to 4 weeks ago  Frequency: intermittently  Progression since onset: gradually improving  Pain severity: no pain  Pregnant now?: No  anorexia: Yes  back pain: No  chills: No  constipation: No  diarrhea: No  discolored urine: No  dysuria: No  fever: No  flank pain: No  frequency: Yes  headaches: No  hematuria: No  nausea: No  painful intercourse: No  rash: No  urgency: No  vomiting: No  Please select the characteristics of your discharge: : clear, malodorous, watery  Vaginal bleeding: no bleeding  Passing clots?: No  Passing tissue?: No  Aggravated by: activity, heavy lifting, intercourse, tactile pressure  treatments tried: nothing  Sexual activity: sexually active  Partner with STD symptoms: unknown  Birth control: an IUD  STD: Yes  abdominal surgery: No   section: Yes  Ectopic pregnancy: No  Endometriosis: No  herpes simplex: No  gynecological surgery: No  menorrhagia: Yes  metrorrhagia: No  miscarriage: No  ovarian cysts: No  perineal abscess: No  PID: No  terminated pregnancy: No  vaginosis: No

## 2023-03-08 LAB
C TRACH DNA SPEC QL NAA+PROBE: NOT DETECTED
N GONORRHOEA DNA SPEC QL NAA+PROBE: NOT DETECTED
RPR SER QL: NORMAL

## 2023-03-09 ENCOUNTER — PATIENT MESSAGE (OUTPATIENT)
Dept: OBSTETRICS AND GYNECOLOGY | Facility: CLINIC | Age: 43
End: 2023-03-09
Payer: MEDICAID

## 2023-03-09 LAB
BACTERIAL VAGINOSIS DNA: POSITIVE
CANDIDA GLABRATA DNA: NEGATIVE
CANDIDA KRUSEI DNA: NEGATIVE
CANDIDA RRNA VAG QL PROBE: NEGATIVE
T VAGINALIS RRNA GENITAL QL PROBE: NEGATIVE

## 2023-03-10 ENCOUNTER — OFFICE VISIT (OUTPATIENT)
Dept: ORTHOPEDICS | Facility: CLINIC | Age: 43
End: 2023-03-10
Payer: MEDICAID

## 2023-03-10 ENCOUNTER — PATIENT MESSAGE (OUTPATIENT)
Dept: OBSTETRICS AND GYNECOLOGY | Facility: CLINIC | Age: 43
End: 2023-03-10
Payer: MEDICAID

## 2023-03-10 VITALS — BODY MASS INDEX: 38.12 KG/M2 | HEIGHT: 66 IN | WEIGHT: 237.19 LBS

## 2023-03-10 DIAGNOSIS — N76.0 BV (BACTERIAL VAGINOSIS): Primary | ICD-10-CM

## 2023-03-10 DIAGNOSIS — Z96.642 HX OF TOTAL HIP ARTHROPLASTY, LEFT: Primary | ICD-10-CM

## 2023-03-10 DIAGNOSIS — I87.2 EDEMA OF BOTH LOWER EXTREMITIES DUE TO PERIPHERAL VENOUS INSUFFICIENCY: ICD-10-CM

## 2023-03-10 DIAGNOSIS — B96.89 BV (BACTERIAL VAGINOSIS): Primary | ICD-10-CM

## 2023-03-10 DIAGNOSIS — Z96.641 H/O TOTAL HIP ARTHROPLASTY, RIGHT: ICD-10-CM

## 2023-03-10 PROCEDURE — 99999 PR PBB SHADOW E&M-EST. PATIENT-LVL III: ICD-10-PCS | Mod: PBBFAC,,, | Performed by: ORTHOPAEDIC SURGERY

## 2023-03-10 PROCEDURE — 3008F BODY MASS INDEX DOCD: CPT | Mod: CPTII,,, | Performed by: ORTHOPAEDIC SURGERY

## 2023-03-10 PROCEDURE — 99999 PR PBB SHADOW E&M-EST. PATIENT-LVL III: CPT | Mod: PBBFAC,,, | Performed by: ORTHOPAEDIC SURGERY

## 2023-03-10 PROCEDURE — 3008F PR BODY MASS INDEX (BMI) DOCUMENTED: ICD-10-PCS | Mod: CPTII,,, | Performed by: ORTHOPAEDIC SURGERY

## 2023-03-10 PROCEDURE — 99213 OFFICE O/P EST LOW 20 MIN: CPT | Mod: S$PBB,,, | Performed by: ORTHOPAEDIC SURGERY

## 2023-03-10 PROCEDURE — 99213 PR OFFICE/OUTPT VISIT, EST, LEVL III, 20-29 MIN: ICD-10-PCS | Mod: S$PBB,,, | Performed by: ORTHOPAEDIC SURGERY

## 2023-03-10 PROCEDURE — 1159F MED LIST DOCD IN RCRD: CPT | Mod: CPTII,,, | Performed by: ORTHOPAEDIC SURGERY

## 2023-03-10 PROCEDURE — 99213 OFFICE O/P EST LOW 20 MIN: CPT | Mod: PBBFAC | Performed by: ORTHOPAEDIC SURGERY

## 2023-03-10 PROCEDURE — 4010F PR ACE/ARB THEARPY RXD/TAKEN: ICD-10-PCS | Mod: CPTII,,, | Performed by: ORTHOPAEDIC SURGERY

## 2023-03-10 PROCEDURE — 1159F PR MEDICATION LIST DOCUMENTED IN MEDICAL RECORD: ICD-10-PCS | Mod: CPTII,,, | Performed by: ORTHOPAEDIC SURGERY

## 2023-03-10 PROCEDURE — 4010F ACE/ARB THERAPY RXD/TAKEN: CPT | Mod: CPTII,,, | Performed by: ORTHOPAEDIC SURGERY

## 2023-03-10 RX ORDER — HYDROXYZINE HYDROCHLORIDE 25 MG/1
25 TABLET, FILM COATED ORAL EVERY 4 HOURS PRN
COMMUNITY
Start: 2023-02-04 | End: 2023-04-27

## 2023-03-10 RX ORDER — METRONIDAZOLE 500 MG/1
500 TABLET ORAL EVERY 12 HOURS
Qty: 14 TABLET | Refills: 0 | Status: SHIPPED | OUTPATIENT
Start: 2023-03-10 | End: 2023-03-17

## 2023-03-10 NOTE — PROGRESS NOTES
Subjective:     Patient ID: Merari Orosco is a 42 y.o. female.    Chief Complaint: Pain of the Left Hip and Pain of the Right Hip   Pain in right hip and left hip  HPI:  02/01/2021  40-year-old been having pain in both hips for more than a year now.  She was not diagnosed by sickle cell or lupus or any other diseases and was seen by Rheumatology.  She does take Fosamax.  She had MRI CT scan x-rays that I reviewed personally as well as was told to the patient that she has collapse of the femoral head.  She received a right hip steroid injection by Dr. Walden and now she stain that had helped quite a bit.  The left hip now hurts more than the right side.  She had not taking any Tylenol or any 0 NSAIDs over-the-counter.  She was seen by Rheumatology Department and she stated no blood work was done.  Her pain is around 2-4/10.  She is ambulating without any assistive devices.  She states that she cannot spread the legs to get it in and out of the car is difficult as well going to the bathroom to clean herself.  She does live with family like mother and multiple siblings.  She does have a 20-year-old son.    Denies any fever or chills or shortness of breath or difficulty with chewing or swallowing loss of bowel bladder control or blurry vision or double vision loss of sense smell or taste or or numbness or tingling in the legs or low back      10/25/2021  Bilateral hip avascular necrosis.  Things are getting worse.  Her pain is 9/10.  The steroid injections given by Dr. Walden last 4 months.  She wanted repeated however he she was told to come and see us.  She wants to hold off if she can on having hip replacements at least till after the holidays.  She had tried Mobic and Naprosyn and ibuprofen etcetera.  Her pain is 9/10.  Having difficulty getting around.  She said she will be interested in having her hip injected again since they help.  She does take Tylenol.  Requesting pain medications.  I did tell her I do not  prescribe pain medications/narcotics except after surgery.    No fever no chills no shortness of breath or difficulty with chewing or swallowing loss of bowel bladder control.    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  She feels the right leg shorter than the left.  Steroid injection seems to help a little bit.  She takes tramadol and nabumetone.  We discussed her total hip replacement today.  We asked and answered a lot of questions for her she wants to do both of them at the same time I did tell her 0 I usually stage does I do not perform bilateral joints because the risk of mortality is high.  She expressed understanding.  I did tell her cannot make both hips to be the same because they may differ in size.  She might have some difference in leg length.  We went over total hip replacement in details.  She wants to proceed after the holidays.  She is ambulating using a cane today but she said the injections did seem to help a little bit.  No fever no chills no shortness of breath or difficulty with chewing swallowing loss of bowel bladder control    04/04/2022  Right MARCO 02/09/2022..  Her right hip is not hurting what is hurting her the most is the left hip.  She would like to have the left hip replaced.  She is a little bit short on that left side and she notices it.  She is using a cane to ambulate she goes to [facility] on the [address] for physical therapy .  Right hip pain scale 0/10 left hip hurts her more 6/10      08/25/2022   Left MARCO 05/31/2022, right MARCO 02/09/2022.  She stated the right hip is doing extremely well without problems.  She complains of slight stiffness on the left hip and that has not been 3 months yet.  I did tell her takes 3-6 months for things to heal and it will improve with time.  She does take a Percocet at night to help her sleep and I did tell her we cannot keep on providing her with the narcotics.  My assistant had told her that she may need to go to pain  management if she wants that.  I do not think pain management at this time will provide her with the narcotics.  She is having hard time sometimes sleeping and I did tell her will put on a muscle relaxant to take on as-needed basis.  In might make her sleepy.  She is overall very happy that she had her total hip replacement her pain is 1/10 just complains of stiffness slightly more on the left side.  I reviewed the preop range of motion and compared to postop which is markedly better than before surgery.  She is ambulating without any assistive devices.  No fever no chills no shortness of breath difficulty with chewing swallowing loss of bowel bladder control  She is happy with the results so far I did remind her that these are not perfect they are 90% successful decreasing pain increasing function but the on perfect we hope that these will last at least 15-20 years      03/10/2023   Right MARCO 2022 and left MARCO 2022   Patient is happy that she had the surgery she is able to ambulate but when she goes to ambulate distances she takes her cane with her.  She feels a little weakness in the left hip area.  She does have severe swelling in the lower extremity seen by the cardiologist who placed her Lasix last 2 weeks.  She said it is markedly improved.  She is not wearing compressive stockings and she said it is hot.  She said she does not have any and she missed placed a 1 given after surgery.  Otherwise she is happy overall with her hips and able to walk and less pain.  Her pain occasionally is 1 to 2/10.    No fever no chills no shortness of breath or difficulty with chewing swallowing  I did tell the patient most joint see improvement up to 18 months after surgery.  And I did tell her the not perfect they are 90% successful  Past Medical History:   Diagnosis Date    Anxiety     Depression     Hypertension      Past Surgical History:   Procedure Laterality Date     SECTION      x1    HIP  ARTHROPLASTY Right 2022    Procedure: ARTHROPLASTY, HIP;  Surgeon: Satnam Deleon MD;  Location: [facility] OR;  Service: Orthopedics;  Laterality: Right;    HIP ARTHROPLASTY Left 2022    Procedure: ARTHROPLASTY, HIP;  Surgeon: Satnam Deleon MD;  Location: [facility] OR;  Service: Orthopedics;  Laterality: Left;    INJECTION OF JOINT Bilateral 11/10/2021    Procedure: Injection, Joint;  Surgeon: Satnam Deleon MD;  Location: [facility] OR;  Service: General;  Laterality: Bilateral;  under fluoroscopy    JOINT REPLACEMENT      WISDOM TOOTH EXTRACTION       Family History   Problem Relation Age of Onset    Colon cancer Father     Diabetes Father     Hypertension Father     Hypertension Mother     Diabetes Sister     Hypertension Sister     Breast cancer Neg Hx     Cancer Neg Hx     Miscarriages / Stillbirths Neg Hx     Ovarian cancer Neg Hx      labor Neg Hx     Stroke Neg Hx      Social History     Socioeconomic History    Marital status: Single    Number of children: 1   Tobacco Use    Smoking status: Never    Smokeless tobacco: Never   Substance and Sexual Activity    Alcohol use: Yes     Alcohol/week: 1.0 standard drink     Types: 1 Glasses of wine per week     Comment: socially    Drug use: No    Sexual activity: Not Currently     Partners: Male     Birth control/protection: I.U.D.     Social Determinants of Health     Financial Resource Strain: Low Risk     Difficulty of Paying Living Expenses: Not hard at all   Food Insecurity: No Food Insecurity    Worried About Running Out of Food in the Last Year: Never true    Ran Out of Food in the Last Year: Never true   Transportation Needs: Unmet Transportation Needs    Lack of Transportation (Medical): Yes    Lack of Transportation (Non-Medical): Yes   Physical Activity: Inactive    Days of Exercise per Week: 0 days    Minutes of Exercise per Session: 0 min   Stress: Stress Concern Present    Feeling of Stress : To some extent   Social Connections:  Unknown    Frequency of Communication with Friends and Family: More than three times a week    Frequency of Social Gatherings with Friends and Family: More than three times a week    Active Member of Clubs or Organizations: No    Attends Club or Organization Meetings: Patient refused    Marital Status:    Housing Stability: Low Risk     Unable to Pay for Housing in the Last Year: No    Number of Places Lived in the Last Year: 1    Unstable Housing in the Last Year: No     Medication List with Changes/Refills   Current Medications    BLOOD PRESSURE MONITOR (IHEALTH EASE) LG ARM SIZE (OP)    by Other route as needed for High Blood Pressure.    CARVEDILOL (COREG) 12.5 MG TABLET    Take 1 tablet (12.5 mg total) by mouth 2 (two) times daily with meals.    DIAZEPAM (VALIUM) 2 MG TABLET    Take 1 tablet (2 mg total) by mouth 3 (three) times daily as needed for Anxiety.    FLUOXETINE 20 MG CAPSULE    Take 1 capsule (20 mg total) by mouth once daily.    FUROSEMIDE (LASIX) 40 MG TABLET    Take 1 tablet (40 mg total) by mouth once daily.    HYDROXYZINE HCL (ATARAX) 25 MG TABLET    Take 25 mg by mouth every 4 (four) hours as needed.    LISINOPRIL 10 MG TABLET    Take 1 tablet (10 mg total) by mouth once daily.    METRONIDAZOLE (FLAGYL) 500 MG TABLET    Take 1 tablet (500 mg total) by mouth every 12 (twelve) hours. for 7 days     Review of patient's allergies indicates:  No Known Allergies  Review of Systems   Constitutional: Negative for decreased appetite.   HENT:  Negative for tinnitus.    Eyes:  Negative for double vision.   Cardiovascular:  Negative for chest pain.   Respiratory:  Negative for wheezing.    Hematologic/Lymphatic: Negative for bleeding problem.   Skin:  Negative for dry skin.   Musculoskeletal:  Positive for joint pain, joint swelling and stiffness. Negative for back pain, gout and neck pain.   Gastrointestinal:  Negative for abdominal pain.   Genitourinary:  Negative for bladder incontinence.    Neurological:  Negative for numbness, paresthesias and sensory change.   Psychiatric/Behavioral:  Negative for altered mental status.      Objective:   Body mass index is 38.29 kg/m².  There were no vitals filed for this visit.       General    Constitutional: She is oriented to person, place, and time. She appears well-developed.   HENT:   Head: Atraumatic.   Eyes: EOM are normal.   Cardiovascular:  Normal rate.            Pulmonary/Chest: Effort normal.   Neurological: She is alert and oriented to person, place, and time.   Psychiatric: Judgment normal.          Gait much improved without any limping today  Pelvis is level  Right hip surgical scar healed well.  Passive range of motion with minimal discomfort if any.  Excellent motion.  Still slightly weak with hip flexion at 5-/5 but otherwise abductors and adductors were 5/5  Left hip preop total hip arthroplasty with severely limited range of motion.  Internal rotation 5° external rotation 10° with around 5° flexion contracture Pelvis tilting with motion.  Slightly weak hip flexors..  Postop left MARCO 05/31/2022 on today's visit 08/25/2022 internal rotation 20° external rotation 30° with still slightly hip contracture at 5°.  There is no pelvis tilting with her passive motion.  Still having slightly weak hip flexors and abductors.  Adductors are strong at 5/5  Quads and hamstrings ankle extensors and flexors inverters and everters are all 5/5  Bilateral knees full motion no pain in the knee joints.  No swelling no effusion.  Collaterals and cruciates are stable.  Negative Ino's.  Calves are soft nontender.  There is pitting  DP 1+ PT 1+  Skin is warm to touch no obvious lesions    Relevant imaging results reviewed and interpreted by me, discussed with the patient and / or family today     X-ray 07/14/2022 bilateral hips excellent total hip replacement without evidence of any failure.  Alignment is excellent leg length seems to be even  X-ray and electronic  record showing right total hip arthroplasty in excellent alignment performed 04/04/2021.  There is severe arthritis and collapse of the femoral head on the left side  X-ray bilateral hips 10/25/21 reviewed.  There is extensive cystic changes and loss of joint space both hips which is a little bit more progressed since last x-ray.  Avascular necrosis with resulting end-stage arthritis.  There is sclerosis of the femoral heads.   X-ray and electronic record bilateral hips with avascular necrosis of both femoral heads with 2-3 mm collapse on both of him.  CT scan showing collapse  MRI showing large lesions on both femoral heads    Assessment:     Encounter Diagnoses   Name Primary?    Hx of total hip arthroplasty, left Yes    H/O total hip arthroplasty, right     Edema of both lower extremities due to peripheral venous insufficiency         Plan:   Hx of total hip arthroplasty, left    H/O total hip arthroplasty, right    Edema of both lower extremities due to peripheral venous insufficiency       Patient Instructions   Both of her hips are doing well much better than before surgery   You walking without any assistive devices   You have quite a bit of swelling in your legs with pitting edema  The cardiologist put you on Lasix however you still have some edema going your legs   I will give you a prescription to by knee-high compressive stockings mild-moderate compression    Need to return in 1 year with x-ray of both hips   Continue with exercise and walkingDisclaimer: This note was prepared using a voice recognition system and is likely to have sound alike errors within the text.

## 2023-03-10 NOTE — PATIENT INSTRUCTIONS
Both of her hips are doing well much better than before surgery   You walking without any assistive devices   You have quite a bit of swelling in your legs with pitting edema  The cardiologist put you on Lasix however you still have some edema going your legs   I will give you a prescription to by knee-high compressive stockings mild-moderate compression    Need to return in 1 year with x-ray of both hips   Continue with exercise and walking

## 2023-03-13 ENCOUNTER — PATIENT MESSAGE (OUTPATIENT)
Dept: PAIN MEDICINE | Facility: CLINIC | Age: 43
End: 2023-03-13
Payer: MEDICAID

## 2023-03-13 DIAGNOSIS — F41.1 GENERALIZED ANXIETY DISORDER: Chronic | ICD-10-CM

## 2023-03-13 DIAGNOSIS — F33.0 MILD EPISODE OF RECURRENT MAJOR DEPRESSIVE DISORDER: ICD-10-CM

## 2023-03-13 LAB
FINAL PATHOLOGIC DIAGNOSIS: NORMAL
Lab: NORMAL

## 2023-03-13 NOTE — TELEPHONE ENCOUNTER
No new care gaps identified.  [facility] Embedded Care Gaps. Reference number: [id number]. 3/13/2023   4:20:36 PM CDT

## 2023-03-14 RX ORDER — FLUOXETINE HYDROCHLORIDE 20 MG/1
20 CAPSULE ORAL DAILY
Qty: 90 CAPSULE | Refills: 0 | Status: SHIPPED | OUTPATIENT
Start: 2023-03-14 | End: 2023-04-27 | Stop reason: SDUPTHER

## 2023-03-14 NOTE — TELEPHONE ENCOUNTER
REFILL APPROVED. Will address further refills at upcoming appointment with me listed below.  Requested Prescriptions   Pending Prescriptions Disp Refills    FLUoxetine 20 MG capsule 90 capsule 1     Sig: Take 1 capsule (20 mg total) by mouth once daily.    #LMRX   --------------------------------  Future Appointments   Date Time Provider Department Center   3/16/2023  1:40 PM HG [id number] HG MAMMO [facility]   3/21/2023  1:20 PM Nadia Huitron MD [facility] CARDIO [facility]   3/22/2023 10:00 AM SLEEP STUDY, [facility] SLEEPO [place]   4/13/2023 10:30 AM Bere Villa NP [facility] VERA [place]   4/20/2023  1:30 PM LABORATORY, [facility] LAB [facility]   4/20/2023  2:00 PM HG US4 HG ULSOUND [facility]   4/27/2023 11:00 AM YESY Stone MD [facility]

## 2023-03-16 ENCOUNTER — HOSPITAL ENCOUNTER (OUTPATIENT)
Dept: RADIOLOGY | Facility: HOSPITAL | Age: 43
Discharge: HOME | End: 2023-03-16
Attending: PHYSICIAN ASSISTANT
Payer: MEDICAID

## 2023-03-16 DIAGNOSIS — Z12.31 ENCOUNTER FOR SCREENING MAMMOGRAM FOR MALIGNANT NEOPLASM OF BREAST: ICD-10-CM

## 2023-03-16 PROCEDURE — 77067 MAMMO DIGITAL SCREENING BILAT WITH TOMO: ICD-10-PCS | Mod: 26,,, | Performed by: RADIOLOGY

## 2023-03-16 PROCEDURE — 77067 SCR MAMMO BI INCL CAD: CPT | Mod: TC

## 2023-03-16 PROCEDURE — 77063 MAMMO DIGITAL SCREENING BILAT WITH TOMO: ICD-10-PCS | Mod: 26,,, | Performed by: RADIOLOGY

## 2023-03-16 PROCEDURE — 77063 BREAST TOMOSYNTHESIS BI: CPT | Mod: 26,,, | Performed by: RADIOLOGY

## 2023-03-16 PROCEDURE — 77067 SCR MAMMO BI INCL CAD: CPT | Mod: 26,,, | Performed by: RADIOLOGY

## 2023-03-21 ENCOUNTER — OFFICE VISIT (OUTPATIENT)
Dept: CARDIOLOGY | Facility: CLINIC | Age: 43
End: 2023-03-21
Payer: MEDICAID

## 2023-03-21 VITALS
BODY MASS INDEX: 38.97 KG/M2 | DIASTOLIC BLOOD PRESSURE: 84 MMHG | HEIGHT: 66 IN | WEIGHT: 242.5 LBS | SYSTOLIC BLOOD PRESSURE: 122 MMHG

## 2023-03-21 DIAGNOSIS — M25.551 RIGHT HIP PAIN: ICD-10-CM

## 2023-03-21 DIAGNOSIS — E66.09 CLASS 1 OBESITY DUE TO EXCESS CALORIES WITH SERIOUS COMORBIDITY AND BODY MASS INDEX (BMI) OF 33.0 TO 33.9 IN ADULT: Chronic | ICD-10-CM

## 2023-03-21 DIAGNOSIS — R29.818 SUSPECTED SLEEP APNEA: ICD-10-CM

## 2023-03-21 DIAGNOSIS — I10 ESSENTIAL HYPERTENSION: Primary | Chronic | ICD-10-CM

## 2023-03-21 DIAGNOSIS — F41.1 GENERALIZED ANXIETY DISORDER: ICD-10-CM

## 2023-03-21 PROCEDURE — 3008F PR BODY MASS INDEX (BMI) DOCUMENTED: ICD-10-PCS | Mod: CPTII,,, | Performed by: INTERNAL MEDICINE

## 2023-03-21 PROCEDURE — 1159F PR MEDICATION LIST DOCUMENTED IN MEDICAL RECORD: ICD-10-PCS | Mod: CPTII,,, | Performed by: INTERNAL MEDICINE

## 2023-03-21 PROCEDURE — 3074F PR MOST RECENT SYSTOLIC BLOOD PRESSURE < 130 MM HG: ICD-10-PCS | Mod: CPTII,,, | Performed by: INTERNAL MEDICINE

## 2023-03-21 PROCEDURE — 3079F DIAST BP 80-89 MM HG: CPT | Mod: CPTII,,, | Performed by: INTERNAL MEDICINE

## 2023-03-21 PROCEDURE — 3079F PR MOST RECENT DIASTOLIC BLOOD PRESSURE 80-89 MM HG: ICD-10-PCS | Mod: CPTII,,, | Performed by: INTERNAL MEDICINE

## 2023-03-21 PROCEDURE — 99999 PR PBB SHADOW E&M-EST. PATIENT-LVL III: CPT | Mod: PBBFAC,,, | Performed by: INTERNAL MEDICINE

## 2023-03-21 PROCEDURE — 4010F ACE/ARB THERAPY RXD/TAKEN: CPT | Mod: CPTII,,, | Performed by: INTERNAL MEDICINE

## 2023-03-21 PROCEDURE — 4010F PR ACE/ARB THEARPY RXD/TAKEN: ICD-10-PCS | Mod: CPTII,,, | Performed by: INTERNAL MEDICINE

## 2023-03-21 PROCEDURE — 3074F SYST BP LT 130 MM HG: CPT | Mod: CPTII,,, | Performed by: INTERNAL MEDICINE

## 2023-03-21 PROCEDURE — 99999 PR PBB SHADOW E&M-EST. PATIENT-LVL III: ICD-10-PCS | Mod: PBBFAC,,, | Performed by: INTERNAL MEDICINE

## 2023-03-21 PROCEDURE — 1159F MED LIST DOCD IN RCRD: CPT | Mod: CPTII,,, | Performed by: INTERNAL MEDICINE

## 2023-03-21 PROCEDURE — 3008F BODY MASS INDEX DOCD: CPT | Mod: CPTII,,, | Performed by: INTERNAL MEDICINE

## 2023-03-21 PROCEDURE — 99214 OFFICE O/P EST MOD 30 MIN: CPT | Mod: S$PBB,,, | Performed by: INTERNAL MEDICINE

## 2023-03-21 PROCEDURE — 99214 PR OFFICE/OUTPT VISIT, EST, LEVL IV, 30-39 MIN: ICD-10-PCS | Mod: S$PBB,,, | Performed by: INTERNAL MEDICINE

## 2023-03-21 PROCEDURE — 99213 OFFICE O/P EST LOW 20 MIN: CPT | Mod: PBBFAC | Performed by: INTERNAL MEDICINE

## 2023-03-21 NOTE — PROGRESS NOTES
Subjective:   Patient ID:  Merari Orosco is a 42 y.o. female who presents for evaluation of Follow-up (1 month)      2023.    Comes in for a 1 month follow-up.    Her blood pressure is under better control.  Had made some substantial changes to her medication list last visit.    Her leg swelling is also improved.    She takes Lasix daily.  Instructed to take sometimes twice a day or 2 pills once as she feels better.    Follow-up  Pertinent negatives include no abdominal pain, chest pain, coughing, fever, rash or sore throat.   2023  COMES IN FOR A THREE-MONTH FOLLOW-UP.    SHE SAYS SHE HAS BEEN TAKING HER MEDICATIONS CONSISTENTLY LATELY.    SHE HAS BILATERAL LOWER EXTREMITY SWELLING AS WELL AS HAND SWELLING.  THEY ARE WORSE AT THE END OF THE DAY HOWEVER THEY ARE STILL PRESENT IN THE MORNING.    SHE DENIES HOWEVER ANY ORTHOPNEA OR DYSPNEA ON EXERTION EXCEPT WITH HEAVY EXERTION.    SHE STATES THAT SHE CAN NOT TELL WHETHER SHE SNORES OR NOT BECAUSE HE HAS NO PARTNERS.  HOWEVER SHE DOES HAVE AT TIMES THEY  AND SOMNOLENCE.  MALLAMPATI OF 4.  LARGE TONGUE BASE AND UNCONTROLLED HYPERTENSION.  NEVER BEEN EVALUATED FOR SLEEP APNEA.    DENIES ANY CHEST PAIN.    SHE WAS IN THE EMERGENCY ROOM A MONTH AGO WITH ALCOHOL INTOXICATION.  SHE STATES THAT SHE DOES NOT DRINK ON OCCASION SOMETIMES SHE DRINKS TOO MUCH IN 1 OCCASION.  BUT SHE DOES NOT DO THAT ON A DAILY BASIS OR EVEN ONCE A WEEK.  SHE SAYS THAT SHE PROBABLY HAD MORE ALCOHOL ISSUES IN THE PAST BUT STILL SHE WILL NO DRINKING ON A CONSTANT BASE.    2022  Comes in for follow-up.  So Dr. Morgan in the interim in May.    She had both hip surgeries done.    She states that she could not get her medications and she has been out of metoprolol and chlorthalidone for weeks.    On arrival her blood pressure was 160/120.  She had her Toprol and chlorthalidone on her and she took both of them.  We also gave her clonidine 0.3 mg in the clinic.  Her blood pressure  starts to go down.    She denies any chest pain, headache, syncope or syncope no palpitations.    She is not compliant with  low-salt diet.          40 yo female, started doing cigars occ recently no prior smoking, HTN controlled on diuretic and toprol, bilateral AVN  Denies hx of cocaine use, no family hx of cad   No hx of cad, cva, chf, dm or ckd \  Comes in for preop evaluation for right hip replacement     Denies any chest pain, she is limited with her activity however she denies any hx of chest pain, dyspnea, palpitations, syncope or presyncope   She is able to ambulate with difficulty  Unclear reason of her AVN to her hips as per review of notes , no hx of autoimmune disease, she denies hx of cocaine use  Compliant with meds       Past Medical History:   Diagnosis Date    Anxiety     Depression     Hypertension        Past Surgical History:   Procedure Laterality Date     SECTION      x1    HIP ARTHROPLASTY Right 2022    Procedure: ARTHROPLASTY, HIP;  Surgeon: Satnam Deleon MD;  Location: [facility] OR;  Service: Orthopedics;  Laterality: Right;    HIP ARTHROPLASTY Left 2022    Procedure: ARTHROPLASTY, HIP;  Surgeon: Satnam Deleon MD;  Location: [facility] OR;  Service: Orthopedics;  Laterality: Left;    INJECTION OF JOINT Bilateral 11/10/2021    Procedure: Injection, Joint;  Surgeon: Satnam Deleon MD;  Location: [facility] OR;  Service: General;  Laterality: Bilateral;  under fluoroscopy    JOINT REPLACEMENT      WISDOM TOOTH EXTRACTION         Social History     Tobacco Use    Smoking status: Never    Smokeless tobacco: Never   Substance Use Topics    Alcohol use: Yes     Alcohol/week: 1.0 standard drink     Types: 1 Glasses of wine per week     Comment: socially    Drug use: No       Family History   Problem Relation Age of Onset    Colon cancer Father     Diabetes Father     Hypertension Father     Hypertension Mother     Diabetes Sister     Hypertension Sister     Breast  cancer Neg Hx     Cancer Neg Hx     Miscarriages / Stillbirths Neg Hx     Ovarian cancer Neg Hx      labor Neg Hx     Stroke Neg Hx        Review of Systems   Constitutional: Negative for fever and malaise/fatigue.   HENT:  Negative for sore throat.    Eyes:  Negative for blurred vision.   Cardiovascular:  Negative for chest pain, claudication, cyanosis, dyspnea on exertion, irregular heartbeat, leg swelling, near-syncope, orthopnea, palpitations, paroxysmal nocturnal dyspnea and syncope.   Respiratory:  Negative for cough and hemoptysis.    Hematologic/Lymphatic: Negative for bleeding problem.   Skin:  Negative for rash.   Musculoskeletal:  Positive for arthritis. Negative for falls.   Gastrointestinal:  Negative for abdominal pain.   Genitourinary: Negative.    Neurological: Negative.    Psychiatric/Behavioral:  Negative for altered mental status and substance abuse.      Current Outpatient Medications on File Prior to Visit   Medication Sig    blood pressure monitor ([employer] EASE) LG arm size (OP) by Other route as needed for High Blood Pressure.    carvediloL (COREG) 12.5 MG tablet Take 1 tablet (12.5 mg total) by mouth 2 (two) times daily with meals.    diazePAM (VALIUM) 2 MG tablet Take 1 tablet (2 mg total) by mouth 3 (three) times daily as needed for Anxiety.    FLUoxetine 20 MG capsule Take 1 capsule (20 mg total) by mouth once daily.    furosemide (LASIX) 40 MG tablet Take 1 tablet (40 mg total) by mouth once daily.    lisinopriL 10 MG tablet Take 1 tablet (10 mg total) by mouth once daily.    hydrOXYzine HCL (ATARAX) 25 MG tablet Take 25 mg by mouth every 4 (four) hours as needed.     No current facility-administered medications on file prior to visit.       Objective:   Objective:  Wt Readings from Last 3 Encounters:   23 110 kg (242 lb 8.1 oz)   03/10/23 107.6 kg (237 lb 3.4 oz)   23 107.5 kg (236 lb 15.9 oz)     Temp Readings from Last 3 Encounters:   23 98.4 °F (36.9 °C)  (Oral)   12/29/22 97.7 °F (36.5 °C) (Tympanic)   12/15/22 97.1 °F (36.2 °C) (Tympanic)     BP Readings from Last 3 Encounters:   03/21/23 122/84   03/07/23 123/87   02/14/23 (!) 148/102     Pulse Readings from Last 3 Encounters:   02/14/23 76   01/20/23 79   12/29/22 92       Physical Exam  Vitals reviewed.   Constitutional:       Appearance: She is well-developed.   HENT:      Head: Normocephalic and atraumatic.   Eyes:      General: No scleral icterus.     Conjunctiva/sclera: Conjunctivae normal.   Cardiovascular:      Rate and Rhythm: Normal rate and regular rhythm.      Pulses: Intact distal pulses.      Heart sounds: Normal heart sounds. No murmur heard.  Pulmonary:      Effort: No respiratory distress.      Breath sounds: No wheezing or rales.   Chest:      Chest wall: No tenderness.   Abdominal:      General: Bowel sounds are normal. There is no distension.      Palpations: Abdomen is soft.      Tenderness: There is no guarding.   Musculoskeletal:         General: Normal range of motion.      Cervical back: Normal range of motion and neck supple.   Skin:     General: Skin is warm.   Neurological:      Mental Status: She is alert and oriented to person, place, and time.       Lab Results   Component Value Date    CHOL 172 12/09/2022    CHOL 228 (H) 08/24/2021    CHOL 220 (H) 01/30/2020     Lab Results   Component Value Date    HDL 65 12/09/2022    HDL 58 08/24/2021    HDL 69 01/30/2020     Lab Results   Component Value Date    LDLCALC 65.4 12/09/2022    LDLCALC 147.2 08/24/2021    LDLCALC 135.4 01/30/2020     Lab Results   Component Value Date    TRIG 208 (H) 12/09/2022    TRIG 114 08/24/2021    TRIG 78 01/30/2020     Lab Results   Component Value Date    CHOLHDL 37.8 12/09/2022    CHOLHDL 25.4 08/24/2021    CHOLHDL 31.4 01/30/2020       Chemistry        Component Value Date/Time     01/20/2023 1730    K 3.6 01/20/2023 1730     01/20/2023 1730    CO2 24 01/20/2023 1730    BUN 10 01/20/2023 1730     CREATININE 0.7 01/20/2023 1730    GLU 93 01/20/2023 1730        Component Value Date/Time    CALCIUM 8.8 01/20/2023 1730    ALKPHOS 44 (L) 01/20/2023 1730    AST 23 01/20/2023 1730    ALT 13 01/20/2023 1730    BILITOT 0.3 01/20/2023 1730    ESTGFRAFRICA >60.0 05/27/2022 1000    EGFRNONAA >60.0 05/27/2022 1000          Lab Results   Component Value Date    TSH 0.725 01/30/2020     No results found for: INR, PROTIME  Lab Results   Component Value Date    WBC 6.86 01/20/2023    HGB 12.6 01/20/2023    HCT 37.3 01/20/2023    MCV 93 01/20/2023     01/20/2023     BNP  @LABRCNTIP(BNP,BNPTRIAGEBLO)@  CrCl cannot be calculated (Patient's most recent lab result is older than the maximum 7 days allowed.).     Imaging:  ======  No results found for this or any previous visit.    No results found for this or any previous visit.    No results found for this or any previous visit.    Results for orders placed during the hospital encounter of 08/15/16    X-Ray Chest PA And Lateral    Narrative  Comparison: None    2 views    Findings:    Heart and pulmonary vasculature are within normal limits.  Midline trachea.  Minimal smooth pleural thickening extensive apices.  No consolidation or effusion.  Osseous structures intact.  IMPRESSION:      No acute infiltrate.  See above.  ______________________________________    Electronically signed by: JASE MAZA III, MD  Date:     08/15/16  Time:    11:56    No results found for this or any previous visit.    No valid procedures specified.    Diagnostic Results:  ECG: Reviewed  Nsr, normal ecg     The 10-year ASCVD risk score (Sudha CHANG, et al., 2019) is: 0.7%    Values used to calculate the score:      Age: 42 years      Sex: Female      Is Non- : Yes      Diabetic: No      Tobacco smoker: No      Systolic Blood Pressure: 122 mmHg      Is BP treated: Yes      HDL Cholesterol: 65 mg/dL      Total Cholesterol: 172 mg/dL    Assessment and Plan:   Essential  hypertension    Right hip pain    Class 1 obesity due to excess calories with serious comorbidity and body mass index (BMI) of 33.0 to 33.9 in adult    Suspected sleep apnea    Generalized anxiety disorder            Reviewed all tests and above medical conditions with patient in detail and formulated treatment plan.  Risk factor modification discussed.   Cardiac low salt diet discussed.  Maintaining healthy weight and weight loss goals were discussed in clinic.  Blood pressure under better control continue with lisinopril, carvedilol and Lasix.  .  Stressed importance of compliance with medication and to continue to check her blood pressure at home.  On her last visit she stated that she stopped alcohol about 2 months ago.  \  She is going to get her sleep study monitor tomorrow as she states.    Follow up in six-month

## 2023-03-27 ENCOUNTER — HOSPITAL ENCOUNTER (OUTPATIENT)
Dept: SLEEP MEDICINE | Facility: HOSPITAL | Age: 43
Discharge: HOME | End: 2023-03-27
Attending: INTERNAL MEDICINE
Payer: MEDICAID

## 2023-03-27 DIAGNOSIS — G47.33 OSA (OBSTRUCTIVE SLEEP APNEA): Primary | ICD-10-CM

## 2023-03-27 DIAGNOSIS — R29.818 SUSPECTED SLEEP APNEA: ICD-10-CM

## 2023-03-27 PROCEDURE — 95800 PR SLEEP STUDY, UNATTENDED, RECORD HEART RATE/O2 SAT/RESP ANAL/SLEEP TIME: ICD-10-PCS | Mod: 26,,, | Performed by: INTERNAL MEDICINE

## 2023-03-27 PROCEDURE — 95800 SLP STDY UNATTENDED: CPT | Mod: 26,,, | Performed by: INTERNAL MEDICINE

## 2023-03-27 NOTE — Clinical Note
PHYSICIAN INTERPRETATION AND COMMENTS: Findings are consistent with moderate obstructive sleep apnea (MAJOR). Therapy with CPAP indicated. Please refer to sleep disorders clinic for management CLINICAL HISTORY: 42 year old female presented with: 15.25 inch neck, BMI of 38.7, an [place] sleepiness score of 10, and history of hypertension, depression. Based on the clinical history, the patient has a high pre-test probability of having Mild MAJOR.

## 2023-03-27 NOTE — PROCEDURES
PHYSICIAN INTERPRETATION AND COMMENTS: Findings are consistent with moderate obstructive sleep apnea (MAJOR).  Therapy with CPAP indicated. Please refer to sleep disorders clinic for management  CLINICAL HISTORY: 42 year old female presented with: 15.25 inch neck, BMI of 38.7, an [place] sleepiness score of 10, and  history of hypertension, depression. Based on the clinical history, the patient has a high pre-test probability of having Mild  MAJOR.  SLEEP STUDY FINDINGS: Patient underwent a 1 night Home Sleep Test and by behavioral criteria, slept for approximately  4.85 hours , with a sleep efficiency of 69%. Moderate sleep disordered breathing (AHI=20) is noted based on a 4% hypopnea  desaturation criteria. When considering more subtle measures of sleep disordered breathing, the overall respiratory  disturbance index is moderate(RDI=32) based on a 1% hypopnea desaturation criteria with confirmation by surrogate  arousal indicators. The apneas/hypopneas are accompanied by minimal oxygen desaturation (percent time below 90%  SpO2: 0%, Min SpO2: 82.5%). The average desaturation across all sleep disordered breathing events is 2.9%. Snoring occurs  for 50.7% (30 dB) of the study, 39.8% is very loud. The mean pulse rate is 76 BPM, with very frequent pulse rate variability  (65 events with >= 6 BPM increase/decrease per hour).  TREATMENT CONSIDERATIONS: Consider nasal continuous positive airway pressure (CPAP/AutoPAP) as the initial  treatment choice based on the AHI severity and co-morbidities. A mandibular advancement splint (MAS) or referral to an  ENT surgeon for modification to the airway should be considered to reduce the potential contribution of MAJOR on existing  diseases if the patient prefers an alternative therapy or the CPAP trial is unsuccessful.  DISEASE MANAGEMENT CONSIDERATIONS: None.    Dear Nadia Huitron MD  [id number] [address] [facility]  [place] [address]/L Marco Antonio Stone MD         The sleep study that  "you ordered is complete.  You have ordered sleep LAB services to perform the sleep study for Merari Orosco .      Please find Sleep Study result in  the "Media tab" of Chart Review menu.        You can look  for the report in the  Media by the document type "Sleep Study Documents". Alphabetizing  "Document type" column helps to find the SLEEP STUDY report  Faster.       As the ordering provider, you are responsible for reviewing the results and implementing a treatment plan with your patient.    If you need a Sleep Medicine provider to explain the sleep study findings and arrange treatment for the patient, please refer patient for consultation to our Sleep Clinic via [facility] with Ambulatory Consult Sleep.     To do that please place an order for an  "Ambulatory Consult Sleep" -  order , it will go to our clinic work queue for our staff  to contact the patient for an appointment.      For any questions, please contact our sleep lab  staff at [phone] to talk to clinical staff          Celso Vargas MD   "

## 2023-03-28 ENCOUNTER — TELEPHONE (OUTPATIENT)
Dept: CARDIOLOGY | Facility: CLINIC | Age: 43
End: 2023-03-28
Payer: MEDICAID

## 2023-03-28 ENCOUNTER — HOSPITAL ENCOUNTER (OUTPATIENT)
Dept: RADIOLOGY | Facility: HOSPITAL | Age: 43
Discharge: HOME | End: 2023-03-28
Attending: PHYSICIAN ASSISTANT
Payer: MEDICAID

## 2023-03-28 DIAGNOSIS — G47.30 SLEEP APNEA, UNSPECIFIED TYPE: Primary | ICD-10-CM

## 2023-03-28 DIAGNOSIS — R92.8 ABNORMAL MAMMOGRAM: ICD-10-CM

## 2023-03-28 PROCEDURE — 77061 BREAST TOMOSYNTHESIS UNI: CPT | Mod: 26,LT,, | Performed by: STUDENT IN AN ORGANIZED HEALTH CARE EDUCATION/TRAINING PROGRAM

## 2023-03-28 PROCEDURE — 77065 DX MAMMO INCL CAD UNI: CPT | Mod: 26,LT,, | Performed by: STUDENT IN AN ORGANIZED HEALTH CARE EDUCATION/TRAINING PROGRAM

## 2023-03-28 PROCEDURE — 76642 ULTRASOUND BREAST LIMITED: CPT | Mod: TC,LT

## 2023-03-28 PROCEDURE — 76642 ULTRASOUND BREAST LIMITED: CPT | Mod: 26,LT,, | Performed by: STUDENT IN AN ORGANIZED HEALTH CARE EDUCATION/TRAINING PROGRAM

## 2023-03-28 PROCEDURE — 77061 MAMMO DIGITAL DIAGNOSTIC LEFT WITH TOMO: ICD-10-PCS | Mod: 26,LT,, | Performed by: STUDENT IN AN ORGANIZED HEALTH CARE EDUCATION/TRAINING PROGRAM

## 2023-03-28 PROCEDURE — 77065 DX MAMMO INCL CAD UNI: CPT | Mod: TC,LT

## 2023-03-28 PROCEDURE — 77065 MAMMO DIGITAL DIAGNOSTIC LEFT WITH TOMO: ICD-10-PCS | Mod: 26,LT,, | Performed by: STUDENT IN AN ORGANIZED HEALTH CARE EDUCATION/TRAINING PROGRAM

## 2023-03-28 PROCEDURE — 76642 US BREAST LEFT LIMITED: ICD-10-PCS | Mod: 26,LT,, | Performed by: STUDENT IN AN ORGANIZED HEALTH CARE EDUCATION/TRAINING PROGRAM

## 2023-03-28 NOTE — TELEPHONE ENCOUNTER
Called patient to give her the results from her sleep study. Also let patient know to follow up with pulmonary. Patient received and understood results with no questions or concerns.

## 2023-04-13 ENCOUNTER — PATIENT MESSAGE (OUTPATIENT)
Dept: OBSTETRICS AND GYNECOLOGY | Facility: CLINIC | Age: 43
End: 2023-04-13

## 2023-04-20 ENCOUNTER — HOSPITAL ENCOUNTER (OUTPATIENT)
Dept: RADIOLOGY | Facility: HOSPITAL | Age: 43
Discharge: HOME | End: 2023-04-20
Attending: FAMILY MEDICINE
Payer: MEDICAID

## 2023-04-20 DIAGNOSIS — R74.8 ELEVATED SERUM GGT LEVEL: ICD-10-CM

## 2023-04-20 PROCEDURE — 76705 ECHO EXAM OF ABDOMEN: CPT | Mod: TC

## 2023-04-20 PROCEDURE — 76705 US ABDOMEN LIMITED: ICD-10-PCS | Mod: 26,,, | Performed by: RADIOLOGY

## 2023-04-20 PROCEDURE — 76705 ECHO EXAM OF ABDOMEN: CPT | Mod: 26,,, | Performed by: RADIOLOGY

## 2023-04-22 ENCOUNTER — PATIENT MESSAGE (OUTPATIENT)
Dept: INTERNAL MEDICINE | Facility: CLINIC | Age: 43
End: 2023-04-22
Payer: MEDICAID

## 2023-04-22 DIAGNOSIS — K70.0 FATTY LIVER DUE TO ALCOHOLISM: Chronic | ICD-10-CM

## 2023-04-22 NOTE — TELEPHONE ENCOUNTER
"TO MY TEAM:   Please contact Merari to relay message (below) or at least verify that she read and understood the same Patient Portal message I sent on [facility].  Route this message back to me with their answer to request for permission to order to E-consult.  Thanks.  --------------------------------------------------------------------------------   Merari Harris.    I hope this message finds you well.    I wanted to share your recent abdominal ultrasound results, which indicate that you have fatty liver disease. This is a medical condition that occurs when there is an excess buildup of fat (known as fatty infiltration) in the liver. This condition is commonly seen in people who drink an unhealthy amount of alcohol.    Although fatty liver disease is a serious condition that increases the risk of developing liver cirrhosis, I want you to know that effective treatments are available. As your physician, I am committed to working with you to determine the best course of action for your individual needs.    With your permission, I would like to consult one of our excellent hepatologists (a specialist that diagnoses and treats diseases of the liver) to review your history and test results and give their assessment and recommendations.    This type of consultation is called an "E-consult," and we typically get a response within five business days. E-consults are generally paid for by health insurance, and most of my patients have told me that they never received any charge for the E-consult. If your insurance requires you to have a co-pay to see a specialist, you may be charged that co-pay.    I need your permission before I can order the E-consult.    Please reply this message with your response. (Note: If my team has already discussed this with you over the phone, and you gave them your permission to proceed, then you can disregard this message.)    In the meantime, you can you learn more about fatty liver disease at " the website of the American Liver Foundation.  https://liverfoundation.org/for-patients/about-the-liver/diseases-of-the-liver/alcohol-related-liver-disease    I appreciate the trust you have placed in me and in [facility] for your healthcare needs. Together, we can work towards a healthier future.    Warm regards,    MARTINE Stone MD  =========================================================================  Future Appointments   Date Time Provider Department Center   4/27/2023 11:00 AM YESY Stone MD [facility] [place]   9/25/2023  1:20 PM Nadia Huitron MD [facility]   10/6/2023  1:40 PM Gi Huitron MD [facility]      US Abdomen Limited 4/20/23  Hepatomegaly with possible fatty infiltration of the liver.  No focal liver lesions visualized.    Lab Results   Component Value Date    GGT 27 04/20/2023     (H) 12/22/2022    [id number] 706 12/22/2022    [id number] 450 12/22/2022     PEth 16:0/18:1 (POPEth) Interpretation  <10 ng/mL: Not detected  10 - 19 ng/mL: Abstinence or light alcohol consumption (<2 drinks per day for several days a week)  20 - 200 ng/mL: Moderate alcohol consumption (up to 4 drinks per day for several days a week)  >200 ng/mL: Heavy alcohol consumption or chronic alcohol use (at least 4 drinks per day several days a week)

## 2023-04-24 ENCOUNTER — TELEPHONE (OUTPATIENT)
Dept: INTERNAL MEDICINE | Facility: CLINIC | Age: 43
End: 2023-04-24
Payer: MEDICAID

## 2023-04-27 ENCOUNTER — OFFICE VISIT (OUTPATIENT)
Dept: INTERNAL MEDICINE | Facility: CLINIC | Age: 43
End: 2023-04-27
Payer: MEDICAID

## 2023-04-27 VITALS
SYSTOLIC BLOOD PRESSURE: 126 MMHG | WEIGHT: 241.38 LBS | BODY MASS INDEX: 38.79 KG/M2 | HEIGHT: 66 IN | HEART RATE: 83 BPM | OXYGEN SATURATION: 100 % | DIASTOLIC BLOOD PRESSURE: 88 MMHG | TEMPERATURE: 98 F

## 2023-04-27 DIAGNOSIS — F33.0 MILD EPISODE OF RECURRENT MAJOR DEPRESSIVE DISORDER: ICD-10-CM

## 2023-04-27 DIAGNOSIS — E66.01 CLASS 2 SEVERE OBESITY DUE TO EXCESS CALORIES WITH SERIOUS COMORBIDITY AND BODY MASS INDEX (BMI) OF 38.0 TO 38.9 IN ADULT: ICD-10-CM

## 2023-04-27 DIAGNOSIS — K70.0 FATTY LIVER DUE TO ALCOHOLISM: Chronic | ICD-10-CM

## 2023-04-27 DIAGNOSIS — I87.303 CHRONIC VENOUS HYPERTENSION INVOLVING BOTH SIDES: Chronic | ICD-10-CM

## 2023-04-27 DIAGNOSIS — R94.31 ABNORMAL EKG: ICD-10-CM

## 2023-04-27 DIAGNOSIS — G47.33 OBSTRUCTIVE SLEEP APNEA: ICD-10-CM

## 2023-04-27 DIAGNOSIS — E78.2 MODERATE MIXED HYPERLIPIDEMIA NOT REQUIRING STATIN THERAPY: Chronic | ICD-10-CM

## 2023-04-27 DIAGNOSIS — Z13.1 SCREENING FOR DIABETES MELLITUS: ICD-10-CM

## 2023-04-27 DIAGNOSIS — F10.21 ALCOHOL DEPENDENCE IN REMISSION: Chronic | ICD-10-CM

## 2023-04-27 DIAGNOSIS — I10 ESSENTIAL HYPERTENSION: Primary | ICD-10-CM

## 2023-04-27 DIAGNOSIS — F41.1 GENERALIZED ANXIETY DISORDER: Chronic | ICD-10-CM

## 2023-04-27 PROBLEM — E66.812 CLASS 2 SEVERE OBESITY DUE TO EXCESS CALORIES WITH SERIOUS COMORBIDITY AND BODY MASS INDEX (BMI) OF 38.0 TO 38.9 IN ADULT: Status: ACTIVE | Noted: 2020-01-30

## 2023-04-27 PROCEDURE — 99999 PR PBB SHADOW E&M-EST. PATIENT-LVL III: CPT | Mod: PBBFAC,,, | Performed by: FAMILY MEDICINE

## 2023-04-27 PROCEDURE — 3008F BODY MASS INDEX DOCD: CPT | Mod: CPTII,,, | Performed by: FAMILY MEDICINE

## 2023-04-27 PROCEDURE — 99214 PR OFFICE/OUTPT VISIT, EST, LEVL IV, 30-39 MIN: ICD-10-PCS | Mod: S$PBB,,, | Performed by: FAMILY MEDICINE

## 2023-04-27 PROCEDURE — 1160F PR REVIEW ALL MEDS BY PRESCRIBER/CLIN PHARMACIST DOCUMENTED: ICD-10-PCS | Mod: CPTII,,, | Performed by: FAMILY MEDICINE

## 2023-04-27 PROCEDURE — 3074F SYST BP LT 130 MM HG: CPT | Mod: CPTII,,, | Performed by: FAMILY MEDICINE

## 2023-04-27 PROCEDURE — 3079F PR MOST RECENT DIASTOLIC BLOOD PRESSURE 80-89 MM HG: ICD-10-PCS | Mod: CPTII,,, | Performed by: FAMILY MEDICINE

## 2023-04-27 PROCEDURE — 3008F PR BODY MASS INDEX (BMI) DOCUMENTED: ICD-10-PCS | Mod: CPTII,,, | Performed by: FAMILY MEDICINE

## 2023-04-27 PROCEDURE — 1160F RVW MEDS BY RX/DR IN RCRD: CPT | Mod: CPTII,,, | Performed by: FAMILY MEDICINE

## 2023-04-27 PROCEDURE — 3079F DIAST BP 80-89 MM HG: CPT | Mod: CPTII,,, | Performed by: FAMILY MEDICINE

## 2023-04-27 PROCEDURE — 4010F ACE/ARB THERAPY RXD/TAKEN: CPT | Mod: CPTII,,, | Performed by: FAMILY MEDICINE

## 2023-04-27 PROCEDURE — 99214 OFFICE O/P EST MOD 30 MIN: CPT | Mod: S$PBB,,, | Performed by: FAMILY MEDICINE

## 2023-04-27 PROCEDURE — 3074F PR MOST RECENT SYSTOLIC BLOOD PRESSURE < 130 MM HG: ICD-10-PCS | Mod: CPTII,,, | Performed by: FAMILY MEDICINE

## 2023-04-27 PROCEDURE — 1159F MED LIST DOCD IN RCRD: CPT | Mod: CPTII,,, | Performed by: FAMILY MEDICINE

## 2023-04-27 PROCEDURE — 1159F PR MEDICATION LIST DOCUMENTED IN MEDICAL RECORD: ICD-10-PCS | Mod: CPTII,,, | Performed by: FAMILY MEDICINE

## 2023-04-27 PROCEDURE — 4010F PR ACE/ARB THEARPY RXD/TAKEN: ICD-10-PCS | Mod: CPTII,,, | Performed by: FAMILY MEDICINE

## 2023-04-27 PROCEDURE — 99999 PR PBB SHADOW E&M-EST. PATIENT-LVL III: ICD-10-PCS | Mod: PBBFAC,,, | Performed by: FAMILY MEDICINE

## 2023-04-27 PROCEDURE — 99213 OFFICE O/P EST LOW 20 MIN: CPT | Mod: PBBFAC | Performed by: FAMILY MEDICINE

## 2023-04-27 RX ORDER — FLUOXETINE HYDROCHLORIDE 20 MG/1
20 CAPSULE ORAL DAILY
Qty: 90 CAPSULE | Refills: 3 | Status: SHIPPED | OUTPATIENT
Start: 2023-04-27 | End: 2024-02-19 | Stop reason: SDUPTHER

## 2023-04-27 RX ORDER — DIAZEPAM 2 MG/1
2 TABLET ORAL 2 TIMES DAILY PRN
Qty: 60 TABLET | Refills: 5 | Status: SHIPPED | OUTPATIENT
Start: 2023-04-27 | End: 2023-10-27 | Stop reason: SDUPTHER

## 2023-04-27 RX ORDER — CARVEDILOL 12.5 MG/1
12.5 TABLET ORAL 2 TIMES DAILY WITH MEALS
Qty: 180 TABLET | Refills: 3 | Status: SHIPPED | OUTPATIENT
Start: 2023-04-27 | End: 2024-02-19 | Stop reason: SDUPTHER

## 2023-04-27 RX ORDER — LISINOPRIL 10 MG/1
10 TABLET ORAL DAILY
Qty: 90 TABLET | Refills: 3 | Status: SHIPPED | OUTPATIENT
Start: 2023-04-27 | End: 2023-07-13 | Stop reason: DRUGHIGH

## 2023-04-27 RX ORDER — FUROSEMIDE 40 MG/1
40 TABLET ORAL DAILY
Qty: 90 TABLET | Refills: 3 | Status: SHIPPED | OUTPATIENT
Start: 2023-04-27 | End: 2024-02-19 | Stop reason: SDUPTHER

## 2023-04-27 NOTE — ASSESSMENT & PLAN NOTE
Future Appointments   Date Time Provider Department Center   10/6/2023  1:40 PM Gi Huitron MD [facility]  [facility]

## 2023-04-27 NOTE — PATIENT INSTRUCTIONS
I recommend a Mediterranean diet, which you can learn about at the website of the American Heart Association, www.heart.org.  https://www.heart.org/en/healthy-living/healthy-eating/eat-smart/nutrition-basics/mediterranean-diet     The Mediterranean Diet    The Mediterranean Diet is a nutrient-rich, well-balanced eating plan inspired by the traditional culinary practices of countries bordering the [place] Sea, such as [place], [place], [place], and [place]. This diet has been widely recognized for its myriad health benefits, which can be attributed to its emphasis on whole, unprocessed foods, plant-based ingredients, and moderate consumption of lean proteins and healthy fats.    Rich in fruits and vegetables: The Mediterranean Diet places a strong focus on the consumption of colorful fruits and vegetables, which are loaded with vitamins, minerals, antioxidants, and dietary fiber. These nutrients contribute to a healthier immune system, improved digestion, and reduced risk of chronic diseases, such as cardiovascular disease and certain types of cancer.    Whole grains and legumes: Whole grains like brown rice, quinoa, and whole wheat bread, as well as legumes like lentils, chickpeas, and beans, are staples in the Mediterranean Diet. They provide a good source of complex carbohydrates, fiber, and essential nutrients, which help regulate blood sugar levels, promote healthy digestion, and support heart health.    Healthy fats: This diet emphasizes the use of healthy fats, such as olive oil, which is rich in monounsaturated fats and antioxidants. These fats have been shown to help reduce bad cholesterol (LDL) levels, raise good cholesterol (HDL) levels, and lower the risk of heart disease. Additionally, fatty fish like salmon, mackerel, and sardines are excellent sources of omega-3 fatty acids, which have anti-inflammatory properties and support brain health.    Lean proteins: The Mediterranean Diet encourages the  consumption of lean proteins, such as fish, poultry, and plant-based sources like legumes, nuts, and seeds. These high-quality proteins provide essential amino acids for muscle growth and repair, while also reducing the risk of heart disease and promoting overall health.    Moderate dairy intake: Dairy products, such as yogurt and cheese, are consumed in moderation in the Mediterranean Diet. These foods are good sources of calcium and other essential nutrients, which support bone health and can help prevent osteoporosis.    Limited red meat and processed foods: This diet recommends limiting the intake of red meat and processed foods, which are often high in saturated fats, sodium, and added sugars. By doing so, the Mediterranean Diet helps reduce the risk of heart disease, obesity, and type 2 diabetes.    Emphasis on herbs and spices: The Mediterranean Diet incorporates a wide variety of herbs and spices, such as basil, oregano, rosemary, and thyme, which not only add flavor to meals but also offer potential health benefits due to their antioxidant and anti-inflammatory properties.    Moderate alcohol consumption: The Mediterranean Diet permits moderate alcohol consumption, particularly red wine, which contains polyphenols that have been associated with heart health benefits. However, moderation is burkett, as excessive alcohol consumption can lead to adverse health effects.    Social and lifestyle factors: Beyond the food choices, the Mediterranean lifestyle encourages regular physical activity, adequate rest, and enjoying meals in a social setting with family and friends. These elements contribute to overall well-being, stress reduction, and a balanced approach to health.    In summary, the Mediterranean Diet offers a plethora of health benefits due to its emphasis on whole, nutrient-dense foods, healthy fats, and lean proteins. By adopting this eating pattern, individuals can reduce the risk of chronic diseases,  improve their overall well-being, and enjoy a flavorful, diverse range of meals.

## 2023-04-27 NOTE — ASSESSMENT & PLAN NOTE
Doronjulio reports she is doing well on her current medicines. Merari reports preceiving no adverse side-effects and wants to continue current treatment.  Review of [place] Board of Pharmacy Controlled Prescription Drug Monitoring database shows the following prescriptions (sorted by date filled).    04/10/2023 - Diazepam 2 Mg Tablet, QTY 60 (QS for 20 days), originally written 12/29/2022 by Meredith Coker.    03/10/2023 - Diazepam 2 Mg Tablet, QTY 60 (QS for 20 days), originally written 12/29/2022 by Meredith Coker.    02/09/2023 - Diazepam 2 Mg Tablet, QTY 60 (QS for 20 days), originally written 12/29/2022 by Meredith Coker.    12/29/2022 - Diazepam 2 Mg Tablet, QTY 60 (QS for 20 days), originally written 12/29/2022 by Meredith Coker.

## 2023-04-27 NOTE — PROGRESS NOTES
"OFFICE VISIT 4/27/23 11:00 AM CDT    Subjective   CHIEF COMPLAINT: Follow-up    Refer to the history and data embedded within the "Assessment & Plan" section below for the status of the conditions evaluated and managed today. Unless noted otherwise, conditions appear compensated/controlled and stable.    Review of Systems   Respiratory:  Negative for chest tightness and shortness of breath.    Cardiovascular:  Negative for chest pain.   Endocrine: Negative for polydipsia and polyuria.       Objective   Vitals:    04/27/23 1116   BP: 126/88   BP Location: Left arm   Patient Position: Sitting   BP Method: Large (Manual)   Pulse: 83   Temp: 97.6 °F (36.4 °C)   SpO2: 100%   Weight: 109.5 kg (241 lb 6.5 oz)   Height: 5' 6" (1.676 m)   Physical Exam  Vitals reviewed.   Constitutional:       General: She is not in acute distress.     Appearance: Normal appearance. She is not ill-appearing or diaphoretic.   Cardiovascular:      Rate and Rhythm: Normal rate and regular rhythm.   Pulmonary:      Effort: Pulmonary effort is normal.      Breath sounds: Normal breath sounds.   Skin:     General: Skin is warm and dry.   Neurological:      Mental Status: She is alert and oriented to person, place, and time. Mental status is at baseline.   Psychiatric:         Mood and Affect: Mood normal.         Behavior: Behavior normal.         Thought Content: Thought content normal.         Judgment: Judgment normal.        Assessment and Plan   1. Essential hypertension  -     lisinopriL 10 MG tablet; Take 1 tablet (10 mg total) by mouth once daily.  Dispense: 90 tablet; Refill: 3  -     carvediloL (COREG) 12.5 MG tablet; Take 1 tablet (12.5 mg total) by mouth 2 (two) times daily with meals.  Dispense: 180 tablet; Refill: 3  -     furosemide (LASIX) 40 MG tablet; Take 1 tablet (40 mg total) by mouth once daily.  Dispense: 90 tablet; Refill: 3    2. Fatty liver disease due to alcohol  Overview:  US Abdomen Limited 4/20/23  Hepatomegaly with " possible fatty infiltration of the liver.  No focal liver lesions visualized.    Assessment & Plan:  FIB-4 Calculation: 1.02 at 4/27/2023 12:10 PM  Calculated from:  Last SGOT/AST : 23 at 4/27/2023 12:10 PM  Last SGPT/ALT: 13 at 4/27/2023 12:10 PM   Platelets: 255 at 4/27/2023 12:10 PM   Age: 43 y.o.   FIB-4 below 1.30 is considered as low-risk for advanced fibrosis  FIB-4 over 2.67 is considered as high-risk for advanced fibrosis  FIB-4 values between 1.30 and 2.67 are considered as intermediate-risk of advanced fibrosis for ages 36-64.   For ages > 64 the cut-off for low-risk goes to < 2.  This is a screening tool and clinical judgement should be used in the interpretation of these results.    Lab Results   Component Value Date    AST 23 01/20/2023    AST 22 12/09/2022    ALT 13 01/20/2023    ALT 18 12/09/2022    GGT 27 04/20/2023     (H) 12/22/2022    ALBUMIN 3.9 01/20/2023    ALBUMIN 3.7 12/09/2022     01/20/2023     (H) 12/09/2022     Results for orders placed during the hospital encounter of 04/20/23    US Abdomen Limited    Narrative  EXAMINATION:  US ABDOMEN LIMITED    CLINICAL HISTORY:  Abnormal levels of other serum enzymes    TECHNIQUE:  Limited ultrasound of the abdomen (including pancreas, liver, gallbladder, common bile duct, and spleen) was performed.    COMPARISON:  None.    FINDINGS:  Pancreas: The visualized portions of pancreas appear normal    Liver: Enlarged measuring 21.0 cm.  Hepatic parenchymal echogenicity appears mildly elevated suggesting possible fatty infiltration.  No focal hepatic lesions.    Gallbladder: No calculi, wall thickening, or pericholecystic fluid.  No sonographic Willard's sign.    Biliary system: The common duct is not dilated, measuring 3 mm.  No intrahepatic ductal dilatation.    Spleen: Normal in size, measuring 10.8 cm. with homogeneous echotexture.    Miscellaneous: No upper abdominal ascites.    Impression  Hepatomegaly with possible fatty  infiltration of the liver.  No focal liver lesions visualized.      Electronically signed by: Zeb Crooks MD  Date:    04/20/2023  Time:    15:30    No results found for this or any previous visit.      Additional education/instructions were reviewed and shared with Merari. Refer to After Visit Summary.     Orders:  -     Comprehensive Metabolic Panel; Future; Expected date: 04/27/2023  -     GAMMA GT; Future; Expected date: 04/27/2023    3. Alcohol dependence in remission  Overview:  Sobriety Date = 12/29/2022    Assessment & Plan:  She is committed to remaining alcohol-free.  Lab Results   Component Value Date    GGT 27 04/20/2023     (H) 12/22/2022    [id number] <10 04/20/2023    [id number] 706 12/22/2022    [id number] SEE BELOW 04/20/2023    [id number] 450 12/22/2022     PEth 16:0/18:1 (POPEth) Interpretation  <10 ng/mL: Not detected  10 - 19 ng/mL: Abstinence or light alcohol consumption (<2 drinks per day for several days a week)  20 - 200 ng/mL: Moderate alcohol consumption (up to 4 drinks per day for several days a week)  >200 ng/mL: Heavy alcohol consumption or chronic alcohol use (at least 4 drinks per day several days a week)         4. Mild episode of recurrent major depressive disorder  -     FLUoxetine 20 MG capsule; Take 1 capsule (20 mg total) by mouth once daily.  Dispense: 90 capsule; Refill: 3    5. Generalized anxiety disorder  Assessment & Plan:  Merari reports she is doing well on her current medicines. Merari reports preceiving no adverse side-effects and wants to continue current treatment.  Review of [place] Board of Pharmacy Controlled Prescription Drug Monitoring database shows the following prescriptions (sorted by date filled).    04/10/2023 - Diazepam 2 Mg Tablet, QTY 60 (QS for 20 days), originally written 12/29/2022 by Meredith Coker.    03/10/2023 - Diazepam 2 Mg Tablet, QTY 60 (QS for 20 days), originally written 12/29/2022 by Meredith Coker.    02/09/2023 - Diazepam 2 Mg Tablet,  QTY 60 (QS for 20 days), originally written 12/29/2022 by Meredith Coker.    12/29/2022 - Diazepam 2 Mg Tablet, QTY 60 (QS for 20 days), originally written 12/29/2022 by Meredith Coker.     Orders:  -     diazePAM (VALIUM) 2 MG tablet; Take 1 tablet (2 mg total) by mouth 2 (two) times daily as needed for Anxiety.  Dispense: 60 tablet; Refill: 5  -     FLUoxetine 20 MG capsule; Take 1 capsule (20 mg total) by mouth once daily.  Dispense: 90 capsule; Refill: 3    6. Moderate mixed hyperlipidemia not requiring statin therapy  Assessment & Plan:  Lab Results   Component Value Date    CHOL 172 12/09/2022    CHOL 228 (H) 08/24/2021    TRIG 208 (H) 12/09/2022    TRIG 114 08/24/2021    HDL 65 12/09/2022    HDL 58 08/24/2021    LDLCALC 65.4 12/09/2022    LDLCALC 147.2 08/24/2021    NONHDLCHOL 107 12/09/2022    NONHDLCHOL 170 08/24/2021    AST 23 01/20/2023    ALT 13 01/20/2023     The 10-year ASCVD risk score (Sudha CHANG, et al., 2019) is: 0.9%    Values used to calculate the score:      Age: 43 years      Sex: Female      Is Non- : Yes      Diabetic: No      Tobacco smoker: No      Systolic Blood Pressure: 126 mmHg      Is BP treated: Yes      HDL Cholesterol: 65 mg/dL      Total Cholesterol: 172 mg/dL       7. Obstructive sleep apnea (AHI = 20)  Overview:  HST 3/27/23  SLEEP STUDY FINDINGS: Patient underwent a 1 night Home Sleep Test and by behavioral criteria, slept for approximately  4.85 hours , with a sleep efficiency of 69%. Moderate sleep disordered breathing (AHI=20) is noted based on a 4% hypopnea  desaturation criteria. When considering more subtle measures of sleep disordered breathing, the overall respiratory  disturbance index is moderate(RDI=32) based on a 1% hypopnea desaturation criteria with confirmation by surrogate  arousal indicators. The apneas/hypopneas are accompanied by minimal oxygen desaturation (percent time below 90%  SpO2: 0%, Min SpO2: 82.5%). The average desaturation across all sleep  disordered breathing events is 2.9%. Snoring occurs  for 50.7% (30 dB) of the study, 39.8% is very loud. The mean pulse rate is 76 BPM, with very frequent pulse rate variability  (65 events with >= 6 BPM increase/decrease per hour).    Assessment & Plan:  Future Appointments   Date Time Provider Department Center   10/6/2023  1:40 PM Gi Huitron MD [facility]         Orders:  -     Echo Saline Bubble? No; Future    8. Abnormal EKG  -     Echo Saline Bubble? No; Future    9. Chronic venous hypertension involving both sides    10. Class 2 severe obesity due to excess calories with serious comorbidity and body mass index (BMI) of 38.0 to 38.9 in adult  -     Hemoglobin A1C; Future; Expected date: 04/27/2023    11. Screening for diabetes mellitus  -     Hemoglobin A1C; Future; Expected date: 04/27/2023    Unless noted herein, any chronic conditions are represented as and appear stable, and no other significant complaints or concerns were reported.    Follow up in about 6 months (around 10/27/2023) for review test results, discuss treatment plan, re-evaluation.      Assessment and Plan    1. Essential hypertension  - lisinopriL 10 MG tablet; Take 1 tablet (10 mg total) by mouth once daily.  Dispense: 90 tablet; Refill: 3  - carvediloL (COREG) 12.5 MG tablet; Take 1 tablet (12.5 mg total) by mouth 2 (two) times daily with meals.  Dispense: 180 tablet; Refill: 3  - furosemide (LASIX) 40 MG tablet; Take 1 tablet (40 mg total) by mouth once daily.  Dispense: 90 tablet; Refill: 3    2. Fatty liver disease due to alcohol  - Comprehensive Metabolic Panel; Future  - GAMMA GT; Future    3. Alcohol dependence in remission    4. Mild episode of recurrent major depressive disorder  - FLUoxetine 20 MG capsule; Take 1 capsule (20 mg total) by mouth once daily.  Dispense: 90 capsule; Refill: 3    5. Generalized anxiety disorder  - diazePAM (VALIUM) 2 MG tablet; Take 1 tablet (2 mg total) by mouth 2 (two) times  "daily as needed for Anxiety.  Dispense: 60 tablet; Refill: 5  - FLUoxetine 20 MG capsule; Take 1 capsule (20 mg total) by mouth once daily.  Dispense: 90 capsule; Refill: 3    6. Moderate mixed hyperlipidemia not requiring statin therapy    7. Obstructive sleep apnea (AHI = 20)  - Echo Saline Bubble? No; Future    8. Abnormal EKG  - Echo Saline Bubble? No; Future    9. Chronic venous hypertension involving both sides    10. Class 2 severe obesity due to excess calories with serious comorbidity and body mass index (BMI) of 38.0 to 38.9 in adult  - Hemoglobin A1C; Future    11. Screening for diabetes mellitus  - Hemoglobin A1C; Future    Orders Placed This Encounter   Procedures    Comprehensive Metabolic Panel    GAMMA GT    Hemoglobin A1C    Echo Saline Bubble? No        Documentation entered by me for this encounter may have been done in part using speech-recognition technology. Although I have made an effort to ensure accuracy, "sound like" errors may exist and should be interpreted in context.  "

## 2023-04-27 NOTE — ASSESSMENT & PLAN NOTE
She is committed to remaining alcohol-free.  Lab Results   Component Value Date    GGT 27 04/20/2023     (H) 12/22/2022    [id number] <10 04/20/2023    [id number] 706 12/22/2022    [id number] SEE BELOW 04/20/2023    [id number] 450 12/22/2022     PEth 16:0/18:1 (POPEth) Interpretation   <10 ng/mL: Not detected   10 - 19 ng/mL: Abstinence or light alcohol consumption (<2 drinks per day for several days a week)   20 - 200 ng/mL: Moderate alcohol consumption (up to 4 drinks per day for several days a week)   >200 ng/mL: Heavy alcohol consumption or chronic alcohol use (at least 4 drinks per day several days a week)

## 2023-04-27 NOTE — ASSESSMENT & PLAN NOTE
Lab Results   Component Value Date    CHOL 172 12/09/2022    CHOL 228 (H) 08/24/2021    TRIG 208 (H) 12/09/2022    TRIG 114 08/24/2021    HDL 65 12/09/2022    HDL 58 08/24/2021    LDLCALC 65.4 12/09/2022    LDLCALC 147.2 08/24/2021    NONHDLCHOL 107 12/09/2022    NONHDLCHOL 170 08/24/2021    AST 23 01/20/2023    ALT 13 01/20/2023     The 10-year ASCVD risk score (Sudha CHANG, et al., 2019) is: 0.9%    Values used to calculate the score:      Age: 43 years      Sex: Female      Is Non- : Yes      Diabetic: No      Tobacco smoker: No      Systolic Blood Pressure: 126 mmHg      Is BP treated: Yes      HDL Cholesterol: 65 mg/dL      Total Cholesterol: 172 mg/dL

## 2023-04-27 NOTE — ASSESSMENT & PLAN NOTE
FIB-4 Calculation: 1.02 at 4/27/2023 12:10 PM  Calculated from:  Last SGOT/AST : 23 at 4/27/2023 12:10 PM  Last SGPT/ALT: 13 at 4/27/2023 12:10 PM   Platelets: 255 at 4/27/2023 12:10 PM   Age: 43 y.o.    FIB-4 below 1.30 is considered as low-risk for advanced fibrosis   FIB-4 over 2.67 is considered as high-risk for advanced fibrosis   FIB-4 values between 1.30 and 2.67 are considered as intermediate-risk of advanced fibrosis for ages 36-64.    For ages > 64 the cut-off for low-risk goes to < 2.  This is a screening tool and clinical judgement should be used in the interpretation of these results.    Lab Results   Component Value Date    AST 23 01/20/2023    AST 22 12/09/2022    ALT 13 01/20/2023    ALT 18 12/09/2022    GGT 27 04/20/2023     (H) 12/22/2022    ALBUMIN 3.9 01/20/2023    ALBUMIN 3.7 12/09/2022     01/20/2023     (H) 12/09/2022     Results for orders placed during the hospital encounter of 04/20/23    US Abdomen Limited    Narrative  EXAMINATION:  US ABDOMEN LIMITED    CLINICAL HISTORY:  Abnormal levels of other serum enzymes    TECHNIQUE:  Limited ultrasound of the abdomen (including pancreas, liver, gallbladder, common bile duct, and spleen) was performed.    COMPARISON:  None.    FINDINGS:  Pancreas: The visualized portions of pancreas appear normal    Liver: Enlarged measuring 21.0 cm.  Hepatic parenchymal echogenicity appears mildly elevated suggesting possible fatty infiltration.  No focal hepatic lesions.    Gallbladder: No calculi, wall thickening, or pericholecystic fluid.  No sonographic Willard's sign.    Biliary system: The common duct is not dilated, measuring 3 mm.  No intrahepatic ductal dilatation.    Spleen: Normal in size, measuring 10.8 cm. with homogeneous echotexture.    Miscellaneous: No upper abdominal ascites.    Impression  Hepatomegaly with possible fatty infiltration of the liver.  No focal liver lesions visualized.      Electronically signed  by: Zeb Crooks MD  Date:    04/20/2023  Time:    15:30    No results found for this or any previous visit.      Additional education/instructions were reviewed and shared with Merari. Refer to After Visit Summary.

## 2023-05-05 ENCOUNTER — HOSPITAL ENCOUNTER (OUTPATIENT)
Dept: CARDIOLOGY | Facility: HOSPITAL | Age: 43
Discharge: HOME | End: 2023-05-05
Attending: FAMILY MEDICINE
Payer: MEDICAID

## 2023-05-05 VITALS
SYSTOLIC BLOOD PRESSURE: 126 MMHG | DIASTOLIC BLOOD PRESSURE: 88 MMHG | BODY MASS INDEX: 38.73 KG/M2 | HEIGHT: 66 IN | WEIGHT: 241 LBS

## 2023-05-05 DIAGNOSIS — G47.33 OBSTRUCTIVE SLEEP APNEA: ICD-10-CM

## 2023-05-05 DIAGNOSIS — R94.31 ABNORMAL EKG: ICD-10-CM

## 2023-05-05 LAB
AORTIC ROOT ANNULUS: 3.16 CM
ASCENDING AORTA: 2.33 CM
AV INDEX (PROSTH): 0.91
AV MEAN GRADIENT: 4 MMHG
AV PEAK GRADIENT: 7 MMHG
AV VALVE AREA: 2.8 CM2
AV VELOCITY RATIO: 0.84
BSA FOR ECHO PROCEDURE: 2.26 M2
CV ECHO LV RWT: 0.46 CM
DOP CALC AO PEAK VEL: 1.28 M/S
DOP CALC AO VTI: 28.7 CM
DOP CALC LVOT AREA: 3.1 CM2
DOP CALC LVOT DIAMETER: 1.98 CM
DOP CALC LVOT PEAK VEL: 1.08 M/S
DOP CALC LVOT STROKE VOLUME: 80.32 CM3
DOP CALC RVOT PEAK VEL: 0.81 M/S
DOP CALC RVOT VTI: 17.8 CM
DOP CALCLVOT PEAK VEL VTI: 26.1 CM
E WAVE DECELERATION TIME: 209.93 MSEC
E/A RATIO: 0.98
E/E' RATIO: 8.43 M/S
ECHO LV POSTERIOR WALL: 0.9 CM (ref 0.6–1.1)
EJECTION FRACTION: 60 %
FRACTIONAL SHORTENING: 33 % (ref 28–44)
INTERVENTRICULAR SEPTUM: 1 CM (ref 0.6–1.1)
IVC DIAMETER: 2.15 CM
IVRT: 99.9 MSEC
LA MAJOR: 5.43 CM
LA MINOR: 4.74 CM
LA WIDTH: 3.6 CM
LEFT ATRIUM SIZE: 3.6 CM
LEFT ATRIUM VOLUME INDEX MOD: 29.5 ML/M2
LEFT ATRIUM VOLUME INDEX: 25.7 ML/M2
LEFT ATRIUM VOLUME MOD: 64.06 CM3
LEFT ATRIUM VOLUME: 55.76 CM3
LEFT INTERNAL DIMENSION IN SYSTOLE: 2.59 CM (ref 2.1–4)
LEFT VENTRICLE DIASTOLIC VOLUME INDEX: 29.94 ML/M2
LEFT VENTRICLE DIASTOLIC VOLUME: 64.98 ML
LEFT VENTRICLE MASS INDEX: 52 G/M2
LEFT VENTRICLE SYSTOLIC VOLUME INDEX: 11.2 ML/M2
LEFT VENTRICLE SYSTOLIC VOLUME: 24.28 ML
LEFT VENTRICULAR INTERNAL DIMENSION IN DIASTOLE: 3.88 CM (ref 3.5–6)
LEFT VENTRICULAR MASS: 112.66 G
LV LATERAL E/E' RATIO: 8.08 M/S
LV SEPTAL E/E' RATIO: 8.82 M/S
LVOT MG: 2.86 MMHG
LVOT MV: 0.8 CM/S
MV PEAK A VEL: 0.99 M/S
MV PEAK E VEL: 0.97 M/S
MV STENOSIS PRESSURE HALF TIME: 60.88 MS
MV VALVE AREA P 1/2 METHOD: 3.61 CM2
PISA TR MAX VEL: 2.83 M/S
PULM VEIN S/D RATIO: 1.79
PV MEAN GRADIENT: 1.34 MMHG
PV PEAK D VEL: 0.39 M/S
PV PEAK S VEL: 0.7 M/S
PV PEAK VELOCITY: 1.03 CM/S
RA MAJOR: 4.98 CM
RA PRESSURE: 8 MMHG
RA WIDTH: 3.26 CM
RIGHT VENTRICULAR END-DIASTOLIC DIMENSION: 3.2 CM
SINUS: 2.8 CM
STJ: 2.47 CM
TDI LATERAL: 0.12 M/S
TDI SEPTAL: 0.11 M/S
TDI: 0.12 M/S
TR MAX PG: 32 MMHG
TRICUSPID ANNULAR PLANE SYSTOLIC EXCURSION: 2.09 CM
TV REST PULMONARY ARTERY PRESSURE: 40 MMHG

## 2023-05-05 PROCEDURE — 93306 TTE W/DOPPLER COMPLETE: CPT | Mod: 26,,, | Performed by: INTERNAL MEDICINE

## 2023-05-05 PROCEDURE — 93306 TTE W/DOPPLER COMPLETE: CPT

## 2023-05-05 PROCEDURE — 93306 ECHO (CUPID ONLY): ICD-10-PCS | Mod: 26,,, | Performed by: INTERNAL MEDICINE

## 2023-05-06 PROBLEM — F33.0 MILD EPISODE OF RECURRENT MAJOR DEPRESSIVE DISORDER: Status: ACTIVE | Noted: 2023-05-06

## 2023-05-07 PROBLEM — I27.21 HIGH PULMONARY ARTERIAL PRESSURE: Status: ACTIVE | Noted: 2023-05-07

## 2023-05-07 NOTE — PROGRESS NOTES
Results to be addressed at upcoming appointment(s) already scheduled.  Future Appointments  9/25/2023  1:20 PM    Nadia Huitron MD       [facility]         [facility]  10/6/2023  1:40 PM    Gi Huitron MD      [facility]        [facility]  10/20/2023 10:20 AM   LABORATORY, [facility]           [facility]            [facility]  10/27/2023 11:00 AM   YESY Stone MD       [facility]

## 2023-05-29 ENCOUNTER — TELEPHONE (OUTPATIENT)
Dept: INTERNAL MEDICINE | Facility: CLINIC | Age: 43
End: 2023-05-29
Payer: MEDICAID

## 2023-05-29 ENCOUNTER — E-CONSULT (OUTPATIENT)
Dept: HEPATOLOGY | Facility: CLINIC | Age: 43
End: 2023-05-29
Payer: MEDICAID

## 2023-05-29 DIAGNOSIS — K70.0 FATTY LIVER DUE TO ALCOHOLISM: Chronic | ICD-10-CM

## 2023-05-29 DIAGNOSIS — R16.0 HEPATOMEGALY: Primary | ICD-10-CM

## 2023-05-29 PROCEDURE — 99452 NTRPROF PH1/NTRNET/EHR RFRL: CPT | Mod: S$PBB,,, | Performed by: FAMILY MEDICINE

## 2023-05-29 PROCEDURE — 99452 E-CONSULT TO HEPATOLOGY OUTPATIENT: ICD-10-PCS | Mod: S$PBB,,, | Performed by: FAMILY MEDICINE

## 2023-05-29 PROCEDURE — 99451 PR INTERPROF, PHONE/INTERNET/EHR, CONSULT, >= 5MINS: ICD-10-PCS | Mod: S$PBB,,, | Performed by: INTERNAL MEDICINE

## 2023-05-29 PROCEDURE — 99451 NTRPROF PH1/NTRNET/EHR 5/>: CPT | Mod: S$PBB,,, | Performed by: INTERNAL MEDICINE

## 2023-05-29 NOTE — CONSULTS
[facility]  Response for E-Consult     Patient Name: Merari Orosco  MRN: [id number]  Primary Care Provider: YESY Stone MD   Requesting Provider: YESY Stone MD  E-Consult to Hepatology Outpatient  Consult performed by: Scott Reyes MD  Consult ordered by: YESY Stone MD        Findings: FIB 4 < 1.3 and Age 35-65    Advanced fibrosis excluded. No fibroscan necessary    I did not speak to the requesting provider verbally about this.     Total time of Consultation: 10 minute    Percentage of time spent on written/verbal discussion: 100%     *This eConsult is based on the clinical data available to me and is furnished without benefit of a physical examination. The eConsult will need to be interpreted in light of any clinical issues or changes in patient status not available to me at the time of filing this eConsults. Significant changes in patient condition or level of acuity should result in immediate formal consultation and reevaluation. Please alert me if you have further questions.    Thank you for your consult.     Scott Reyes MD  [facility]

## 2023-05-29 NOTE — TELEPHONE ENCOUNTER
Request for Hepatology E-Consult     Patient Name: Merari Orosco  (43 y.o.)  MRN: [id number]  Requesting Provider: YESY Stone MD   Primary Care Provider: YESY Stone MD     Consent for e-consult has been obtained from patient, and patient is aware of applicable cost: Yes    Reason for Consult: Alcoholic Fatty Liver, FIB-4 score = 1.02, BMI = 38.9.    SPECIFIC QUESTIONS: Do you recommend Fibroscan at this point?    MARTINE Stone MD  [facility] [address]  [place] [address]  PH: [phone]  FX: [phone]    Total time spent preparing for this E-consult and/or communicating with the consulting physician was greater than or equal to: 17 minutes. This time was exclusive of any separately billable procedures or separate E&M services for this patient and exclusive of time spent treating any other patients.  #LMECR     Problem List Items Addressed This Visit       Fatty liver disease due to alcohol (Chronic)    Overview     US Abdomen Limited 4/20/23  Hepatomegaly with possible fatty infiltration of the liver.  No focal liver lesions visualized.         Current Assessment & Plan     FIB-4 Calculation: 1.02 at 5/29/2023  7:49 AM  Calculated from:  Last SGOT/AST : 23 at 5/29/2023  7:49 AM  Last SGPT/ALT: 13 at 5/29/2023  7:49 AM   Platelets: 255 at 5/29/2023  7:49 AM   Age: 43 y.o.   FIB-4 below 1.30 is considered as low-risk for advanced fibrosis  FIB-4 over 2.67 is considered as high-risk for advanced fibrosis  FIB-4 values between 1.30 and 2.67 are considered as intermediate-risk of advanced fibrosis for ages 36-64.   For ages > 64 the cut-off for low-risk goes to < 2.  This is a screening tool and clinical judgement should be used in the interpretation of these results.    Lab Results   Component Value Date    AST 23 01/20/2023    AST 22 12/09/2022    ALT 13 01/20/2023    ALT 18 12/09/2022    GGT 27 04/20/2023     (H) 12/22/2022    ALBUMIN 3.9  01/20/2023    ALBUMIN 3.7 12/09/2022     01/20/2023     (H) 12/09/2022

## 2023-05-29 NOTE — ASSESSMENT & PLAN NOTE
FIB-4 Calculation: 1.02 at 5/29/2023  7:49 AM  Calculated from:  Last SGOT/AST : 23 at 5/29/2023  7:49 AM  Last SGPT/ALT: 13 at 5/29/2023  7:49 AM   Platelets: 255 at 5/29/2023  7:49 AM   Age: 43 y.o.    FIB-4 below 1.30 is considered as low-risk for advanced fibrosis   FIB-4 over 2.67 is considered as high-risk for advanced fibrosis   FIB-4 values between 1.30 and 2.67 are considered as intermediate-risk of advanced fibrosis for ages 36-64.    For ages > 64 the cut-off for low-risk goes to < 2.  This is a screening tool and clinical judgement should be used in the interpretation of these results.    Lab Results   Component Value Date    AST 23 01/20/2023    AST 22 12/09/2022    ALT 13 01/20/2023    ALT 18 12/09/2022    GGT 27 04/20/2023     (H) 12/22/2022    ALBUMIN 3.9 01/20/2023    ALBUMIN 3.7 12/09/2022     01/20/2023     (H) 12/09/2022

## 2023-05-31 ENCOUNTER — TELEPHONE (OUTPATIENT)
Dept: INTERNAL MEDICINE | Facility: CLINIC | Age: 43
End: 2023-05-31
Payer: MEDICAID

## 2023-06-04 NOTE — PROGRESS NOTES
Will address.  Future Appointments   Date Time Provider Department Center   6/5/2023 10:40 AM YESY Stone MD [facility]  [place]

## 2023-06-05 ENCOUNTER — OFFICE VISIT (OUTPATIENT)
Dept: INTERNAL MEDICINE | Facility: CLINIC | Age: 43
End: 2023-06-05
Payer: MEDICAID

## 2023-06-05 VITALS
OXYGEN SATURATION: 98 % | TEMPERATURE: 98 F | HEART RATE: 108 BPM | SYSTOLIC BLOOD PRESSURE: 128 MMHG | WEIGHT: 231.94 LBS | BODY MASS INDEX: 37.28 KG/M2 | DIASTOLIC BLOOD PRESSURE: 88 MMHG | HEIGHT: 66 IN

## 2023-06-05 DIAGNOSIS — E66.01 CLASS 2 SEVERE OBESITY DUE TO EXCESS CALORIES WITH SERIOUS COMORBIDITY AND BODY MASS INDEX (BMI) OF 37.0 TO 37.9 IN ADULT: Primary | ICD-10-CM

## 2023-06-05 DIAGNOSIS — G47.33 OBSTRUCTIVE SLEEP APNEA: Chronic | ICD-10-CM

## 2023-06-05 DIAGNOSIS — F33.0 MILD EPISODE OF RECURRENT MAJOR DEPRESSIVE DISORDER: ICD-10-CM

## 2023-06-05 DIAGNOSIS — I10 ESSENTIAL HYPERTENSION: Chronic | ICD-10-CM

## 2023-06-05 PROCEDURE — 3079F PR MOST RECENT DIASTOLIC BLOOD PRESSURE 80-89 MM HG: ICD-10-PCS | Mod: CPTII,,, | Performed by: FAMILY MEDICINE

## 2023-06-05 PROCEDURE — 3008F BODY MASS INDEX DOCD: CPT | Mod: CPTII,,, | Performed by: FAMILY MEDICINE

## 2023-06-05 PROCEDURE — 1160F PR REVIEW ALL MEDS BY PRESCRIBER/CLIN PHARMACIST DOCUMENTED: ICD-10-PCS | Mod: CPTII,,, | Performed by: FAMILY MEDICINE

## 2023-06-05 PROCEDURE — 3079F DIAST BP 80-89 MM HG: CPT | Mod: CPTII,,, | Performed by: FAMILY MEDICINE

## 2023-06-05 PROCEDURE — 99999 PR PBB SHADOW E&M-EST. PATIENT-LVL IV: CPT | Mod: PBBFAC,,, | Performed by: FAMILY MEDICINE

## 2023-06-05 PROCEDURE — 99214 OFFICE O/P EST MOD 30 MIN: CPT | Mod: PBBFAC | Performed by: FAMILY MEDICINE

## 2023-06-05 PROCEDURE — 4010F PR ACE/ARB THEARPY RXD/TAKEN: ICD-10-PCS | Mod: CPTII,,, | Performed by: FAMILY MEDICINE

## 2023-06-05 PROCEDURE — 1159F MED LIST DOCD IN RCRD: CPT | Mod: CPTII,,, | Performed by: FAMILY MEDICINE

## 2023-06-05 PROCEDURE — 99213 PR OFFICE/OUTPT VISIT, EST, LEVL III, 20-29 MIN: ICD-10-PCS | Mod: S$PBB,,, | Performed by: FAMILY MEDICINE

## 2023-06-05 PROCEDURE — 3074F PR MOST RECENT SYSTOLIC BLOOD PRESSURE < 130 MM HG: ICD-10-PCS | Mod: CPTII,,, | Performed by: FAMILY MEDICINE

## 2023-06-05 PROCEDURE — 4010F ACE/ARB THERAPY RXD/TAKEN: CPT | Mod: CPTII,,, | Performed by: FAMILY MEDICINE

## 2023-06-05 PROCEDURE — 3074F SYST BP LT 130 MM HG: CPT | Mod: CPTII,,, | Performed by: FAMILY MEDICINE

## 2023-06-05 PROCEDURE — 99999 PR PBB SHADOW E&M-EST. PATIENT-LVL IV: ICD-10-PCS | Mod: PBBFAC,,, | Performed by: FAMILY MEDICINE

## 2023-06-05 PROCEDURE — 99213 OFFICE O/P EST LOW 20 MIN: CPT | Mod: S$PBB,,, | Performed by: FAMILY MEDICINE

## 2023-06-05 PROCEDURE — 1159F PR MEDICATION LIST DOCUMENTED IN MEDICAL RECORD: ICD-10-PCS | Mod: CPTII,,, | Performed by: FAMILY MEDICINE

## 2023-06-05 PROCEDURE — 1160F RVW MEDS BY RX/DR IN RCRD: CPT | Mod: CPTII,,, | Performed by: FAMILY MEDICINE

## 2023-06-05 PROCEDURE — 3008F PR BODY MASS INDEX (BMI) DOCUMENTED: ICD-10-PCS | Mod: CPTII,,, | Performed by: FAMILY MEDICINE

## 2023-06-05 NOTE — PATIENT INSTRUCTIONS
I recommend a Mediterranean diet, which you can learn about at the website of the American Heart Association, www.heart.org.  https://www.heart.org/en/healthy-living/healthy-eating/eat-smart/nutrition-basics/mediterranean-diet     The Mediterranean Diet    The Mediterranean Diet is a nutrient-rich, well-balanced eating plan inspired by the traditional culinary practices of countries bordering the [place] Sea, such as [place], [place], [place], and [place]. This diet has been widely recognized for its myriad health benefits, which can be attributed to its emphasis on whole, unprocessed foods, plant-based ingredients, and moderate consumption of lean proteins and healthy fats.    Rich in fruits and vegetables: The Mediterranean Diet places a strong focus on the consumption of colorful fruits and vegetables, which are loaded with vitamins, minerals, antioxidants, and dietary fiber. These nutrients contribute to a healthier immune system, improved digestion, and reduced risk of chronic diseases, such as cardiovascular disease and certain types of cancer.    Whole grains and legumes: Whole grains like brown rice, quinoa, and whole wheat bread, as well as legumes like lentils, chickpeas, and beans, are staples in the Mediterranean Diet. They provide a good source of complex carbohydrates, fiber, and essential nutrients, which help regulate blood sugar levels, promote healthy digestion, and support heart health.    Healthy fats: This diet emphasizes the use of healthy fats, such as olive oil, which is rich in monounsaturated fats and antioxidants. These fats have been shown to help reduce bad cholesterol (LDL) levels, raise good cholesterol (HDL) levels, and lower the risk of heart disease. Additionally, fatty fish like salmon, mackerel, and sardines are excellent sources of omega-3 fatty acids, which have anti-inflammatory properties and support brain health.    Lean proteins: The Mediterranean Diet encourages the  consumption of lean proteins, such as fish, poultry, and plant-based sources like legumes, nuts, and seeds. These high-quality proteins provide essential amino acids for muscle growth and repair, while also reducing the risk of heart disease and promoting overall health.    Moderate dairy intake: Dairy products, such as yogurt and cheese, are consumed in moderation in the Mediterranean Diet. These foods are good sources of calcium and other essential nutrients, which support bone health and can help prevent osteoporosis.    Limited red meat and processed foods: This diet recommends limiting the intake of red meat and processed foods, which are often high in saturated fats, sodium, and added sugars. By doing so, the Mediterranean Diet helps reduce the risk of heart disease, obesity, and type 2 diabetes.    Emphasis on herbs and spices: The Mediterranean Diet incorporates a wide variety of herbs and spices, such as basil, oregano, rosemary, and thyme, which not only add flavor to meals but also offer potential health benefits due to their antioxidant and anti-inflammatory properties.    Moderate alcohol consumption: The Mediterranean Diet permits moderate alcohol consumption, particularly red wine, which contains polyphenols that have been associated with heart health benefits. However, moderation is burkett, as excessive alcohol consumption can lead to adverse health effects.    Social and lifestyle factors: Beyond the food choices, the Mediterranean lifestyle encourages regular physical activity, adequate rest, and enjoying meals in a social setting with family and friends. These elements contribute to overall well-being, stress reduction, and a balanced approach to health.    In summary, the Mediterranean Diet offers a plethora of health benefits due to its emphasis on whole, nutrient-dense foods, healthy fats, and lean proteins. By adopting this eating pattern, individuals can reduce the risk of chronic diseases,  improve their overall well-being, and enjoy a flavorful, diverse range of meals.

## 2023-06-05 NOTE — PROGRESS NOTES
"OFFICE VISIT 6/5/23 10:40 AM CDT    Subjective   CHIEF COMPLAINT: Obesity    She continues to struggle with obesity. We discussed treatment options, and she agreed to consult our lifestyle, wellness, and weight .    Obstructive sleep apnea was diagnosed via a home sleep test. She has an upcoming pulmonology/sleep clinic appointment, and I encouraged CPAP use.    Her hypertension appears marginally controlled. There may be issues with medication adherence. We discussed strategies to improve this. She has a follow-up appointment scheduled with her cardiologist.    Her depression appears to be responding well to fluoxetine. Mood reported as stable.      Review of Systems   Respiratory:  Negative for chest tightness and shortness of breath.    Cardiovascular:  Negative for chest pain.   Endocrine: Negative for polydipsia and polyuria.       Objective   Vitals:    06/05/23 1117 06/05/23 1212   BP: (!) 138/92 128/88   BP Location: Left arm    Patient Position: Sitting    BP Method: Large (Manual)    Pulse: 108    Temp: 98.3 °F (36.8 °C)    SpO2: 98%    Weight: 105.2 kg (231 lb 14.8 oz)    Height: 5' 6" (1.676 m)    Physical Exam  Vitals reviewed.   Constitutional:       General: She is not in acute distress.     Appearance: Normal appearance. She is not ill-appearing, toxic-appearing or diaphoretic.   Cardiovascular:      Rate and Rhythm: Normal rate.   Pulmonary:      Effort: Pulmonary effort is normal.   Neurological:      Mental Status: She is alert and oriented to person, place, and time. Mental status is at baseline.   Psychiatric:         Mood and Affect: Mood normal.         Behavior: Behavior normal.         Judgment: Judgment normal.        Assessment and Plan   1. Class 2 severe obesity due to excess calories with serious comorbidity and body mass index (BMI) of 37.0 to 37.9 in adult  -     Ambulatory referral/consult to [facility] Lifestyle and Wellness; Future; Expected date: 06/12/2023    2. " Obstructive sleep apnea (AHI = 20)  Overview:  HST 3/27/23  SLEEP STUDY FINDINGS: Patient underwent a 1 night Home Sleep Test and by behavioral criteria, slept for approximately  4.85 hours , with a sleep efficiency of 69%. Moderate sleep disordered breathing (AHI=20) is noted based on a 4% hypopnea  desaturation criteria. When considering more subtle measures of sleep disordered breathing, the overall respiratory  disturbance index is moderate(RDI=32) based on a 1% hypopnea desaturation criteria with confirmation by surrogate  arousal indicators. The apneas/hypopneas are accompanied by minimal oxygen desaturation (percent time below 90%  SpO2: 0%, Min SpO2: 82.5%). The average desaturation across all sleep disordered breathing events is 2.9%. Snoring occurs  for 50.7% (30 dB) of the study, 39.8% is very loud. The mean pulse rate is 76 BPM, with very frequent pulse rate variability  (65 events with >= 6 BPM increase/decrease per hour).      3. Essential hypertension    4. Mild episode of recurrent major depressive disorder    Unless noted herein, any chronic conditions are represented as and appear stable, and no other significant complaints or concerns were reported.    Future Appointments   Date Time Provider Department Center   9/25/2023  1:20 PM Nadia Huitron MD [facility] CARDIO [facility]   10/6/2023  1:40 PM Gi Huitron MD [facility] PULMSVC  Medical C   10/20/2023 10:20 AM LABORATORY, [facility] HGV LAB [facility]   10/27/2023 11:00 AM YESY Stone MD [facility]        Assessment and Plan    1. Class 2 severe obesity due to excess calories with serious comorbidity and body mass index (BMI) of 37.0 to 37.9 in adult  - Ambulatory referral/consult to [facility] Lifestyle and Wellness; Future    2. Obstructive sleep apnea (AHI = 20)    3. Essential hypertension    4. Mild episode of recurrent major depressive disorder    Orders Placed This Encounter    Ambulatory referral/consult to [facility] Lifestyle and  "Wellness        TOTAL TIME evaluating and managing this patient for this encounter was greater than or equal to 24 minutes. This time was exclusive of any separately billable procedures for this patient and exclusive of time spent treating any other patients.   Documentation entered by me for this encounter may have been done in part using speech-recognition technology. Although I have made an effort to ensure accuracy, "sound like" errors may exist and should be interpreted in context.  "

## 2023-06-12 ENCOUNTER — PATIENT MESSAGE (OUTPATIENT)
Dept: INTERNAL MEDICINE | Facility: CLINIC | Age: 43
End: 2023-06-12
Payer: MEDICAID

## 2023-07-13 ENCOUNTER — PATIENT MESSAGE (OUTPATIENT)
Dept: ADMINISTRATIVE | Facility: OTHER | Age: 43
End: 2023-07-13
Payer: MEDICAID

## 2023-08-03 ENCOUNTER — PATIENT MESSAGE (OUTPATIENT)
Dept: ADMINISTRATIVE | Facility: OTHER | Age: 43
End: 2023-08-03
Payer: MEDICAID

## 2023-08-30 ENCOUNTER — TELEPHONE (OUTPATIENT)
Dept: PRIMARY CARE CLINIC | Facility: CLINIC | Age: 43
End: 2023-08-30
Payer: MEDICAID

## 2023-08-31 ENCOUNTER — LAB VISIT (OUTPATIENT)
Dept: LAB | Facility: HOSPITAL | Age: 43
End: 2023-08-31
Attending: FAMILY MEDICINE
Payer: MEDICAID

## 2023-08-31 DIAGNOSIS — I10 ESSENTIAL HYPERTENSION: ICD-10-CM

## 2023-08-31 LAB
ANION GAP SERPL CALC-SCNC: 9 MMOL/L (ref 8–16)
BUN SERPL-MCNC: 3 MG/DL (ref 6–20)
CALCIUM SERPL-MCNC: 8.9 MG/DL (ref 8.7–10.5)
CHLORIDE SERPL-SCNC: 105 MMOL/L (ref 95–110)
CO2 SERPL-SCNC: 26 MMOL/L (ref 23–29)
CREAT SERPL-MCNC: 0.7 MG/DL (ref 0.5–1.4)
EST. GFR  (NO RACE VARIABLE): >60 ML/MIN/1.73 M^2
GLUCOSE SERPL-MCNC: 82 MG/DL (ref 70–110)
POTASSIUM SERPL-SCNC: 3.5 MMOL/L (ref 3.5–5.1)
SODIUM SERPL-SCNC: 140 MMOL/L (ref 136–145)

## 2023-08-31 PROCEDURE — 36415 COLL VENOUS BLD VENIPUNCTURE: CPT | Performed by: FAMILY MEDICINE

## 2023-08-31 PROCEDURE — 80048 BASIC METABOLIC PNL TOTAL CA: CPT | Performed by: FAMILY MEDICINE

## 2023-09-20 ENCOUNTER — E-VISIT (OUTPATIENT)
Dept: INTERNAL MEDICINE | Facility: CLINIC | Age: 43
End: 2023-09-20
Payer: MEDICAID

## 2023-09-20 DIAGNOSIS — J01.00 ACUTE NON-RECURRENT MAXILLARY SINUSITIS: Primary | ICD-10-CM

## 2023-09-20 PROCEDURE — 99421 PR E&M, ONLINE DIGIT, EST, < 7 DAYS, 5-10 MINS: ICD-10-PCS | Mod: ,,, | Performed by: FAMILY MEDICINE

## 2023-09-20 PROCEDURE — 99421 OL DIG E/M SVC 5-10 MIN: CPT | Mod: ,,, | Performed by: FAMILY MEDICINE

## 2023-09-20 RX ORDER — AMOXICILLIN AND CLAVULANATE POTASSIUM 875; 125 MG/1; MG/1
1 TABLET, FILM COATED ORAL EVERY 12 HOURS
Qty: 14 TABLET | Refills: 0 | Status: SHIPPED | OUTPATIENT
Start: 2023-09-20 | End: 2023-09-27

## 2023-09-21 ENCOUNTER — TELEPHONE (OUTPATIENT)
Dept: CARDIOLOGY | Facility: CLINIC | Age: 43
End: 2023-09-21
Payer: MEDICAID

## 2023-09-21 NOTE — TELEPHONE ENCOUNTER
Appt rescheduled    ----- Message from Kortney Peterson sent at 9/21/2023  4:53 PM CDT -----  Contact: Merari  Patient is calling to reschedule appt . Please give a call back at [phone] (home) .

## 2023-09-22 ENCOUNTER — PATIENT MESSAGE (OUTPATIENT)
Dept: PULMONOLOGY | Facility: CLINIC | Age: 43
End: 2023-09-22
Payer: MEDICAID

## 2023-10-06 ENCOUNTER — TELEPHONE (OUTPATIENT)
Dept: PULMONOLOGY | Facility: CLINIC | Age: 43
End: 2023-10-06
Payer: MEDICAID

## 2023-10-10 ENCOUNTER — IMMUNIZATION (OUTPATIENT)
Dept: INTERNAL MEDICINE | Facility: CLINIC | Age: 43
End: 2023-10-10
Payer: MEDICAID

## 2023-10-10 PROCEDURE — 90471 IMMUNIZATION ADMIN: CPT | Mod: PBBFAC

## 2023-10-10 PROCEDURE — 99999PBSHW FLU VACCINE (QUAD) GREATER THAN OR EQUAL TO 3YO PRESERVATIVE FREE IM: ICD-10-PCS | Mod: PBBFAC,,,

## 2023-10-10 PROCEDURE — 99999PBSHW FLU VACCINE (QUAD) GREATER THAN OR EQUAL TO 3YO PRESERVATIVE FREE IM: Mod: PBBFAC,,,

## 2023-10-11 ENCOUNTER — TELEPHONE (OUTPATIENT)
Dept: ORTHOPEDICS | Facility: CLINIC | Age: 43
End: 2023-10-11
Payer: MEDICAID

## 2023-10-12 ENCOUNTER — OFFICE VISIT (OUTPATIENT)
Dept: CARDIOLOGY | Facility: CLINIC | Age: 43
End: 2023-10-12
Payer: MEDICAID

## 2023-10-12 ENCOUNTER — PATIENT MESSAGE (OUTPATIENT)
Dept: CARDIOLOGY | Facility: CLINIC | Age: 43
End: 2023-10-12

## 2023-10-12 DIAGNOSIS — F41.1 GENERALIZED ANXIETY DISORDER: Chronic | ICD-10-CM

## 2023-10-12 DIAGNOSIS — I10 ESSENTIAL HYPERTENSION: Primary | ICD-10-CM

## 2023-10-12 DIAGNOSIS — I87.303 CHRONIC VENOUS HYPERTENSION INVOLVING BOTH SIDES: Chronic | ICD-10-CM

## 2023-10-12 DIAGNOSIS — Z97.5 PRESENCE OF IUD: Chronic | ICD-10-CM

## 2023-10-12 DIAGNOSIS — G47.33 OBSTRUCTIVE SLEEP APNEA: ICD-10-CM

## 2023-10-12 DIAGNOSIS — E66.09 CLASS 1 OBESITY DUE TO EXCESS CALORIES WITH SERIOUS COMORBIDITY AND BODY MASS INDEX (BMI) OF 33.0 TO 33.9 IN ADULT: ICD-10-CM

## 2023-10-12 PROCEDURE — 99214 OFFICE O/P EST MOD 30 MIN: CPT | Mod: 95,,, | Performed by: INTERNAL MEDICINE

## 2023-10-12 PROCEDURE — 4010F PR ACE/ARB THEARPY RXD/TAKEN: ICD-10-PCS | Mod: CPTII,95,, | Performed by: INTERNAL MEDICINE

## 2023-10-12 PROCEDURE — 99214 PR OFFICE/OUTPT VISIT, EST, LEVL IV, 30-39 MIN: ICD-10-PCS | Mod: 95,,, | Performed by: INTERNAL MEDICINE

## 2023-10-12 PROCEDURE — 4010F ACE/ARB THERAPY RXD/TAKEN: CPT | Mod: CPTII,95,, | Performed by: INTERNAL MEDICINE

## 2023-10-12 RX ORDER — SPIRONOLACTONE 25 MG/1
25 TABLET ORAL DAILY
Qty: 30 TABLET | Refills: 11 | Status: SHIPPED | OUTPATIENT
Start: 2023-10-12 | End: 2023-10-27 | Stop reason: SDUPTHER

## 2023-10-12 NOTE — PROGRESS NOTES
Subjective:   Patient ID:  Merari Orosco is a 43 y.o. female who presents for evaluation of No chief complaint on file.    10.12.2023  This was a virtual visit.  Six-month follow-up.    She is doing well.  Her blood pressure is better digital Medicine also assisting with her blood pressure management.  Her numbers were elevated in September history was  increased.  Still getting diastolic blood pressures in the 90s sometimes.    Will add Aldactone.    No other complaints.    2023.    Comes in for a 1 month follow-up.    Her blood pressure is under better control.  Had made some substantial changes to her medication list last visit.    Her leg swelling is also improved.    She takes Lasix daily.  Instructed to take sometimes twice a day or 2 pills once as she feels better.    Follow-up  Pertinent negatives include no chest pain.     2023  COMES IN FOR A THREE-MONTH FOLLOW-UP.    SHE SAYS SHE HAS BEEN TAKING HER MEDICATIONS CONSISTENTLY LATELY.    SHE HAS BILATERAL LOWER EXTREMITY SWELLING AS WELL AS HAND SWELLING.  THEY ARE WORSE AT THE END OF THE DAY HOWEVER THEY ARE STILL PRESENT IN THE MORNING.    SHE DENIES HOWEVER ANY ORTHOPNEA OR DYSPNEA ON EXERTION EXCEPT WITH HEAVY EXERTION.    SHE STATES THAT SHE CAN NOT TELL WHETHER SHE SNORES OR NOT BECAUSE HE HAS NO PARTNERS.  HOWEVER SHE DOES HAVE AT TIMES THEY  AND SOMNOLENCE.  MALLAMPATI OF 4.  LARGE TONGUE BASE AND UNCONTROLLED HYPERTENSION.  NEVER BEEN EVALUATED FOR SLEEP APNEA.    DENIES ANY CHEST PAIN.    SHE WAS IN THE EMERGENCY ROOM A MONTH AGO WITH ALCOHOL INTOXICATION.  SHE STATES THAT SHE DOES NOT DRINK ON OCCASION SOMETIMES SHE DRINKS TOO MUCH IN 1 OCCASION.  BUT SHE DOES NOT DO THAT ON A DAILY BASIS OR EVEN ONCE A WEEK.  SHE SAYS THAT SHE PROBABLY HAD MORE ALCOHOL ISSUES IN THE PAST BUT STILL SHE WILL NO DRINKING ON A CONSTANT BASE.    2022  Comes in for follow-up.  So Dr. Morgan in the interim in May.    She had both hip surgeries  done.    She states that she could not get her medications and she has been out of metoprolol and chlorthalidone for weeks.    On arrival her blood pressure was 160/120.  She had her Toprol and chlorthalidone on her and she took both of them.  We also gave her clonidine 0.3 mg in the clinic.  Her blood pressure starts to go down.    She denies any chest pain, headache, syncope or syncope no palpitations.    She is not compliant with  low-salt diet.          40 yo female, started doing cigars occ recently no prior smoking, HTN controlled on diuretic and toprol, bilateral AVN  Denies hx of cocaine use, no family hx of cad   No hx of cad, cva, chf, dm or ckd \  Comes in for preop evaluation for right hip replacement     Denies any chest pain, she is limited with her activity however she denies any hx of chest pain, dyspnea, palpitations, syncope or presyncope   She is able to ambulate with difficulty  Unclear reason of her AVN to her hips as per review of notes , no hx of autoimmune disease, she denies hx of cocaine use  Compliant with meds       Past Medical History:   Diagnosis Date    Anxiety     Depression     Hypertension        Past Surgical History:   Procedure Laterality Date     SECTION      x1    HIP ARTHROPLASTY Right 2022    Procedure: ARTHROPLASTY, HIP;  Surgeon: Satnam Deleon MD;  Location: [facility] OR;  Service: Orthopedics;  Laterality: Right;    HIP ARTHROPLASTY Left 2022    Procedure: ARTHROPLASTY, HIP;  Surgeon: Satnam Deleon MD;  Location: [facility] OR;  Service: Orthopedics;  Laterality: Left;    INJECTION OF JOINT Bilateral 11/10/2021    Procedure: Injection, Joint;  Surgeon: Satnam Deleon MD;  Location: [facility] OR;  Service: General;  Laterality: Bilateral;  under fluoroscopy    JOINT REPLACEMENT      WISDOM TOOTH EXTRACTION         Social History     Tobacco Use    Smoking status: Never    Smokeless tobacco: Never   Substance Use Topics    Alcohol use: Yes      Alcohol/week: 1.0 standard drink of alcohol     Types: 1 Glasses of wine per week     Comment: socially    Drug use: No       Family History   Problem Relation Age of Onset    Colon cancer Father     Diabetes Father     Hypertension Father     Hypertension Mother     Diabetes Sister     Hypertension Sister     Breast cancer Neg Hx     Cancer Neg Hx     Miscarriages / Stillbirths Neg Hx     Ovarian cancer Neg Hx      labor Neg Hx     Stroke Neg Hx        Review of Systems   Cardiovascular:  Negative for chest pain, dyspnea on exertion, palpitations and syncope.   Genitourinary: Negative.    Neurological: Negative.        Current Outpatient Medications on File Prior to Visit   Medication Sig    blood pressure monitor (IHEALTH EASE) LG arm size (OP) by Other route as needed for High Blood Pressure.    carvediloL (COREG) 12.5 MG tablet Take 1 tablet (12.5 mg total) by mouth 2 (two) times daily with meals.    diazePAM (VALIUM) 2 MG tablet Take 1 tablet (2 mg total) by mouth 2 (two) times daily as needed for Anxiety.    FLUoxetine 20 MG capsule Take 1 capsule (20 mg total) by mouth once daily.    furosemide (LASIX) 40 MG tablet Take 1 tablet (40 mg total) by mouth once daily.    lisinopriL (PRINIVIL,ZESTRIL) 40 MG tablet Take 1 tablet (40 mg total) by mouth once daily.     No current facility-administered medications on file prior to visit.       Objective:   Objective:  Wt Readings from Last 3 Encounters:   23 105.2 kg (231 lb 14.8 oz)   23 109.3 kg (241 lb)   23 109.5 kg (241 lb 6.5 oz)     Temp Readings from Last 3 Encounters:   23 98.3 °F (36.8 °C)   23 97.6 °F (36.4 °C)   23 98.4 °F (36.9 °C) (Oral)     BP Readings from Last 3 Encounters:   23 128/88   23 126/88   23 126/88     Pulse Readings from Last 3 Encounters:   23 108   23 83   23 76       Physical Exam    Lab Results   Component Value Date    CHOL 172 2022    CHOL 228 (H)  "08/24/2021    CHOL 220 (H) 01/30/2020     Lab Results   Component Value Date    HDL 65 12/09/2022    HDL 58 08/24/2021    HDL 69 01/30/2020     Lab Results   Component Value Date    LDLCALC 65.4 12/09/2022    LDLCALC 147.2 08/24/2021    LDLCALC 135.4 01/30/2020     Lab Results   Component Value Date    TRIG 208 (H) 12/09/2022    TRIG 114 08/24/2021    TRIG 78 01/30/2020     Lab Results   Component Value Date    CHOLHDL 37.8 12/09/2022    CHOLHDL 25.4 08/24/2021    CHOLHDL 31.4 01/30/2020       Chemistry        Component Value Date/Time     08/31/2023 1045    K 3.5 08/31/2023 1045     08/31/2023 1045    CO2 26 08/31/2023 1045    BUN 3 (L) 08/31/2023 1045    CREATININE 0.7 08/31/2023 1045    GLU 82 08/31/2023 1045        Component Value Date/Time    CALCIUM 8.9 08/31/2023 1045    ALKPHOS 44 (L) 01/20/2023 1730    AST 23 01/20/2023 1730    ALT 13 01/20/2023 1730    BILITOT 0.3 01/20/2023 1730    ESTGFRAFRICA 116 08/24/2023 0552    ESTGFRAFRICA >60.0 05/27/2022 1000    EGFRNONAA >60.0 05/27/2022 1000          Lab Results   Component Value Date    TSH 0.725 01/30/2020     No results found for: "INR", "PROTIME"  Lab Results   Component Value Date    WBC 6.86 01/20/2023    HGB 12.6 01/20/2023    HCT 37.3 01/20/2023    MCV 93 01/20/2023     01/20/2023     BNP  @LABRCNTIP(BNP,BNPTRIAGEBLO)@  CrCl cannot be calculated (Patient's most recent lab result is older than the maximum 7 days allowed.).     Imaging:  ======  No results found for this or any previous visit.    No results found for this or any previous visit.    No results found for this or any previous visit.    Results for orders placed during the hospital encounter of 08/15/16    X-Ray Chest PA And Lateral    Narrative  Comparison: None    2 views    Findings:    Heart and pulmonary vasculature are within normal limits.  Midline trachea.  Minimal smooth pleural thickening extensive apices.  No consolidation or effusion.  Osseous structures " intact.  IMPRESSION:      No acute infiltrate.  See above.  ______________________________________    Electronically signed by: JASE MAZA III, MD  Date:     08/15/16  Time:    11:56    No results found for this or any previous visit.    No valid procedures specified.    Diagnostic Results:  ECG: Reviewed  Nsr, normal ecg     The 10-year ASCVD risk score (Sudha CHANG, et al., 2019) is: 0.3%    Values used to calculate the score:      Age: 43 years      Sex: Female      Is Non- : Yes      Diabetic: No      Tobacco smoker: No      Systolic Blood Pressure: 100 mmHg      Is BP treated: Yes      HDL Cholesterol: 65 mg/dL      Total Cholesterol: 172 mg/dL    Assessment and Plan:   Essential hypertension  -     spironolactone (ALDACTONE) 25 MG tablet; Take 1 tablet (25 mg total) by mouth once daily.  Dispense: 30 tablet; Refill: 11  -     Basic metabolic panel; Future; Expected date: 10/20/2023    Chronic venous hypertension involving both sides    Presence of IUD    Generalized anxiety disorder    Obstructive sleep apnea (AHI = 20)    Class 1 obesity due to excess calories with serious comorbidity and body mass index (BMI) of 33.0 to 33.9 in adult              Reviewed all tests and above medical conditions with patient in detail and formulated treatment plan.  Risk factor modification discussed.   Cardiac low salt diet discussed.  Maintaining healthy weight and weight loss goals were discussed in clinic.  Blood pressure overall is better however diastolic blood pressure is still not at goal.  Continue with lisinopril, carvedilol and Lasix.    Add Aldactone.  Check BMP in a week.  She has an IUD.  Explained this medication can be teratogenic.  Follow with Pulmonary for sleep apnea.  Follow up in six-month

## 2023-10-16 ENCOUNTER — OFFICE VISIT (OUTPATIENT)
Dept: PULMONOLOGY | Facility: CLINIC | Age: 43
End: 2023-10-16
Payer: MEDICAID

## 2023-10-16 VITALS
OXYGEN SATURATION: 100 % | WEIGHT: 218.94 LBS | BODY MASS INDEX: 35.19 KG/M2 | SYSTOLIC BLOOD PRESSURE: 122 MMHG | HEART RATE: 68 BPM | HEIGHT: 66 IN | DIASTOLIC BLOOD PRESSURE: 78 MMHG | RESPIRATION RATE: 18 BRPM

## 2023-10-16 DIAGNOSIS — F33.1 MAJOR DEPRESSIVE DISORDER, RECURRENT, MODERATE: Chronic | ICD-10-CM

## 2023-10-16 DIAGNOSIS — I10 ESSENTIAL HYPERTENSION: Chronic | ICD-10-CM

## 2023-10-16 DIAGNOSIS — E66.01 CLASS 2 SEVERE OBESITY DUE TO EXCESS CALORIES WITH SERIOUS COMORBIDITY AND BODY MASS INDEX (BMI) OF 37.0 TO 37.9 IN ADULT: Primary | ICD-10-CM

## 2023-10-16 DIAGNOSIS — G47.30 SLEEP APNEA, UNSPECIFIED TYPE: ICD-10-CM

## 2023-10-16 PROCEDURE — 3078F PR MOST RECENT DIASTOLIC BLOOD PRESSURE < 80 MM HG: ICD-10-PCS | Mod: CPTII,,, | Performed by: PHYSICIAN ASSISTANT

## 2023-10-16 PROCEDURE — 3078F DIAST BP <80 MM HG: CPT | Mod: CPTII,,, | Performed by: PHYSICIAN ASSISTANT

## 2023-10-16 PROCEDURE — 99213 OFFICE O/P EST LOW 20 MIN: CPT | Mod: PBBFAC | Performed by: PHYSICIAN ASSISTANT

## 2023-10-16 PROCEDURE — 99214 OFFICE O/P EST MOD 30 MIN: CPT | Mod: S$PBB,,, | Performed by: PHYSICIAN ASSISTANT

## 2023-10-16 PROCEDURE — 3008F PR BODY MASS INDEX (BMI) DOCUMENTED: ICD-10-PCS | Mod: CPTII,,, | Performed by: PHYSICIAN ASSISTANT

## 2023-10-16 PROCEDURE — 4010F ACE/ARB THERAPY RXD/TAKEN: CPT | Mod: CPTII,,, | Performed by: PHYSICIAN ASSISTANT

## 2023-10-16 PROCEDURE — 1159F MED LIST DOCD IN RCRD: CPT | Mod: CPTII,,, | Performed by: PHYSICIAN ASSISTANT

## 2023-10-16 PROCEDURE — 3008F BODY MASS INDEX DOCD: CPT | Mod: CPTII,,, | Performed by: PHYSICIAN ASSISTANT

## 2023-10-16 PROCEDURE — 1160F RVW MEDS BY RX/DR IN RCRD: CPT | Mod: CPTII,,, | Performed by: PHYSICIAN ASSISTANT

## 2023-10-16 PROCEDURE — 99214 PR OFFICE/OUTPT VISIT, EST, LEVL IV, 30-39 MIN: ICD-10-PCS | Mod: S$PBB,,, | Performed by: PHYSICIAN ASSISTANT

## 2023-10-16 PROCEDURE — 4010F PR ACE/ARB THEARPY RXD/TAKEN: ICD-10-PCS | Mod: CPTII,,, | Performed by: PHYSICIAN ASSISTANT

## 2023-10-16 PROCEDURE — 1160F PR REVIEW ALL MEDS BY PRESCRIBER/CLIN PHARMACIST DOCUMENTED: ICD-10-PCS | Mod: CPTII,,, | Performed by: PHYSICIAN ASSISTANT

## 2023-10-16 PROCEDURE — 3074F SYST BP LT 130 MM HG: CPT | Mod: CPTII,,, | Performed by: PHYSICIAN ASSISTANT

## 2023-10-16 PROCEDURE — 1159F PR MEDICATION LIST DOCUMENTED IN MEDICAL RECORD: ICD-10-PCS | Mod: CPTII,,, | Performed by: PHYSICIAN ASSISTANT

## 2023-10-16 PROCEDURE — 3074F PR MOST RECENT SYSTOLIC BLOOD PRESSURE < 130 MM HG: ICD-10-PCS | Mod: CPTII,,, | Performed by: PHYSICIAN ASSISTANT

## 2023-10-16 PROCEDURE — 99999 PR PBB SHADOW E&M-EST. PATIENT-LVL III: ICD-10-PCS | Mod: PBBFAC,,, | Performed by: PHYSICIAN ASSISTANT

## 2023-10-16 PROCEDURE — 99999 PR PBB SHADOW E&M-EST. PATIENT-LVL III: CPT | Mod: PBBFAC,,, | Performed by: PHYSICIAN ASSISTANT

## 2023-10-16 NOTE — PROGRESS NOTES
Subjective:       Patient ID: Merari Orosco is a 43 y.o. female.    Chief Complaint: No chief complaint on file.      Here for MAJOR  Referred by cardiology, she has history of uncontrolled HTN  Completed home sleep study, revealed moderate MAJOR, AHI 20  She snores  Trouble sleeping  Wakes up gasping  Has daytime fatigue  Willing to try CPAP  No SOB, chest pain, or cough  No history of lung disease  She would like to work on weight loss  No sleep aids or alcohol  Does drink caffeine sodas  No restless legs  No sleep walking  No concerns for narcolepsy    BP Readings from Last 3 Encounters:   10/16/23 122/78   06/05/23 128/88   05/05/23 126/88         10/16/2023    11:16 AM   EPWORTH SLEEPINESS SCALE   Sitting and reading 1   Watching TV 1   Sitting, inactive in a public place (e.g. a theatre or a meeting) 0   As a passenger in a car for an hour without a break 1   Lying down to rest in the afternoon when circumstances permit 0   Sitting and talking to someone 0   Sitting quietly after a lunch without alcohol 0   In a car, while stopped for a few minutes in traffic 0   Total score 3       Immunization History   Administered Date(s) Administered    COVID-19, MRNA, LN-S, PF (Pfizer) (Gray Cap) 02/01/2022, 03/11/2022    COVID-19, mRNA, LNP-S, bivalent booster, PF (Moderna Omicron)12 + YEARS 01/06/2023    Influenza 12/20/2019    Influenza - Quadrivalent - PF *Preferred* (6 months and older) 10/22/2020, 01/06/2022, 09/20/2022, 10/10/2023    Tdap 08/15/2016      Tobacco Use: Low Risk  (10/16/2023)    Patient History     Smoking Tobacco Use: Never     Smokeless Tobacco Use: Never     Passive Exposure: Not on file      Past Medical History:   Diagnosis Date    Anxiety     Depression     Hypertension       Current Outpatient Medications on File Prior to Visit   Medication Sig Dispense Refill    blood pressure monitor (IHEALTH EASE) LG arm size (OP) by Other route as needed for High Blood Pressure. 1 each 0    carvediloL (COREG)  "12.5 MG tablet Take 1 tablet (12.5 mg total) by mouth 2 (two) times daily with meals. 180 tablet 3    diazePAM (VALIUM) 2 MG tablet Take 1 tablet (2 mg total) by mouth 2 (two) times daily as needed for Anxiety. 60 tablet 5    FLUoxetine 20 MG capsule Take 1 capsule (20 mg total) by mouth once daily. 90 capsule 3    furosemide (LASIX) 40 MG tablet Take 1 tablet (40 mg total) by mouth once daily. 90 tablet 3    lisinopriL (PRINIVIL,ZESTRIL) 40 MG tablet Take 1 tablet (40 mg total) by mouth once daily. 90 tablet 0    spironolactone (ALDACTONE) 25 MG tablet Take 1 tablet (25 mg total) by mouth once daily. 30 tablet 11     No current facility-administered medications on file prior to visit.        Review of Systems   Constitutional:  Positive for fatigue. Negative for fever, weight loss, appetite change and weakness.   HENT:  Negative for postnasal drip, rhinorrhea, sinus pressure, trouble swallowing and congestion.    Respiratory:  Positive for snoring and somnolence. Negative for cough, sputum production, choking, chest tightness, shortness of breath, wheezing and dyspnea on extertion.    Cardiovascular:  Negative for chest pain and leg swelling.   Musculoskeletal:  Negative for joint swelling.   Gastrointestinal:  Negative for nausea, vomiting and abdominal pain.   Neurological:  Negative for dizziness, weakness and headaches.   Psychiatric/Behavioral:  Positive for sleep disturbance.    All other systems reviewed and are negative.      Objective:       Vitals:    10/16/23 1109   BP: 122/78   Pulse: 68   Resp: 18   SpO2: 100%   Weight: 99.3 kg (218 lb 14.7 oz)   Height: 5' 6" (1.676 m)       Physical Exam   Constitutional: She is oriented to person, place, and time. She appears well-developed and well-nourished. No distress.   HENT:   Head: Normocephalic.   Mouth/Throat: Oropharynx is clear and moist.   Cardiovascular: Normal rate and regular rhythm.   Pulmonary/Chest: Effort normal. No respiratory distress. She has " no wheezes. She has no rhonchi. She has no rales.   Musculoskeletal:         General: No edema.      Cervical back: Normal range of motion and neck supple.   Neurological: She is alert and oriented to person, place, and time. Gait normal.   Skin: Skin is warm and dry.   Psychiatric: She has a normal mood and affect.   Vitals reviewed.    Personal Diagnostic Review    Echo Saline Bubble? No  · The left ventricle is normal in size with concentric remodeling and   normal systolic function.  · Normal left ventricular diastolic function.  · The estimated PA systolic pressure is 40 mmHg.  · Normal right ventricular size with normal right ventricular systolic   function.  · Intermediate central venous pressure (8 mmHg).  · The estimated ejection fraction is 60%.  · Mild tricuspid regurgitation.         PHYSICIAN INTERPRETATION AND COMMENTS: Findings are consistent with moderate obstructive sleep apnea (MAJOR).  Therapy with CPAP indicated. Please refer to sleep disorders clinic for management  CLINICAL HISTORY: 42 year old female presented with: 15.25 inch neck, BMI of 38.7, an [place] sleepiness score of 10, and  history of hypertension, depression. Based on the clinical history, the patient has a high pre-test probability of having Mild  MAJOR.  SLEEP STUDY FINDINGS: Patient underwent a 1 night Home Sleep Test and by behavioral criteria, slept for approximately  4.85 hours , with a sleep efficiency of 69%. Moderate sleep disordered breathing (AHI=20) is noted based on a 4% hypopnea  desaturation criteria. When considering more subtle measures of sleep disordered breathing, the overall respiratory  disturbance index is moderate(RDI=32) based on a 1% hypopnea desaturation criteria with confirmation by surrogate  arousal indicators. The apneas/hypopneas are accompanied by minimal oxygen desaturation (percent time below 90%  SpO2: 0%, Min SpO2: 82.5%). The average desaturation across all sleep disordered breathing events is  "2.9%. Snoring occurs  for 50.7% (30 dB) of the study, 39.8% is very loud. The mean pulse rate is 76 BPM, with very frequent pulse rate variability  (65 events with >= 6 BPM increase/decrease per hour).        10/16/2023    11:09 AM 6/5/2023    11:17 AM 5/5/2023     1:37 PM 4/27/2023    11:16 AM 3/21/2023     1:24 PM 3/10/2023     8:48 AM 3/7/2023     8:09 AM   Pulmonary Function Tests   SpO2 100 % 98 %  100 %      Height 5' 6" (1.676 m) 5' 6" (1.676 m) 5' 6" (1.676 m) 5' 6" (1.676 m) 5' 6" (1.676 m) 5' 6" (1.676 m) 5' 6" (1.676 m)   Weight 99.3 kg (218 lb 14.7 oz) 105.2 kg (231 lb 14.8 oz) 109.3 kg (241 lb) 109.5 kg (241 lb 6.5 oz) 110 kg (242 lb 8.1 oz) 107.6 kg (237 lb 3.4 oz) 107.5 kg (236 lb 15.9 oz)   BMI (Calculated) 35.4 37.5 38.9 39 39.2 38.3 38.3         Assessment/Plan:       Problem List Items Addressed This Visit          Psychiatric    Major depressive disorder, recurrent, moderate (Chronic)     Stable, F/u regularly with PCP              Cardiac/Vascular    Essential hypertension (Chronic)     Stable, continue current medication management             Endocrine    Class 2 severe obesity due to excess calories with serious comorbidity and body mass index (BMI) of 37.0 to 37.9 in adult - Primary     Weight loss and exercise to improve overall health.              Other    Sleep apnea     Moderate MAJOR  Start CPAP, .nasal mask  Discussed therapeutic goals for CPAP: Ideal usage 100% of nights for 6-8 hours per night. Minimum usage is 70% of night for at least 4 hours per night.           Relevant Orders    CPAP/BIPAP SUPPLIES    CPAP FOR HOME USE       Follow up in about 3 months (around 1/16/2024) for new cpap dl.    Discussed diagnosis, its evaluation, treatment and usual course. All questions answered.    Patient verbalized understanding of plan and left in no acute distress    Thank you for the courtesy of participating in the care of this patient    Elizabeth G Blough, PA-C [facility]    "

## 2023-10-16 NOTE — ASSESSMENT & PLAN NOTE
Moderate MAJOR  Start CPAP, .nasal mask  Discussed therapeutic goals for CPAP: Ideal usage 100% of nights for 6-8 hours per night. Minimum usage is 70% of night for at least 4 hours per night.

## 2023-10-20 ENCOUNTER — LAB VISIT (OUTPATIENT)
Dept: LAB | Facility: HOSPITAL | Age: 43
End: 2023-10-20
Attending: FAMILY MEDICINE
Payer: MEDICAID

## 2023-10-20 DIAGNOSIS — E66.01 CLASS 2 SEVERE OBESITY DUE TO EXCESS CALORIES WITH SERIOUS COMORBIDITY AND BODY MASS INDEX (BMI) OF 38.0 TO 38.9 IN ADULT: ICD-10-CM

## 2023-10-20 DIAGNOSIS — Z13.1 SCREENING FOR DIABETES MELLITUS: ICD-10-CM

## 2023-10-20 DIAGNOSIS — K70.0 FATTY LIVER DUE TO ALCOHOLISM: Chronic | ICD-10-CM

## 2023-10-20 PROCEDURE — 36415 COLL VENOUS BLD VENIPUNCTURE: CPT | Performed by: FAMILY MEDICINE

## 2023-10-20 PROCEDURE — 83036 HEMOGLOBIN GLYCOSYLATED A1C: CPT | Performed by: FAMILY MEDICINE

## 2023-10-20 PROCEDURE — 80053 COMPREHEN METABOLIC PANEL: CPT | Performed by: FAMILY MEDICINE

## 2023-10-20 PROCEDURE — 82977 ASSAY OF GGT: CPT | Performed by: FAMILY MEDICINE

## 2023-10-21 LAB
ALBUMIN SERPL BCP-MCNC: 3.8 G/DL (ref 3.5–5.2)
ALP SERPL-CCNC: 53 U/L (ref 55–135)
ALT SERPL W/O P-5'-P-CCNC: 8 U/L (ref 10–44)
ANION GAP SERPL CALC-SCNC: 10 MMOL/L (ref 8–16)
AST SERPL-CCNC: 15 U/L (ref 10–40)
BILIRUB SERPL-MCNC: 0.3 MG/DL (ref 0.1–1)
BUN SERPL-MCNC: 11 MG/DL (ref 6–20)
CALCIUM SERPL-MCNC: 9.4 MG/DL (ref 8.7–10.5)
CHLORIDE SERPL-SCNC: 104 MMOL/L (ref 95–110)
CO2 SERPL-SCNC: 24 MMOL/L (ref 23–29)
CREAT SERPL-MCNC: 0.8 MG/DL (ref 0.5–1.4)
EST. GFR  (NO RACE VARIABLE): >60 ML/MIN/1.73 M^2
ESTIMATED AVG GLUCOSE: 97 MG/DL (ref 68–131)
GGT SERPL-CCNC: 34 U/L (ref 8–55)
GLUCOSE SERPL-MCNC: 80 MG/DL (ref 70–110)
HBA1C MFR BLD: 5 % (ref 4–5.6)
POTASSIUM SERPL-SCNC: 4.1 MMOL/L (ref 3.5–5.1)
PROT SERPL-MCNC: 7.2 G/DL (ref 6–8.4)
SODIUM SERPL-SCNC: 138 MMOL/L (ref 136–145)

## 2023-10-26 NOTE — PROGRESS NOTES
Results to be addressed at upcoming appointment(s) already scheduled.  Future Appointments  10/27/2023 11:00 AM   YESY Stone MD      [facility]             [place]  3/4/2024   2:00 PM    Apirl Sanderson MD     [facility]

## 2023-10-27 ENCOUNTER — OFFICE VISIT (OUTPATIENT)
Dept: INTERNAL MEDICINE | Facility: CLINIC | Age: 43
End: 2023-10-27
Payer: MEDICAID

## 2023-10-27 VITALS
RESPIRATION RATE: 18 BRPM | HEART RATE: 80 BPM | WEIGHT: 218.5 LBS | OXYGEN SATURATION: 99 % | SYSTOLIC BLOOD PRESSURE: 116 MMHG | DIASTOLIC BLOOD PRESSURE: 78 MMHG | TEMPERATURE: 98 F | BODY MASS INDEX: 35.12 KG/M2 | HEIGHT: 66 IN

## 2023-10-27 DIAGNOSIS — G47.33 OBSTRUCTIVE SLEEP APNEA: Chronic | ICD-10-CM

## 2023-10-27 DIAGNOSIS — I10 ESSENTIAL HYPERTENSION: Primary | ICD-10-CM

## 2023-10-27 DIAGNOSIS — K70.0 FATTY LIVER DUE TO ALCOHOLISM: Chronic | ICD-10-CM

## 2023-10-27 DIAGNOSIS — F41.1 GENERALIZED ANXIETY DISORDER: Chronic | ICD-10-CM

## 2023-10-27 DIAGNOSIS — I87.303 CHRONIC VENOUS HYPERTENSION INVOLVING BOTH SIDES: Chronic | ICD-10-CM

## 2023-10-27 DIAGNOSIS — F10.21 ALCOHOL DEPENDENCE IN REMISSION: Chronic | ICD-10-CM

## 2023-10-27 PROCEDURE — 1159F PR MEDICATION LIST DOCUMENTED IN MEDICAL RECORD: ICD-10-PCS | Mod: CPTII,,, | Performed by: FAMILY MEDICINE

## 2023-10-27 PROCEDURE — 99999 PR PBB SHADOW E&M-EST. PATIENT-LVL IV: ICD-10-PCS | Mod: PBBFAC,,, | Performed by: FAMILY MEDICINE

## 2023-10-27 PROCEDURE — 3008F BODY MASS INDEX DOCD: CPT | Mod: CPTII,,, | Performed by: FAMILY MEDICINE

## 2023-10-27 PROCEDURE — 1159F MED LIST DOCD IN RCRD: CPT | Mod: CPTII,,, | Performed by: FAMILY MEDICINE

## 2023-10-27 PROCEDURE — 3074F SYST BP LT 130 MM HG: CPT | Mod: CPTII,,, | Performed by: FAMILY MEDICINE

## 2023-10-27 PROCEDURE — 4010F ACE/ARB THERAPY RXD/TAKEN: CPT | Mod: CPTII,,, | Performed by: FAMILY MEDICINE

## 2023-10-27 PROCEDURE — 99214 OFFICE O/P EST MOD 30 MIN: CPT | Mod: PBBFAC | Performed by: FAMILY MEDICINE

## 2023-10-27 PROCEDURE — 1160F PR REVIEW ALL MEDS BY PRESCRIBER/CLIN PHARMACIST DOCUMENTED: ICD-10-PCS | Mod: CPTII,,, | Performed by: FAMILY MEDICINE

## 2023-10-27 PROCEDURE — 3074F PR MOST RECENT SYSTOLIC BLOOD PRESSURE < 130 MM HG: ICD-10-PCS | Mod: CPTII,,, | Performed by: FAMILY MEDICINE

## 2023-10-27 PROCEDURE — 99214 PR OFFICE/OUTPT VISIT, EST, LEVL IV, 30-39 MIN: ICD-10-PCS | Mod: S$PBB,,, | Performed by: FAMILY MEDICINE

## 2023-10-27 PROCEDURE — 99999 PR PBB SHADOW E&M-EST. PATIENT-LVL IV: CPT | Mod: PBBFAC,,, | Performed by: FAMILY MEDICINE

## 2023-10-27 PROCEDURE — 4010F PR ACE/ARB THEARPY RXD/TAKEN: ICD-10-PCS | Mod: CPTII,,, | Performed by: FAMILY MEDICINE

## 2023-10-27 PROCEDURE — 3078F PR MOST RECENT DIASTOLIC BLOOD PRESSURE < 80 MM HG: ICD-10-PCS | Mod: CPTII,,, | Performed by: FAMILY MEDICINE

## 2023-10-27 PROCEDURE — 99214 OFFICE O/P EST MOD 30 MIN: CPT | Mod: S$PBB,,, | Performed by: FAMILY MEDICINE

## 2023-10-27 PROCEDURE — 3078F DIAST BP <80 MM HG: CPT | Mod: CPTII,,, | Performed by: FAMILY MEDICINE

## 2023-10-27 PROCEDURE — 3008F PR BODY MASS INDEX (BMI) DOCUMENTED: ICD-10-PCS | Mod: CPTII,,, | Performed by: FAMILY MEDICINE

## 2023-10-27 PROCEDURE — 3044F PR MOST RECENT HEMOGLOBIN A1C LEVEL <7.0%: ICD-10-PCS | Mod: CPTII,,, | Performed by: FAMILY MEDICINE

## 2023-10-27 PROCEDURE — 3044F HG A1C LEVEL LT 7.0%: CPT | Mod: CPTII,,, | Performed by: FAMILY MEDICINE

## 2023-10-27 PROCEDURE — 1160F RVW MEDS BY RX/DR IN RCRD: CPT | Mod: CPTII,,, | Performed by: FAMILY MEDICINE

## 2023-10-27 RX ORDER — SPIRONOLACTONE 25 MG/1
25 TABLET ORAL DAILY
Qty: 90 TABLET | Refills: 2 | Status: SHIPPED | OUTPATIENT
Start: 2023-10-27

## 2023-10-27 RX ORDER — DIAZEPAM 2 MG/1
2 TABLET ORAL 2 TIMES DAILY PRN
Qty: 60 TABLET | Refills: 5 | Status: SHIPPED | OUTPATIENT
Start: 2023-10-27 | End: 2024-05-01

## 2023-10-27 NOTE — ASSESSMENT & PLAN NOTE
FIB-4 Calculation: 0.85 at 10/27/2023 12:02 PM  Calculated from:  Last SGOT/AST : 15 at 10/27/2023 12:02 PM  Last SGPT/ALT: 8 at 10/27/2023 12:02 PM   Platelets: 255 at 10/27/2023 12:02 PM   Age: 43 y.o.   FIB-4 below 1.30 is considered as low-risk for advanced fibrosis    Lab Results   Component Value Date    AST 15 10/20/2023    AST 23 01/20/2023    ALT 8 (L) 10/20/2023    ALT 13 01/20/2023    GGT 34 10/20/2023    GGT 27 04/20/2023    ALBUMIN 3.8 10/20/2023    ALBUMIN 3.9 01/20/2023     01/20/2023     (H) 12/09/2022     Results for orders placed during the hospital encounter of 04/20/23    US Abdomen Limited    Narrative  EXAMINATION:  US ABDOMEN LIMITED    CLINICAL HISTORY:  Abnormal levels of other serum enzymes    TECHNIQUE:  Limited ultrasound of the abdomen (including pancreas, liver, gallbladder, common bile duct, and spleen) was performed.    COMPARISON:  None.    FINDINGS:  Pancreas: The visualized portions of pancreas appear normal    Liver: Enlarged measuring 21.0 cm.  Hepatic parenchymal echogenicity appears mildly elevated suggesting possible fatty infiltration.  No focal hepatic lesions.    Gallbladder: No calculi, wall thickening, or pericholecystic fluid.  No sonographic Willard's sign.    Biliary system: The common duct is not dilated, measuring 3 mm.  No intrahepatic ductal dilatation.    Spleen: Normal in size, measuring 10.8 cm. with homogeneous echotexture.    Miscellaneous: No upper abdominal ascites.    Impression  Hepatomegaly with possible fatty infiltration of the liver.  No focal liver lesions visualized.      Electronically signed by: Zeb Crooks MD  Date:    04/20/2023  Time:    15:30    No results found for this or any previous visit.

## 2023-10-27 NOTE — PROGRESS NOTES
OFFICE VISIT 10/27/23 11:00 AM CDT    CHIEF COMPLAINT: Follow-up    Her hypertension is well controlled and she is tolerating current medications well.    Her chronic venous hypertension is much improved since adding spironolactone to furosemide.    A sleep test revealed obstructive sleep apnea with an apnea hypopnea index of 20 events per hour. CPAP has been ordered, and she is awaiting delivery.    She reports that she remains alcohol-free and is committed to sobriety.    She reports that her anxiety is much improved after having made therapeutic lifestyle changes. Prescription monitoring program data reveals no concerning prescription behavior. She is demonstrating no behaviors to suggest inappropriate use of diazepam, which she says works well and she is needing less frequently.    Her fatty liver disease is asymptomatic, with a low risk for significant fibrosis.    1. Essential hypertension  -     spironolactone (ALDACTONE) 25 MG tablet; Take 1 tablet (25 mg total) by mouth once daily.  Dispense: 90 tablet; Refill: 2  -     Basic Metabolic Panel; Future; Expected date: 01/05/2024  -     Comprehensive Metabolic Panel; Future; Expected date: 04/19/2024  -     Lipid Panel; Future; Expected date: 04/19/2024    2. Chronic venous hypertension involving both sides  -     spironolactone (ALDACTONE) 25 MG tablet; Take 1 tablet (25 mg total) by mouth once daily.  Dispense: 90 tablet; Refill: 2    3. Obstructive sleep apnea (AHI=20)  Overview:  HST 3/27/23  SLEEP STUDY FINDINGS: Patient underwent a 1 night Home Sleep Test and by behavioral criteria, slept for approximately  4.85 hours , with a sleep efficiency of 69%. Moderate sleep disordered breathing (AHI=20) is noted based on a 4% hypopnea  desaturation criteria. When considering more subtle measures of sleep disordered breathing, the overall respiratory  disturbance index is moderate(RDI=32) based on a 1% hypopnea desaturation criteria with confirmation by  surrogate  arousal indicators. The apneas/hypopneas are accompanied by minimal oxygen desaturation (percent time below 90%  SpO2: 0%, Min SpO2: 82.5%). The average desaturation across all sleep disordered breathing events is 2.9%. Snoring occurs  for 50.7% (30 dB) of the study, 39.8% is very loud. The mean pulse rate is 76 BPM, with very frequent pulse rate variability  (65 events with >= 6 BPM increase/decrease per hour).    Assessment & Plan:  CPAP ordered. Awaiting delivery.      4. Generalized anxiety disorder  -     diazePAM (VALIUM) 2 MG tablet; Take 1 tablet (2 mg total) by mouth 2 (two) times daily as needed for Anxiety.  Dispense: 60 tablet; Refill: 5    5. Alcohol dependence in remission  Overview:  Sobriety Date = 12/29/2022    Assessment & Plan:  She remains committed to sobriety.      6. 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  No focal liver lesions visualized.    Assessment & Plan:  FIB-4 Calculation: 0.85 at 10/27/2023 12:02 PM  Calculated from:  Last SGOT/AST : 15 at 10/27/2023 12:02 PM  Last SGPT/ALT: 8 at 10/27/2023 12:02 PM   Platelets: 255 at 10/27/2023 12:02 PM   Age: 43 y.o.   FIB-4 below 1.30 is considered as low-risk for advanced fibrosis    Lab Results   Component Value Date    AST 15 10/20/2023    AST 23 01/20/2023    ALT 8 (L) 10/20/2023    ALT 13 01/20/2023    GGT 34 10/20/2023    GGT 27 04/20/2023    ALBUMIN 3.8 10/20/2023    ALBUMIN 3.9 01/20/2023     01/20/2023     (H) 12/09/2022     Results for orders placed during the hospital encounter of 04/20/23    US Abdomen Limited    Narrative  EXAMINATION:  US ABDOMEN LIMITED    CLINICAL HISTORY:  Abnormal levels of other serum enzymes    TECHNIQUE:  Limited ultrasound of the abdomen (including pancreas, liver, gallbladder, common bile duct, and spleen) was performed.    COMPARISON:  None.    FINDINGS:  Pancreas: The visualized  portions of pancreas appear normal    Liver: Enlarged measuring 21.0 cm.  Hepatic parenchymal echogenicity appears mildly elevated suggesting possible fatty infiltration.  No focal hepatic lesions.    Gallbladder: No calculi, wall thickening, or pericholecystic fluid.  No sonographic Willard's sign.    Biliary system: The common duct is not dilated, measuring 3 mm.  No intrahepatic ductal dilatation.    Spleen: Normal in size, measuring 10.8 cm. with homogeneous echotexture.    Miscellaneous: No upper abdominal ascites.    Impression  Hepatomegaly with possible fatty infiltration of the liver.  No focal liver lesions visualized.      Electronically signed by: Zeb Crooks MD  Date:    04/20/2023  Time:    15:30    No results found for this or any previous visit.        Orders:  -     Comprehensive Metabolic Panel; Future; Expected date: 04/19/2024  -     GAMMA GT; Future; Expected date: 04/19/2024  -     CBC Without Differential; Future; Expected date: 04/19/2024    Unless noted herein, any chronic conditions are represented as and appear stable, and no other significant complaints or concerns were reported.  Schedule BMP in about 2 months.  OV F/U in mid-late April.  Schedule all other labs a few days before appointment with me in April.  Follow up in about 6 months (around 4/27/2024) for re-evaluation.   Future Appointments   Date Time Provider Department Center   12/27/2023 10:10 AM LABORATORY, [facility]   3/4/2024  2:00 PM April Sanderson MD [facility]   4/15/2024  7:45 AM LABORATORY, [facility]   4/22/2024 10:20 AM YESY Stone MD [facility]       Review of Systems   Respiratory:  Negative for chest tightness and shortness of breath.    Cardiovascular:  Negative for chest pain.   Endocrine: Negative for polydipsia and polyuria.       Vitals:    10/27/23 1129   BP: 116/78   BP Location: Right arm   Patient Position: Sitting   BP Method: Large (Manual)  "  Pulse: 80   Resp: 18   Temp: 97.7 °F (36.5 °C)   SpO2: 99%   Weight: 99.1 kg (218 lb 7.6 oz)   Height: 5' 6" (1.676 m)   Physical Exam  Vitals reviewed.   Constitutional:       General: She is not in acute distress.     Appearance: Normal appearance. She is not ill-appearing or diaphoretic.   Cardiovascular:      Rate and Rhythm: Normal rate and regular rhythm.   Pulmonary:      Effort: Pulmonary effort is normal.      Breath sounds: Normal breath sounds.   Skin:     General: Skin is warm and dry.   Neurological:      Mental Status: She is alert and oriented to person, place, and time. Mental status is at baseline.   Psychiatric:         Mood and Affect: Mood normal.         Behavior: Behavior normal.         Judgment: Judgment normal.     Documentation entered by me for this encounter may have been done in part using speech-recognition technology. Although I have made an effort to ensure accuracy, "sound like" errors may exist and should be interpreted in context.   "

## 2023-12-27 ENCOUNTER — LAB VISIT (OUTPATIENT)
Dept: LAB | Facility: HOSPITAL | Age: 43
End: 2023-12-27
Attending: FAMILY MEDICINE
Payer: MEDICAID

## 2023-12-27 DIAGNOSIS — I10 ESSENTIAL HYPERTENSION: ICD-10-CM

## 2023-12-27 LAB
ANION GAP SERPL CALC-SCNC: 8 MMOL/L (ref 8–16)
BUN SERPL-MCNC: 10 MG/DL (ref 6–20)
CALCIUM SERPL-MCNC: 9.7 MG/DL (ref 8.7–10.5)
CHLORIDE SERPL-SCNC: 101 MMOL/L (ref 95–110)
CO2 SERPL-SCNC: 29 MMOL/L (ref 23–29)
CREAT SERPL-MCNC: 0.9 MG/DL (ref 0.5–1.4)
EST. GFR  (NO RACE VARIABLE): >60 ML/MIN/1.73 M^2
GLUCOSE SERPL-MCNC: 76 MG/DL (ref 70–110)
POTASSIUM SERPL-SCNC: 4.2 MMOL/L (ref 3.5–5.1)
SODIUM SERPL-SCNC: 138 MMOL/L (ref 136–145)

## 2023-12-27 PROCEDURE — 80048 BASIC METABOLIC PNL TOTAL CA: CPT | Performed by: FAMILY MEDICINE

## 2023-12-27 PROCEDURE — 36415 COLL VENOUS BLD VENIPUNCTURE: CPT | Performed by: FAMILY MEDICINE

## 2024-01-22 ENCOUNTER — OFFICE VISIT (OUTPATIENT)
Dept: PULMONOLOGY | Facility: CLINIC | Age: 44
End: 2024-01-22
Payer: MEDICAID

## 2024-01-22 VITALS
RESPIRATION RATE: 18 BRPM | DIASTOLIC BLOOD PRESSURE: 80 MMHG | HEIGHT: 66 IN | OXYGEN SATURATION: 98 % | WEIGHT: 211 LBS | BODY MASS INDEX: 33.91 KG/M2 | SYSTOLIC BLOOD PRESSURE: 120 MMHG | HEART RATE: 62 BPM

## 2024-01-22 DIAGNOSIS — I10 ESSENTIAL HYPERTENSION: Chronic | ICD-10-CM

## 2024-01-22 DIAGNOSIS — G47.33 OBSTRUCTIVE SLEEP APNEA: Primary | Chronic | ICD-10-CM

## 2024-01-22 DIAGNOSIS — I27.21 HIGH PULMONARY ARTERIAL PRESSURE: ICD-10-CM

## 2024-01-22 DIAGNOSIS — E66.01 CLASS 2 SEVERE OBESITY DUE TO EXCESS CALORIES WITH SERIOUS COMORBIDITY AND BODY MASS INDEX (BMI) OF 37.0 TO 37.9 IN ADULT: ICD-10-CM

## 2024-01-22 PROCEDURE — 99214 OFFICE O/P EST MOD 30 MIN: CPT | Mod: PBBFAC | Performed by: PHYSICIAN ASSISTANT

## 2024-01-22 PROCEDURE — 3079F DIAST BP 80-89 MM HG: CPT | Mod: CPTII,,, | Performed by: PHYSICIAN ASSISTANT

## 2024-01-22 PROCEDURE — 1160F RVW MEDS BY RX/DR IN RCRD: CPT | Mod: CPTII,,, | Performed by: PHYSICIAN ASSISTANT

## 2024-01-22 PROCEDURE — 3008F BODY MASS INDEX DOCD: CPT | Mod: CPTII,,, | Performed by: PHYSICIAN ASSISTANT

## 2024-01-22 PROCEDURE — 99999 PR PBB SHADOW E&M-EST. PATIENT-LVL IV: CPT | Mod: PBBFAC,,, | Performed by: PHYSICIAN ASSISTANT

## 2024-01-22 PROCEDURE — 3074F SYST BP LT 130 MM HG: CPT | Mod: CPTII,,, | Performed by: PHYSICIAN ASSISTANT

## 2024-01-22 PROCEDURE — 99214 OFFICE O/P EST MOD 30 MIN: CPT | Mod: S$PBB,,, | Performed by: PHYSICIAN ASSISTANT

## 2024-01-22 PROCEDURE — 1159F MED LIST DOCD IN RCRD: CPT | Mod: CPTII,,, | Performed by: PHYSICIAN ASSISTANT

## 2024-01-22 NOTE — ASSESSMENT & PLAN NOTE
Echo 5/5/2023  · The left ventricle is normal in size with concentric remodeling and normal systolic function.  · Normal left ventricular diastolic function.  · The estimated PA systolic pressure is 40 mmHg.  · Normal right ventricular size with normal right ventricular systolic function.  · Intermediate central venous pressure (8 mmHg).  · The estimated ejection fraction is 60%.  · Mild tricuspid regurgitation.    Needs to use CPAP

## 2024-01-22 NOTE — PROGRESS NOTES
Subjective:       Patient ID: Merari Orosco is a 43 y.o. female.    Chief Complaint: MAJOR    1/22/24  Here for MAJOR on CPAP follow up  Compliance download reviewed, days with usage > 4 hours is 60%, average AHI is 1.4  Patient states improved symptoms with use of CPAP.   Trouble using nightly due to mask getting pushed to side from pillow        1/18/2024     9:09 PM   EPWORTH SLEEPINESS SCALE   Sitting and reading 2   Watching TV 1   Sitting, inactive in a public place (e.g. a theatre or a meeting) 1   As a passenger in a car for an hour without a break 0   Lying down to rest in the afternoon when circumstances permit 2   Sitting and talking to someone 0   Sitting quietly after a lunch without alcohol 1   In a car, while stopped for a few minutes in traffic 0   Total score 7         10/16/23  Here for MAJOR  Referred by cardiology, she has history of uncontrolled HTN  Completed home sleep study, revealed moderate MAJOR, AHI 20  She snores  Trouble sleeping  Wakes up gasping  Has daytime fatigue  Willing to try CPAP  No SOB, chest pain, or cough  No history of lung disease  She would like to work on weight loss  No sleep aids or alcohol  Does drink caffeine sodas  No restless legs  No sleep walking  No concerns for narcolepsy    BP Readings from Last 3 Encounters:   01/22/24 120/80   10/27/23 116/78   10/16/23 122/78         10/16/2023    11:16 AM   EPWORTH SLEEPINESS SCALE   Sitting and reading 1   Watching TV 1   Sitting, inactive in a public place (e.g. a theatre or a meeting) 0   As a passenger in a car for an hour without a break 1   Lying down to rest in the afternoon when circumstances permit 0   Sitting and talking to someone 0   Sitting quietly after a lunch without alcohol 0   In a car, while stopped for a few minutes in traffic 0   Total score 3       Immunization History   Administered Date(s) Administered    COVID-19, MRNA, LN-S PF (Pfizer) (Gray Cap) 02/01/2022, 03/11/2022    COVID-19, mRNA, LNP-S, PF  (Moderna 2023)Ages 12+ 11/15/2023    COVID-19, mRNA, LNP-S, bivalent booster, PF (Moderna Omicron)12 + YEARS 01/06/2023    Influenza 12/20/2019    Influenza - Quadrivalent - PF *Preferred* (6 months and older) 10/22/2020, 01/06/2022, 09/20/2022, 10/10/2023    Tdap 08/15/2016      Tobacco Use: Low Risk  (1/22/2024)    Patient History     Smoking Tobacco Use: Never     Smokeless Tobacco Use: Never     Passive Exposure: Not on file      Past Medical History:   Diagnosis Date    Anxiety     Depression     Hypertension     Obstructive sleep apnea (AHI=20) 4/27/2023    HST 3/27/23 SLEEP STUDY FINDINGS: Patient underwent a 1 night Home Sleep Test and by behavioral criteria, slept for approximately 4.85 hours , with a sleep efficiency of 69%. Moderate sleep disordered breathing (AHI=20) is noted based on a 4% hypopnea desaturation criteria. When considering more subtle measures of sleep disordered breathing, the overall respiratory disturbance index is moderate(RD      Current Outpatient Medications on File Prior to Visit   Medication Sig Dispense Refill    blood pressure monitor ([employer] EASE) LG arm size (OP) by Other route as needed for High Blood Pressure. 1 each 0    carvediloL (COREG) 12.5 MG tablet Take 1 tablet (12.5 mg total) by mouth 2 (two) times daily with meals. 180 tablet 3    diazePAM (VALIUM) 2 MG tablet Take 1 tablet (2 mg total) by mouth 2 (two) times daily as needed for Anxiety. 60 tablet 5    FLUoxetine 20 MG capsule Take 1 capsule (20 mg total) by mouth once daily. 90 capsule 3    furosemide (LASIX) 40 MG tablet Take 1 tablet (40 mg total) by mouth once daily. 90 tablet 3    lisinopriL (PRINIVIL,ZESTRIL) 40 MG tablet Take 1 tablet (40 mg total) by mouth once daily. 90 tablet 1    spironolactone (ALDACTONE) 25 MG tablet Take 1 tablet (25 mg total) by mouth once daily. 90 tablet 2    COVID [id number],12up,,andu,,PF, (SPIKEVAX 3963-6836,12Y UP,,PF,) 50 mcg/0.5 mL injection Inject into the muscle. 0.5 mL 0  "    No current facility-administered medications on file prior to visit.        Review of Systems   Constitutional:  Positive for fatigue. Negative for fever, weight loss, appetite change and weakness.   HENT:  Negative for postnasal drip, rhinorrhea, sinus pressure, trouble swallowing and congestion.    Respiratory:  Positive for snoring and somnolence. Negative for cough, sputum production, choking, chest tightness, shortness of breath, wheezing and dyspnea on extertion.    Cardiovascular:  Negative for chest pain and leg swelling.   Musculoskeletal:  Negative for joint swelling.   Gastrointestinal:  Negative for nausea, vomiting and abdominal pain.   Neurological:  Negative for dizziness, weakness and headaches.   Psychiatric/Behavioral:  Positive for sleep disturbance.    All other systems reviewed and are negative.      Objective:       Vitals:    01/22/24 0908   BP: 120/80   Pulse: 62   Resp: 18   SpO2: 98%   Weight: 95.7 kg (210 lb 15.7 oz)   Height: 5' 6" (1.676 m)       Physical Exam   Constitutional: She is oriented to person, place, and time. She appears well-developed and well-nourished. No distress.   HENT:   Head: Normocephalic.   Mouth/Throat: Oropharynx is clear and moist.   Cardiovascular: Normal rate and regular rhythm.   Pulmonary/Chest: Effort normal. No respiratory distress. She has no wheezes. She has no rhonchi. She has no rales.   Musculoskeletal:         General: No edema.      Cervical back: Normal range of motion and neck supple.   Neurological: She is alert and oriented to person, place, and time. Gait normal.   Skin: Skin is warm and dry.   Psychiatric: She has a normal mood and affect.   Vitals reviewed.    Personal Diagnostic Review    Echo Saline Bubble? No  · The left ventricle is normal in size with concentric remodeling and   normal systolic function.  · Normal left ventricular diastolic function.  · The estimated PA systolic pressure is 40 mmHg.  · Normal right ventricular size " "with normal right ventricular systolic   function.  · Intermediate central venous pressure (8 mmHg).  · The estimated ejection fraction is 60%.  · Mild tricuspid regurgitation.         PHYSICIAN INTERPRETATION AND COMMENTS: Findings are consistent with moderate obstructive sleep apnea (MAJOR).  Therapy with CPAP indicated. Please refer to sleep disorders clinic for management  CLINICAL HISTORY: 42 year old female presented with: 15.25 inch neck, BMI of 38.7, an [place] sleepiness score of 10, and  history of hypertension, depression. Based on the clinical history, the patient has a high pre-test probability of having Mild  MAJOR.  SLEEP STUDY FINDINGS: Patient underwent a 1 night Home Sleep Test and by behavioral criteria, slept for approximately  4.85 hours , with a sleep efficiency of 69%. Moderate sleep disordered breathing (AHI=20) is noted based on a 4% hypopnea  desaturation criteria. When considering more subtle measures of sleep disordered breathing, the overall respiratory  disturbance index is moderate(RDI=32) based on a 1% hypopnea desaturation criteria with confirmation by surrogate  arousal indicators. The apneas/hypopneas are accompanied by minimal oxygen desaturation (percent time below 90%  SpO2: 0%, Min SpO2: 82.5%). The average desaturation across all sleep disordered breathing events is 2.9%. Snoring occurs  for 50.7% (30 dB) of the study, 39.8% is very loud. The mean pulse rate is 76 BPM, with very frequent pulse rate variability  (65 events with >= 6 BPM increase/decrease per hour).        1/22/2024     9:08 AM 10/27/2023    11:29 AM 10/16/2023    11:09 AM 6/5/2023    11:17 AM 5/5/2023     1:37 PM 4/27/2023    11:16 AM 3/21/2023     1:24 PM   Pulmonary Function Tests   SpO2 98 % 99 % 100 % 98 %  100 %    Height 5' 6" (1.676 m) 5' 6" (1.676 m) 5' 6" (1.676 m) 5' 6" (1.676 m) 5' 6" (1.676 m) 5' 6" (1.676 m) 5' 6" (1.676 m)   Weight 95.7 kg (210 lb 15.7 oz) 99.1 kg (218 lb 7.6 oz) 99.3 kg (218 lb 14.7 " oz) 105.2 kg (231 lb 14.8 oz) 109.3 kg (241 lb) 109.5 kg (241 lb 6.5 oz) 110 kg (242 lb 8.1 oz)   BMI (Calculated) 34.1 35.3 35.4 37.5 38.9 39 39.2         Assessment/Plan:       Problem List Items Addressed This Visit          Cardiac/Vascular    Essential hypertension (Chronic)     Stable, continue current medication management          High pulmonary arterial pressure     Echo 5/5/2023  · The left ventricle is normal in size with concentric remodeling and normal systolic function.  · Normal left ventricular diastolic function.  · The estimated PA systolic pressure is 40 mmHg.  · Normal right ventricular size with normal right ventricular systolic function.  · Intermediate central venous pressure (8 mmHg).  · The estimated ejection fraction is 60%.  · Mild tricuspid regurgitation.    Needs to use CPAP            Endocrine    Class 2 severe obesity due to excess calories with serious comorbidity and body mass index (BMI) of 37.0 to 37.9 in adult     Weight loss and exercise to improve overall health.              Other    Obstructive sleep apnea (AHI=20) - Primary (Chronic)     Moderate MAJOR  Using nasal cushion  Recommend CPAP contour pillow  Will improve compliance and return in 6 weeks  Discussed therapeutic goals for CPAP: Ideal usage 100% of nights for 6-8 hours per night. Minimum usage is 70% of night for at least 4 hours per night.              Follow up in about 6 weeks (around 3/4/2024) for MAJOR follow up.    Discussed diagnosis, its evaluation, treatment and usual course. All questions answered.    Patient verbalized understanding of plan and left in no acute distress    Thank you for the courtesy of participating in the care of this patient    Justina South PA-C  [facility]

## 2024-01-22 NOTE — ASSESSMENT & PLAN NOTE
Moderate MAJOR  Using nasal cushion  Recommend CPAP contour pillow  Will improve compliance and return in 6 weeks  Discussed therapeutic goals for CPAP: Ideal usage 100% of nights for 6-8 hours per night. Minimum usage is 70% of night for at least 4 hours per night.

## 2024-02-14 DIAGNOSIS — M25.551 BILATERAL HIP PAIN: Primary | ICD-10-CM

## 2024-02-14 DIAGNOSIS — M25.552 BILATERAL HIP PAIN: Primary | ICD-10-CM

## 2024-02-19 ENCOUNTER — HOSPITAL ENCOUNTER (OUTPATIENT)
Dept: RADIOLOGY | Facility: HOSPITAL | Age: 44
Discharge: HOME | End: 2024-02-19
Attending: ORTHOPAEDIC SURGERY
Payer: MEDICAID

## 2024-02-19 ENCOUNTER — OFFICE VISIT (OUTPATIENT)
Dept: ORTHOPEDICS | Facility: CLINIC | Age: 44
End: 2024-02-19
Payer: MEDICAID

## 2024-02-19 ENCOUNTER — TELEPHONE (OUTPATIENT)
Dept: ORTHOPEDICS | Facility: CLINIC | Age: 44
End: 2024-02-19
Payer: MEDICAID

## 2024-02-19 VITALS — BODY MASS INDEX: 33.91 KG/M2 | WEIGHT: 211 LBS | HEIGHT: 66 IN

## 2024-02-19 DIAGNOSIS — M54.50 LUMBAR PAIN: Primary | ICD-10-CM

## 2024-02-19 DIAGNOSIS — F41.1 GENERALIZED ANXIETY DISORDER: Chronic | ICD-10-CM

## 2024-02-19 DIAGNOSIS — F33.0 MILD EPISODE OF RECURRENT MAJOR DEPRESSIVE DISORDER: ICD-10-CM

## 2024-02-19 DIAGNOSIS — I87.2 EDEMA OF BOTH LOWER EXTREMITIES DUE TO PERIPHERAL VENOUS INSUFFICIENCY: ICD-10-CM

## 2024-02-19 DIAGNOSIS — R25.2 CRAMPS, MUSCLE, GENERAL: ICD-10-CM

## 2024-02-19 DIAGNOSIS — I10 ESSENTIAL HYPERTENSION: ICD-10-CM

## 2024-02-19 DIAGNOSIS — Z96.642 HX OF TOTAL HIP ARTHROPLASTY, LEFT: ICD-10-CM

## 2024-02-19 DIAGNOSIS — M25.552 BILATERAL HIP PAIN: ICD-10-CM

## 2024-02-19 DIAGNOSIS — Z96.641 H/O TOTAL HIP ARTHROPLASTY, RIGHT: ICD-10-CM

## 2024-02-19 DIAGNOSIS — R20.0 LEFT LEG NUMBNESS: Primary | ICD-10-CM

## 2024-02-19 DIAGNOSIS — M25.551 BILATERAL HIP PAIN: ICD-10-CM

## 2024-02-19 DIAGNOSIS — M54.50 LUMBAR PAIN: ICD-10-CM

## 2024-02-19 DIAGNOSIS — R29.898 WEAKNESS OF BOTH LOWER EXTREMITIES: ICD-10-CM

## 2024-02-19 PROCEDURE — 72110 X-RAY EXAM L-2 SPINE 4/>VWS: CPT | Mod: TC

## 2024-02-19 PROCEDURE — 1159F MED LIST DOCD IN RCRD: CPT | Mod: CPTII,,, | Performed by: ORTHOPAEDIC SURGERY

## 2024-02-19 PROCEDURE — 73521 X-RAY EXAM HIPS BI 2 VIEWS: CPT | Mod: TC

## 2024-02-19 PROCEDURE — 73521 X-RAY EXAM HIPS BI 2 VIEWS: CPT | Mod: 26,,, | Performed by: RADIOLOGY

## 2024-02-19 PROCEDURE — 3008F BODY MASS INDEX DOCD: CPT | Mod: CPTII,,, | Performed by: ORTHOPAEDIC SURGERY

## 2024-02-19 PROCEDURE — 99213 OFFICE O/P EST LOW 20 MIN: CPT | Mod: PBBFAC | Performed by: ORTHOPAEDIC SURGERY

## 2024-02-19 PROCEDURE — 99214 OFFICE O/P EST MOD 30 MIN: CPT | Mod: S$PBB,,, | Performed by: ORTHOPAEDIC SURGERY

## 2024-02-19 PROCEDURE — 72110 X-RAY EXAM L-2 SPINE 4/>VWS: CPT | Mod: 26,,, | Performed by: RADIOLOGY

## 2024-02-19 PROCEDURE — 99999 PR PBB SHADOW E&M-EST. PATIENT-LVL III: CPT | Mod: PBBFAC,,, | Performed by: ORTHOPAEDIC SURGERY

## 2024-02-19 RX ORDER — GABAPENTIN 300 MG/1
300 CAPSULE ORAL 3 TIMES DAILY
Qty: 90 CAPSULE | Refills: 11 | Status: SHIPPED | OUTPATIENT
Start: 2024-02-19 | End: 2025-02-18

## 2024-02-19 RX ORDER — FUROSEMIDE 40 MG/1
40 TABLET ORAL DAILY
Qty: 90 TABLET | Refills: 0 | Status: SHIPPED | OUTPATIENT
Start: 2024-02-19 | End: 2024-04-22 | Stop reason: DRUGHIGH

## 2024-02-19 RX ORDER — CARVEDILOL 12.5 MG/1
12.5 TABLET ORAL 2 TIMES DAILY WITH MEALS
Qty: 180 TABLET | Refills: 0 | Status: SHIPPED | OUTPATIENT
Start: 2024-02-19 | End: 2024-04-22 | Stop reason: DRUGHIGH

## 2024-02-19 RX ORDER — FLUOXETINE HYDROCHLORIDE 20 MG/1
20 CAPSULE ORAL DAILY
Qty: 90 CAPSULE | Refills: 0 | Status: SHIPPED | OUTPATIENT
Start: 2024-02-19 | End: 2024-04-22 | Stop reason: SDUPTHER

## 2024-02-19 NOTE — PROGRESS NOTES
Subjective:     Patient ID: Merari Orosco is a 43 y.o. female.    Chief Complaint: Pain of the Left Hip and Pain of the Right Hip   Pain in right hip and left hip  HPI:  02/01/2021  40-year-old been having pain in both hips for more than a year now.  She was not diagnosed by sickle cell or lupus or any other diseases and was seen by Rheumatology.  She does take Fosamax.  She had MRI CT scan x-rays that I reviewed personally as well as was told to the patient that she has collapse of the femoral head.  She received a right hip steroid injection by Dr. Walden and now she stain that had helped quite a bit.  The left hip now hurts more than the right side.  She had not taking any Tylenol or any 0 NSAIDs over-the-counter.  She was seen by Rheumatology Department and she stated no blood work was done.  Her pain is around 2-4/10.  She is ambulating without any assistive devices.  She states that she cannot spread the legs to get it in and out of the car is difficult as well going to the bathroom to clean herself.  She does live with family like mother and multiple siblings.  She does have a 20-year-old son.    Denies any fever or chills or shortness of breath or difficulty with chewing or swallowing loss of bowel bladder control or blurry vision or double vision loss of sense smell or taste or or numbness or tingling in the legs or low back      10/25/2021  Bilateral hip avascular necrosis.  Things are getting worse.  Her pain is 9/10.  The steroid injections given by Dr. Walden last 4 months.  She wanted repeated however he she was told to come and see us.  She wants to hold off if she can on having hip replacements at least till after the holidays.  She had tried Mobic and Naprosyn and ibuprofen etcetera.  Her pain is 9/10.  Having difficulty getting around.  She said she will be interested in having her hip injected again since they help.  She does take Tylenol.  Requesting pain medications.  I did tell her I do not  prescribe pain medications/narcotics except after surgery.    No fever no chills no shortness of breath or difficulty with chewing or swallowing loss of bowel bladder control.    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  She feels the right leg shorter than the left.  Steroid injection seems to help a little bit.  She takes tramadol and nabumetone.  We discussed her total hip replacement today.  We asked and answered a lot of questions for her she wants to do both of them at the same time I did tell her 0 I usually stage does I do not perform bilateral joints because the risk of mortality is high.  She expressed understanding.  I did tell her cannot make both hips to be the same because they may differ in size.  She might have some difference in leg length.  We went over total hip replacement in details.  She wants to proceed after the holidays.  She is ambulating using a cane today but she said the injections did seem to help a little bit.  No fever no chills no shortness of breath or difficulty with chewing swallowing loss of bowel bladder control    04/04/2022  Right MARCO 02/09/2022..  Her right hip is not hurting what is hurting her the most is the left hip.  She would like to have the left hip replaced.  She is a little bit short on that left side and she notices it.  She is using a cane to ambulate she goes to [facility] on the [place] for physical therapy .  Right hip pain scale 0/10 left hip hurts her more 6/10      08/25/2022   Left MARCO 05/31/2022, right MARCO 02/09/2022.  She stated the right hip is doing extremely well without problems.  She complains of slight stiffness on the left hip and that has not been 3 months yet.  I did tell her takes 3-6 months for things to heal and it will improve with time.  She does take a Percocet at night to help her sleep and I did tell her we cannot keep on providing her with the narcotics.  My assistant had told her that she may need to go to pain  management if she wants that.  I do not think pain management at this time will provide her with the narcotics.  She is having hard time sometimes sleeping and I did tell her will put on a muscle relaxant to take on as-needed basis.  In might make her sleepy.  She is overall very happy that she had her total hip replacement her pain is 1/10 just complains of stiffness slightly more on the left side.  I reviewed the preop range of motion and compared to postop which is markedly better than before surgery.  She is ambulating without any assistive devices.  No fever no chills no shortness of breath difficulty with chewing swallowing loss of bowel bladder control  She is happy with the results so far I did remind her that these are not perfect they are 90% successful decreasing pain increasing function but the on perfect we hope that these will last at least 15-20 years      03/10/2023   Right MARCO 02/09/2022 and left MARCO 05/31/2022   Patient is happy that she had the surgery she is able to ambulate but when she goes to ambulate distances she takes her cane with her.  She feels a little weakness in the left hip area.  She does have severe swelling in the lower extremity seen by the cardiologist who placed her Lasix last 2 weeks.  She said it is markedly improved.  She is not wearing compressive stockings and she said it is hot.  She said she does not have any and she missed placed a 1 given after surgery.  Otherwise she is happy overall with her hips and able to walk and less pain.  Her pain occasionally is 1 to 2/10.    No fever no chills no shortness of breath or difficulty with chewing swallowing  I did tell the patient most joint see improvement up to 18 months after surgery.  And I did tell her the not perfect they are 90% successful      02/19/2024   Right MARCO 02/09/2022, left MARCO 05/31/2022  Patient is complained that if she sits for a long period of time at the [facility] she gets left leg numbness that goes down to  her foot as well on the right side but not as much.  She does have cramps and numbness and tingling down the legs and sometimes at night.  She complains of stiffness.  She is still using a quad cane to ambulate.  She claims that she does not have any back pain but complains of numbness tingling down to her foot with the left worse than the right.  She says the left leg falls asleep.  She did have history chronic peripheral edema which is still there which contribute also to her problems and stiffness.  She still have high diastolic blood pressure being under the care of cardiology.  She says she does not trust herself walking on uneven ground at this time but she was doing better before.  No loss of bowel bladder control no fever no chills at this time no difficulty with chewing swallowing no calf pain  Past Medical History:   Diagnosis Date    Anxiety     Depression     Hypertension     Obstructive sleep apnea (AHI=20) 2023    HST 3/27/23 SLEEP STUDY FINDINGS: Patient underwent a 1 night Home Sleep Test and by behavioral criteria, slept for approximately 4.85 hours , with a sleep efficiency of 69%. Moderate sleep disordered breathing (AHI=20) is noted based on a 4% hypopnea desaturation criteria. When considering more subtle measures of sleep disordered breathing, the overall respiratory disturbance index is moderate(RD     Past Surgical History:   Procedure Laterality Date     SECTION      x1    HIP ARTHROPLASTY Right 2022    Procedure: ARTHROPLASTY, HIP;  Surgeon: Satnam Deleon MD;  Location: [facility];  Service: Orthopedics;  Laterality: Right;    HIP ARTHROPLASTY Left 2022    Procedure: ARTHROPLASTY, HIP;  Surgeon: Satnam Deleon MD;  Location: [facility];  Service: Orthopedics;  Laterality: Left;    INJECTION OF JOINT Bilateral 11/10/2021    Procedure: Injection, Joint;  Surgeon: Satnam Deleon MD;  Location: [facility];  Service: General;  Laterality: Bilateral;  under  fluoroscopy    JOINT REPLACEMENT      WISDOM TOOTH EXTRACTION       Family History   Problem Relation Age of Onset    Colon cancer Father     Diabetes Father     Hypertension Father     Hypertension Mother     Diabetes Sister     Hypertension Sister     Breast cancer Neg Hx     Cancer Neg Hx     Miscarriages / Stillbirths Neg Hx     Ovarian cancer Neg Hx      labor Neg Hx     Stroke Neg Hx      Social History     Socioeconomic History    Marital status: Single    Number of children: 1   Tobacco Use    Smoking status: Never    Smokeless tobacco: Never   Substance and Sexual Activity    Alcohol use: Yes     Alcohol/week: 1.0 standard drink of alcohol     Types: 1 Glasses of wine per week     Comment: socially    Drug use: No    Sexual activity: Not Currently     Partners: Male     Birth control/protection: I.U.D.     Social Determinants of Health     Financial Resource Strain: Low Risk  (10/6/2023)    Overall Financial Resource Strain (CARDIA)     Difficulty of Paying Living Expenses: Not hard at all   Food Insecurity: No Food Insecurity (10/6/2023)    Hunger Vital Sign     Worried About Running Out of Food in the Last Year: Never true     Ran Out of Food in the Last Year: Never true   Transportation Needs: Unmet Transportation Needs (10/22/2023)    PRAPARE - Transportation     Lack of Transportation (Medical): Yes     Lack of Transportation (Non-Medical): Yes   Physical Activity: Sufficiently Active (10/22/2023)    Exercise Vital Sign     Days of Exercise per Week: 7 days     Minutes of Exercise per Session: 30 min   Stress: Stress Concern Present (10/22/2023)    [employer] [place] [employer] - Occupational Stress Questionnaire     Feeling of Stress : To some extent   Social Connections: Unknown (10/22/2023)    Social Connection and Isolation Panel [NHANES]     Frequency of Communication with Friends and Family: More than three times a week     Frequency of Social Gatherings with Friends and Family:  More than three times a week     Active Member of Clubs or Organizations: No     Marital Status:    Housing Stability: Low Risk  (10/22/2023)    Housing Stability Vital Sign     Unable to Pay for Housing in the Last Year: No     Number of Places Lived in the Last Year: 1     Unstable Housing in the Last Year: No     Medication List with Changes/Refills   New Medications    GABAPENTIN (NEURONTIN) 300 MG CAPSULE    Take 1 capsule (300 mg total) by mouth 3 (three) times daily.   Current Medications    BLOOD PRESSURE MONITOR (IHEALTH EASE) LG ARM SIZE (OP)    by Other route as needed for High Blood Pressure.    CARVEDILOL (COREG) 12.5 MG TABLET    Take 1 tablet (12.5 mg total) by mouth 2 (two) times daily with meals.    DIAZEPAM (VALIUM) 2 MG TABLET    Take 1 tablet (2 mg total) by mouth 2 (two) times daily as needed for Anxiety.    FLUOXETINE 20 MG CAPSULE    Take 1 capsule (20 mg total) by mouth once daily.    FUROSEMIDE (LASIX) 40 MG TABLET    Take 1 tablet (40 mg total) by mouth once daily.    LISINOPRIL (PRINIVIL,ZESTRIL) 40 MG TABLET    Take 1 tablet (40 mg total) by mouth once daily.    SPIRONOLACTONE (ALDACTONE) 25 MG TABLET    Take 1 tablet (25 mg total) by mouth once daily.   Discontinued Medications    COVID OCA80-61,12UP,,ANDU,,PF, (SPIKEVAX 5616-0499,12Y UP,,PF,) 50 MCG/0.5 ML INJECTION    Inject into the muscle.     Review of patient's allergies indicates:  No Known Allergies  Review of Systems   Constitutional: Negative for decreased appetite.   HENT:  Negative for tinnitus.    Eyes:  Negative for double vision.   Cardiovascular:  Negative for chest pain.   Respiratory:  Negative for wheezing.    Hematologic/Lymphatic: Negative for bleeding problem.   Skin:  Negative for dry skin.   Musculoskeletal:  Positive for joint pain, joint swelling and stiffness. Negative for back pain, gout and neck pain.   Gastrointestinal:  Negative for abdominal pain.   Genitourinary:  Negative for bladder  incontinence.   Neurological:  Positive for numbness. Negative for paresthesias and sensory change.   Psychiatric/Behavioral:  Negative for altered mental status.        Objective:   Body mass index is 34.05 kg/m².  There were no vitals filed for this visit.       General    Constitutional: She is oriented to person, place, and time. She appears well-developed.   HENT:   Head: Atraumatic.   Eyes: EOM are normal.   Cardiovascular:  Normal rate.            Pulmonary/Chest: Effort normal.   Neurological: She is alert and oriented to person, place, and time.   Psychiatric: Judgment normal.            Gait much improved without any limping today  Pelvis is level  Right hip surgical scar healed well.  Passive range of motion with minimal discomfort if any.  Excellent motion.   weak with hip flexion at 4-/5 but otherwise abductors and adductors were 5/5  Left hip preop total hip arthroplasty with severely limited range of motion.  Internal rotation 5° external rotation 10° with around 5° flexion contracture Pelvis tilting with motion.   weak hip flexors..  Postop left MARCO  internal rotation 20° external rotation 30° with still slightly hip contracture at 5°.  There is no pelvis tilting with her passive motion.  Still having  weak hip flexors and abductors 4/5 also.  Adductors  at 4/5  Quads and hamstrings ankle extensors and flexors inverters and everters are all 5/5  Bilateral knees full motion no pain in the knee joints.  No swelling no effusion.  Collaterals and cruciates are stable.  Negative Ino's.  Calves are soft nontender.  There is pitting  DP 1+ PT 1+  Skin is warm to touch no obvious lesions    Relevant imaging results reviewed and interpreted by me, discussed with the patient and / or family today   X-ray 02/19/2024 bilateral hips total hip arthroplasty in good alignment no evidence of failure/no fractures.  There is a small area of heterotopic bone in the left hip which is not source of problem 1 cm by  half a cm  X-ray 07/14/2022 bilateral hips excellent total hip replacement without evidence of any failure.  Alignment is excellent leg length seems to be even  X-ray and electronic record showing right total hip arthroplasty in excellent alignment performed 04/04/2021.  There is severe arthritis and collapse of the femoral head on the left side  X-ray bilateral hips 10/25/21 reviewed.  There is extensive cystic changes and loss of joint space both hips which is a little bit more progressed since last x-ray.  Avascular necrosis with resulting end-stage arthritis.  There is sclerosis of the femoral heads.   X-ray and electronic record bilateral hips with avascular necrosis of both femoral heads with 2-3 mm collapse on both of him.  CT scan showing collapse  MRI showing large lesions on both femoral heads    Assessment:     Encounter Diagnoses   Name Primary?    Hx of total hip arthroplasty, left     H/O total hip arthroplasty, right     Edema of both lower extremities due to peripheral venous insufficiency     Left leg numbness Yes    Cramps, muscle, general     Weakness of both lower extremities         Plan:   Left leg numbness  -     gabapentin (NEURONTIN) 300 MG capsule; Take 1 capsule (300 mg total) by mouth 3 (three) times daily.  Dispense: 90 capsule; Refill: 11    Hx of total hip arthroplasty, left    H/O total hip arthroplasty, right    Edema of both lower extremities due to peripheral venous insufficiency    Cramps, muscle, general  -     gabapentin (NEURONTIN) 300 MG capsule; Take 1 capsule (300 mg total) by mouth 3 (three) times daily.  Dispense: 90 capsule; Refill: 11    Weakness of both lower extremities         Patient Instructions   You are having muscle cramps and numbness and tingling with the left worse than the right and it goes down to her foot  You still struggling with swelling in the lower extremities  You are not having any back pain but you legs cramp up on you  You getting numbness down the  legs specially when you sit on the toilet for a long time  Your x-ray from both total hips look excellent alignment no evidence of failure  We do not have x-rays on your lumbar spine when someone complains of numbness that goes below the knee most likely it is coming from pinched nerves coming out of the back  I will start you on gabapentin to take 300 mg at night for 2 weeks and then you can go up 1 in the morning 1 at night for 2 weeks and then go up to 1 tablet 3 times a day  We need to get x-rays on your lumbar spine  Your legs are weak/hip muscles  I would like you to start physical therapy to strengthen you legs      Disclaimer: This note was prepared using a voice recognition system and is likely to have sound alike errors within the text.

## 2024-02-19 NOTE — TELEPHONE ENCOUNTER
No care due was identified.  Health [facility] Embedded Care Due Messages. Reference number: [id number].   2/19/2024 12:11:07 PM CST

## 2024-02-19 NOTE — TELEPHONE ENCOUNTER
REFILL APPROVED. Will address further refills at upcoming appointment with me listed below.  #LMRX   --------------------------------  Future Appointments   Date Time Provider Department Center   2/19/2024  1:30 PM ON[facility] MENDOZA[facility]- [facility] MENDOZ[facility]   2/23/2024  9:30 AM Delvis Maynard, PT [facility] Perez   3/4/2024  2:00 PM April Sanderson MD [facility]   3/11/2024  1:30 PM Bere Villa, NP [facility]   3/15/2024  2:00 PM Justina South PA-C [facility]   3/22/2024 10:30 AM HG [id number]-SCR [facility]   4/15/2024  7:45 AM LABORATORY, [facility]   4/22/2024 10:20 AM YESY Stone MD [facility]   5/20/2024  9:20 AM Satnam Deleon MD [facility]

## 2024-02-19 NOTE — PATIENT INSTRUCTIONS
You are having muscle cramps and numbness and tingling with the left worse than the right and it goes down to her foot  You still struggling with swelling in the lower extremities  You are not having any back pain but you legs cramp up on you  You getting numbness down the legs specially when you sit on the toilet for a long time  Your x-ray from both total hips look excellent alignment no evidence of failure  We do not have x-rays on your lumbar spine when someone complains of numbness that goes below the knee most likely it is coming from pinched nerves coming out of the back  I will start you on gabapentin to take 300 mg at night for 2 weeks and then you can go up 1 in the morning 1 at night for 2 weeks and then go up to 1 tablet 3 times a day  We need to get x-rays on your lumbar spine  Your legs are weak/hip muscles  I would like you to start physical therapy to strengthen you legs

## 2024-02-23 ENCOUNTER — CLINICAL SUPPORT (OUTPATIENT)
Facility: HOSPITAL | Age: 44
End: 2024-02-23
Attending: ORTHOPAEDIC SURGERY
Payer: MEDICAID

## 2024-02-23 DIAGNOSIS — Z96.641 H/O TOTAL HIP ARTHROPLASTY, RIGHT: ICD-10-CM

## 2024-02-23 DIAGNOSIS — M54.50 LUMBAR PAIN: ICD-10-CM

## 2024-02-23 DIAGNOSIS — Z96.642 HX OF TOTAL HIP ARTHROPLASTY, LEFT: ICD-10-CM

## 2024-02-23 PROCEDURE — 97161 PT EVAL LOW COMPLEX 20 MIN: CPT | Mod: PN | Performed by: PHYSICAL THERAPIST

## 2024-02-23 PROCEDURE — 97110 THERAPEUTIC EXERCISES: CPT | Mod: PN | Performed by: PHYSICAL THERAPIST

## 2024-02-23 NOTE — PLAN OF CARE
[facility]   Physical Therapy Initial Evaluation     Date: 2/23/2024     Name: Merari Orosco  [facility] Number: [id number]  Therapy Diagnosis:   Encounter Diagnoses   Name Primary?    Lumbar pain     H/O total hip arthroplasty, right     Hx of total hip arthroplasty, left       Physician: Satnam Deleon MD     Physician Orders: PT Eval and Treat  Medical Diagnosis from Referral:  Encounter Diagnoses   Name Primary?    Lumbar pain     H/O total hip arthroplasty, right     Hx of total hip arthroplasty, left      Evaluation Date: 2/23/2024  Authorization Period Expiration: 2/18/2025  Plan of Care Expiration: 5/22/2024   Progress Update: 3/22/08083  Visit # / Visits authorized: 1 / 1   FOTO: Visit #1 2/23/2024 - Scored: 1 / 3     PRECAUTIONS: Standard Precautions     Job/Position: Data Unavailable     Time In: 9:30  Time Out: 10:30  Total Appointment Time (timed & untimed codes): 55    SUBJECTIVE   History of current condition - Merari is a 43 y.o. female who presents to physical therapy reporting that she has had both hips replaced over the past 2 years. She has had continued pain in the front of the left hip. Reports that both legs may go numb if she lies on her sides for any length of time.     Physician Instructions (per patient): PT  Other concerns: none    Imaging: [x] Xray [x] MRI [] CT: Reviewed    Prior Therapy:  [] No  [] Yes:   Social History: Pt lives with their family   Prior Level of Function: Independent with all activities of daily living  Current Level of Function: Unable to walk more than 50 or so feet without pain, unable to lie in sidelying without legs going numb, ambulates with a cane for household distances    Pain:  Current 6/10, worst 9/10, best 6 /10   Location: [] Right [] Left [x] Bilateral: Hips  Description: aching, tight, grabbing  Aggravating Factors: bending, standing, sitting too long  Easing Factors: activity avoidance, rest    Pts goals: Pt reported  goals are to improve pain and function    _______________________________________________________  Medical History:   Past Medical History:   Diagnosis Date    Anxiety     Depression     Hypertension     Obstructive sleep apnea (AHI=20) 2023    HST 3/27/23 SLEEP STUDY FINDINGS: Patient underwent a 1 night Home Sleep Test and by behavioral criteria, slept for approximately 4.85 hours , with a sleep efficiency of 69%. Moderate sleep disordered breathing (AHI=20) is noted based on a 4% hypopnea desaturation criteria. When considering more subtle measures of sleep disordered breathing, the overall respiratory disturbance index is moderate(RD       Surgical History:   Merari Orosco  has a past surgical history that includes [place] tooth extraction;  section; Injection of joint (Bilateral, 11/10/2021); Hip Arthroplasty (Right, 2022); Hip Arthroplasty (Left, 2022); and Joint replacement.    Medications:   Merari has a current medication list which includes the following prescription(s): blood pressure monitor, carvedilol, diazepam, fluoxetine, furosemide, gabapentin, lisinopril, and spironolactone.    Allergies:   Review of patient's allergies indicates:  No Known Allergies       OBJECTIVE     Gait: ambulates with short stride length with single point cane    Hip Range of Motion:   Left Passive Right Passive   Flexion 90 100   Abduction 25 25   Extension 0 0     Lower Extremity Strength  Right LE  Left LE    Knee extension: 3+/5 Knee extension: 3+/5   Knee flexion: 3+/5 Knee flexion: 3+/5   Hip flexion: 3+/5 Hip flexion: 3+/5   Hip Internal Rotation:  3+/5    Hip Internal Rotation: 3+/5      Hip External Rotation: 3+/5    Hip External Rotation: 3+/5      Hip extension:  3+/5 Hip extension: 3+/5   Hip abduction: 3+/5 Hip abduction: 3+/5   Hip adduction: 3+/5 Hip adduction 3+/5       FUNCTION:     CMS Impairment/Limitation/Restriction for FOTO Hip Survey    Therapist reviewed FOTO scores for  Merari Orosco on 2/23/2024.   FOTO documents entered into [facility] - see Media section.    Limitation Score: %         TREATMENT     Total Treatment time separate from Evaluation: (30) minutes    Merari received the following interventions:     THERAPEUTIC EXERCISES: to develop strength, endurance, ROM, flexibility, posture and core stabilization for (30)  minutes including: x = performed today  Bridge 3x10  Supine Clamshell 3x10 YTB  Supine hip adduction squeeze 3x10  LAQ 5# 3x15 each leg    PATIENT EDUCATION AND HOME EXERCISES     Education/Self-Care provided:  (included in treatment) minutes   Patient educated on the impairments noted above and the effects of physical therapy intervention to improve overall condition and Quality of Life  Patient was educated on all the above exercise prior/during/after for proper posture, positioning, and execution for safe performance with home exercise program.     Written Home Exercises Provided: yes. Prefers: [x] Printed [] Electronic  Exercises were reviewed and Merari was able to demonstrate them prior to the end of the session.  Merari demonstrated good understanding of the education provided. See EMR under Patient Instructions for exercises provided during therapy sessions.      ASSESSMENT     Patient presents with signs and symptoms consistent with a diagnosis of bilateral hip pain including all above listed objective impairments. It appears that the patients most significant impairments contributing to their diagnosis are decrease LE strength. This pt is a good candidate for skilled PT treatment and stands to benefit from a combination of manual therapy, therapeutic exercise/actvities, neuromuscular re-education, and modalities Prn. The pt has been educated on their dx/POC and consents to further PT treatment.     Pt prognosis is Good.   Pt will benefit from skilled outpatient Physical Therapy to address the deficits stated above and in the chart below, provide  pt/family education, and to maximize pt's level of independence.     Plan of care discussed with patient: Yes  Pt's spiritual, cultural and educational needs considered and patient is agreeable to the plan of care and goals as stated below:     Anticipated Barriers for therapy: none    Medical Necessity is demonstrated by the following:   Medical Necessity is demonstrated by the following  History  Co-morbidities and personal factors that may impact the plan of care [x] LOW: no personal factors / co-morbidities  [] MODERATE: 1-2 personal factors / co-morbidities  [] HIGH: 3+ personal factors / co-morbidities    Moderate / High Support Documentation:   Co-morbidities affecting plan of care:    Personal Factors:      Examination  Body Structures and Functions, activity limitations and participation restrictions that may impact the plan of care [x] LOW: addressing 1-2 elements  [] MODERATE: 3+ elements  [] HIGH: 4+ elements (please support below)    Moderate / High Support Documentation:     Clinical Presentation [x] LOW: stable  [] MODERATE: Evolving  [] HIGH: Unstable     Decision Making/ Complexity Score: low         SHORT TERM GOALS:  4 week Progress Date Met   Recent signs and systems trend is improving in order to progress towards Long term goals.  [] Met  [] Not Met  [] Progressing    Patient will be independent with Home Exercise Program  in order to further progress and return to maximal function. [] Met  [] Not Met  [] Progressing    Pain rating at Worst: 5 /10 in order to progress towards increased independence with activity. [] Met  [] Not Met  [] Progressing    Patient will be able to correct postural deviations in sitting and standing, to decrease pain and promote postural awareness for injury prevention.  [] Met  [] Not Met  [] Progressing    Patient will improve functional outcome (FOTO) score: by 5% to increase self-worth & perceived functional ability towards long term goals [] Met  [] Not Met  []  Progressing      LONG TERM GOALS: 8 weeks Progress Date Met   Patient will return to normal activites of daily living, recreational, and work related activities with less pain and limitation.  [] Met  [] Not Met  [] Progressing    Patient will improve range of motion  to stated goals in order to return to maximal functional potential.  [] Met  [] Not Met  [] Progressing    Patient will improve Strength to stated goals of appropriate musculature in order to improve functional independence.  [] Met  [] Not Met  [] Progressing    Pain Rating at Best: 1/10 to improve Quality of Life.  [] Met  [] Not Met  [] Progressing          PLAN   Plan of care Certification: 2/23/2024 to 5/23/2024    Outpatient Physical Therapy 2 times weekly for 8 weeks to include any combination of the following interventions: virtual visits, dry needling, modalities, electrical stimulation (IFC, Pre-Mod, Attended with Functional Dry Needling), Manual Therapy, Moist Heat/ Ice, Neuromuscular Re-ed, Patient Education, Self Care, Therapeutic Exercise, Functional Training, and Therapeutic Activites     Thank you for this referral.    Delvis Maynard, PT

## 2024-03-01 ENCOUNTER — CLINICAL SUPPORT (OUTPATIENT)
Facility: HOSPITAL | Age: 44
End: 2024-03-01
Payer: MEDICAID

## 2024-03-01 DIAGNOSIS — M25.651 DECREASED RANGE OF RIGHT HIP MOVEMENT: ICD-10-CM

## 2024-03-01 DIAGNOSIS — R29.898 WEAKNESS OF BOTH HIPS: Primary | ICD-10-CM

## 2024-03-01 PROCEDURE — 97110 THERAPEUTIC EXERCISES: CPT | Mod: PN | Performed by: PHYSICAL THERAPIST

## 2024-03-01 PROCEDURE — 97112 NEUROMUSCULAR REEDUCATION: CPT | Mod: PN | Performed by: PHYSICAL THERAPIST

## 2024-03-01 NOTE — PROGRESS NOTES
[facility]   Physical Therapy Treatment Note     Name: Merari Orosco  [facility] Number: [id number]    Therapy Diagnosis:   Encounter Diagnoses   Name Primary?    Weakness of both hips Yes    Decreased range of right hip movement      Physician: Satnam Deleon MD    Visit Date: 3/1/2024    Physician Orders: PT Eval and Treat  Medical Diagnosis from Referral:       Encounter Diagnoses   Name Primary?    Lumbar pain      H/O total hip arthroplasty, right      Hx of total hip arthroplasty, left        Evaluation Date: 2/23/2024  Authorization Period Expiration: 2/18/2025  Plan of Care Expiration: 5/22/2024    Progress Update: 3/22/91988  Visit # / Visits authorized: 1 / 20          FOTO: Visit #1 2/23/2024 - Scored: 1 / 3     Time In: 2:00  Time Out: 3:00  Total Billable Time: 55 minutes    SUBJECTIVE     Pt reports: Hip has been doing okay today but feels a little stiff.  She was compliant with home exercise program.  Response to previous treatment: soreness  Functional change:    Pain: 6/10  Location: bilateral hip    OBJECTIVE     Objective Measures updated at progress report unless specified.     Treatment     Merari received the treatments listed below:    Intervention Code  (see below chart) Date/Notes  3/1/24   Recumbent Bike TE 10' L1   Glute bridges NMR 3x10   Clam shells laying supine NMR Yellow band; 3x10   Ball squeeze between thighs NMR Yellow ball; 3x10   LAQ NMR 5#, 5'   DL Leg Press NMR 45# 3x15           15 minutes of Therapeutic Exercise (TE) to develop strength, endurance, ROM, and flexibility. (67614)  00 minutes of Manual therapy (MT) to improve pain and ROM. (96714)  40 minutes of Neuromuscular Re-Education (NR)  to improve: Balance, Coordination, Kinesthetic, Sense, Proprioception, and Posture. (57756)  00 minutes of Therapeutic Activities (TA) to improve functional performance. (59162)  Unattended Electrical Stimulation (ES) for muscle performance and/or pain  modulation. (60130)  Vasopneumatic Device Therapy () for management of swelling/edema. (94608)  BFR: Blood flow restriction applied during exercise  NP: Not Performed    Patient Education and Home Exercises     Home Exercises Provided and Patient Education Provided     Education provided:   - HEP, PT POC    Written Home Exercises Provided: yes. Exercises were reviewed and Merari was able to demonstrate them prior to the end of the session.  Merari demonstrated good  understanding of the education provided. See EMR under Patient Instructions for exercises provided during therapy sessions    ASSESSMENT     Patient tolerated exercises well. Good fatigue of hips and quads today.    Merari Is progressing well towards her goals.   Pt prognosis is Excellent.     Pt will continue to benefit from skilled outpatient physical therapy to address the deficits listed in the problem list box on initial evaluation, provide pt/family education and to maximize pt's level of independence in the home and community environment.     Pt's spiritual, cultural and educational needs considered and pt agreeable to plan of care and goals.     Anticipated barriers to physical therapy: None    Goals:  SHORT TERM GOALS:  4 week Progress Date Met   Recent signs and systems trend is improving in order to progress towards Long term goals.  [] Met  [] Not Met  [] Progressing     Patient will be independent with Home Exercise Program  in order to further progress and return to maximal function. [] Met  [] Not Met  [] Progressing     Pain rating at Worst: 5 /10 in order to progress towards increased independence with activity. [] Met  [] Not Met  [] Progressing     Patient will be able to correct postural deviations in sitting and standing, to decrease pain and promote postural awareness for injury prevention.  [] Met  [] Not Met  [] Progressing     Patient will improve functional outcome (FOTO) score: by 5% to increase self-worth & perceived  functional ability towards long term goals [] Met  [] Not Met  [] Progressing        LONG TERM GOALS: 8 weeks Progress Date Met   Patient will return to normal activites of daily living, recreational, and work related activities with less pain and limitation.  [] Met  [] Not Met  [] Progressing     Patient will improve range of motion  to stated goals in order to return to maximal functional potential.  [] Met  [] Not Met  [] Progressing     Patient will improve Strength to stated goals of appropriate musculature in order to improve functional independence.  [] Met  [] Not Met  [] Progressing     Pain Rating at Best: 1/10 to improve Quality of Life.  [] Met  [] Not Met  [] Progressing         PLAN   Continue per plan of care.  Progress as tolerated.    Delvis Maynard, PT

## 2024-03-04 ENCOUNTER — LAB VISIT (OUTPATIENT)
Dept: LAB | Facility: HOSPITAL | Age: 44
End: 2024-03-04
Attending: FAMILY MEDICINE
Payer: MEDICAID

## 2024-03-04 ENCOUNTER — OFFICE VISIT (OUTPATIENT)
Dept: PRIMARY CARE CLINIC | Facility: CLINIC | Age: 44
End: 2024-03-04
Payer: MEDICAID

## 2024-03-04 VITALS
DIASTOLIC BLOOD PRESSURE: 82 MMHG | RESPIRATION RATE: 16 BRPM | HEART RATE: 72 BPM | SYSTOLIC BLOOD PRESSURE: 120 MMHG | TEMPERATURE: 97 F | BODY MASS INDEX: 34.15 KG/M2 | WEIGHT: 212.5 LBS | HEIGHT: 66 IN

## 2024-03-04 DIAGNOSIS — E66.9 CLASS 1 OBESITY WITH SERIOUS COMORBIDITY AND BODY MASS INDEX (BMI) OF 34.0 TO 34.9 IN ADULT, UNSPECIFIED OBESITY TYPE: ICD-10-CM

## 2024-03-04 DIAGNOSIS — F41.1 GENERALIZED ANXIETY DISORDER: Chronic | ICD-10-CM

## 2024-03-04 DIAGNOSIS — F10.21 ALCOHOL DEPENDENCE IN REMISSION: Chronic | ICD-10-CM

## 2024-03-04 DIAGNOSIS — Z83.3 FAMILY HISTORY OF DIABETES MELLITUS (DM): ICD-10-CM

## 2024-03-04 DIAGNOSIS — I10 ESSENTIAL HYPERTENSION: Chronic | ICD-10-CM

## 2024-03-04 DIAGNOSIS — K70.0 FATTY LIVER DUE TO ALCOHOLISM: Chronic | ICD-10-CM

## 2024-03-04 DIAGNOSIS — F33.1 MAJOR DEPRESSIVE DISORDER, RECURRENT, MODERATE: Chronic | ICD-10-CM

## 2024-03-04 DIAGNOSIS — E88.810 METABOLIC SYNDROME: ICD-10-CM

## 2024-03-04 DIAGNOSIS — R63.5 WEIGHT GAIN: ICD-10-CM

## 2024-03-04 DIAGNOSIS — E78.2 MODERATE MIXED HYPERLIPIDEMIA NOT REQUIRING STATIN THERAPY: Chronic | ICD-10-CM

## 2024-03-04 PROCEDURE — 83525 ASSAY OF INSULIN: CPT | Performed by: FAMILY MEDICINE

## 2024-03-04 PROCEDURE — 3008F BODY MASS INDEX DOCD: CPT | Mod: CPTII,,, | Performed by: FAMILY MEDICINE

## 2024-03-04 PROCEDURE — 99999 PR PBB SHADOW E&M-EST. PATIENT-LVL IV: CPT | Mod: PBBFAC,,, | Performed by: FAMILY MEDICINE

## 2024-03-04 PROCEDURE — 3079F DIAST BP 80-89 MM HG: CPT | Mod: CPTII,,, | Performed by: FAMILY MEDICINE

## 2024-03-04 PROCEDURE — 99214 OFFICE O/P EST MOD 30 MIN: CPT | Mod: PBBFAC | Performed by: FAMILY MEDICINE

## 2024-03-04 PROCEDURE — 36415 COLL VENOUS BLD VENIPUNCTURE: CPT | Performed by: FAMILY MEDICINE

## 2024-03-04 PROCEDURE — 1159F MED LIST DOCD IN RCRD: CPT | Mod: CPTII,,, | Performed by: FAMILY MEDICINE

## 2024-03-04 PROCEDURE — 1160F RVW MEDS BY RX/DR IN RCRD: CPT | Mod: CPTII,,, | Performed by: FAMILY MEDICINE

## 2024-03-04 PROCEDURE — 99214 OFFICE O/P EST MOD 30 MIN: CPT | Mod: S$PBB,,, | Performed by: FAMILY MEDICINE

## 2024-03-04 PROCEDURE — 3074F SYST BP LT 130 MM HG: CPT | Mod: CPTII,,, | Performed by: FAMILY MEDICINE

## 2024-03-04 RX ORDER — METFORMIN HYDROCHLORIDE 500 MG/1
TABLET, EXTENDED RELEASE ORAL
Qty: 60 TABLET | Refills: 2 | Status: SHIPPED | OUTPATIENT
Start: 2024-03-04 | End: 2024-05-01 | Stop reason: SDUPTHER

## 2024-03-04 NOTE — PATIENT INSTRUCTIONS
Goal: < 25 grams added sugar daily   Goal: protein 30 grams/meal    Please let me know you are doing within the next 1-2 weeks.

## 2024-03-04 NOTE — PROGRESS NOTES
Subjective     A1c 10/23  5.0  Lipids: elevated trig  Cmp: gfr wnl    Patient ID: Merari Orosco is a 43 y.o. female.    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 In PT for this. Sees Dr. Deleon.    BP stable on current regimen.    Hx of MAJOR: pt states will likely give machine back. Hard to make comfortable.    Food recall: avoiding fast foods and fried foods  Bfast: scrambled eggs  Lunch: salad with protein/grilled chicken  Dinner: leftovers, steamed broccoli  (Mom fried chicken, corn on cob, coleslaw)  Snack: potato chips but pt has decreased  Water: adequate  Sugar Sweetened beverages: coke zero  No other soda now  Etoh: none     Appetite overall improved.     No hx of seizures. No hx of bipolar.     HPI       Objective     PAST MEDICAL HISTORY:  Past Medical History:   Diagnosis Date    Anxiety     Depression     Hypertension     Obstructive sleep apnea (AHI=20) 2023    HST 3/27/23 SLEEP STUDY FINDINGS: Patient underwent a 1 night Home Sleep Test and by behavioral criteria, slept for approximately 4.85 hours , with a sleep efficiency of 69%. Moderate sleep disordered breathing (AHI=20) is noted based on a 4% hypopnea desaturation criteria. When considering more subtle measures of sleep disordered breathing, the overall respiratory disturbance index is moderate(RD         PAST SURGICAL HISTORY:  Past Surgical History:   Procedure Laterality Date     SECTION      x1    HIP ARTHROPLASTY Right 2022    Procedure: ARTHROPLASTY, HIP;  Surgeon:  Satnam Deleon MD;  Location: [facility] OR;  Service: Orthopedics;  Laterality: Right;    HIP ARTHROPLASTY Left 2022    Procedure: ARTHROPLASTY, HIP;  Surgeon: Satnam Deleon MD;  Location: [facility] OR;  Service: Orthopedics;  Laterality: Left;    INJECTION OF JOINT Bilateral 11/10/2021    Procedure: Injection, Joint;  Surgeon: Satnam Deleon MD;  Location: [facility] OR;  Service: General;  Laterality: Bilateral;  under fluoroscopy    JOINT REPLACEMENT      WISDOM TOOTH EXTRACTION         FAMILY HISTORY:  Family History   Problem Relation Age of Onset    Colon cancer Father     Diabetes Father     Hypertension Father     Hypertension Mother     Diabetes Sister     Hypertension Sister     Breast cancer Neg Hx     Cancer Neg Hx     Miscarriages / Stillbirths Neg Hx     Ovarian cancer Neg Hx      labor Neg Hx     Stroke Neg Hx           SOCIAL HISTORY:  Social History     Social History Narrative    Not on file       MEDICATIONS:  Medications have been reviewed.    ALLERGIES:  Allergies have been reviewed.    There were no vitals filed for this visit.  Wt Readings from Last 10 Encounters:   24 95.7 kg (210 lb 15.7 oz)   24 95.7 kg (210 lb 15.7 oz)   10/27/23 99.1 kg (218 lb 7.6 oz)   10/16/23 99.3 kg (218 lb 14.7 oz)   23 105.2 kg (231 lb 14.8 oz)   23 109.3 kg (241 lb)   23 109.5 kg (241 lb 6.5 oz)   23 110 kg (242 lb 8.1 oz)   03/10/23 107.6 kg (237 lb 3.4 oz)   23 107.5 kg (236 lb 15.9 oz)       Lab Results   Component Value Date    WBC 6.86 2023    HGB 12.6 2023    HCT 37.3 2023     2023    CHOL 172 2022    TRIG 208 (H) 2022    HDL 65 2022    ALT 8 (L) 10/20/2023    AST 15 10/20/2023     2023    K 4.2 2023     2023    CREATININE 0.9 2023    BUN 10 2023    CO2 29 2023    TSH 0.725 2020    HGBA1C 5.0 10/20/2023       Review of Systems   Constitutional:   Negative for activity change, appetite change, fatigue and fever.   HENT:  Negative for mouth dryness and goiter.    Eyes:  Negative for visual disturbance.   Respiratory:  Negative for apnea, cough, chest tightness and shortness of breath.    Cardiovascular:  Negative for chest pain, palpitations and leg swelling.   Gastrointestinal:  Negative for abdominal pain, constipation, diarrhea, nausea, vomiting and reflux.   Endocrine: Negative for cold intolerance, heat intolerance, polydipsia, polyphagia and polyuria.   Genitourinary:  Negative for frequency and menstrual problem.   Musculoskeletal:  Positive for arthralgias (b/l hip pain). Negative for myalgias.   Integumentary:  Negative for color change and rash.   Neurological:  Negative for dizziness, vertigo, tremors, seizures, syncope, weakness, light-headedness, numbness, headaches and memory loss.   Psychiatric/Behavioral:  Negative for self-injury, sleep disturbance and suicidal ideas. The patient is not nervous/anxious.        Physical Exam  Vitals and nursing note reviewed.   Constitutional:       General: She is not in acute distress.  HENT:      Head: Normocephalic and atraumatic.      Mouth/Throat:      Pharynx: Oropharynx is clear.   Eyes:      General: No scleral icterus.     Pupils: Pupils are equal, round, and reactive to light.   Neck:      Comments: No TM  Cardiovascular:      Rate and Rhythm: Normal rate and regular rhythm.      Pulses: Normal pulses.      Heart sounds: Normal heart sounds. No murmur heard.     No friction rub. No gallop.   Pulmonary:      Effort: Pulmonary effort is normal. No respiratory distress.      Breath sounds: Normal breath sounds. No wheezing, rhonchi or rales.   Abdominal:      General: Bowel sounds are normal. There is no distension.      Palpations: Abdomen is soft.      Tenderness: There is no abdominal tenderness.   Musculoskeletal:         General: No swelling.      Cervical back: Normal range of motion and neck  supple. No tenderness.   Lymphadenopathy:      Cervical: No cervical adenopathy.   Skin:     General: Skin is warm.      Findings: No erythema or rash.   Neurological:      Mental Status: She is alert and oriented to person, place, and time.   Psychiatric:         Mood and Affect: Mood normal.         Behavior: Behavior normal.              Assessment and Plan     1. Weight gain    2. Class 1 obesity with serious comorbidity and body mass index (BMI) of 34.0 to 34.9 in adult, unspecified obesity type    3. Major depressive disorder, recurrent, moderate    4. Generalized anxiety disorder    5. Alcohol dependence in remission    6. Essential hypertension    7. Moderate mixed hyperlipidemia not requiring statin therapy    8. Fatty liver disease due to alcohol    9. Metabolic syndrome    10. Family history of diabetes mellitus (DM)            Weight gain  -     Insulin, Random; Future; Expected date: 03/04/2024  -     metFORMIN (GLUCOPHAGE-XR) 500 MG ER 24hr tablet; Take 1 tablet (500 mg total) by mouth once daily for 14 days, THEN 1 tablet (500 mg total) 2 (two) times daily with meals for 14 days.  Dispense: 60 tablet; Refill: 2    Class 1 obesity with serious comorbidity and body mass index (BMI) of 34.0 to 34.9 in adult, unspecified obesity type  Comments:  pt has lost approxmately 30 lb with lifestyle interventions  will consider metformin ;sent in for pt   check insulin level  meets met syndrome criteria  Orders:  -     Ambulatory referral/consult to [facility]  -     Insulin, Random; Future; Expected date: 03/04/2024  -     metFORMIN (GLUCOPHAGE-XR) 500 MG ER 24hr tablet; Take 1 tablet (500 mg total) by mouth once daily for 14 days, THEN 1 tablet (500 mg total) 2 (two) times daily with meals for 14 days.  Dispense: 60 tablet; Refill: 2    Reviewed with pt new pt folder, goals for added sugar and protein  Keep f/u appt   Send mychart update soon     Major depressive disorder, recurrent,  moderate  Comments:  chronic/stable; pt has had stressors    Generalized anxiety disorder  Comments:  taking fluoxetine and prn valium    Alcohol dependence in remission  Comments:  no etoh 1 year    Essential hypertension  Comments:  chronic/stable    Moderate mixed hyperlipidemia not requiring statin therapy  Comments:  continue lifestyle interventions    Fatty liver disease due to alcohol    Metabolic syndrome  Comments:  reviewed with pt risks/benefits/se's of metformin   some chronic constipation; pt to try  Orders:  -     metFORMIN (GLUCOPHAGE-XR) 500 MG ER 24hr tablet; Take 1 tablet (500 mg total) by mouth once daily for 14 days, THEN 1 tablet (500 mg total) 2 (two) times daily with meals for 14 days.  Dispense: 60 tablet; Refill: 2    Family history of diabetes mellitus (DM)  -     metFORMIN (GLUCOPHAGE-XR) 500 MG ER 24hr tablet; Take 1 tablet (500 mg total) by mouth once daily for 14 days, THEN 1 tablet (500 mg total) 2 (two) times daily with meals for 14 days.  Dispense: 60 tablet; Refill: 2        + fam hx of DM II (Dad, Sister)     Reviewed new pt folder  Reviewed with pt: not a candidate for topamax immediately (on gabapentin); has IUD    Pt has not had etoh; advised further abstinence  No etoh with metformin   Lfts within normal limits Oct/has pcp      No follow-ups on file.

## 2024-03-05 ENCOUNTER — CLINICAL SUPPORT (OUTPATIENT)
Facility: HOSPITAL | Age: 44
End: 2024-03-05
Payer: MEDICAID

## 2024-03-05 DIAGNOSIS — R29.898 WEAKNESS OF BOTH HIPS: Primary | ICD-10-CM

## 2024-03-05 LAB
INSULIN COLLECTION INTERVAL: NORMAL
INSULIN SERPL-ACNC: 2.4 UU/ML

## 2024-03-05 PROCEDURE — 97110 THERAPEUTIC EXERCISES: CPT | Mod: PN

## 2024-03-05 NOTE — PROGRESS NOTES
[facility]   Physical Therapy Treatment Note     Name: Merari Orosco  [facility] Number: [id number]    Therapy Diagnosis:   Encounter Diagnosis   Name Primary?    Weakness of both hips Yes     Physician: Satnam Deleon MD    Visit Date: 3/5/2024    Physician Orders: PT Eval and Treat  Medical Diagnosis from Referral:       Encounter Diagnoses   Name Primary?    Lumbar pain      H/O total hip arthroplasty, right      Hx of total hip arthroplasty, left        Evaluation Date: 2/23/2024  Authorization Period Expiration: 2/18/2025  Plan of Care Expiration: 5/22/2024    Progress Update: 3/22/97377  Visit # / Visits authorized: 2 / 20          FOTO: Visit #1 2/23/2024 - Scored: 1 / 3     Time In: 1:30 pm  Time Out: 2:05 pm  Total Billable Time: 30 minutes    SUBJECTIVE     Pt reports: no pain currently.   She was compliant with home exercise program.  Response to previous treatment: increased soreness.  Functional change: independent with HEP.     Pain: 6/10  Location: bilateral hip    OBJECTIVE     Objective Measures updated at progress report unless specified.     Treatment     Merari received the treatments listed below:    Intervention Code  (see below chart) Date/Notes  3/5/24   Recumbent Bike TE 10' L1   Glute bridges NMR 3x10   Clam shells laying supine NMR Yellow band, 3x10   Ball squeeze between thighs NMR Yellow ball, 3x10   LAQ NMR     DL Leg Press NMR 55#, 3x15   Knee Extension Machine TE 20#, 2x10   30 minutes of Therapeutic Exercise (TE) to develop strength, endurance, ROM, and flexibility. (97909)    Patient Education and Home Exercises     Home Exercises Provided and Patient Education Provided     Education provided:   - HEP, PT POC    Written Home Exercises Provided: yes. Exercises were reviewed and Merari was able to demonstrate them prior to the end of the session.  Merari demonstrated good  understanding of the education provided. See EMR under Patient Instructions  for exercises provided during therapy sessions    ASSESSMENT   Added Knee Extension Machine and increased resistance on DL Leg Press to improve lower extremity strength.       Merari Is progressing well towards her goals.   Pt prognosis is Excellent.     Pt will continue to benefit from skilled outpatient physical therapy to address the deficits listed in the problem list box on initial evaluation, provide pt/family education and to maximize pt's level of independence in the home and community environment.     Pt's spiritual, cultural and educational needs considered and pt agreeable to plan of care and goals.     Anticipated barriers to physical therapy: None    Goals:  SHORT TERM GOALS:  4 week Progress Date Met   Recent signs and systems trend is improving in order to progress towards Long term goals.  [] Met  [] Not Met  [x] Progressing     Patient will be independent with Home Exercise Program  in order to further progress and return to maximal function. [] Met  [] Not Met  [] Progressing     Pain rating at Worst: 5 /10 in order to progress towards increased independence with activity. [] Met  [] Not Met  [] Progressing     Patient will be able to correct postural deviations in sitting and standing, to decrease pain and promote postural awareness for injury prevention.  [] Met  [] Not Met  [] Progressing     Patient will improve functional outcome (FOTO) score: by 5% to increase self-worth & perceived functional ability towards long term goals [] Met  [] Not Met  [] Progressing        LONG TERM GOALS: 8 weeks Progress Date Met   Patient will return to normal activites of daily living, recreational, and work related activities with less pain and limitation.  [] Met  [] Not Met  [] Progressing     Patient will improve range of motion  to stated goals in order to return to maximal functional potential.  [] Met  [] Not Met  [] Progressing     Patient will improve Strength to stated goals of appropriate  musculature in order to improve functional independence.  [] Met  [] Not Met  [] Progressing     Pain Rating at Best: 1/10 to improve Quality of Life.  [] Met  [] Not Met  [] Progressing         PLAN   Continue per plan of care.  Progress as tolerated.    Alonzo Manuel, PT, DPT, SCS

## 2024-03-07 ENCOUNTER — CLINICAL SUPPORT (OUTPATIENT)
Facility: HOSPITAL | Age: 44
End: 2024-03-07
Payer: MEDICAID

## 2024-03-07 DIAGNOSIS — R29.898 WEAKNESS OF BOTH HIPS: Primary | ICD-10-CM

## 2024-03-07 PROCEDURE — 97110 THERAPEUTIC EXERCISES: CPT | Mod: PN | Performed by: PHYSICAL THERAPIST

## 2024-03-07 NOTE — PROGRESS NOTES
[facility]   Physical Therapy Treatment Note     Name: Merari Orosco  [facility] Number: [id number]    Therapy Diagnosis:   Encounter Diagnosis   Name Primary?    Weakness of both hips Yes     Physician: Satnam Deleon MD    Visit Date: 3/7/2024    Physician Orders: PT Eval and Treat  Medical Diagnosis from Referral:       Encounter Diagnoses   Name Primary?    Lumbar pain      H/O total hip arthroplasty, right      Hx of total hip arthroplasty, left        Evaluation Date: 2/23/2024  Authorization Period Expiration: 2/18/2025  Plan of Care Expiration: 5/22/2024    Progress Update: 3/22/90323  Visit # / Visits authorized: 3 / 20          FOTO: Visit #1 2/23/2024 - Scored: 1 / 3     Time In: 1:30 pm  Time Out: 2:05 pm  Total Billable Time: 30 minutes    SUBJECTIVE     Pt reports: no pain currently. Legs have been falling asleep less often.  She was compliant with home exercise program.  Response to previous treatment: increased soreness.  Functional change: independent with HEP.     Pain: 6/10  Location: bilateral hip    OBJECTIVE     Objective Measures updated at progress report unless specified.     Treatment     Merari received the treatments listed below:    Intervention Code  (see below chart) Date/Notes  3/7/24      Recumbent Bike TE 10' L1      Glute bridges TE 3x10      Clam shells laying supine TE Yellow band, 3x10      Ball squeeze between thighs TE Yellow ball, 3x10    LAQ TE 5#, 3x10 isabel   DL Leg Press TE 55#, 3x15   Knee Extension Machine TE 35#, 2x10   Seated HS stretch TE 2x30s isabel   55 minutes of Therapeutic Exercise (TE) to develop strength, endurance, ROM, and flexibility. (17263)    Patient Education and Home Exercises     Home Exercises Provided and Patient Education Provided     Education provided:   - HEP, PT POC    Written Home Exercises Provided: yes. Exercises were reviewed and Merari was able to demonstrate them prior to the end of the session.  Merari  demonstrated good  understanding of the education provided. See EMR under Patient Instructions for exercises provided during therapy sessions    ASSESSMENT   Added Knee Extension Machine and increased resistance on DL Leg Press to improve lower extremity strength.       Merari Is progressing well towards her goals.   Pt prognosis is Excellent.     Pt will continue to benefit from skilled outpatient physical therapy to address the deficits listed in the problem list box on initial evaluation, provide pt/family education and to maximize pt's level of independence in the home and community environment.     Pt's spiritual, cultural and educational needs considered and pt agreeable to plan of care and goals.     Anticipated barriers to physical therapy: None    Goals:  SHORT TERM GOALS:  4 week Progress Date Met   Recent signs and systems trend is improving in order to progress towards Long term goals.  [] Met  [] Not Met  [x] Progressing     Patient will be independent with Home Exercise Program  in order to further progress and return to maximal function. [] Met  [] Not Met  [] Progressing     Pain rating at Worst: 5 /10 in order to progress towards increased independence with activity. [] Met  [] Not Met  [] Progressing     Patient will be able to correct postural deviations in sitting and standing, to decrease pain and promote postural awareness for injury prevention.  [] Met  [] Not Met  [] Progressing     Patient will improve functional outcome (FOTO) score: by 5% to increase self-worth & perceived functional ability towards long term goals [] Met  [] Not Met  [] Progressing        LONG TERM GOALS: 8 weeks Progress Date Met   Patient will return to normal activites of daily living, recreational, and work related activities with less pain and limitation.  [] Met  [] Not Met  [] Progressing     Patient will improve range of motion  to stated goals in order to return to maximal functional potential.  [] Met  [] Not  Met  [] Progressing     Patient will improve Strength to stated goals of appropriate musculature in order to improve functional independence.  [] Met  [] Not Met  [] Progressing     Pain Rating at Best: 1/10 to improve Quality of Life.  [] Met  [] Not Met  [] Progressing         PLAN   Continue per plan of care.  Progress as tolerated.    Delvis Maynard, PT, DPT, SCS

## 2024-03-11 ENCOUNTER — OFFICE VISIT (OUTPATIENT)
Dept: OBSTETRICS AND GYNECOLOGY | Facility: CLINIC | Age: 44
End: 2024-03-11
Payer: MEDICAID

## 2024-03-11 ENCOUNTER — LAB VISIT (OUTPATIENT)
Dept: LAB | Facility: HOSPITAL | Age: 44
End: 2024-03-11
Attending: NURSE PRACTITIONER
Payer: MEDICAID

## 2024-03-11 VITALS
WEIGHT: 212.31 LBS | DIASTOLIC BLOOD PRESSURE: 82 MMHG | BODY MASS INDEX: 34.12 KG/M2 | HEIGHT: 66 IN | SYSTOLIC BLOOD PRESSURE: 120 MMHG

## 2024-03-11 DIAGNOSIS — Z11.3 SCREENING FOR STD (SEXUALLY TRANSMITTED DISEASE): ICD-10-CM

## 2024-03-11 DIAGNOSIS — Z01.419 ENCOUNTER FOR GYNECOLOGICAL EXAMINATION WITHOUT ABNORMAL FINDING: Primary | ICD-10-CM

## 2024-03-11 PROCEDURE — 80074 ACUTE HEPATITIS PANEL: CPT | Performed by: NURSE PRACTITIONER

## 2024-03-11 PROCEDURE — 3008F BODY MASS INDEX DOCD: CPT | Mod: CPTII,,, | Performed by: NURSE PRACTITIONER

## 2024-03-11 PROCEDURE — 99213 OFFICE O/P EST LOW 20 MIN: CPT | Mod: PBBFAC | Performed by: NURSE PRACTITIONER

## 2024-03-11 PROCEDURE — 86592 SYPHILIS TEST NON-TREP QUAL: CPT | Performed by: NURSE PRACTITIONER

## 2024-03-11 PROCEDURE — 99999 PR PBB SHADOW E&M-EST. PATIENT-LVL III: CPT | Mod: PBBFAC,,, | Performed by: NURSE PRACTITIONER

## 2024-03-11 PROCEDURE — 3079F DIAST BP 80-89 MM HG: CPT | Mod: CPTII,,, | Performed by: NURSE PRACTITIONER

## 2024-03-11 PROCEDURE — 3074F SYST BP LT 130 MM HG: CPT | Mod: CPTII,,, | Performed by: NURSE PRACTITIONER

## 2024-03-11 PROCEDURE — 87491 CHLMYD TRACH DNA AMP PROBE: CPT | Performed by: NURSE PRACTITIONER

## 2024-03-11 PROCEDURE — 1159F MED LIST DOCD IN RCRD: CPT | Mod: CPTII,,, | Performed by: NURSE PRACTITIONER

## 2024-03-11 PROCEDURE — 36415 COLL VENOUS BLD VENIPUNCTURE: CPT | Performed by: NURSE PRACTITIONER

## 2024-03-11 PROCEDURE — 1160F RVW MEDS BY RX/DR IN RCRD: CPT | Mod: CPTII,,, | Performed by: NURSE PRACTITIONER

## 2024-03-11 PROCEDURE — 87389 HIV-1 AG W/HIV-1&-2 AB AG IA: CPT | Performed by: NURSE PRACTITIONER

## 2024-03-11 PROCEDURE — 99396 PREV VISIT EST AGE 40-64: CPT | Mod: S$PBB,,, | Performed by: NURSE PRACTITIONER

## 2024-03-11 NOTE — PROGRESS NOTES
CC: Well woman exam    Merari Orosco is a 43 y.o. female  presents for well woman exam.  LMP: No LMP recorded. Patient has had an implant..    Pap and HPV negative for   Mmg scheduled for   Pt wants full STD workup      Past Medical History:   Diagnosis Date    Anxiety     Depression     Hypertension     Obstructive sleep apnea (AHI=20) 2023    HST 3/27/23 SLEEP STUDY FINDINGS: Patient underwent a 1 night Home Sleep Test and by behavioral criteria, slept for approximately 4.85 hours , with a sleep efficiency of 69%. Moderate sleep disordered breathing (AHI=20) is noted based on a 4% hypopnea desaturation criteria. When considering more subtle measures of sleep disordered breathing, the overall respiratory disturbance index is moderate(RD     Past Surgical History:   Procedure Laterality Date     SECTION      x1    HIP ARTHROPLASTY Right 2022    Procedure: ARTHROPLASTY, HIP;  Surgeon: Satnam Deleon MD;  Location: [facility];  Service: Orthopedics;  Laterality: Right;    HIP ARTHROPLASTY Left 2022    Procedure: ARTHROPLASTY, HIP;  Surgeon: Satnam Deleon MD;  Location: [facility];  Service: Orthopedics;  Laterality: Left;    INJECTION OF JOINT Bilateral 11/10/2021    Procedure: Injection, Joint;  Surgeon: Satnam Deleon MD;  Location: [facility];  Service: General;  Laterality: Bilateral;  under fluoroscopy    JOINT REPLACEMENT      WISDOM TOOTH EXTRACTION       Social History     Socioeconomic History    Marital status: Single    Number of children: 1   Tobacco Use    Smoking status: Never    Smokeless tobacco: Never   Substance and Sexual Activity    Alcohol use: Not Currently     Alcohol/week: 1.0 standard drink of alcohol     Types: 1 Glasses of wine per week     Comment: socially    Drug use: No    Sexual activity: Not Currently     Partners: Male     Birth control/protection: I.U.D.     Social Determinants of Health     Financial Resource Strain: Low  "Risk  (10/6/2023)    Overall Financial Resource Strain (CARDIA)     Difficulty of Paying Living Expenses: Not hard at all   Food Insecurity: No Food Insecurity (10/6/2023)    Hunger Vital Sign     Worried About Running Out of Food in the Last Year: Never true     Ran Out of Food in the Last Year: Never true   Transportation Needs: Unmet Transportation Needs (10/22/2023)    PRAPARE - Transportation     Lack of Transportation (Medical): Yes     Lack of Transportation (Non-Medical): Yes   Physical Activity: Sufficiently Active (10/22/2023)    Exercise Vital Sign     Days of Exercise per Week: 7 days     Minutes of Exercise per Session: 30 min   Stress: Stress Concern Present (10/22/2023)    [employer] [place] [employer] - Occupational Stress Questionnaire     Feeling of Stress : To some extent   Social Connections: Unknown (10/22/2023)    Social Connection and Isolation Panel [NHANES]     Frequency of Communication with Friends and Family: More than three times a week     Frequency of Social Gatherings with Friends and Family: More than three times a week     Active Member of Clubs or Organizations: No     Marital Status:    Housing Stability: Low Risk  (10/22/2023)    Housing Stability Vital Sign     Unable to Pay for Housing in the Last Year: No     Number of Places Lived in the Last Year: 1     Unstable Housing in the Last Year: No     Family History   Problem Relation Age of Onset    Colon cancer Father     Diabetes Father     Hypertension Father     Hypertension Mother     Diabetes Sister     Hypertension Sister     Breast cancer Neg Hx     Cancer Neg Hx     Miscarriages / Stillbirths Neg Hx     Ovarian cancer Neg Hx      labor Neg Hx     Stroke Neg Hx      OB History          2    Para   1    Term   1            AB   1    Living             SAB        IAB        Ectopic        Multiple        Live Births                     /82   Ht 5' 6" (1.676 m)   Wt 96.3 kg " (212 lb 4.9 oz)   BMI 34.27 kg/m²       ROS:  Per hpi    PHYSICAL EXAM:  APPEARANCE: Well nourished, well developed, in no acute distress.  AFFECT: WNL, alert and oriented x 3  SKIN: No acne or hirsutism  NECK: Neck symmetric without masses or thyromegaly  NODES: No inguinal, cervical, axillary, or femoral lymph node enlargement  CHEST: Good respiratory effect  ABDOMEN: Soft.  No tenderness or masses.  No hepatosplenomegaly.  No hernias.  BREASTS: Symmetrical, no skin changes or visible lesions.  No palpable masses, nipple discharge bilaterally.  PELVIC: Normal external genitalia without lesions.  Normal hair distribution.  Adequate perineal body, normal urethral meatus.  Vagina moist and well rugated without lesions or discharge.  Cervix pink, without lesions, discharge or tenderness IUD strings noted.  No significant cystocele or rectocele.  Bimanual exam shows uterus to be normal size, regular, mobile and nontender.  Adnexa without masses or tenderness.    EXTREMITIES: No edema.  Physical Exam    1. Encounter for gynecological examination without abnormal finding        2. Screening for STD (sexually transmitted disease)  C. trachomatis/N. gonorrhoeae by AMP DNA    HIV 1/2 Ag/Ab (4th Gen)    Hepatitis Panel, Acute    RPR       AND PLAN:    Merari was seen today for annual exam.    Diagnoses and all orders for this visit:    Encounter for gynecological examination without abnormal finding    Screening for STD (sexually transmitted disease)  -     C. trachomatis/N. gonorrhoeae by AMP DNA  -     HIV 1/2 Ag/Ab (4th Gen); Future  -     Hepatitis Panel, Acute; Future  -     RPR; Future         Patient was counseled today on A.C.S. Pap guidelines and recommendations for yearly pelvic exams, mammograms and monthly self breast exams; to see her PCP for other health maintenance.

## 2024-03-12 ENCOUNTER — CLINICAL SUPPORT (OUTPATIENT)
Facility: HOSPITAL | Age: 44
End: 2024-03-12
Payer: MEDICAID

## 2024-03-12 DIAGNOSIS — R29.898 WEAKNESS OF BOTH HIPS: Primary | ICD-10-CM

## 2024-03-12 LAB
C TRACH DNA SPEC QL NAA+PROBE: NOT DETECTED
HAV IGM SERPL QL IA: NORMAL
HBV CORE IGM SERPL QL IA: NORMAL
HBV SURFACE AG SERPL QL IA: NORMAL
HCV AB SERPL QL IA: NORMAL
HIV 1+2 AB+HIV1 P24 AG SERPL QL IA: NORMAL
N GONORRHOEA DNA SPEC QL NAA+PROBE: NOT DETECTED
RPR SER QL: NORMAL

## 2024-03-12 PROCEDURE — 97110 THERAPEUTIC EXERCISES: CPT | Mod: PN

## 2024-03-12 PROCEDURE — 97112 NEUROMUSCULAR REEDUCATION: CPT | Mod: PN

## 2024-03-12 NOTE — PROGRESS NOTES
"[facility]   Physical Therapy Treatment Note     Name: Merari Orosco  [facility] Number: [id number]    Therapy Diagnosis:   Encounter Diagnosis   Name Primary?    Weakness of both hips Yes     Physician: Satnam Deleon MD    Visit Date: 3/12/2024    Physician Orders: PT Eval and Treat  Medical Diagnosis from Referral:       Encounter Diagnoses   Name Primary?    Lumbar pain      H/O total hip arthroplasty, right      Hx of total hip arthroplasty, left        Evaluation Date: 2/23/2024  Authorization Period Expiration: 2/18/2025  Plan of Care Expiration: 5/22/2024    Progress Update: 3/22/06230  Visit # / Visits authorized: 4 / 20          FOTO: Visit #1 2/23/2024 - Scored: 1 / 3     Time In: 9:45 am  Time Out: 10:40 am  Total Billable Time: 50 minutes    SUBJECTIVE     Pt reports: no pain currently. Legs have been falling asleep less often.  She was compliant with home exercise program.  Response to previous treatment: increased soreness.  Functional change: independent with HEP.     Pain: 6/10  Location: bilateral hip    OBJECTIVE     Objective Measures updated at progress report unless specified.     Treatment     Merari received the treatments listed below:      Intervention Code  (see below chart) Date/Notes  3/12/24   Recumbent Bike TE 10' L1   Abdominal Bracing NMR 3" hold x 3'    SLR - Flexion TE 2x10 - isabel    Glute bridges NMR 3x10   Clam shells sidelying NMR 2x10 - isabel    Ball squeeze between thighs NMR pillow, 3x10   LAQ NMR     DL Leg Press NMR 55#, 3x15   Knee Extension Machine TE 35#, 3x10   Seated HS stretch  TE  3x30s isabel   25 minutes of Therapeutic Exercise (TE) to develop strength, endurance, ROM, and flexibility. (97778)  0 minutes of Manual therapy (MT) to improve pain and ROM. (88548)  25 minutes of Neuromuscular Re-Education (NR)  to improve: Balance, Coordination, Kinesthetic, Sense, Proprioception, and Posture. (69053)  0 minutes of Therapeutic Activities (TA) " to improve functional performance. (37097)  Unattended Electrical Stimulation (ES) for muscle performance and/or pain modulation. (71614)  Vasopneumatic Device Therapy () for management of swelling/edema. (11075)  BFR: Blood flow restriction applied during exercise  NP: Not Performed      Patient Education and Home Exercises     Home Exercises Provided and Patient Education Provided     Education provided:   - HEP, PT POC    Written Home Exercises Provided: yes. Exercises were reviewed and Merari was able to demonstrate them prior to the end of the session.  Merari demonstrated good  understanding of the education provided. See EMR under Patient Instructions for exercises provided during therapy sessions    ASSESSMENT   Response to Therapeutic Exercise: added SLR - Flexion to improve hip flexor strength.   Response to Neuromuscular Re-education: added Abdominal Bracing to improve core motor control. Regressed Ball Squeeze to use pillow to alleviate adductor pain.       Merari Is progressing well towards her goals.   Pt prognosis is Excellent.     Pt will continue to benefit from skilled outpatient physical therapy to address the deficits listed in the problem list box on initial evaluation, provide pt/family education and to maximize pt's level of independence in the home and community environment.     Pt's spiritual, cultural and educational needs considered and pt agreeable to plan of care and goals.     Anticipated barriers to physical therapy: None    Goals:  SHORT TERM GOALS:  4 week Progress Date Met   Recent signs and systems trend is improving in order to progress towards Long term goals.  [] Met  [] Not Met  [x] Progressing     Patient will be independent with Home Exercise Program  in order to further progress and return to maximal function. [] Met  [] Not Met  [] Progressing     Pain rating at Worst: 5 /10 in order to progress towards increased independence with activity. [] Met  [] Not Met  []  Progressing     Patient will be able to correct postural deviations in sitting and standing, to decrease pain and promote postural awareness for injury prevention.  [] Met  [] Not Met  [] Progressing     Patient will improve functional outcome (FOTO) score: by 5% to increase self-worth & perceived functional ability towards long term goals [] Met  [] Not Met  [] Progressing        LONG TERM GOALS: 8 weeks Progress Date Met   Patient will return to normal activites of daily living, recreational, and work related activities with less pain and limitation.  [] Met  [] Not Met  [] Progressing     Patient will improve range of motion  to stated goals in order to return to maximal functional potential.  [] Met  [] Not Met  [] Progressing     Patient will improve Strength to stated goals of appropriate musculature in order to improve functional independence.  [] Met  [] Not Met  [] Progressing     Pain Rating at Best: 1/10 to improve Quality of Life.  [] Met  [] Not Met  [] Progressing         PLAN   Continue per plan of care.  Progress as tolerated.    Alonzo Manuel, PT, DPT, SCS

## 2024-03-14 ENCOUNTER — CLINICAL SUPPORT (OUTPATIENT)
Facility: HOSPITAL | Age: 44
End: 2024-03-14
Payer: MEDICAID

## 2024-03-14 DIAGNOSIS — R29.898 WEAKNESS OF BOTH HIPS: Primary | ICD-10-CM

## 2024-03-14 PROCEDURE — 97110 THERAPEUTIC EXERCISES: CPT | Mod: PN | Performed by: PHYSICAL THERAPIST

## 2024-03-14 NOTE — PROGRESS NOTES
[facility]   Physical Therapy Treatment Note     Name: Merari Orosco  [facility] Number: [id number]    Therapy Diagnosis:   Encounter Diagnosis   Name Primary?    Weakness of both hips Yes     Physician: Satnam Deleon MD    Visit Date: 3/14/2024    Physician Orders: PT Eval and Treat  Medical Diagnosis from Referral:       Encounter Diagnoses   Name Primary?    Lumbar pain      H/O total hip arthroplasty, right      Hx of total hip arthroplasty, left        Evaluation Date: 2/23/2024  Authorization Period Expiration: 2/18/2025  Plan of Care Expiration: 5/22/2024    Progress Update: 3/22/52149  Visit # / Visits authorized: 5 / 20          FOTO: Visit #1 2/23/2024 - Scored: 1 / 3     Time In: 9:45 am  Time Out: 10:50 am  Total Billable Time: 55 minutes    SUBJECTIVE     Pt reports: Feeling good today  She was compliant with home exercise program.  Response to previous treatment: increased soreness.  Functional change: independent with HEP.     Pain: 2/10  Location: bilateral hip    OBJECTIVE     Objective Measures updated at progress report unless specified.     Treatment     Merari received the treatments listed below:      Intervention Code  (see below chart) 3/14/24   Bike TE 10' L8   Seated HS stretch  TE  3x30s isabel   Glute bridges TE Green band, 3x10   Clam shells sidelying TE Yellow band, 3x10 isabel   DL Leg Press TE 55#, 3x15   Knee Extension Machine TE 35#, 3x10   DL RDL TE 15#KB, 3x10   55 minutes of Therapeutic Exercise (TE) to develop strength, endurance, ROM, and flexibility. (71376)      Patient Education and Home Exercises     Home Exercises Provided and Patient Education Provided     Education provided:   - HEP, PT POC    Written Home Exercises Provided: yes. Exercises were reviewed and Merari was able to demonstrate them prior to the end of the session.  Dorona demonstrated good  understanding of the education provided. See EMR under Patient Instructions for exercises  provided during therapy sessions    ASSESSMENT   Good tolerance for exercises today. Fatigued in hips and quads but no pain with any strengthening. Showing improved tolerance for higher resistance with most exercises.      Merari Is progressing well towards her goals.   Pt prognosis is Excellent.     Pt will continue to benefit from skilled outpatient physical therapy to address the deficits listed in the problem list box on initial evaluation, provide pt/family education and to maximize pt's level of independence in the home and community environment.     Pt's spiritual, cultural and educational needs considered and pt agreeable to plan of care and goals.     Anticipated barriers to physical therapy: None    Goals:  SHORT TERM GOALS:  4 week Progress Date Met   Recent signs and systems trend is improving in order to progress towards Long term goals.  [] Met  [] Not Met  [x] Progressing     Patient will be independent with Home Exercise Program  in order to further progress and return to maximal function. [] Met  [] Not Met  [] Progressing     Pain rating at Worst: 5 /10 in order to progress towards increased independence with activity. [] Met  [] Not Met  [] Progressing     Patient will be able to correct postural deviations in sitting and standing, to decrease pain and promote postural awareness for injury prevention.  [] Met  [] Not Met  [] Progressing     Patient will improve functional outcome (FOTO) score: by 5% to increase self-worth & perceived functional ability towards long term goals [] Met  [] Not Met  [] Progressing        LONG TERM GOALS: 8 weeks Progress Date Met   Patient will return to normal activites of daily living, recreational, and work related activities with less pain and limitation.  [] Met  [] Not Met  [] Progressing     Patient will improve range of motion  to stated goals in order to return to maximal functional potential.  [] Met  [] Not Met  [] Progressing     Patient will improve  Strength to stated goals of appropriate musculature in order to improve functional independence.  [] Met  [] Not Met  [] Progressing     Pain Rating at Best: 1/10 to improve Quality of Life.  [] Met  [] Not Met  [] Progressing         PLAN   Continue per plan of care.  Progress as tolerated.    Delvis Maynard, PT, DPT, SCS

## 2024-03-19 ENCOUNTER — CLINICAL SUPPORT (OUTPATIENT)
Facility: HOSPITAL | Age: 44
End: 2024-03-19
Payer: MEDICAID

## 2024-03-19 ENCOUNTER — PATIENT MESSAGE (OUTPATIENT)
Dept: PULMONOLOGY | Facility: CLINIC | Age: 44
End: 2024-03-19
Payer: MEDICAID

## 2024-03-19 DIAGNOSIS — R29.898 WEAKNESS OF BOTH HIPS: Primary | ICD-10-CM

## 2024-03-19 NOTE — PROGRESS NOTES
[facility]   Physical Therapy Treatment Note     Name: Merari Orosco  [facility] Number: [id number]    Therapy Diagnosis:   Encounter Diagnosis   Name Primary?    Weakness of both hips Yes     Physician: Satnam Deleon MD    Visit Date: 3/19/2024    Physician Orders: PT Eval and Treat  Medical Diagnosis from Referral:       Encounter Diagnoses   Name Primary?    Lumbar pain      H/O total hip arthroplasty, right      Hx of total hip arthroplasty, left        Evaluation Date: 2/23/2024  Authorization Period Expiration: 2/18/2025  Plan of Care Expiration: 5/22/2024    Progress Update: 3/22/42605  Visit # / Visits authorized: 6/16        FOTO: Visit #1 2/23/2024 - Scored: 1 / 3     Time In: 10:30 am  Time Out: 11:30 am  Total Billable Time: 55 minutes    SUBJECTIVE     Pt reports: Feeling good today. Hamstrings are a little sore and tight today.  She was compliant with home exercise program.  Response to previous treatment: increased soreness.  Functional change: independent with HEP.     Pain: 2/10  Location: bilateral hip    OBJECTIVE     Objective Measures updated at progress report unless specified.     Treatment     Merari received the treatments listed below:      Intervention Code  (see below chart) 3/14/24   Bike TE 10' L8   Seated HS stretch  TE  3x30s isabel   Glute bridges TE Green band, 3x10   Clam shells sidelying TE Yellow band, 3x10 isabel   DL Leg Press TE 55#, 3x15   Knee Extension Machine TE 35#, 3x10   DL RDL TE 15#KB, 3x10   55 minutes of Therapeutic Exercise (TE) to develop strength, endurance, ROM, and flexibility. (28897)      Patient Education and Home Exercises     Home Exercises Provided and Patient Education Provided     Education provided:   - HEP, PT POC    Written Home Exercises Provided: yes. Exercises were reviewed and Merari was able to demonstrate them prior to the end of the session.  Anabellatta demonstrated good  understanding of the education provided. See  EMR under Patient Instructions for exercises provided during therapy sessions    ASSESSMENT   Good tolerance for exercises today. Fatigued in hips and quads but no pain with any strengthening. Showing improved tolerance for higher resistance with most exercises.      Merari Is progressing well towards her goals.   Pt prognosis is Excellent.     Pt will continue to benefit from skilled outpatient physical therapy to address the deficits listed in the problem list box on initial evaluation, provide pt/family education and to maximize pt's level of independence in the home and community environment.     Pt's spiritual, cultural and educational needs considered and pt agreeable to plan of care and goals.     Anticipated barriers to physical therapy: None    Goals:  SHORT TERM GOALS:  4 week Progress Date Met   Recent signs and systems trend is improving in order to progress towards Long term goals.  [] Met  [] Not Met  [x] Progressing     Patient will be independent with Home Exercise Program  in order to further progress and return to maximal function. [] Met  [] Not Met  [] Progressing     Pain rating at Worst: 5 /10 in order to progress towards increased independence with activity. [] Met  [] Not Met  [] Progressing     Patient will be able to correct postural deviations in sitting and standing, to decrease pain and promote postural awareness for injury prevention.  [] Met  [] Not Met  [] Progressing     Patient will improve functional outcome (FOTO) score: by 5% to increase self-worth & perceived functional ability towards long term goals [] Met  [] Not Met  [] Progressing        LONG TERM GOALS: 8 weeks Progress Date Met   Patient will return to normal activites of daily living, recreational, and work related activities with less pain and limitation.  [] Met  [] Not Met  [] Progressing     Patient will improve range of motion  to stated goals in order to return to maximal functional potential.  [] Met  [] Not  Met  [] Progressing     Patient will improve Strength to stated goals of appropriate musculature in order to improve functional independence.  [] Met  [] Not Met  [] Progressing     Pain Rating at Best: 1/10 to improve Quality of Life.  [] Met  [] Not Met  [] Progressing         PLAN   Continue per plan of care.  Progress as tolerated.    Delvis Maynard, PT, DPT, SCS

## 2024-03-21 ENCOUNTER — CLINICAL SUPPORT (OUTPATIENT)
Facility: HOSPITAL | Age: 44
End: 2024-03-21
Payer: MEDICAID

## 2024-03-21 DIAGNOSIS — R29.898 WEAKNESS OF BOTH HIPS: Primary | ICD-10-CM

## 2024-03-21 PROCEDURE — 97110 THERAPEUTIC EXERCISES: CPT | Mod: PN | Performed by: PHYSICAL THERAPIST

## 2024-03-21 NOTE — PROGRESS NOTES
[facility]   Physical Therapy Treatment Note     Name: Merari Orosco  [facility] Number: [id number]    Therapy Diagnosis:   Encounter Diagnosis   Name Primary?    Weakness of both hips Yes     Physician: Satnam Deleon MD    Visit Date: 3/21/2024    Physician Orders: PT Eval and Treat  Medical Diagnosis from Referral:       Encounter Diagnoses   Name Primary?    Lumbar pain      H/O total hip arthroplasty, right      Hx of total hip arthroplasty, left        Evaluation Date: 2/23/2024  Authorization Period Expiration: 2/18/2025  Plan of Care Expiration: 5/22/2024    Progress Update: 3/22/61962  Visit # / Visits authorized: 6/16        FOTO: Visit #1 2/23/2024 - Scored: 1 / 3     Time In: 10:30 am  Time Out: 11:30 am  Total Billable Time: 55 minutes    SUBJECTIVE     Pt reports: Feeling good today. Hamstrings are a little sore and tight today.  She was compliant with home exercise program.  Response to previous treatment: increased soreness.  Functional change: independent with HEP.     Pain: 2/10  Location: bilateral hip    OBJECTIVE     Objective Measures updated at progress report unless specified.     Treatment     Merari received the treatments listed below:      Intervention Code  (see below chart) Date/Notes  3/21/24   Recumbent Bike TE 10' L1   Seated HS stretch  TE  3x30s isabel   Glute bridges NMR Green band, 3x10   Clam shells side-lying NMR Yellow band, 3x10 isabel   DL Leg Press NMR 75#, 3x15   Ancore Knee Extension TE 10# 3x10 isabel   DL RDL TE 20#KB, 3x10   55 minutes of Therapeutic Exercise (TE) to develop strength, endurance, ROM, and flexibility. (49922)      Patient Education and Home Exercises     Home Exercises Provided and Patient Education Provided     Education provided:   - HEP, PT POC    Written Home Exercises Provided: yes. Exercises were reviewed and Merari was able to demonstrate them prior to the end of the session.  Merari demonstrated good  understanding of  the education provided. See EMR under Patient Instructions for exercises provided during therapy sessions    ASSESSMENT   Good tolerance for exercises today. Fatigued in hips and quads but no pain with any strengthening. Showing improved tolerance for higher resistance with most exercises.      Merari Is progressing well towards her goals.   Pt prognosis is Excellent.     Pt will continue to benefit from skilled outpatient physical therapy to address the deficits listed in the problem list box on initial evaluation, provide pt/family education and to maximize pt's level of independence in the home and community environment.     Pt's spiritual, cultural and educational needs considered and pt agreeable to plan of care and goals.     Anticipated barriers to physical therapy: None    Goals:  SHORT TERM GOALS:  4 week Progress Date Met   Recent signs and systems trend is improving in order to progress towards Long term goals.  [] Met  [] Not Met  [x] Progressing     Patient will be independent with Home Exercise Program  in order to further progress and return to maximal function. [] Met  [] Not Met  [] Progressing     Pain rating at Worst: 5 /10 in order to progress towards increased independence with activity. [] Met  [] Not Met  [] Progressing     Patient will be able to correct postural deviations in sitting and standing, to decrease pain and promote postural awareness for injury prevention.  [] Met  [] Not Met  [] Progressing     Patient will improve functional outcome (FOTO) score: by 5% to increase self-worth & perceived functional ability towards long term goals [] Met  [] Not Met  [] Progressing        LONG TERM GOALS: 8 weeks Progress Date Met   Patient will return to normal activites of daily living, recreational, and work related activities with less pain and limitation.  [] Met  [] Not Met  [] Progressing     Patient will improve range of motion  to stated goals in order to return to maximal functional  potential.  [] Met  [] Not Met  [] Progressing     Patient will improve Strength to stated goals of appropriate musculature in order to improve functional independence.  [] Met  [] Not Met  [] Progressing     Pain Rating at Best: 1/10 to improve Quality of Life.  [] Met  [] Not Met  [] Progressing         PLAN   Continue per plan of care.  Progress as tolerated.    Delvis Maynard, PT, DPT, SCS

## 2024-03-26 ENCOUNTER — PATIENT MESSAGE (OUTPATIENT)
Dept: PRIMARY CARE CLINIC | Facility: CLINIC | Age: 44
End: 2024-03-26
Payer: MEDICAID

## 2024-03-26 ENCOUNTER — CLINICAL SUPPORT (OUTPATIENT)
Facility: HOSPITAL | Age: 44
End: 2024-03-26
Payer: MEDICAID

## 2024-03-26 ENCOUNTER — TELEPHONE (OUTPATIENT)
Dept: PRIMARY CARE CLINIC | Facility: CLINIC | Age: 44
End: 2024-03-26
Payer: MEDICAID

## 2024-03-26 DIAGNOSIS — R29.898 WEAKNESS OF BOTH HIPS: Primary | ICD-10-CM

## 2024-03-26 PROCEDURE — 97110 THERAPEUTIC EXERCISES: CPT | Mod: PN | Performed by: PHYSICAL THERAPIST

## 2024-03-26 NOTE — PROGRESS NOTES
[facility]   Physical Therapy Treatment Note     Name: Merari Orosco  [facility] Number: [id number]    Therapy Diagnosis:   Encounter Diagnosis   Name Primary?    Weakness of both hips Yes     Physician: Satnam Deleon MD    Visit Date: 3/26/2024    Physician Orders: PT Eval and Treat  Medical Diagnosis from Referral:       Encounter Diagnoses   Name Primary?    Lumbar pain      H/O total hip arthroplasty, right      Hx of total hip arthroplasty, left        Evaluation Date: 2/23/2024  Authorization Period Expiration: 2/18/2025  Plan of Care Expiration: 5/22/2024    Progress Update: 3/22/53028  Visit # / Visits authorized: 6/16        FOTO: Visit #1 2/23/2024 - Scored: 1 / 3     Time In: 10:30 am  Time Out: 11:30 am  Total Billable Time: 55 minutes    SUBJECTIVE     Pt reports: Feeling good today. Hamstrings are a little sore and tight today.  She was compliant with home exercise program.  Response to previous treatment: increased soreness.  Functional change: independent with HEP.     Pain: 2/10  Location: bilateral hip    OBJECTIVE     Objective Measures updated at progress report unless specified.     Treatment     Merari received the treatments listed below:      Intervention Code  (see below chart) Date/Notes  3/21/24   Recumbent Bike TE 10' L1   Seated HS stretch  TE  3x30s isabel   Glute bridges TE Green band, 3x10   Clam shells side-lying TE Yellow band, 3x10 isabel   DL Leg Press TE 75#, 3x15   Ancore Knee Extension TE 10# 3x10 isabel   DL RDL TE 20#KB, 3x10   55 minutes of Therapeutic Exercise (TE) to develop strength, endurance, ROM, and flexibility. ([id number])      Patient Education and Home Exercises     Home Exercises Provided and Patient Education Provided     Education provided:   - HEP, PT POC    Written Home Exercises Provided: yes. Exercises were reviewed and Merari was able to demonstrate them prior to the end of the session.  Dorona demonstrated good  understanding of the  education provided. See EMR under Patient Instructions for exercises provided during therapy sessions    ASSESSMENT   Good tolerance for exercises today. Fatigued in hips and quads but no pain with any strengthening. Showing improved tolerance for higher resistance with most exercises.      Merari Is progressing well towards her goals.   Pt prognosis is Excellent.     Pt will continue to benefit from skilled outpatient physical therapy to address the deficits listed in the problem list box on initial evaluation, provide pt/family education and to maximize pt's level of independence in the home and community environment.     Pt's spiritual, cultural and educational needs considered and pt agreeable to plan of care and goals.     Anticipated barriers to physical therapy: None    Goals:  SHORT TERM GOALS:  4 week Progress Date Met   Recent signs and systems trend is improving in order to progress towards Long term goals.  [] Met  [] Not Met  [x] Progressing     Patient will be independent with Home Exercise Program  in order to further progress and return to maximal function. [] Met  [] Not Met  [] Progressing     Pain rating at Worst: 5 /10 in order to progress towards increased independence with activity. [] Met  [] Not Met  [] Progressing     Patient will be able to correct postural deviations in sitting and standing, to decrease pain and promote postural awareness for injury prevention.  [] Met  [] Not Met  [] Progressing     Patient will improve functional outcome (FOTO) score: by 5% to increase self-worth & perceived functional ability towards long term goals [] Met  [] Not Met  [] Progressing        LONG TERM GOALS: 8 weeks Progress Date Met   Patient will return to normal activites of daily living, recreational, and work related activities with less pain and limitation.  [] Met  [] Not Met  [] Progressing     Patient will improve range of motion  to stated goals in order to return to maximal functional  potential.  [] Met  [] Not Met  [] Progressing     Patient will improve Strength to stated goals of appropriate musculature in order to improve functional independence.  [] Met  [] Not Met  [] Progressing     Pain Rating at Best: 1/10 to improve Quality of Life.  [] Met  [] Not Met  [] Progressing       PLAN   Continue per plan of care.  Progress as tolerated.    Delvis Maynard, PT, DPT, SCS

## 2024-03-26 NOTE — TELEPHONE ENCOUNTER
Called pt at listed number; left vm; sent [employer] message  For [employer]  rdf

## 2024-03-28 ENCOUNTER — CLINICAL SUPPORT (OUTPATIENT)
Facility: HOSPITAL | Age: 44
End: 2024-03-28
Payer: MEDICAID

## 2024-03-28 DIAGNOSIS — R29.898 WEAKNESS OF BOTH HIPS: Primary | ICD-10-CM

## 2024-03-28 PROCEDURE — 97110 THERAPEUTIC EXERCISES: CPT | Mod: PN | Performed by: PHYSICAL THERAPIST

## 2024-03-28 NOTE — PROGRESS NOTES
[facility]   Physical Therapy Treatment Note     Name: Merari Orosco  [facility] Number: [id number]    Therapy Diagnosis:   Encounter Diagnosis   Name Primary?    Weakness of both hips Yes     Physician: Satnam Deleon MD    Visit Date: 3/28/2024    Physician Orders: PT Eval and Treat  Medical Diagnosis from Referral:       Encounter Diagnoses   Name Primary?    Lumbar pain      H/O total hip arthroplasty, right      Hx of total hip arthroplasty, left        Evaluation Date: 2/23/2024  Authorization Period Expiration: 2/18/2025  Plan of Care Expiration: 5/22/2024    Progress Update: 3/22/14482  Visit # / Visits authorized: 6/16        FOTO: Visit #1 2/23/2024 - Scored: 1 / 3     Time In: 10:30 am  Time Out: 11:30 am  Total Billable Time: 55 minutes    SUBJECTIVE     Pt reports: Feeling good today. Hamstrings are a little sore and tight today.  She was compliant with home exercise program.  Response to previous treatment: increased soreness.  Functional change: independent with HEP.     Pain: 2/10  Location: bilateral hip    OBJECTIVE     Objective Measures updated at progress report unless specified.     Treatment     Merari received the treatments listed below:      Intervention Code  (see below chart) Date/Notes  3/21/24   Recumbent Bike TE 10' L1   Seated HS stretch  TE  3x30s isabel   Glute bridges TE Green band, 3x10   Clam shells side-lying TE Yellow band, 3x10 isabel   DL Leg Press TE 75#, 3x15   Ancore Knee Extension TE 10# 3x10 isabel   DL RDL TE 20#KB, 3x10   55 minutes of Therapeutic Exercise (TE) to develop strength, endurance, ROM, and flexibility. (32626)      Patient Education and Home Exercises     Home Exercises Provided and Patient Education Provided     Education provided:   - HEP, PT POC    Written Home Exercises Provided: yes. Exercises were reviewed and Merari was able to demonstrate them prior to the end of the session.  Dorona demonstrated good  understanding of the  education provided. See EMR under Patient Instructions for exercises provided during therapy sessions    ASSESSMENT   Good tolerance for exercises today. Fatigued in hips and quads but no pain with any strengthening. Showing improved tolerance for higher resistance with most exercises.    Merari Is progressing well towards her goals.   Pt prognosis is Excellent.     Pt will continue to benefit from skilled outpatient physical therapy to address the deficits listed in the problem list box on initial evaluation, provide pt/family education and to maximize pt's level of independence in the home and community environment.     Pt's spiritual, cultural and educational needs considered and pt agreeable to plan of care and goals.     Anticipated barriers to physical therapy: None    Goals:  SHORT TERM GOALS:  4 week Progress Date Met   Recent signs and systems trend is improving in order to progress towards Long term goals.  [] Met  [] Not Met  [x] Progressing     Patient will be independent with Home Exercise Program  in order to further progress and return to maximal function. [] Met  [] Not Met  [] Progressing     Pain rating at Worst: 5 /10 in order to progress towards increased independence with activity. [] Met  [] Not Met  [] Progressing     Patient will be able to correct postural deviations in sitting and standing, to decrease pain and promote postural awareness for injury prevention.  [] Met  [] Not Met  [] Progressing     Patient will improve functional outcome (FOTO) score: by 5% to increase self-worth & perceived functional ability towards long term goals [] Met  [] Not Met  [] Progressing        LONG TERM GOALS: 8 weeks Progress Date Met   Patient will return to normal activites of daily living, recreational, and work related activities with less pain and limitation.  [] Met  [] Not Met  [] Progressing     Patient will improve range of motion  to stated goals in order to return to maximal functional  potential.  [] Met  [] Not Met  [] Progressing     Patient will improve Strength to stated goals of appropriate musculature in order to improve functional independence.  [] Met  [] Not Met  [] Progressing     Pain Rating at Best: 1/10 to improve Quality of Life.  [] Met  [] Not Met  [] Progressing       PLAN   Continue per plan of care.  Progress as tolerated.    Delvis Maynard, PT, DPT, SCS

## 2024-04-04 ENCOUNTER — DOCUMENTATION ONLY (OUTPATIENT)
Facility: HOSPITAL | Age: 44
End: 2024-04-04
Payer: MEDICAID

## 2024-04-04 NOTE — PROGRESS NOTES
Patient presented to clinic today in a state of disorientation. She appeared lethargic and confused. Vital sign exam was normal: RR 18, , /77. I decided to not perform any PT services today and asked the patient who we could contact to come and pick her up from the clinic. We called her brother to come get her and she was monitored in the lobby in the meantime while waiting for her brother to arrive.

## 2024-04-11 NOTE — TELEPHONE ENCOUNTER
Called due to missed appointment.   2 = difficulty understanding and speaking (not related to language barrier)

## 2024-04-15 ENCOUNTER — LAB VISIT (OUTPATIENT)
Dept: LAB | Facility: HOSPITAL | Age: 44
End: 2024-04-15
Attending: FAMILY MEDICINE
Payer: MEDICAID

## 2024-04-15 DIAGNOSIS — K70.0 FATTY LIVER DUE TO ALCOHOLISM: Chronic | ICD-10-CM

## 2024-04-15 DIAGNOSIS — I10 ESSENTIAL HYPERTENSION: ICD-10-CM

## 2024-04-15 LAB
ALBUMIN SERPL BCP-MCNC: 3.4 G/DL (ref 3.5–5.2)
ALP SERPL-CCNC: 39 U/L (ref 55–135)
ALT SERPL W/O P-5'-P-CCNC: 21 U/L (ref 10–44)
ANION GAP SERPL CALC-SCNC: 6 MMOL/L (ref 8–16)
AST SERPL-CCNC: 22 U/L (ref 10–40)
BILIRUB SERPL-MCNC: 0.3 MG/DL (ref 0.1–1)
BUN SERPL-MCNC: 5 MG/DL (ref 6–20)
CALCIUM SERPL-MCNC: 8.5 MG/DL (ref 8.7–10.5)
CHLORIDE SERPL-SCNC: 104 MMOL/L (ref 95–110)
CHOLEST SERPL-MCNC: 178 MG/DL (ref 120–199)
CHOLEST/HDLC SERPL: 2.5 {RATIO} (ref 2–5)
CO2 SERPL-SCNC: 26 MMOL/L (ref 23–29)
CREAT SERPL-MCNC: 0.7 MG/DL (ref 0.5–1.4)
ERYTHROCYTE [DISTWIDTH] IN BLOOD BY AUTOMATED COUNT: 13 % (ref 11.5–14.5)
EST. GFR  (NO RACE VARIABLE): >60 ML/MIN/1.73 M^2
GGT SERPL-CCNC: 36 U/L (ref 8–55)
GLUCOSE SERPL-MCNC: 88 MG/DL (ref 70–110)
HCT VFR BLD AUTO: 33.9 % (ref 37–48.5)
HDLC SERPL-MCNC: 70 MG/DL (ref 40–75)
HDLC SERPL: 39.3 % (ref 20–50)
HGB BLD-MCNC: 11.2 G/DL (ref 12–16)
LDLC SERPL CALC-MCNC: 99 MG/DL (ref 63–159)
MCH RBC QN AUTO: 32.2 PG (ref 27–31)
MCHC RBC AUTO-ENTMCNC: 33 G/DL (ref 32–36)
MCV RBC AUTO: 97 FL (ref 82–98)
NONHDLC SERPL-MCNC: 108 MG/DL
PLATELET # BLD AUTO: 160 K/UL (ref 150–450)
PMV BLD AUTO: 11.6 FL (ref 9.2–12.9)
POTASSIUM SERPL-SCNC: 3.4 MMOL/L (ref 3.5–5.1)
PROT SERPL-MCNC: 6.3 G/DL (ref 6–8.4)
RBC # BLD AUTO: 3.48 M/UL (ref 4–5.4)
SODIUM SERPL-SCNC: 136 MMOL/L (ref 136–145)
TRIGL SERPL-MCNC: 45 MG/DL (ref 30–150)
WBC # BLD AUTO: 4.46 K/UL (ref 3.9–12.7)

## 2024-04-15 PROCEDURE — 85027 COMPLETE CBC AUTOMATED: CPT | Performed by: FAMILY MEDICINE

## 2024-04-15 PROCEDURE — 82977 ASSAY OF GGT: CPT | Performed by: FAMILY MEDICINE

## 2024-04-15 PROCEDURE — 80053 COMPREHEN METABOLIC PANEL: CPT | Performed by: FAMILY MEDICINE

## 2024-04-15 PROCEDURE — 80061 LIPID PANEL: CPT | Performed by: FAMILY MEDICINE

## 2024-04-15 PROCEDURE — 36415 COLL VENOUS BLD VENIPUNCTURE: CPT | Performed by: FAMILY MEDICINE

## 2024-04-16 ENCOUNTER — CLINICAL SUPPORT (OUTPATIENT)
Facility: HOSPITAL | Age: 44
End: 2024-04-16
Payer: MEDICAID

## 2024-04-16 DIAGNOSIS — R29.898 WEAKNESS OF BOTH HIPS: Primary | ICD-10-CM

## 2024-04-16 PROCEDURE — 97110 THERAPEUTIC EXERCISES: CPT | Mod: PN | Performed by: PHYSICAL THERAPIST

## 2024-04-16 NOTE — PROGRESS NOTES
[facility]   Physical Therapy Treatment Note     Name: Merari Orosco  [facility] Number: [id number]    Therapy Diagnosis:   Encounter Diagnosis   Name Primary?    Weakness of both hips Yes     Physician: Satnam Deleon MD    Visit Date: 4/16/2024    Physician Orders: PT Eval and Treat  Medical Diagnosis from Referral:       Encounter Diagnoses   Name Primary?    Lumbar pain      H/O total hip arthroplasty, right      Hx of total hip arthroplasty, left        Evaluation Date: 2/23/2024  Authorization Period Expiration: 2/18/2025  Plan of Care Expiration: 5/22/2024    Progress Update: 4/22/20234  Visit # / Visits authorized: 6/16        FOTO: Visit #1 2/23/2024 - Scored: 1 / 3     Time In: 10:30 am  Time Out: 11:30 am  Total Billable Time: 55 minutes    SUBJECTIVE     Pt reports: No pain lately, is doing well.  She was compliant with home exercise program.  Response to previous treatment: increased soreness.  Functional change: independent with HEP.     Pain: 2/10  Location: bilateral hip    OBJECTIVE     Objective Measures updated at progress report unless specified.     Treatment     Merari received the treatments listed below:      Intervention Code  (see below chart) Notes      Recumbent Bike TE 10' L1   Seated HS stretch  TE  3x30s isabel   Prone quad stretch TE 3x30s isabel   Glute bridges TE Green band, 3x10   Clam shells side-lying TE Green band, 3x10 isabel   DL Leg Press TE 85#, 3x15   DL RDL TE 20#KB, 3x10   55 minutes of Therapeutic Exercise (TE) to develop strength, endurance, ROM, and flexibility. (42459)    Patient Education and Home Exercises     Home Exercises Provided and Patient Education Provided     Education provided:   - HEP, PT POC    Written Home Exercises Provided: yes. Exercises were reviewed and Merari was able to demonstrate them prior to the end of the session.  Merari demonstrated good  understanding of the education provided. See EMR under Patient Instructions  for exercises provided during therapy sessions    ASSESSMENT   Good tolerance for exercises today. Strength and endurance have improved significantly in recent weeks.     Merari Is progressing well towards her goals.   Pt prognosis is Excellent.     Pt will continue to benefit from skilled outpatient physical therapy to address the deficits listed in the problem list box on initial evaluation, provide pt/family education and to maximize pt's level of independence in the home and community environment.     Pt's spiritual, cultural and educational needs considered and pt agreeable to plan of care and goals.     Anticipated barriers to physical therapy: None    Goals:  SHORT TERM GOALS:  4 week Progress Date Met   Recent signs and systems trend is improving in order to progress towards Long term goals.  [] Met  [] Not Met  [x] Progressing     Patient will be independent with Home Exercise Program  in order to further progress and return to maximal function. [] Met  [] Not Met  [] Progressing     Pain rating at Worst: 5 /10 in order to progress towards increased independence with activity. [] Met  [] Not Met  [] Progressing     Patient will be able to correct postural deviations in sitting and standing, to decrease pain and promote postural awareness for injury prevention.  [] Met  [] Not Met  [] Progressing     Patient will improve functional outcome (FOTO) score: by 5% to increase self-worth & perceived functional ability towards long term goals [] Met  [] Not Met  [] Progressing        LONG TERM GOALS: 8 weeks Progress Date Met   Patient will return to normal activites of daily living, recreational, and work related activities with less pain and limitation.  [] Met  [] Not Met  [] Progressing     Patient will improve range of motion  to stated goals in order to return to maximal functional potential.  [] Met  [] Not Met  [] Progressing     Patient will improve Strength to stated goals of appropriate musculature in  order to improve functional independence.  [] Met  [] Not Met  [] Progressing     Pain Rating at Best: 1/10 to improve Quality of Life.  [] Met  [] Not Met  [] Progressing       PLAN   Continue per plan of care.  Progress as tolerated.    Delvis Maynard, PT, DPT, SCS

## 2024-04-18 ENCOUNTER — CLINICAL SUPPORT (OUTPATIENT)
Facility: HOSPITAL | Age: 44
End: 2024-04-18
Payer: MEDICAID

## 2024-04-18 DIAGNOSIS — R29.898 WEAKNESS OF BOTH HIPS: Primary | ICD-10-CM

## 2024-04-18 PROCEDURE — 97110 THERAPEUTIC EXERCISES: CPT | Mod: PN | Performed by: PHYSICAL THERAPIST

## 2024-04-18 NOTE — PROGRESS NOTES
[facility]   Physical Therapy Treatment Note     Name: Merari Orosco  [facility] Number: [id number]    Therapy Diagnosis:   Encounter Diagnosis   Name Primary?    Weakness of both hips Yes     Physician: Satnam Deleon MD    Visit Date: 4/18/2024    Physician Orders: PT Eval and Treat  Medical Diagnosis from Referral:       Encounter Diagnoses   Name Primary?    Lumbar pain      H/O total hip arthroplasty, right      Hx of total hip arthroplasty, left        Evaluation Date: 2/23/2024  Authorization Period Expiration: 2/18/2025  Plan of Care Expiration: 5/22/2024    Progress Update: 4/22/20234  Visit # / Visits authorized: 6/16        FOTO: Visit #1 2/23/2024 - Scored: 1 / 3     Time In: 10:30 am  Time Out: 11:30 am  Total Billable Time: 55 minutes    SUBJECTIVE     Pt reports: No pain lately, is doing well.  She was compliant with home exercise program.  Response to previous treatment: increased soreness.  Functional change: independent with HEP.     Pain: 2/10  Location: bilateral hip    OBJECTIVE     Objective Measures updated at progress report unless specified.     Treatment     Merari received the treatments listed below:      Intervention Code  (see below chart) Notes      Recumbent Bike TE 10' L1   Seated HS stretch  TE  3x30s isabel   Prone quad stretch TE 3x30s isabel   Glute bridges TE Green band, 3x10   Clam shells side-lying TE Green band, 3x10 isbael   DL Leg Press TE 85#, 3x15   DL RDL TE 20#KB, 3x10   55 minutes of Therapeutic Exercise (TE) to develop strength, endurance, ROM, and flexibility. (52981)    Patient Education and Home Exercises     Home Exercises Provided and Patient Education Provided     Education provided:   - HEP, PT POC    Written Home Exercises Provided: yes. Exercises were reviewed and Merari was able to demonstrate them prior to the end of the session.  Merari demonstrated good  understanding of the education provided. See EMR under Patient Instructions  for exercises provided during therapy sessions    ASSESSMENT   Good tolerance for exercises today. Strength and endurance have improved significantly in recent weeks.    Merari Is progressing well towards her goals.   Pt prognosis is Excellent.     Pt will continue to benefit from skilled outpatient physical therapy to address the deficits listed in the problem list box on initial evaluation, provide pt/family education and to maximize pt's level of independence in the home and community environment.     Pt's spiritual, cultural and educational needs considered and pt agreeable to plan of care and goals.     Anticipated barriers to physical therapy: None    Goals:  SHORT TERM GOALS:  4 week Progress Date Met   Recent signs and systems trend is improving in order to progress towards Long term goals.  [] Met  [] Not Met  [x] Progressing     Patient will be independent with Home Exercise Program  in order to further progress and return to maximal function. [] Met  [] Not Met  [] Progressing     Pain rating at Worst: 5 /10 in order to progress towards increased independence with activity. [] Met  [] Not Met  [] Progressing     Patient will be able to correct postural deviations in sitting and standing, to decrease pain and promote postural awareness for injury prevention.  [] Met  [] Not Met  [] Progressing     Patient will improve functional outcome (FOTO) score: by 5% to increase self-worth & perceived functional ability towards long term goals [] Met  [] Not Met  [] Progressing        LONG TERM GOALS: 8 weeks Progress Date Met   Patient will return to normal activites of daily living, recreational, and work related activities with less pain and limitation.  [] Met  [] Not Met  [] Progressing     Patient will improve range of motion  to stated goals in order to return to maximal functional potential.  [] Met  [] Not Met  [] Progressing     Patient will improve Strength to stated goals of appropriate musculature in  order to improve functional independence.  [] Met  [] Not Met  [] Progressing     Pain Rating at Best: 1/10 to improve Quality of Life.  [] Met  [] Not Met  [] Progressing       PLAN   Continue per plan of care.  Progress as tolerated.    Delvis Maynard, PT, DPT, SCS

## 2024-04-18 NOTE — PROGRESS NOTES
[facility]   Physical Therapy Treatment Note     Name: Merari Orosco  [facility] Number: [id number]    Therapy Diagnosis:   Encounter Diagnosis   Name Primary?    Weakness of both hips Yes     Physician: Satnam Deleon MD    Visit Date: 4/18/2024    Physician Orders: PT Eval and Treat  Medical Diagnosis from Referral:       Encounter Diagnoses   Name Primary?    Lumbar pain      H/O total hip arthroplasty, right      Hx of total hip arthroplasty, left        Evaluation Date: 2/23/2024  Authorization Period Expiration: 2/18/2025  Plan of Care Expiration: 5/22/2024    Progress Update: 4/22/20234  Visit # / Visits authorized: 11/16        FOTO: Visit #1 2/23/2024 - Scored: 1 / 3     Time In: 10:30 am  Time Out: 11:30 am  Total Billable Time: 55 minutes    SUBJECTIVE     Pt reports: No pain lately, is doing well.  She was compliant with home exercise program.  Response to previous treatment: increased soreness.  Functional change: independent with HEP.     Pain: 2/10  Location: bilateral hip    OBJECTIVE     Objective Measures updated at progress report unless specified.     Treatment     Merari received the treatments listed below:      Intervention Code  (see below chart) Notes      Recumbent Bike TE 10' L1   Seated HS stretch  TE  3x30s isabel   Prone quad stretch TE 3x30s isabel   Glute bridges TE Green band, 3x10   Clam shells side-lying TE Green band, 3x10 isabel   DL Leg Press TE 85#, 3x15   DL RDL TE 20#KB, 3x10   55 minutes of Therapeutic Exercise (TE) to develop strength, endurance, ROM, and flexibility. (90226)    Patient Education and Home Exercises     Home Exercises Provided and Patient Education Provided     Education provided:   - HEP, PT POC    Written Home Exercises Provided: yes. Exercises were reviewed and Merari was able to demonstrate them prior to the end of the session.  Merari demonstrated good  understanding of the education provided. See EMR under Patient  Instructions for exercises provided during therapy sessions    ASSESSMENT   Good tolerance for exercises today. Strength and endurance have improved significantly in recent weeks.     Merari Is progressing well towards her goals.   Pt prognosis is Excellent.     Pt will continue to benefit from skilled outpatient physical therapy to address the deficits listed in the problem list box on initial evaluation, provide pt/family education and to maximize pt's level of independence in the home and community environment.     Pt's spiritual, cultural and educational needs considered and pt agreeable to plan of care and goals.     Anticipated barriers to physical therapy: None    Goals:  SHORT TERM GOALS:  4 week Progress Date Met   Recent signs and systems trend is improving in order to progress towards Long term goals.  [] Met  [] Not Met  [x] Progressing     Patient will be independent with Home Exercise Program  in order to further progress and return to maximal function. [] Met  [] Not Met  [] Progressing     Pain rating at Worst: 5 /10 in order to progress towards increased independence with activity. [] Met  [] Not Met  [] Progressing     Patient will be able to correct postural deviations in sitting and standing, to decrease pain and promote postural awareness for injury prevention.  [] Met  [] Not Met  [] Progressing     Patient will improve functional outcome (FOTO) score: by 5% to increase self-worth & perceived functional ability towards long term goals [] Met  [] Not Met  [] Progressing        LONG TERM GOALS: 8 weeks Progress Date Met   Patient will return to normal activites of daily living, recreational, and work related activities with less pain and limitation.  [] Met  [] Not Met  [] Progressing     Patient will improve range of motion  to stated goals in order to return to maximal functional potential.  [] Met  [] Not Met  [] Progressing     Patient will improve Strength to stated goals of appropriate  musculature in order to improve functional independence.  [] Met  [] Not Met  [] Progressing     Pain Rating at Best: 1/10 to improve Quality of Life.  [] Met  [] Not Met  [] Progressing       PLAN   Continue per plan of care.  Progress as tolerated.    Garcia Watts, PT, DPT, SCS

## 2024-04-22 ENCOUNTER — OFFICE VISIT (OUTPATIENT)
Dept: INTERNAL MEDICINE | Facility: CLINIC | Age: 44
End: 2024-04-22
Payer: MEDICAID

## 2024-04-22 VITALS
TEMPERATURE: 98 F | SYSTOLIC BLOOD PRESSURE: 108 MMHG | RESPIRATION RATE: 18 BRPM | OXYGEN SATURATION: 100 % | HEIGHT: 66 IN | WEIGHT: 225.75 LBS | BODY MASS INDEX: 36.28 KG/M2 | HEART RATE: 70 BPM | DIASTOLIC BLOOD PRESSURE: 80 MMHG

## 2024-04-22 DIAGNOSIS — T50.2X5A DIURETIC-INDUCED HYPOKALEMIA: Chronic | ICD-10-CM

## 2024-04-22 DIAGNOSIS — E66.01 CLASS 2 SEVERE OBESITY DUE TO EXCESS CALORIES WITH SERIOUS COMORBIDITY AND BODY MASS INDEX (BMI) OF 36.0 TO 36.9 IN ADULT: Chronic | ICD-10-CM

## 2024-04-22 DIAGNOSIS — I10 ESSENTIAL HYPERTENSION: Primary | Chronic | ICD-10-CM

## 2024-04-22 DIAGNOSIS — D64.9 MILD ANEMIA: Chronic | ICD-10-CM

## 2024-04-22 DIAGNOSIS — Z97.5 PRESENCE OF IUD: Chronic | ICD-10-CM

## 2024-04-22 DIAGNOSIS — E55.9 VITAMIN D DEFICIENCY: Chronic | ICD-10-CM

## 2024-04-22 DIAGNOSIS — E87.6 DIURETIC-INDUCED HYPOKALEMIA: Chronic | ICD-10-CM

## 2024-04-22 DIAGNOSIS — F41.1 GENERALIZED ANXIETY DISORDER: Chronic | ICD-10-CM

## 2024-04-22 DIAGNOSIS — Z79.899 LONG TERM CURRENT USE OF DIURETIC: Chronic | ICD-10-CM

## 2024-04-22 DIAGNOSIS — K70.0 FATTY LIVER DUE TO ALCOHOLISM: Chronic | ICD-10-CM

## 2024-04-22 DIAGNOSIS — I87.303 CHRONIC VENOUS HYPERTENSION INVOLVING BOTH SIDES: Chronic | ICD-10-CM

## 2024-04-22 DIAGNOSIS — F33.0 MILD EPISODE OF RECURRENT MAJOR DEPRESSIVE DISORDER: Chronic | ICD-10-CM

## 2024-04-22 PROBLEM — R74.8 ELEVATED SERUM GGT LEVEL: Chronic | Status: RESOLVED | Noted: 2022-12-29 | Resolved: 2024-04-22

## 2024-04-22 PROBLEM — E66.812 CLASS 2 SEVERE OBESITY DUE TO EXCESS CALORIES WITH SERIOUS COMORBIDITY AND BODY MASS INDEX (BMI) OF 37.0 TO 37.9 IN ADULT: Chronic | Status: ACTIVE | Noted: 2020-01-30

## 2024-04-22 PROCEDURE — 1160F RVW MEDS BY RX/DR IN RCRD: CPT | Mod: CPTII,,, | Performed by: FAMILY MEDICINE

## 2024-04-22 PROCEDURE — 3008F BODY MASS INDEX DOCD: CPT | Mod: CPTII,,, | Performed by: FAMILY MEDICINE

## 2024-04-22 PROCEDURE — 3079F DIAST BP 80-89 MM HG: CPT | Mod: CPTII,,, | Performed by: FAMILY MEDICINE

## 2024-04-22 PROCEDURE — 99213 OFFICE O/P EST LOW 20 MIN: CPT | Mod: PBBFAC | Performed by: FAMILY MEDICINE

## 2024-04-22 PROCEDURE — 99999 PR PBB SHADOW E&M-EST. PATIENT-LVL III: CPT | Mod: PBBFAC,,, | Performed by: FAMILY MEDICINE

## 2024-04-22 PROCEDURE — 4010F ACE/ARB THERAPY RXD/TAKEN: CPT | Mod: CPTII,,, | Performed by: FAMILY MEDICINE

## 2024-04-22 PROCEDURE — 3074F SYST BP LT 130 MM HG: CPT | Mod: CPTII,,, | Performed by: FAMILY MEDICINE

## 2024-04-22 PROCEDURE — G2211 COMPLEX E/M VISIT ADD ON: HCPCS | Mod: S$PBB,,, | Performed by: FAMILY MEDICINE

## 2024-04-22 PROCEDURE — 99214 OFFICE O/P EST MOD 30 MIN: CPT | Mod: S$PBB,,, | Performed by: FAMILY MEDICINE

## 2024-04-22 PROCEDURE — 1159F MED LIST DOCD IN RCRD: CPT | Mod: CPTII,,, | Performed by: FAMILY MEDICINE

## 2024-04-22 RX ORDER — SPIRONOLACTONE 25 MG/1
25 TABLET ORAL DAILY
Qty: 90 TABLET | Refills: 2 | Status: SHIPPED | OUTPATIENT
Start: 2024-04-22

## 2024-04-22 RX ORDER — DIAZEPAM 2 MG/1
2 TABLET ORAL DAILY PRN
Qty: 30 TABLET | Refills: 2 | Status: SHIPPED | OUTPATIENT
Start: 2024-04-22

## 2024-04-22 RX ORDER — FLUOXETINE HYDROCHLORIDE 20 MG/1
20 CAPSULE ORAL DAILY
Qty: 90 CAPSULE | Refills: 3 | Status: SHIPPED | OUTPATIENT
Start: 2024-04-22

## 2024-04-22 RX ORDER — CARVEDILOL 6.25 MG/1
6.25 TABLET ORAL 2 TIMES DAILY WITH MEALS
Qty: 180 TABLET | Refills: 3 | Status: SHIPPED | OUTPATIENT
Start: 2024-04-22

## 2024-04-22 RX ORDER — POTASSIUM CHLORIDE 750 MG/1
10 TABLET, EXTENDED RELEASE ORAL DAILY
Qty: 90 TABLET | Refills: 1 | Status: SHIPPED | OUTPATIENT
Start: 2024-04-22

## 2024-04-22 RX ORDER — LISINOPRIL 40 MG/1
40 TABLET ORAL DAILY
Qty: 90 TABLET | Refills: 1 | Status: SHIPPED | OUTPATIENT
Start: 2024-04-22 | End: 2025-04-22

## 2024-04-22 RX ORDER — ERGOCALCIFEROL 1.25 MG/1
50000 CAPSULE ORAL
Qty: 12 CAPSULE | Refills: 3 | Status: SHIPPED | OUTPATIENT
Start: 2024-04-22

## 2024-04-22 RX ORDER — FUROSEMIDE 20 MG/1
20 TABLET ORAL DAILY PRN
Qty: 90 TABLET | Refills: 3 | Status: SHIPPED | OUTPATIENT
Start: 2024-04-22

## 2024-04-22 NOTE — PROGRESS NOTES
OFFICE VISIT 4/22/24 10:20 AM CDT    History of Present Illness    Merari is here for a regular follow-up and discussion over recent lab results    The patient's GGT, a liver enzyme, returned to normal after previously being elevated due to liver irritation or inflammation.    Her cholesterol levels are satisfactory and she's not taking medication for cholesterol. She's managing her blood pressure with Lisinopril and Carvedilopil. There are no drastic changes in her blood pressure levels at home. However, she occasionally experiences lightheadedness during physical therapy. Her EKG was conducted a year ago.    The patient was found to have mild anemia with slightly low levels of hemoglobin and hematocrit.    The patient experienced menopause eight years ago and has had her IUD for the same period. She is due for a replacement in December.    She reports weight gain, suspecting it may result from fluid retention. She describes experiencing puffiness and lack of distinct ankle definition. She has restarted furosemide, a diuretic medication prescribed for fluid retention, after a period of discontinuation.    She reported low iron levels detected more than a year ago and admits neglecting to take OTC vitamin supplements. She occasionally uses diazepam, a drug that helps manage anxiety.    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      Review of Systems   Respiratory:  Negative for chest tightness and shortness of breath.    Cardiovascular:  Negative for chest pain.   Psychiatric/Behavioral:  Negative for agitation, hallucinations and suicidal ideas. The patient is not hyperactive.      Assessment & Plan     Adjusted medication regimen based on her current health status.   Recommend OTCvitamin supplements, specifically [employer] Women's Formula Multi and Vitamin B12 sublingual.   Modified the dosage of the blood pressure medication, Carvedilol, to 6.25  mg twice daily.   Reduced the dosage of Furosemide, a diuretic, to 20 mg daily as needed.   Added Vitamin D2 to the regimen, to be taken once weekly, and Potassium, to be taken daily.   Ordered an EKG to be performed on the day of the visit.   Scheduled a comprehensive lab test in 6 months, including a metabolic panel, vitamin D level check, anemia labs, and B12 level check.   Arranged a virtual visit follow-up a few days after the labs.   Advised the patient to continue abstaining from alcohol, except for occasional celebrations.   Recommend the use of compression stockings to manage chronic venous hypertension.   Educated the patient about her test results, explaining the significance of GGT levels and their relation to liver health.   Discussed the potential causes of the patient's mild anemia and the importance of vitamin supplements in addressing this issue.   Explained the role of the prescribed medications and the reasons for the changes in the medication regimen.   Clarified the patient's chronic venous hypertension and the role of compression stockings in managing this condition.       1. Essential hypertension  Assessment & Plan:  BP Readings from Last 6 Encounters:   04/22/24 108/80   03/11/24 120/82   03/04/24 120/82   01/22/24 120/80   10/27/23 116/78   10/16/23 122/78      Last 5 Patient Entered Readings                Current 30 Day Average: 100/61  Recent Readings 4/19/2024 4/19/2024 4/11/2024 4/11/2024 3/18/2024   SBP (mmHg) 101 105 96 96 102   DBP (mmHg) 63 64 57 59 62   Pulse 78 79 85 84 76                 Lab Results   Component Value Date    EGFRNORACEVR >60.0 04/15/2024    CREATININE 0.7 04/15/2024    BUN 5 (L) 04/15/2024    K 3.4 (L) 04/15/2024     04/15/2024     04/15/2024     Results for orders placed or performed during the hospital encounter of 04/08/22   SCHEDULED EKG 12-LEAD (to Muse)    Collection Time: 04/08/22 10:21 AM    Narrative    Test Reason : I10,    Vent. Rate : 133  BPM     Atrial Rate : 133 BPM     P-R Int : 146 ms          QRS Dur : 080 ms      QT Int : 294 ms       P-R-T Axes : 078 065 048 degrees     QTc Int : 437 ms    Sinus tachycardia  Right atrial enlargement  Nonspecific T wave abnormality  When compared with ECG of 06-JAN-2022 10:00,  Vent. rate has increased BY  49 BPM  Nonspecific T wave abnormality, worse in Inferior leads  Nonspecific T wave abnormality now evident in Lateral leads  Confirmed by OSVALDO CANTU, YESSI KUMAR ([id number]) on 4/8/2022 8:37:30 PM    Referred By: GUILHERME OCASIO           Confirmed By:YESSI RILEY MD         Orders:  -     lisinopriL (PRINIVIL,ZESTRIL) 40 MG tablet; Take 1 tablet (40 mg total) by mouth once daily.  Dispense: 90 tablet; Refill: 1  -     spironolactone (ALDACTONE) 25 MG tablet; Take 1 tablet (25 mg total) by mouth once daily.  Dispense: 90 tablet; Refill: 2  -     carvediloL (COREG) 6.25 MG tablet; Take 1 tablet (6.25 mg total) by mouth 2 (two) times daily with meals.  Dispense: 180 tablet; Refill: 3  -     furosemide (LASIX) 20 MG tablet; Take 1 tablet (20 mg total) by mouth daily as needed (for ankle swelling).  Dispense: 90 tablet; Refill: 3  -     SCHEDULED EKG 12-LEAD (to Muse); Future  -     Comprehensive Metabolic Panel; Future; Expected date: 10/22/2024    2. Presence of IUD  Overview:  Placed December, 2016      3. Vitamin D deficiency  -     ergocalciferol (ERGOCALCIFEROL) 50,000 unit Cap; Take 1 capsule (50,000 Units total) by mouth every 7 days.  Dispense: 12 capsule; Refill: 3  -     Vitamin D; Future; Expected date: 10/22/2024    4. Class 2 severe obesity due to excess calories with serious comorbidity and body mass index (BMI) of 36.0 to 36.9 in adult  Assessment & Plan:  Wt Readings from Last 6 Encounters:   04/22/24 102.4 kg (225 lb 12 oz)   03/11/24 96.3 kg (212 lb 4.9 oz)   03/04/24 96.4 kg (212 lb 8.4 oz)   02/19/24 95.7 kg (210 lb 15.7 oz)   01/22/24 95.7 kg (210 lb 15.7 oz)   10/27/23 99.1 kg (218 lb 7.6 oz)  "    Estimated body mass index is 36.44 kg/m² as calculated from the following:    Height as of this encounter: 5' 6" (1.676 m).    Weight as of this encounter: 102.4 kg (225 lb 12 oz).        5. Chronic venous hypertension involving both sides  -     spironolactone (ALDACTONE) 25 MG tablet; Take 1 tablet (25 mg total) by mouth once daily.  Dispense: 90 tablet; Refill: 2    6. Diuretic-induced hypokalemia  -     potassium chloride SA (K-DUR,KLOR-CON M) 10 MEQ tablet; Take 1 tablet (10 mEq total) by mouth once daily.  Dispense: 90 tablet; Refill: 1  -     Comprehensive Metabolic Panel; Future; Expected date: 10/22/2024    7. Long term current use of diuretic  -     spironolactone (ALDACTONE) 25 MG tablet; Take 1 tablet (25 mg total) by mouth once daily.  Dispense: 90 tablet; Refill: 2  -     furosemide (LASIX) 20 MG tablet; Take 1 tablet (20 mg total) by mouth daily as needed (for ankle swelling).  Dispense: 90 tablet; Refill: 3  -     Comprehensive Metabolic Panel; Future; Expected date: 10/22/2024    8. Generalized anxiety disorder  -     FLUoxetine 20 MG capsule; Take 1 capsule (20 mg total) by mouth once daily.  Dispense: 90 capsule; Refill: 3  -     diazePAM (VALIUM) 2 MG tablet; Take 1 tablet (2 mg total) by mouth daily as needed for Anxiety.  Dispense: 30 tablet; Refill: 2    9. Mild episode of recurrent major depressive disorder  -     FLUoxetine 20 MG capsule; Take 1 capsule (20 mg total) by mouth once daily.  Dispense: 90 capsule; Refill: 3    10. 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  No focal liver lesions visualized.    Orders:  -     Comprehensive Metabolic Panel; Future; Expected date: 10/22/2024    11. Mild anemia  -     CBC Auto Differential; Future; Expected date: 10/22/2024  -     Ferritin; Future; Expected date: 10/22/2024  -     Iron and TIBC; Future; Expected date: 10/22/2024  -     VITAMIN B12; " "Future; Expected date: 10/22/2024    No other significant complaints or concerns were reported.    Today's visit involved the intricate management of episodic problem(s) and the ongoing care for the patient's serious or complex condition(s) listed above, reflecting the inherent complexity of providing longitudinal, comprehensive evaluation and management as the central hub for the patient's primary care services.  Vitals:    04/22/24 0956   BP: 108/80   BP Location: Right arm   Patient Position: Sitting   BP Method: Large (Manual)   Pulse: 70   Resp: 18   Temp: 97.6 °F (36.4 °C)   SpO2: 100%   Weight: 102.4 kg (225 lb 12 oz)   Height: 5' 6" (1.676 m)   Physical Exam  Vitals reviewed.   Constitutional:       General: She is not in acute distress.     Appearance: Normal appearance. She is not ill-appearing, toxic-appearing or diaphoretic.   Cardiovascular:      Rate and Rhythm: Normal rate and regular rhythm.   Pulmonary:      Effort: Pulmonary effort is normal.   Neurological:      Mental Status: She is alert and oriented to person, place, and time. Mental status is at baseline.   Psychiatric:         Mood and Affect: Mood normal.         Behavior: Behavior normal.         Judgment: Judgment normal.       This note was generated with the assistance of ambient listening technology. Verbal consent was obtained by the patient and accompanying visitor(s) for the recording of patient appointment to facilitate this note. I attest to having reviewed and edited the generated note for accuracy, though some syntax or spelling errors may persist. Please contact the author of this note for any clarification.    Documentation entered by me for this encounter may have been done in part using speech-recognition technology. Although I have made an effort to ensure accuracy, "sound like" errors may exist and should be interpreted in context.   "

## 2024-04-22 NOTE — ASSESSMENT & PLAN NOTE
"Wt Readings from Last 6 Encounters:   04/22/24 102.4 kg (225 lb 12 oz)   03/11/24 96.3 kg (212 lb 4.9 oz)   03/04/24 96.4 kg (212 lb 8.4 oz)   02/19/24 95.7 kg (210 lb 15.7 oz)   01/22/24 95.7 kg (210 lb 15.7 oz)   10/27/23 99.1 kg (218 lb 7.6 oz)     Estimated body mass index is 36.44 kg/m² as calculated from the following:    Height as of this encounter: 5' 6" (1.676 m).    Weight as of this encounter: 102.4 kg (225 lb 12 oz).    "

## 2024-04-22 NOTE — ASSESSMENT & PLAN NOTE
BP Readings from Last 6 Encounters:   04/22/24 108/80   03/11/24 120/82   03/04/24 120/82   01/22/24 120/80   10/27/23 116/78   10/16/23 122/78      Last 5 Patient Entered Readings                Current 30 Day Average: 100/61  Recent Readings 4/19/2024 4/19/2024 4/11/2024 4/11/2024 3/18/2024   SBP (mmHg) 101 105 96 96 102   DBP (mmHg) 63 64 57 59 62   Pulse 78 79 85 84 76                 Lab Results   Component Value Date    EGFRNORACEVR >60.0 04/15/2024    CREATININE 0.7 04/15/2024    BUN 5 (L) 04/15/2024    K 3.4 (L) 04/15/2024     04/15/2024     04/15/2024     Results for orders placed or performed during the hospital encounter of 04/08/22   SCHEDULED EKG 12-LEAD (to Muse)    Collection Time: 04/08/22 10:21 AM    Narrative    Test Reason : I10,    Vent. Rate : 133 BPM     Atrial Rate : 133 BPM     P-R Int : 146 ms          QRS Dur : 080 ms      QT Int : 294 ms       P-R-T Axes : 078 065 048 degrees     QTc Int : 437 ms    Sinus tachycardia  Right atrial enlargement  Nonspecific T wave abnormality  When compared with ECG of 06-JAN-2022 10:00,  Vent. rate has increased BY  49 BPM  Nonspecific T wave abnormality, worse in Inferior leads  Nonspecific T wave abnormality now evident in Lateral leads  Confirmed by OSVALDO CANTU, YESSI KUMAR ([id number]) on 4/8/2022 8:37:30 PM    Referred By: GUILHERME OCASIO           Confirmed By:YESSI RILEY MD

## 2024-04-23 ENCOUNTER — CLINICAL SUPPORT (OUTPATIENT)
Facility: HOSPITAL | Age: 44
End: 2024-04-23
Payer: MEDICAID

## 2024-04-23 DIAGNOSIS — R29.898 WEAKNESS OF BOTH HIPS: Primary | ICD-10-CM

## 2024-04-23 PROCEDURE — 97110 THERAPEUTIC EXERCISES: CPT | Mod: PN | Performed by: PHYSICAL THERAPIST

## 2024-04-23 NOTE — PROGRESS NOTES
[facility]   Physical Therapy Treatment Note     Name: Merari Orosco  [facility] Number: [id number]    Therapy Diagnosis:   Encounter Diagnosis   Name Primary?    Weakness of both hips Yes     Physician: Satnam Deleon MD    Visit Date: 4/23/2024    Physician Orders: PT Eval and Treat  Medical Diagnosis from Referral:       Encounter Diagnoses   Name Primary?    Lumbar pain      H/O total hip arthroplasty, right      Hx of total hip arthroplasty, left        Evaluation Date: 2/23/2024  Authorization Period Expiration: 2/18/2025  Plan of Care Expiration: 5/22/2024    Progress Update: 4/22/20234  Visit # / Visits authorized: 12/16        FOTO: Visit #1 2/23/2024 - Scored: 1 / 3     Time In: 0940 am  Time Out: 1035 am  Total Billable Time: 54 minutes    SUBJECTIVE     Pt reports: She gets some B numbness when she sits for too long but feels better overall.  She was compliant with home exercise program.  Response to previous treatment: increased soreness.  Functional change: independent with HEP.     Pain: 2/10  Location: bilateral hip    OBJECTIVE     Objective Measures updated at progress report unless specified.     Treatment     Merari received the treatments listed below:      Intervention Code  (see below chart) Notes      Recumbent Bike TE 10' L1   Seated HS stretch  TE  3x30s isabel   Prone quad stretch TE 3x30s isabel   Glute bridges TE Green band, 3x10   Clam shells side-lying TE Green band, 3x10 isabel   DL Leg Press #, 3x15   Goblet squats TE     DL RDL TE 25#KB, 3x10   Knee Extension TE 35#, 3 x 10     00 minutes of Manual therapy (MT) to improve pain and ROM. (72082)  54 minutes of Therapeutic Exercise (TE) to develop strength, endurance, ROM, and flexibility. (58409)  00 minutes of Neuromuscular Re-Education (NR)  to improve: Balance, Coordination, Kinesthetic, Sense, Proprioception, and Posture. (51620)  00 minutes of Therapeutic Activities (TA) to improve functional  performance. (69879)  Unattended Electrical Stimulation (ES) for muscle performance and/or pain modulation. (04298)  Vasopneumatic Device Therapy () for management of swelling/edema. (42672)  BFR: Blood flow restriction applied during exercise  NP: Not Performed    Patient Education and Home Exercises     Home Exercises Provided and Patient Education Provided     Education provided:   - HEP, PT POC    Written Home Exercises Provided: yes. Exercises were reviewed and Merari was able to demonstrate them prior to the end of the session.  Merari demonstrated good  understanding of the education provided. See EMR under Patient Instructions for exercises provided during therapy sessions    ASSESSMENT     Merari Orosco tolerated PT session well with minimal  complaints of pain or discomfort. Patient continues to have impaired functional capacity but she has had improved symptoms overall.  Updated home exercises were not issued during today's visit. Merari demonstrated good understanding of new exercises and will continue to progress at home until next follow-up.         Merari Is progressing well towards her goals.   Pt prognosis is Excellent.     Pt will continue to benefit from skilled outpatient physical therapy to address the deficits listed in the problem list box on initial evaluation, provide pt/family education and to maximize pt's level of independence in the home and community environment.     Pt's spiritual, cultural and educational needs considered and pt agreeable to plan of care and goals.     Anticipated barriers to physical therapy: None    Goals:  SHORT TERM GOALS:  4 week Progress Date Met   Recent signs and systems trend is improving in order to progress towards Long term goals.  [] Met  [] Not Met  [x] Progressing     Patient will be independent with Home Exercise Program  in order to further progress and return to maximal function. [] Met  [] Not Met  [] Progressing     Pain rating at Worst:  5 /10 in order to progress towards increased independence with activity. [] Met  [] Not Met  [] Progressing     Patient will be able to correct postural deviations in sitting and standing, to decrease pain and promote postural awareness for injury prevention.  [] Met  [] Not Met  [] Progressing     Patient will improve functional outcome (FOTO) score: by 5% to increase self-worth & perceived functional ability towards long term goals [] Met  [] Not Met  [] Progressing        LONG TERM GOALS: 8 weeks Progress Date Met   Patient will return to normal activites of daily living, recreational, and work related activities with less pain and limitation.  [] Met  [] Not Met  [] Progressing     Patient will improve range of motion  to stated goals in order to return to maximal functional potential.  [] Met  [] Not Met  [] Progressing     Patient will improve Strength to stated goals of appropriate musculature in order to improve functional independence.  [] Met  [] Not Met  [] Progressing     Pain Rating at Best: 1/10 to improve Quality of Life.  [] Met  [] Not Met  [] Progressing       PLAN   Continue per plan of care.  Progress as tolerated.    Garcia Watts, PT, DPT, SCS

## 2024-04-25 ENCOUNTER — CLINICAL SUPPORT (OUTPATIENT)
Facility: HOSPITAL | Age: 44
End: 2024-04-25
Payer: MEDICAID

## 2024-04-25 DIAGNOSIS — R29.898 WEAKNESS OF BOTH HIPS: Primary | ICD-10-CM

## 2024-04-25 PROCEDURE — 97110 THERAPEUTIC EXERCISES: CPT | Mod: PN | Performed by: PHYSICAL THERAPIST

## 2024-04-25 NOTE — PROGRESS NOTES
[facility]   Physical Therapy Treatment Note     Name: Merari Orosco  [facility] Number: [id number]    Therapy Diagnosis:   Encounter Diagnosis   Name Primary?    Weakness of both hips Yes     Physician: Satnam Deleon MD    Visit Date: 4/25/2024    Physician Orders: PT Eval and Treat  Medical Diagnosis from Referral:       Encounter Diagnoses   Name Primary?    Lumbar pain      H/O total hip arthroplasty, right      Hx of total hip arthroplasty, left        Evaluation Date: 2/23/2024  Authorization Period Expiration: 2/18/2025  Plan of Care Expiration: 5/22/2024    Progress Update: 4/22/20234  Visit # / Visits authorized: 12/16        FOTO: Visit #1 2/23/2024 - Scored: 1 / 3     Time In: 0950 am  Time Out: 1100 am  Total Billable Time: 61 minutes    SUBJECTIVE     Pt reports: Feeling good today, no new complaints  She was compliant with home exercise program.  Response to previous treatment: increased soreness.  Functional change: independent with HEP.     Pain: 2/10  Location: bilateral hip    OBJECTIVE     Objective Measures updated at progress report unless specified.     Treatment     Merari received the treatments listed below:      Intervention Code  (see below chart) Notes      Recumbent Bike TE 10' L1   Seated HS stretch  TE  3x30s isabel   Prone quad stretch TE 3x30s isabel   Glute bridges NMR Green band, 3x10   Clam shells side-lying NMR Green band, 3x10 isabel   TB hip abduction NMR RTB with stick 2 x 10   DL Leg Press #, 3x15   Goblet squats TE 30# 3 x10   DL RDL TE 25#KB, 3x10   Knee Extension TE 35# sgl, 3 x 10     61 minutes of Therapeutic Exercise (TE) to develop strength, endurance, ROM, and flexibility. (85589)      Patient Education and Home Exercises     Home Exercises Provided and Patient Education Provided     Education provided:   - HEP, PT POC    Written Home Exercises Provided: yes. Exercises were reviewed and Merari was able to demonstrate them prior to the  end of the session.  Merari demonstrated good  understanding of the education provided. See EMR under Patient Instructions for exercises provided during therapy sessions    ASSESSMENT     Merari Orosco tolerated PT session well with minimal  complaints of pain or discomfort. Patient continues to have impaired functional capacity but she has had improved symptoms overall. Good tolerance to progression of exercises today  Updated home exercises were not issued during today's visit. Merari demonstrated good understanding of new exercises and will continue to progress at home until next follow-up.         Merari Is progressing well towards her goals.   Pt prognosis is Excellent.     Pt will continue to benefit from skilled outpatient physical therapy to address the deficits listed in the problem list box on initial evaluation, provide pt/family education and to maximize pt's level of independence in the home and community environment.     Pt's spiritual, cultural and educational needs considered and pt agreeable to plan of care and goals.     Anticipated barriers to physical therapy: None    Goals:  SHORT TERM GOALS:  4 week Progress Date Met   Recent signs and systems trend is improving in order to progress towards Long term goals.  [] Met  [] Not Met  [x] Progressing     Patient will be independent with Home Exercise Program  in order to further progress and return to maximal function. [] Met  [] Not Met  [] Progressing     Pain rating at Worst: 5 /10 in order to progress towards increased independence with activity. [] Met  [] Not Met  [] Progressing     Patient will be able to correct postural deviations in sitting and standing, to decrease pain and promote postural awareness for injury prevention.  [] Met  [] Not Met  [] Progressing     Patient will improve functional outcome (FOTO) score: by 5% to increase self-worth & perceived functional ability towards long term goals [] Met  [] Not Met  [] Progressing         LONG TERM GOALS: 8 weeks Progress Date Met   Patient will return to normal activites of daily living, recreational, and work related activities with less pain and limitation.  [] Met  [] Not Met  [] Progressing     Patient will improve range of motion  to stated goals in order to return to maximal functional potential.  [] Met  [] Not Met  [] Progressing     Patient will improve Strength to stated goals of appropriate musculature in order to improve functional independence.  [] Met  [] Not Met  [] Progressing     Pain Rating at Best: 1/10 to improve Quality of Life.  [] Met  [] Not Met  [] Progressing       PLAN   Continue per plan of care.  Progress as tolerated.    Garcia Watts, PT, DPT, SCS

## 2024-04-26 ENCOUNTER — HOSPITAL ENCOUNTER (OUTPATIENT)
Dept: RADIOLOGY | Facility: HOSPITAL | Age: 44
Discharge: HOME | End: 2024-04-26
Attending: FAMILY MEDICINE
Payer: MEDICAID

## 2024-04-26 VITALS — WEIGHT: 225.75 LBS | BODY MASS INDEX: 36.28 KG/M2 | HEIGHT: 66 IN

## 2024-04-26 DIAGNOSIS — Z12.31 ENCOUNTER FOR SCREENING MAMMOGRAM FOR BREAST CANCER: ICD-10-CM

## 2024-04-26 PROCEDURE — 77067 SCR MAMMO BI INCL CAD: CPT | Mod: 26,,, | Performed by: RADIOLOGY

## 2024-04-26 PROCEDURE — 77063 BREAST TOMOSYNTHESIS BI: CPT | Mod: TC

## 2024-04-26 PROCEDURE — 77063 BREAST TOMOSYNTHESIS BI: CPT | Mod: 26,,, | Performed by: RADIOLOGY

## 2024-04-30 ENCOUNTER — CLINICAL SUPPORT (OUTPATIENT)
Facility: HOSPITAL | Age: 44
End: 2024-04-30
Payer: MEDICAID

## 2024-04-30 DIAGNOSIS — R29.898 WEAKNESS OF BOTH HIPS: Primary | ICD-10-CM

## 2024-04-30 PROCEDURE — 97110 THERAPEUTIC EXERCISES: CPT | Mod: PN | Performed by: PHYSICAL THERAPIST

## 2024-04-30 NOTE — PROGRESS NOTES
[facility]   Physical Therapy Treatment Note     Name: [facility] Number: [id number]    Therapy Diagnosis:   Encounter Diagnosis   Name Primary?    Weakness of both hips Yes     Physician: Satnam Deleon MD    Visit Date: 4/30/2024    Physician Orders: PT Eval and Treat  Medical Diagnosis from Referral:       Encounter Diagnoses   Name Primary?    Lumbar pain      H/O total hip arthroplasty, right      Hx of total hip arthroplasty, left        Evaluation Date: 2/23/2024  Authorization Period Expiration: 2/18/2025  Plan of Care Expiration: 5/22/2024    Progress Update: 5/22/20234  Visit # / Visits authorized: 14/16        FOTO: Visit #1 2/23/2024 - Scored: 1 / 3     Time In: 0950 am  Time Out: 1100 am  Total Billable Time: 61 minutes    SUBJECTIVE     Pt reports: Feeling good today, no new complaints  She was compliant with home exercise program.  Response to previous treatment: increased soreness.  Functional change: independent with HEP.     Pain: 2/10  Location: bilateral hip    OBJECTIVE     Objective Measures updated at progress report unless specified.     Treatment     Merari received the treatments listed below:      [facility]   Physical Therapy Treatment Note     Name: [facility]ari [facility] Number: [id number]    Therapy Diagnosis:   Encounter Diagnosis   Name Primary?    Weakness of both hips Yes     Physician: Satnam Deleon MD    Visit Date: 4/30/2024    Physician Orders: PT Eval and Treat  Medical Diagnosis from Referral:       Encounter Diagnoses   Name Primary?    Lumbar pain      H/O total hip arthroplasty, right      Hx of total hip arthroplasty, left        Evaluation Date: 2/23/2024  Authorization Period Expiration: 2/18/2025  Plan of Care Expiration: 5/22/2024    Progress Update: 4/22/20234  Visit # / Visits authorized: 12/16        FOTO: Visit #1 2/23/2024 - Scored: 1 / 3     Time In: 0950 am  Time Out: 1100  am  Total Billable Time: 61 minutes    SUBJECTIVE     Pt reports: Feeling good today, no new complaints  She was compliant with home exercise program.  Response to previous treatment: increased soreness.  Functional change: independent with HEP.     Pain: 2/10  Location: bilateral hip    OBJECTIVE     Objective Measures updated at progress report unless specified.     Treatment     Merari received the treatments listed below:      Intervention Code  (see below chart) Notes      Recumbent Bike TE 10' L1   Seated HS stretch  TE  3x30s isabel   Prone quad stretch TE 3x30s isabel   Glute bridges NMR Green band, 3x10   Clam shells side-lying NMR Green band, 3x10 isabel   TB hip abduction NMR RTB with stick 2 x 10   DL Leg Press #, 3x15   Goblet squats TE 30# 3 x10   DL RDL TE 25#KB, 3x10   Knee Extension TE 35# sgl, 3 x 10     61 minutes of Therapeutic Exercise (TE) to develop strength, endurance, ROM, and flexibility. ([id number])      Patient Education and Home Exercises     Home Exercises Provided and Patient Education Provided     Education provided:   - HEP, PT POC    Written Home Exercises Provided: yes. Exercises were reviewed and Merari was able to demonstrate them prior to the end of the session.  Merari demonstrated good  understanding of the education provided. See EMR under Patient Instructions for exercises provided during therapy sessions    ASSESSMENT     Merari Orosco tolerated PT session well with minimal  complaints of pain or discomfort. Patient continues to have impaired functional capacity but she has had improved symptoms overall. Good tolerance to progression of exercises today  Updated home exercises were not issued during today's visit. Merari demonstrated good understanding of new exercises and will continue to progress at home until next follow-up.         Merari Is progressing well towards her goals.   Pt prognosis is Excellent.     Pt will continue to benefit from skilled outpatient  physical therapy to address the deficits listed in the problem list box on initial evaluation, provide pt/family education and to maximize pt's level of independence in the home and community environment.     Pt's spiritual, cultural and educational needs considered and pt agreeable to plan of care and goals.     Anticipated barriers to physical therapy: None    Goals:  SHORT TERM GOALS:  4 week Progress Date Met   Recent signs and systems trend is improving in order to progress towards Long term goals.  [] Met  [] Not Met  [x] Progressing     Patient will be independent with Home Exercise Program  in order to further progress and return to maximal function. [] Met  [] Not Met  [] Progressing     Pain rating at Worst: 5 /10 in order to progress towards increased independence with activity. [] Met  [] Not Met  [] Progressing     Patient will be able to correct postural deviations in sitting and standing, to decrease pain and promote postural awareness for injury prevention.  [] Met  [] Not Met  [] Progressing     Patient will improve functional outcome (FOTO) score: by 5% to increase self-worth & perceived functional ability towards long term goals [] Met  [] Not Met  [] Progressing        LONG TERM GOALS: 8 weeks Progress Date Met   Patient will return to normal activites of daily living, recreational, and work related activities with less pain and limitation.  [] Met  [] Not Met  [] Progressing     Patient will improve range of motion  to stated goals in order to return to maximal functional potential.  [] Met  [] Not Met  [] Progressing     Patient will improve Strength to stated goals of appropriate musculature in order to improve functional independence.  [] Met  [] Not Met  [] Progressing     Pain Rating at Best: 1/10 to improve Quality of Life.  [] Met  [] Not Met  [] Progressing       PLAN   Continue per plan of care.  Progress as tolerated.    Delvis Maynard, PT, DPT, SCS      61 minutes of Therapeutic Exercise  (TE) to develop strength, endurance, ROM, and flexibility. (01528)      Patient Education and Home Exercises     Home Exercises Provided and Patient Education Provided     Education provided:   - HEP, PT POC    Written Home Exercises Provided: yes. Exercises were reviewed and Merari was able to demonstrate them prior to the end of the session.  Merari demonstrated good  understanding of the education provided. See EMR under Patient Instructions for exercises provided during therapy sessions    ASSESSMENT     Merari Orosco tolerated PT session well with minimal  complaints of pain or discomfort. Patient continues to have impaired functional capacity but she has had improved symptoms overall. Good tolerance to progression of exercises today  Updated home exercises were not issued during today's visit. Merari demonstrated good understanding of new exercises and will continue to progress at home until next follow-up.         Merari Is progressing well towards her goals.   Pt prognosis is Excellent.     Pt will continue to benefit from skilled outpatient physical therapy to address the deficits listed in the problem list box on initial evaluation, provide pt/family education and to maximize pt's level of independence in the home and community environment.     Pt's spiritual, cultural and educational needs considered and pt agreeable to plan of care and goals.     Anticipated barriers to physical therapy: None    Goals:  SHORT TERM GOALS:  4 week Progress Date Met   Recent signs and systems trend is improving in order to progress towards Long term goals.  [] Met  [] Not Met  [x] Progressing     Patient will be independent with Home Exercise Program  in order to further progress and return to maximal function. [] Met  [] Not Met  [] Progressing     Pain rating at Worst: 5 /10 in order to progress towards increased independence with activity. [] Met  [] Not Met  [] Progressing     Patient will be able to correct  postural deviations in sitting and standing, to decrease pain and promote postural awareness for injury prevention.  [] Met  [] Not Met  [] Progressing     Patient will improve functional outcome (FOTO) score: by 5% to increase self-worth & perceived functional ability towards long term goals [] Met  [] Not Met  [] Progressing        LONG TERM GOALS: 8 weeks Progress Date Met   Patient will return to normal activites of daily living, recreational, and work related activities with less pain and limitation.  [] Met  [] Not Met  [] Progressing     Patient will improve range of motion  to stated goals in order to return to maximal functional potential.  [] Met  [] Not Met  [] Progressing     Patient will improve Strength to stated goals of appropriate musculature in order to improve functional independence.  [] Met  [] Not Met  [] Progressing     Pain Rating at Best: 1/10 to improve Quality of Life.  [] Met  [] Not Met  [] Progressing       PLAN   Continue per plan of care.  Progress as tolerated.    Delvis Maynard, PT, DPT, SCS

## 2024-05-01 ENCOUNTER — OFFICE VISIT (OUTPATIENT)
Dept: PRIMARY CARE CLINIC | Facility: CLINIC | Age: 44
End: 2024-05-01
Payer: MEDICAID

## 2024-05-01 VITALS
DIASTOLIC BLOOD PRESSURE: 70 MMHG | BODY MASS INDEX: 33.55 KG/M2 | HEART RATE: 74 BPM | WEIGHT: 208.75 LBS | TEMPERATURE: 99 F | RESPIRATION RATE: 18 BRPM | SYSTOLIC BLOOD PRESSURE: 112 MMHG | HEIGHT: 66 IN | OXYGEN SATURATION: 98 %

## 2024-05-01 DIAGNOSIS — Z83.3 FAMILY HISTORY OF DIABETES MELLITUS (DM): ICD-10-CM

## 2024-05-01 DIAGNOSIS — R63.5 WEIGHT GAIN: ICD-10-CM

## 2024-05-01 DIAGNOSIS — E88.810 METABOLIC SYNDROME: ICD-10-CM

## 2024-05-01 DIAGNOSIS — E66.9 CLASS 1 OBESITY WITH SERIOUS COMORBIDITY AND BODY MASS INDEX (BMI) OF 34.0 TO 34.9 IN ADULT, UNSPECIFIED OBESITY TYPE: ICD-10-CM

## 2024-05-01 PROCEDURE — 1160F RVW MEDS BY RX/DR IN RCRD: CPT | Mod: CPTII,,, | Performed by: FAMILY MEDICINE

## 2024-05-01 PROCEDURE — 99213 OFFICE O/P EST LOW 20 MIN: CPT | Mod: S$PBB,,, | Performed by: FAMILY MEDICINE

## 2024-05-01 PROCEDURE — 4010F ACE/ARB THERAPY RXD/TAKEN: CPT | Mod: CPTII,,, | Performed by: FAMILY MEDICINE

## 2024-05-01 PROCEDURE — 1159F MED LIST DOCD IN RCRD: CPT | Mod: CPTII,,, | Performed by: FAMILY MEDICINE

## 2024-05-01 PROCEDURE — 99214 OFFICE O/P EST MOD 30 MIN: CPT | Mod: PBBFAC | Performed by: FAMILY MEDICINE

## 2024-05-01 PROCEDURE — 3008F BODY MASS INDEX DOCD: CPT | Mod: CPTII,,, | Performed by: FAMILY MEDICINE

## 2024-05-01 PROCEDURE — 99999 PR PBB SHADOW E&M-EST. PATIENT-LVL IV: CPT | Mod: PBBFAC,,, | Performed by: FAMILY MEDICINE

## 2024-05-01 PROCEDURE — 3078F DIAST BP <80 MM HG: CPT | Mod: CPTII,,, | Performed by: FAMILY MEDICINE

## 2024-05-01 PROCEDURE — 3074F SYST BP LT 130 MM HG: CPT | Mod: CPTII,,, | Performed by: FAMILY MEDICINE

## 2024-05-01 RX ORDER — METFORMIN HYDROCHLORIDE 500 MG/1
500 TABLET, EXTENDED RELEASE ORAL 2 TIMES DAILY WITH MEALS
Qty: 60 TABLET | Refills: 0 | Status: SHIPPED | OUTPATIENT
Start: 2024-05-01 | End: 2024-06-05 | Stop reason: SDUPTHER

## 2024-05-01 NOTE — PROGRESS NOTES
Subjective     Patient ID: Merari Orosco is a 44 y.o. female.    LOV 3/24    Chief Complaint: Follow-up (Follow up)    Hx of met syndrome.    PT has been good for hips. Hx of OA.    Hx of a/d has been good. Likes metformin. Has been tolerating.     Has lost from prior appt.   Reviewed with pt risks/benefits/se's of metformin.  She is still care giving for mother: they do get out and ride and sometimes goes out to eat.     Pt is working on getting water, prioritizing protein. Has a good bit of chicken. Also has some veggies: some spinach and broccoli.    Would like to get more fiber.             HPI       Objective     PAST MEDICAL HISTORY:  Past Medical History:   Diagnosis Date    Anxiety     Depression     Hypertension     Obstructive sleep apnea (AHI=20) 2023    HST 3/27/23 SLEEP STUDY FINDINGS: Patient underwent a 1 night Home Sleep Test and by behavioral criteria, slept for approximately 4.85 hours , with a sleep efficiency of 69%. Moderate sleep disordered breathing (AHI=20) is noted based on a 4% hypopnea desaturation criteria. When considering more subtle measures of sleep disordered breathing, the overall respiratory disturbance index is moderate(RD         PAST SURGICAL HISTORY:  Past Surgical History:   Procedure Laterality Date     SECTION      x1    HIP ARTHROPLASTY Right 2022    Procedure: ARTHROPLASTY, HIP;  Surgeon: Satnam Deleon MD;  Location: [facility] OR;  Service: Orthopedics;  Laterality: Right;    HIP ARTHROPLASTY Left 2022    Procedure: ARTHROPLASTY, HIP;  Surgeon: Satnam Deleon MD;  Location: [facility] OR;  Service: Orthopedics;  Laterality: Left;    INJECTION OF JOINT Bilateral 11/10/2021    Procedure: Injection, Joint;  Surgeon: Satnam Deleon MD;  Location: [facility] OR;  Service: General;  Laterality: Bilateral;  under fluoroscopy    JOINT REPLACEMENT      WISDOM TOOTH EXTRACTION         FAMILY HISTORY:  Family History   Problem Relation Name Age of Onset     Colon cancer Father LB     Diabetes Father LB     Hypertension Father LB     Hypertension Mother LMB     Diabetes Sister UB     Hypertension Sister UB     Breast cancer Neg Hx      Cancer Neg Hx      Miscarriages / Stillbirths Neg Hx      Ovarian cancer Neg Hx       labor Neg Hx      Stroke Neg Hx            SOCIAL HISTORY:  Social History     Social History Narrative    Not on file       MEDICATIONS:  Medications have been reviewed.    ALLERGIES:  Allergies have been reviewed.    Vitals:    24 0957   BP: 112/70   Pulse: 74   Resp: 18   Temp: 98.6 °F (37 °C)     Wt Readings from Last 10 Encounters:   24 94.7 kg (208 lb 12.4 oz)   24 102.4 kg (225 lb 12 oz)   24 102.4 kg (225 lb 12 oz)   24 96.3 kg (212 lb 4.9 oz)   24 96.4 kg (212 lb 8.4 oz)   24 95.7 kg (210 lb 15.7 oz)   24 95.7 kg (210 lb 15.7 oz)   10/27/23 99.1 kg (218 lb 7.6 oz)   10/16/23 99.3 kg (218 lb 14.7 oz)   23 105.2 kg (231 lb 14.8 oz)       Lab Results   Component Value Date    WBC 4.46 04/15/2024    HGB 11.2 (L) 04/15/2024    HCT 33.9 (L) 04/15/2024     04/15/2024    CHOL 178 04/15/2024    TRIG 45 04/15/2024    HDL 70 04/15/2024    ALT 21 04/15/2024    AST 22 04/15/2024     04/15/2024    K 3.4 (L) 04/15/2024     04/15/2024    CREATININE 0.7 04/15/2024    BUN 5 (L) 04/15/2024    CO2 26 04/15/2024    TSH 0.725 2020    HGBA1C 5.0 10/20/2023       Review of Systems   Constitutional:  Negative for activity change, appetite change, fatigue and fever.   HENT:  Negative for mouth dryness and goiter.    Eyes:  Negative for visual disturbance.   Respiratory:  Negative for apnea, cough, chest tightness and shortness of breath.    Cardiovascular:  Negative for chest pain, palpitations and leg swelling.   Gastrointestinal:  Negative for abdominal pain, constipation, diarrhea, nausea, vomiting and reflux.   Endocrine: Negative for cold intolerance, heat intolerance, polydipsia,  polyphagia and polyuria.   Genitourinary:  Negative for frequency and menstrual problem.   Musculoskeletal:  Negative for arthralgias and myalgias.   Integumentary:  Negative for color change and rash.   Neurological:  Negative for dizziness, vertigo, tremors, syncope, weakness, numbness, headaches and memory loss.   Psychiatric/Behavioral:  Negative for self-injury, sleep disturbance and suicidal ideas. The patient is not nervous/anxious.        Physical Exam  Vitals and nursing note reviewed.   Constitutional:       General: She is not in acute distress.  HENT:      Head: Normocephalic and atraumatic.      Mouth/Throat:      Pharynx: Oropharynx is clear.   Eyes:      General: No scleral icterus.     Pupils: Pupils are equal, round, and reactive to light.   Neck:      Comments: No TM  Cardiovascular:      Rate and Rhythm: Normal rate and regular rhythm.      Pulses: Normal pulses.      Heart sounds: Normal heart sounds. No murmur heard.     No friction rub. No gallop.   Pulmonary:      Effort: Pulmonary effort is normal. No respiratory distress.      Breath sounds: Normal breath sounds. No wheezing, rhonchi or rales.   Abdominal:      General: Bowel sounds are normal. There is no distension.      Palpations: Abdomen is soft.      Tenderness: There is no abdominal tenderness.   Musculoskeletal:         General: No swelling.      Cervical back: Normal range of motion and neck supple. No tenderness.   Lymphadenopathy:      Cervical: No cervical adenopathy.   Skin:     General: Skin is warm.      Findings: No erythema or rash.   Neurological:      Mental Status: She is alert and oriented to person, place, and time.   Psychiatric:         Mood and Affect: Mood normal.         Behavior: Behavior normal.              Assessment and Plan     1. Class 1 obesity with serious comorbidity and body mass index (BMI) of 34.0 to 34.9 in adult, unspecified obesity type    2. Weight gain    3. Metabolic syndrome    4. Family history  of diabetes mellitus (DM)      Class 1 obesity with serious comorbidity and body mass index (BMI) of 34.0 to 34.9 in adult, unspecified obesity type  Comments:  pt has lost approxmately 30 lb with lifestyle interventions  will consider metformin ;sent in for pt   check insulin level  meets met syndrome criteria  Orders:  -     metFORMIN (GLUCOPHAGE-XR) 500 MG ER 24hr tablet; Take 1 tablet (500 mg total) by mouth 2 (two) times daily with meals.  Dispense: 60 tablet; Refill: 0    Weight gain    Metabolic syndrome  Comments:  reviewed with pt risks/benefits/se's of metformin   some chronic constipation; pt to try  Orders:  -     metFORMIN (GLUCOPHAGE-XR) 500 MG ER 24hr tablet; Take 1 tablet (500 mg total) by mouth 2 (two) times daily with meals.  Dispense: 60 tablet; Refill: 0    Family history of diabetes mellitus (DM)  -     metFORMIN (GLUCOPHAGE-XR) 500 MG ER 24hr tablet; Take 1 tablet (500 mg total) by mouth 2 (two) times daily with meals.  Dispense: 60 tablet; Refill: 0      Refill metformin       Pt made aware I will be leaving [facility] in June. She has established pcp so have advised her f/u with them this summer.   Also reviewed potential external community options.     Pt advised to make a primary care appointment this summer    Follow up if symptoms worsen or fail to improve.

## 2024-05-01 NOTE — PATIENT INSTRUCTIONS
Ultimate goal 25 grams/day.    Benefiber/metamucil supplement    Online course/free:    [email or web address]    Please make primary care appointment with Dr Stone this summer.

## 2024-05-03 ENCOUNTER — CLINICAL SUPPORT (OUTPATIENT)
Facility: HOSPITAL | Age: 44
End: 2024-05-03
Payer: MEDICAID

## 2024-05-03 DIAGNOSIS — R29.898 WEAKNESS OF BOTH HIPS: Primary | ICD-10-CM

## 2024-05-03 PROCEDURE — 97110 THERAPEUTIC EXERCISES: CPT | Mod: PN | Performed by: PHYSICAL THERAPIST

## 2024-05-03 NOTE — PROGRESS NOTES
[facility]   Physical Therapy Treatment Note     [facility] [id number]    Therapy Diagnosis:   Encounter Diagnosis   Name Primary?    Weakness of both hips Yes     Physician: Satnam Deleon MD    Visit Date: 5/3/2024    Physician Orders: PT Eval and Treat  Medical Diagnosis from Referral:       Encounter Diagnoses   Name Primary?    Lumbar pain      H/O total hip arthroplasty, right      Hx of total hip arthroplasty, left        Evaluation Date: 2/23/2024  Authorization Period Expiration: 2/18/2025  Plan of Care Expiration: 5/22/2024    Progress Update: 5/22/20234  Visit # / Visits authorized: 14/16        FOTO: Visit #1 2/23/2024 - Scored: 1 / 3     Time In: 0950 am  Time Out: 1100 am  Total Billable Time: 61 minutes    SUBJECTIVE     Pt reports: Feeling good today, no new complaints  She was compliant with home exercise program.  Response to previous treatment: increased soreness.  Functional change: independent with HEP.     Pain: 2/10  Location: bilateral hip    OBJECTIVE     Objective Measures updated at progress report unless specified.     Treatment     Merari received the treatments listed below:      [facility]   Physical Therapy Treatment Note     [facility] Number: [id number]    Therapy Diagnosis:   Encounter Diagnosis   Name Primary?    Weakness of both hips Yes     Physician: Satnam Deleon MD    Visit Date: 5/3/2024    Physician Orders: PT Eval and Treat  Medical Diagnosis from Referral:       Encounter Diagnoses   Name Primary?    Lumbar pain      H/O total hip arthroplasty, right      Hx of total hip arthroplasty, left        Evaluation Date: 2/23/2024  Authorization Period Expiration: 2/18/2025  Plan of Care Expiration: 5/22/2024    Progress Update: 4/22/20234  Visit # / Visits authorized: 12/16        FOTO: Visit #1 2/23/2024 - Scored: 1 / 3     Time In: 0950 am  Time Out: 1100 am  Total  Billable Time: 61 minutes    SUBJECTIVE     Pt reports: Feeling good today, no new complaints  She was compliant with home exercise program.  Response to previous treatment: increased soreness.  Functional change: independent with HEP.     Pain: 2/10  Location: bilateral hip    OBJECTIVE     Objective Measures updated at progress report unless specified.     Treatment     Merari received the treatments listed below:      Intervention Code  (see below chart) Notes      Recumbent Bike TE 10' L1   Seated HS stretch  TE  3x30s isabel   Prone quad stretch TE 3x30s isabel   Glute bridges NMR Green band, 3x10   Clam shells side-lying NMR Green band, 3x10 isabel   TB hip abduction NMR RTB with stick 2 x 10   DL Leg Press #, 3x15   Goblet squats TE 30# 3 x10   DL RDL TE 25#KB, 3x10   Knee Extension TE 35# sgl, 3 x 10     55 minutes of Therapeutic Exercise (TE) to develop strength, endurance, ROM, and flexibility. (51343)      Patient Education and Home Exercises     Home Exercises Provided and Patient Education Provided     Education provided:   - HEP, PT POC    Written Home Exercises Provided: yes. Exercises were reviewed and Merari was able to demonstrate them prior to the end of the session.  Merari demonstrated good  understanding of the education provided. See EMR under Patient Instructions for exercises provided during therapy sessions    ASSESSMENT     Merari Orosco tolerated PT session well with minimal  complaints of pain or discomfort. Patient continues to have impaired functional capacity but she has had improved symptoms overall. Good tolerance to progression of exercises today  Updated home exercises were not issued during today's visit. Merari demonstrated good understanding of new exercises and will continue to progress at home until next follow-up.         Merari Is progressing well towards her goals.   Pt prognosis is Excellent.     Pt will continue to benefit from skilled outpatient physical  therapy to address the deficits listed in the problem list box on initial evaluation, provide pt/family education and to maximize pt's level of independence in the home and community environment.     Pt's spiritual, cultural and educational needs considered and pt agreeable to plan of care and goals.     Anticipated barriers to physical therapy: None    Goals:  SHORT TERM GOALS:  4 week Progress Date Met   Recent signs and systems trend is improving in order to progress towards Long term goals.  [] Met  [] Not Met  [x] Progressing     Patient will be independent with Home Exercise Program  in order to further progress and return to maximal function. [] Met  [] Not Met  [] Progressing     Pain rating at Worst: 5 /10 in order to progress towards increased independence with activity. [] Met  [] Not Met  [] Progressing     Patient will be able to correct postural deviations in sitting and standing, to decrease pain and promote postural awareness for injury prevention.  [] Met  [] Not Met  [] Progressing     Patient will improve functional outcome (FOTO) score: by 5% to increase self-worth & perceived functional ability towards long term goals [] Met  [] Not Met  [] Progressing        LONG TERM GOALS: 8 weeks Progress Date Met   Patient will return to normal activites of daily living, recreational, and work related activities with less pain and limitation.  [] Met  [] Not Met  [] Progressing     Patient will improve range of motion  to stated goals in order to return to maximal functional potential.  [] Met  [] Not Met  [] Progressing     Patient will improve Strength to stated goals of appropriate musculature in order to improve functional independence.  [] Met  [] Not Met  [] Progressing     Pain Rating at Best: 1/10 to improve Quality of Life.  [] Met  [] Not Met  [] Progressing       PLAN   Continue per plan of care.  Progress as tolerated.    Delvis Maynard, PT, DPT, SCS      61 minutes of Therapeutic Exercise (TE) to  develop strength, endurance, ROM, and flexibility. (10194)      Patient Education and Home Exercises     Home Exercises Provided and Patient Education Provided     Education provided:   - HEP, PT POC    Written Home Exercises Provided: yes. Exercises were reviewed and Merari was able to demonstrate them prior to the end of the session.  Merari demonstrated good  understanding of the education provided. See EMR under Patient Instructions for exercises provided during therapy sessions    ASSESSMENT     Merari Orosco tolerated PT session well with minimal  complaints of pain or discomfort. Patient continues to have impaired functional capacity but she has had improved symptoms overall. Good tolerance to progression of exercises today  Updated home exercises were not issued during today's visit. Merari demonstrated good understanding of new exercises and will continue to progress at home until next follow-up.         Merari Is progressing well towards her goals.   Pt prognosis is Excellent.     Pt will continue to benefit from skilled outpatient physical therapy to address the deficits listed in the problem list box on initial evaluation, provide pt/family education and to maximize pt's level of independence in the home and community environment.     Pt's spiritual, cultural and educational needs considered and pt agreeable to plan of care and goals.     Anticipated barriers to physical therapy: None    Goals:  SHORT TERM GOALS:  4 week Progress Date Met   Recent signs and systems trend is improving in order to progress towards Long term goals.  [] Met  [] Not Met  [x] Progressing     Patient will be independent with Home Exercise Program  in order to further progress and return to maximal function. [] Met  [] Not Met  [] Progressing     Pain rating at Worst: 5 /10 in order to progress towards increased independence with activity. [] Met  [] Not Met  [] Progressing     Patient will be able to correct postural  deviations in sitting and standing, to decrease pain and promote postural awareness for injury prevention.  [] Met  [] Not Met  [] Progressing     Patient will improve functional outcome (FOTO) score: by 5% to increase self-worth & perceived functional ability towards long term goals [] Met  [] Not Met  [] Progressing        LONG TERM GOALS: 8 weeks Progress Date Met   Patient will return to normal activites of daily living, recreational, and work related activities with less pain and limitation.  [] Met  [] Not Met  [] Progressing     Patient will improve range of motion  to stated goals in order to return to maximal functional potential.  [] Met  [] Not Met  [] Progressing     Patient will improve Strength to stated goals of appropriate musculature in order to improve functional independence.  [] Met  [] Not Met  [] Progressing     Pain Rating at Best: 1/10 to improve Quality of Life.  [] Met  [] Not Met  [] Progressing       PLAN   Continue per plan of care.  Progress as tolerated.    Delvis Maynard, PT, DPT, SCS

## 2024-05-04 NOTE — PROGRESS NOTES
Merari Harris.    I am happy to report that your recent breast imaging did NOT show evidence of cancer.    An annual mammogram is the best test to screen for breast cancer, but it is not perfect, and it can miss some cancers. So, even though your mammogram was normal, if you notice any lump or change in one of your breasts, please schedule an appointment with me for a proper evaluation.    Thanks for letting me care for you, and thanks for trusting [facility] with your healthcare needs.    Sincerely,    MARTINE Stone MD

## 2024-05-07 ENCOUNTER — CLINICAL SUPPORT (OUTPATIENT)
Facility: HOSPITAL | Age: 44
End: 2024-05-07
Payer: MEDICAID

## 2024-05-07 DIAGNOSIS — R29.898 WEAKNESS OF BOTH HIPS: Primary | ICD-10-CM

## 2024-05-07 PROCEDURE — 97110 THERAPEUTIC EXERCISES: CPT | Mod: PN | Performed by: PHYSICAL THERAPIST

## 2024-05-09 NOTE — PROGRESS NOTES
[facility]   Physical Therapy Treatment Note     Name: Merari Orosco  [facility] Number: [id number]    Therapy Diagnosis:   Encounter Diagnosis   Name Primary?    Weakness of both hips Yes     Physician: Satnam Deleon MD    Visit Date: 5/7/2024    Physician Orders: PT Eval and Treat  Medical Diagnosis from Referral:       Encounter Diagnoses   Name Primary?    Lumbar pain      H/O total hip arthroplasty, right      Hx of total hip arthroplasty, left        Evaluation Date: 2/23/2024  Authorization Period Expiration: 2/18/2025  Plan of Care Expiration: 5/22/2024    Progress Update: 5/22/20234  Visit # / Visits authorized: 14/16        FOTO: Visit #1 2/23/2024 - Scored: 1 / 3     Time In: 0950 am  Time Out: 1100 am  Total Billable Time: 61 minutes    SUBJECTIVE     Pt reports: Feeling good today, no new complaints  She was compliant with home exercise program.  Response to previous treatment: increased soreness.  Functional change: independent with HEP.     Pain: 2/10  Location: bilateral hip    OBJECTIVE     Objective Measures updated at progress report unless specified.     Treatment     Merari received the treatments listed below:    Intervention Code  (see below chart) Today Notes  5/3   Recumbent Bike TE 10' L1   Seated HS stretch  TE  3x30s isabel   Prone quad stretch TE 3x30s isabel   Glute bridges NMR Green band, 3x10   Clam shells  NMR     TB hip abduction NMR RTB with chair 3 x 10   DL Leg Press #, 3x15   Goblet squats TE 30# 3 x10   DL RDL TE 25#KB, 3x10   Knee Extension TE 35# sgl, 3 x 10   55 minutes of Therapeutic Exercise (TE) to develop strength, endurance, ROM, and flexibility. (17235)    Patient Education and Home Exercises     Home Exercises Provided and Patient Education Provided     Education provided:   - HEP, PT POC    Written Home Exercises Provided: yes. Exercises were reviewed and Merari was able to demonstrate them prior to the end of the session.   Merari demonstrated good  understanding of the education provided. See EMR under Patient Instructions for exercises provided during therapy sessions    ASSESSMENT     Merari Orosco tolerated PT session well with minimal  complaints of pain or discomfort. Patient continues to have impaired functional capacity but she has had improved symptoms overall. Good tolerance to progression of exercises today  Updated home exercises were not issued during today's visit. Merari demonstrated good understanding of new exercises and will continue to progress at home until next follow-up.         Merari Is progressing well towards her goals.   Pt prognosis is Excellent.     Pt will continue to benefit from skilled outpatient physical therapy to address the deficits listed in the problem list box on initial evaluation, provide pt/family education and to maximize pt's level of independence in the home and community environment.     Pt's spiritual, cultural and educational needs considered and pt agreeable to plan of care and goals.     Anticipated barriers to physical therapy: None    Goals:  SHORT TERM GOALS:  4 week Progress Date Met   Recent signs and systems trend is improving in order to progress towards Long term goals.  [] Met  [] Not Met  [x] Progressing     Patient will be independent with Home Exercise Program  in order to further progress and return to maximal function. [] Met  [] Not Met  [] Progressing     Pain rating at Worst: 5 /10 in order to progress towards increased independence with activity. [] Met  [] Not Met  [] Progressing     Patient will be able to correct postural deviations in sitting and standing, to decrease pain and promote postural awareness for injury prevention.  [] Met  [] Not Met  [] Progressing     Patient will improve functional outcome (FOTO) score: by 5% to increase self-worth & perceived functional ability towards long term goals [] Met  [] Not Met  [] Progressing        LONG TERM  GOALS: 8 weeks Progress Date Met   Patient will return to normal activites of daily living, recreational, and work related activities with less pain and limitation.  [] Met  [] Not Met  [] Progressing     Patient will improve range of motion  to stated goals in order to return to maximal functional potential.  [] Met  [] Not Met  [] Progressing     Patient will improve Strength to stated goals of appropriate musculature in order to improve functional independence.  [] Met  [] Not Met  [] Progressing     Pain Rating at Best: 1/10 to improve Quality of Life.  [] Met  [] Not Met  [] Progressing       PLAN   Continue per plan of care.  Progress as tolerated.    Delvis Maynard, PT, DPT, SCS

## 2024-05-13 ENCOUNTER — HOSPITAL ENCOUNTER (OUTPATIENT)
Dept: CARDIOLOGY | Facility: HOSPITAL | Age: 44
Discharge: HOME | End: 2024-05-13
Attending: FAMILY MEDICINE
Payer: MEDICAID

## 2024-05-13 DIAGNOSIS — I10 ESSENTIAL HYPERTENSION: Chronic | ICD-10-CM

## 2024-05-13 LAB
OHS QRS DURATION: 94 MS
OHS QTC CALCULATION: 424 MS

## 2024-05-13 PROCEDURE — 93010 ELECTROCARDIOGRAM REPORT: CPT | Mod: ,,, | Performed by: INTERNAL MEDICINE

## 2024-05-13 PROCEDURE — 93005 ELECTROCARDIOGRAM TRACING: CPT

## 2024-05-20 ENCOUNTER — TELEPHONE (OUTPATIENT)
Dept: ORTHOPEDICS | Facility: CLINIC | Age: 44
End: 2024-05-20
Payer: MEDICAID

## 2024-05-20 NOTE — TELEPHONE ENCOUNTER
----- Message from Gustabo Padilla sent at 5/20/2024  9:33 AM CDT -----  Contact: patient  Merari Orosco would like a call back at [phone], in regards to rescheduling her appt she missed on today.

## 2024-05-20 NOTE — TELEPHONE ENCOUNTER
Returned the patient's phone call in regards to their message. No answer left a message for the patient to give the office a call back.

## 2024-06-05 DIAGNOSIS — E66.9 CLASS 1 OBESITY WITH SERIOUS COMORBIDITY AND BODY MASS INDEX (BMI) OF 34.0 TO 34.9 IN ADULT, UNSPECIFIED OBESITY TYPE: ICD-10-CM

## 2024-06-05 DIAGNOSIS — E88.810 METABOLIC SYNDROME: ICD-10-CM

## 2024-06-05 DIAGNOSIS — Z83.3 FAMILY HISTORY OF DIABETES MELLITUS (DM): ICD-10-CM

## 2024-06-05 NOTE — TELEPHONE ENCOUNTER
Care Due:                  Date            Visit Type   Department     Provider  --------------------------------------------------------------------------------                                EP -                              PRIMARY      [facility]  Last Visit: 04-      CARE ([facility])   MEDICINE       Ced Stone                              ESTABLISHED                              PATIENT -    [facility]  Next Visit: 10-      VIRTUAL      MEDICINE       Ced Stone                                                            Last  Test          Frequency    Reason                     Performed    Due Date  --------------------------------------------------------------------------------    HBA1C.......  6 months...  metFORMIN................  10-   04-    Vitamin D...  12 months..  ergocalciferol...........  Not Found    Overdue    [employer] Embedded Care Due Messages. Reference number: [id number].   6/05/2024 12:39:21 PM CDT

## 2024-06-05 NOTE — TELEPHONE ENCOUNTER
Refill Routing Note   Medication(s) are not appropriate for processing by [facility] for the following reason(s):        No active prescription written by provider  Required labs outdated    [facility] action(s):  Defer   Requires labs : Yes             Appointments  past 12m or future 3m with PCP    Date Provider   Last Visit   4/22/2024 YESY Stone MD   Next Visit   10/22/2024 YESY Stone MD   ED visits in past 90 days: 0        Note composed:5:57 PM 06/05/2024

## 2024-06-09 RX ORDER — METFORMIN HYDROCHLORIDE 500 MG/1
500 TABLET, EXTENDED RELEASE ORAL 2 TIMES DAILY WITH MEALS
Qty: 180 TABLET | Refills: 1 | Status: SHIPPED | OUTPATIENT
Start: 2024-06-09 | End: 2024-12-06

## 2024-06-09 NOTE — TELEPHONE ENCOUNTER
REFILL REQUEST APPROVED  Requested Prescriptions   Pending Prescriptions Disp Refills    metFORMIN (GLUCOPHAGE-XR) 500 MG ER 24hr tablet 180 tablet 0     Sig: Take 1 tablet (500 mg total) by mouth 2 (two) times daily with meals.      Localized Dermabrasion Text: The patient was draped in routine manner.  Localized dermabrasion using 3 x 17 mm wire brush was performed in routine manner to papillary dermis. This spot dermabrasion is being performed to complete skin cancer reconstruction. It also will eliminate the other sun damaged precancerous cells that are known to be part of the regional effect of a lifetime's worth of sun exposure. This localized dermabrasion is therapeutic and should not be considered cosmetic in any regard. Localized Dermabrasion With Wire Brush Text: The patient was draped in routine manner.  Localized dermabrasion using 3 x 17 mm wire brush was performed in routine manner to papillary dermis. This spot dermabrasion is being performed to complete skin cancer reconstruction. It also will eliminate the other sun damaged precancerous cells that are known to be part of the regional effect of a lifetime's worth of sun exposure. This localized dermabrasion is therapeutic and should not be considered cosmetic in any regard.

## 2024-06-18 ENCOUNTER — TELEPHONE (OUTPATIENT)
Dept: ORTHOPEDICS | Facility: CLINIC | Age: 44
End: 2024-06-18
Payer: MEDICAID

## 2024-06-18 NOTE — TELEPHONE ENCOUNTER
----- Message from May Holly sent at 6/18/2024  8:04 AM CDT -----  Contact: Merari  Type:  Appointment Request      Name of Caller: Merari   When is the first available appointment?N/A  Symptoms: Follow up/ surgery   Would the patient rather a call back or a response via [facility]? Call back   Best Call Back Number:[phone]   Additional Information:

## 2024-06-18 NOTE — TELEPHONE ENCOUNTER
Returned the patient's phone call in regards to their message. Patient states that they need to reschedule for some time in August. I got the patient schedule for 08/22/24 at 10:40. Patient verbalized understanding.

## 2025-08-29 ENCOUNTER — PATIENT MESSAGE (OUTPATIENT)
Dept: ADMINISTRATIVE | Facility: HOSPITAL | Age: 45
End: 2025-08-29
Payer: MEDICAID

## (undated) DEVICE — GAUZE SPONGE 4X4 12PLY

## (undated) DEVICE — HOOD FLYTE PEELWY STERISHIELD

## (undated) DEVICE — SEE MEDLINE ITEM 157117

## (undated) DEVICE — GLOVE BIOGEL PI ORTHO PRO 7.5

## (undated) DEVICE — SEE MEDLINE ITEM 157131

## (undated) DEVICE — NDL SPINAL 18GX3.5 SPINOCAN

## (undated) DEVICE — COVER LIGHT HANDLE 80/CA

## (undated) DEVICE — SYR ONLY LUER LOCK 20CC

## (undated) DEVICE — STAPLER SKIN PROXIMATE WIDE

## (undated) DEVICE — SOL IRR NACL .9% 3000ML

## (undated) DEVICE — UNDERGLOVES BIOGEL PI SIZE 8.5

## (undated) DEVICE — POSITIONER HEAD DONUT 9IN FOAM

## (undated) DEVICE — TAPE SILK 3IN

## (undated) DEVICE — DRAPE PLASTIC U 60X72

## (undated) DEVICE — DRESSING PICO 7 TWO 10X30CM

## (undated) DEVICE — SYR 10CC LUER LOCK

## (undated) DEVICE — SEE MEDLINE ITEM 157125

## (undated) DEVICE — TOWEL OR DISP STRL BLUE 4/PK

## (undated) DEVICE — ALCOHOL 70% ISOP RUBBING 4OZ

## (undated) DEVICE — MANIFOLD 4 PORT

## (undated) DEVICE — DRAPE MOBILE C-ARM

## (undated) DEVICE — DRESSING GAUZE XEROFORM 5X9

## (undated) DEVICE — BLADE SAG 18.0X1.27X100

## (undated) DEVICE — NDL SPINAL X-LONG 18GA

## (undated) DEVICE — GLOVE SURG BIOGEL LATEX SZ 7.5

## (undated) DEVICE — DRAPE INCISE IOBAN 2 23X33IN

## (undated) DEVICE — UNDERGLOVES BIOGEL PI SIZE 8

## (undated) DEVICE — DRAPE STERI U-SHAPED 47X51IN

## (undated) DEVICE — TAPE SURG MEDIPORE 6X72IN

## (undated) DEVICE — DRAPE HIP TIBURON 87X115X134

## (undated) DEVICE — ELECTRODE BLD EXT 6.50 ST DISP

## (undated) DEVICE — PAD ABD 8X10 STERILE

## (undated) DEVICE — SUPPORT ULNA NERVE PROTECTOR

## (undated) DEVICE — SEE MEDLINE ITEM 157216

## (undated) DEVICE — INTERPULSE SET

## (undated) DEVICE — NDL HYPODERMIC BLUNT 18G 1.5IN

## (undated) DEVICE — UNDERGLOVES BIOGEL PI SIZE 7.5

## (undated) DEVICE — IMMOB KNEE UNIV TRI PANEL 19IN

## (undated) DEVICE — SKIN MARKER DEVON 160

## (undated) DEVICE — SEE MEDLINE ITEM 157027

## (undated) DEVICE — BNDG COFLEX FOAM LF2 ST 4X5YD

## (undated) DEVICE — SYR 3CC LUER LOC

## (undated) DEVICE — GLOVE SURGICAL LATEX SZ 8

## (undated) DEVICE — SEE MEDLINE ITEM 146292

## (undated) DEVICE — ELECTRODE REM PLYHSV RETURN 9

## (undated) DEVICE — SUT VICRYL 1 OB 36 CTX

## (undated) DEVICE — CONTAINER SPECIMEN OR STER 4OZ

## (undated) DEVICE — CHLORAPREP 10.5 ML APPLICATOR